# Patient Record
Sex: FEMALE | Race: WHITE | NOT HISPANIC OR LATINO | Employment: OTHER | URBAN - METROPOLITAN AREA
[De-identification: names, ages, dates, MRNs, and addresses within clinical notes are randomized per-mention and may not be internally consistent; named-entity substitution may affect disease eponyms.]

---

## 2021-01-08 ENCOUNTER — HOSPITAL ENCOUNTER (OUTPATIENT)
Dept: LAB | Facility: MEDICAL CENTER | Age: 71
End: 2021-01-08
Attending: PSYCHIATRY & NEUROLOGY
Payer: MEDICARE

## 2021-01-08 ENCOUNTER — OFFICE VISIT (OUTPATIENT)
Dept: NEUROLOGY | Facility: MEDICAL CENTER | Age: 71
End: 2021-01-08
Attending: PSYCHIATRY & NEUROLOGY
Payer: MEDICARE

## 2021-01-08 VITALS
HEART RATE: 94 BPM | TEMPERATURE: 99.4 F | RESPIRATION RATE: 16 BRPM | BODY MASS INDEX: 22.82 KG/M2 | WEIGHT: 141.98 LBS | HEIGHT: 66 IN | OXYGEN SATURATION: 95 % | SYSTOLIC BLOOD PRESSURE: 128 MMHG | DIASTOLIC BLOOD PRESSURE: 65 MMHG

## 2021-01-08 DIAGNOSIS — M31.6 GCA (GIANT CELL ARTERITIS) (HCC): ICD-10-CM

## 2021-01-08 DIAGNOSIS — M31.6 GCA (GIANT CELL ARTERITIS) (HCC): Primary | ICD-10-CM

## 2021-01-08 LAB — ERYTHROCYTE [SEDIMENTATION RATE] IN BLOOD BY WESTERGREN METHOD: <1 MM/HOUR (ref 0–30)

## 2021-01-08 PROCEDURE — 99212 OFFICE O/P EST SF 10 MIN: CPT | Performed by: PSYCHIATRY & NEUROLOGY

## 2021-01-08 PROCEDURE — 86039 ANTINUCLEAR ANTIBODIES (ANA): CPT

## 2021-01-08 PROCEDURE — 36415 COLL VENOUS BLD VENIPUNCTURE: CPT

## 2021-01-08 PROCEDURE — 86038 ANTINUCLEAR ANTIBODIES: CPT

## 2021-01-08 PROCEDURE — 99205 OFFICE O/P NEW HI 60 MIN: CPT | Performed by: PSYCHIATRY & NEUROLOGY

## 2021-01-08 PROCEDURE — 85652 RBC SED RATE AUTOMATED: CPT

## 2021-01-08 RX ORDER — ASPIRIN 81 MG/1
81 TABLET ORAL EVERY EVENING
COMMUNITY

## 2021-01-08 RX ORDER — OMEPRAZOLE 20 MG/1
CAPSULE, DELAYED RELEASE ORAL
COMMUNITY
Start: 2020-10-16 | End: 2021-04-15

## 2021-01-08 RX ORDER — CHOLECALCIFEROL (VITAMIN D3) 125 MCG
2000 CAPSULE ORAL DAILY
COMMUNITY

## 2021-01-08 RX ORDER — ATORVASTATIN CALCIUM 40 MG/1
40 TABLET, FILM COATED ORAL DAILY
Status: ON HOLD | COMMUNITY
Start: 2020-11-04 | End: 2021-04-17

## 2021-01-08 RX ORDER — OMEPRAZOLE 20 MG/1
20 CAPSULE, DELAYED RELEASE ORAL DAILY
COMMUNITY
Start: 2020-10-16 | End: 2021-04-15

## 2021-01-08 RX ORDER — AZELASTINE 1 MG/ML
SPRAY, METERED NASAL
COMMUNITY
Start: 2020-10-20 | End: 2021-04-15

## 2021-01-08 RX ORDER — PREDNISONE 20 MG/1
TABLET ORAL
COMMUNITY
Start: 2020-12-28 | End: 2021-01-11 | Stop reason: SDUPTHER

## 2021-01-08 ASSESSMENT — PATIENT HEALTH QUESTIONNAIRE - PHQ9: CLINICAL INTERPRETATION OF PHQ2 SCORE: 0

## 2021-01-10 ASSESSMENT — ENCOUNTER SYMPTOMS
BLURRED VISION: 1
HEADACHES: 1
DOUBLE VISION: 1
LOSS OF CONSCIOUSNESS: 0
SEIZURES: 0
FOCAL WEAKNESS: 0
MEMORY LOSS: 0
MYALGIAS: 0
NECK PAIN: 0
SENSORY CHANGE: 0

## 2021-01-10 NOTE — PROGRESS NOTES
Naeem Pelletier 59  OCCUPATIONAL THERAPY MISSED TREATMENT NOTE  [facility]  [id number]      Date: 2019  Patient Name: David العلي        CSN: [id number]   : 1936  (79 y.o.)  Gender: male                REASON FOR MISSED TREATMENT:  Missed Treat. Pt is eating breakfast, nurse reports possible back to [facility] today. Will check back at a later date if appropriate. Subjective:      Carine Christopher is a 70 y.o. female who presents for consultation, from the office of Dr. Khai Deras MD, with a history of acute onset headaches back in March, 2020, which have persisted and have seemingly been quite responsive to prednisone suggestive of possible GCA.     HPI    Patient is a pleasant 70-year-old right-handed woman whose headaches started quite suddenly in March of last year.  She has no history of migraine or other primary headache disorder.  The headache has always been right-sided, starting over the right occiput but in time and beginning to spread over into the right temple.  At their worst the headaches have been associated with some bilateral visual blurring but she has never had unilateral, ipsilateral visual loss.  She has complained of diplopia.  The pain itself has been constant, another occasion stabbing, energy levels have diminished, there is notable heightened sensitivity to light, but no GI disturbances or other migrainous stigmata.    She has noted a mass and tenderness that seems to have moved, associated with the pain over the right occiput and then in time over the right temple.  It is exquisitely sensitive to compression.  There is no neck pain or stiffness.  She denies any ipsilateral dysautonomia with lacrimation, conjunctival injection or rhinorrhea.    Since onset, she had one episode where her jaw actually locked, she could not open or close her mouth.  She saw a dentist with some resolution, no change in the headaches themselves.  She has been chewing on Motrin 800 mg tablets like candy.  This jaw event has never recurred.  She denies any pain with chewing or talking in the jaw itself.    She denies any systemic symptoms such as fevers, malaise, generalized myalgias, arthralgias, rash, etc.    She has seen her cardiologist, in June, 2020, evidently had suffered from a heart attack, and a stent was placed.  Placed on Plavix and aspirin, she has begun  to bruise quite profoundly.  He was the impression, given the symptoms, that she may be suffering from GCA.  With her most recent appointment, prednisone was started and this provided consistent benefit.  The problem with her prednisone is that she is putting on weight, and a lot of it!    Through Dr. Deras, additional blood work was drawn, prednisone was again started for 5 days and this gave her more than 2 weeks of benefit though the headaches then recurred.  She did this again with the same pattern of headache response and recurrence.  With steroids all of her symptoms associated with the headaches resolved as well as this tender mass.  Dizziness and some of the visual distortions as well got better though they never resolved completely.    She was then seen at [facility] for the symptoms, ESR was normal at 13, CRP slightly elevated at 1.13, but she was told that she had migraine headaches and nothing more.  Imaging of the brain with CT scan was unremarkable.  Back with Dr. Deras, Plavix has been held pending possible temporal artery biopsy.  She is back on prednisone 20 mg daily.    She has a history of CAD, reflux and dyslipidemia, no history of migraine or other primary headache disorder, diabetes, hypertension, PVD, CVA, seizure, MS, arrhythmia, malignancy, thyroid disease or other autoimmune disease.  There is no surgical history of note.    Her mother is 90 years of age, has suffered from migraine headache, her father and 3 siblings are all alive and well.  No one has a history of similar symptoms as well as autoimmune disease, CVA or malignancy.    She does not smoke, drinks alcohol socially.  Alcohol has no effect on her symptoms 1 way or the other.  She is on multivitamins, prednisone 20 mg daily, Prilosec, hormone supplementation, vitamin D, baby aspirin and Lipitor.    Review of Systems   Constitutional: Negative for malaise/fatigue.   Eyes: Positive for blurred vision and double vision.  "  Musculoskeletal: Negative for myalgias and neck pain.   Neurological: Positive for headaches. Negative for sensory change, focal weakness, seizures and loss of consciousness.   Psychiatric/Behavioral: Negative for memory loss.   All other systems reviewed and are negative.       Objective:     /65 (BP Location: Right arm, Patient Position: Sitting, BP Cuff Size: Adult)   Pulse 94   Temp 37.4 °C (99.4 °F) (Temporal)   Resp 16   Ht 1.664 m (5' 5.5\")   Wt 64.4 kg (141 lb 15.6 oz)   SpO2 95%   BMI 23.27 kg/m²      Physical Exam    She appears in no acute distress.  Still she is quite anxious regarding her symptoms.  She is cooperative.  Vital signs are stable.  There is no malar rash.  The neck is supple.  Cardiac evaluation is unremarkable.  She Chvostek sign is absent, there is no tenderness along the jaws margin bilaterally.  There is no tenderness of the right temple, I cannot palpate any type of soft tissue mass.  There is no conjunctival injection, or ipsilateral rhinorrhea and lacrimation on the right.    Cognition reveals no evidence of deficit, fully oriented, there is no aphasia, agnosia, apraxia, or inattention.    PERRLA/EOMI, visual fields are full to finger counting bilaterally, funduscopic exam reveals sharp disc margins bilaterally.  Facial movements are symmetric, sensory exam is intact to temperature and light touch bilaterally.  The tongue and uvula are midline without bulbar dysfunction.  Shoulder shrug and head rotation are intact and symmetric.    Musculoskeletal exam reveals normal tone throughout, there is no tremor, asterixis, or drift.  Strength is 5/5 throughout, reflexes are present throughout, there are no asymmetries and none are dropped, both toes are downgoing.    She stands easily, arm swing is symmetric, heel, toe, and tandem walking are all normal.  There is no appendicular dystaxia, repetitive movements and fine motor control are also normal with intact and symmetric " amplitude and frequencies throughout.    Sensory exam is intact to temperature and light touch, Romberg is absent.     Assessment/Plan:     1. GCA (giant cell arteritis) (HCC)  I agree that this is not migraine or any other likely primary headache disorder such as autonomic cephalalgias such as cluster headache, chronic paroxysmal hemicrania, etc.  This is not part of polymyalgia rheumatica, the sensitivity to steroids is certainly consistent with GCA, but giant cell artery disease may also have more systemic manifestations, though fortunately none of this has been discovered.  This is not a manifestation of primary CNS vasculitis, though other systemic autoimmune disease could be considered, though easily screened for through blood serologies.    For now, higher doses of steroids are indicated, typically at 60 mg daily for several weeks and then a slow titration by 10 mg increments.  Fortunately, up to now, she has had no ipsilateral visual loss, though she certainly is at risk.  In the interim she was told to talk with Dr. Deras about Plavix and aspirin, especially since the former is creating a problem with bruising.  As for the weight gain on prednisone, unfortunately she is going to have to bite the bullet on this, she was told to alter her diet accordingly, continue to exercise, etc.    Imaging of the brain as well as cerebrovascular bed is indicated.  Sedimentation rate should be checked to see if there has been any major change, it is often used as an indicator of disease control.  Biopsy probably should be done, and even though she has been on steroids, abnormalities can often be seen upwards of several weeks after steroid treatment is initiated.  Neuro-ophthalmology evaluation also is indicated as a baseline.    We talked about the nature of this condition, the rationale for steroid use, we also talked about her prognosis which can actually be quite good assuming the diagnosis is established and  appropriate treatments initiated and maintained.  I will try to contact an appropriate surgeon for biopsy if this has not already been arranged through Dr. Deras.  She now we will play phone contact in the interim.    - MR-BRAIN-WITH & W/O; Future  - MR-MRA HEAD-W/O; Future  - Sed Rate; Future  - VICKIE  - AMB REFERRAL TO NEURO OPHTHALMOLOGY    Time: 60-minute spent face-to-face for exam, review, discussion, and education, of this over 50% of the time spent counseling and coordinating care.

## 2021-01-11 DIAGNOSIS — M31.6 GCA (GIANT CELL ARTERITIS) (HCC): ICD-10-CM

## 2021-01-11 LAB — NUCLEAR IGG SER QL IA: DETECTED

## 2021-01-11 RX ORDER — PREDNISONE 20 MG/1
TABLET ORAL
Qty: 168 TAB | Refills: 0 | Status: ON HOLD
Start: 2021-01-11 | End: 2021-04-17

## 2021-01-11 NOTE — TELEPHONE ENCOUNTER
Received request via: Patient     Was the patient seen in the last year in this department? Yes    Does the patient have an active prescription (recently filled or refills available) for medication(s) requested? No     Patient did not receive medication on 01/08/2021

## 2021-01-12 LAB
ANA INTERPRETIVE COMMENT Q5143: ABNORMAL
ANA PATTERN Q5144: ABNORMAL
ANA TITER Q5145: ABNORMAL
ANTINUCLEAR ANTIBODY (ANA), HEP-2, IGG Q5142: DETECTED

## 2021-01-20 ENCOUNTER — HOSPITAL ENCOUNTER (OUTPATIENT)
Dept: RADIOLOGY | Facility: MEDICAL CENTER | Age: 71
End: 2021-01-20
Attending: PSYCHIATRY & NEUROLOGY
Payer: MEDICARE

## 2021-01-20 DIAGNOSIS — M31.6 GCA (GIANT CELL ARTERITIS) (HCC): ICD-10-CM

## 2021-01-20 PROCEDURE — 70544 MR ANGIOGRAPHY HEAD W/O DYE: CPT

## 2021-01-20 PROCEDURE — 700117 HCHG RX CONTRAST REV CODE 255: Performed by: PSYCHIATRY & NEUROLOGY

## 2021-01-20 PROCEDURE — 70553 MRI BRAIN STEM W/O & W/DYE: CPT

## 2021-01-20 PROCEDURE — A9576 INJ PROHANCE MULTIPACK: HCPCS | Performed by: PSYCHIATRY & NEUROLOGY

## 2021-01-20 RX ADMIN — GADOTERIDOL 15 ML: 279.3 INJECTION, SOLUTION INTRAVENOUS at 17:05

## 2021-02-03 ENCOUNTER — OFFICE VISIT (OUTPATIENT)
Dept: OPHTHALMOLOGY | Facility: MEDICAL CENTER | Age: 71
End: 2021-02-03
Payer: MEDICARE

## 2021-02-03 DIAGNOSIS — M31.6 GIANT CELL ARTERITIS (HCC): ICD-10-CM

## 2021-02-03 DIAGNOSIS — H46.9 OPTIC NEUROPATHY: ICD-10-CM

## 2021-02-03 DIAGNOSIS — Z96.1 PSEUDOPHAKIA OF BOTH EYES: ICD-10-CM

## 2021-02-03 DIAGNOSIS — H52.31 ANISOMETROPIA: ICD-10-CM

## 2021-02-03 DIAGNOSIS — H35.371 EPIRETINAL MEMBRANE (ERM) OF RIGHT EYE: ICD-10-CM

## 2021-02-03 PROCEDURE — 92250 FUNDUS PHOTOGRAPHY W/I&R: CPT | Performed by: OPHTHALMOLOGY

## 2021-02-03 PROCEDURE — 99204 OFFICE O/P NEW MOD 45 MIN: CPT | Mod: 25 | Performed by: OPHTHALMOLOGY

## 2021-02-03 ASSESSMENT — VISUAL ACUITY
OD_SC: 20/20
OS_PH_SC: 20/20
OS_SC: 20/40+2
OS_CC: J2
METHOD: SNELLEN - LINEAR
OD_CC: J2

## 2021-02-03 ASSESSMENT — ENCOUNTER SYMPTOMS
HEADACHES: 1
DOUBLE VISION: 1
BLURRED VISION: 1

## 2021-02-03 ASSESSMENT — REFRACTION
OD_SPHERE: +0.25
OS_SPHERE: -1.25
OD_AXIS: 079
OD_CYLINDER: +0.50
OS_CYLINDER: +1.25
OS_AXIS: 152

## 2021-02-03 ASSESSMENT — CONF VISUAL FIELD
OS_NORMAL: 1
OD_NORMAL: 1

## 2021-02-03 ASSESSMENT — REFRACTION_MANIFEST
OS_CYLINDER: +1.50
OS_AXIS: 156
METHOD_AUTOREFRACTION: 1
OD_SPHERE: +0.50
OS_SPHERE: -1.50
OD_CYLINDER: +0.25
OD_AXIS: 078

## 2021-02-03 ASSESSMENT — REFRACTION_WEARINGRX
SPECS_TYPE: SVL
OD_CYLINDER: SPHERE
OD_SPHERE: +1.50
OS_SPHERE: +1.50
OS_CYLINDER: SPHERE

## 2021-02-03 ASSESSMENT — SLIT LAMP EXAM - LIDS
COMMENTS: NORMAL
COMMENTS: NORMAL

## 2021-02-03 ASSESSMENT — EXTERNAL EXAM - RIGHT EYE: OD_EXAM: NORMAL

## 2021-02-03 ASSESSMENT — TONOMETRY
OD_IOP_MMHG: 16
OS_IOP_MMHG: 10
OD_IOP_MMHG: 14
OS_IOP_MMHG: 12

## 2021-02-03 ASSESSMENT — CUP TO DISC RATIO
OS_RATIO: 0.1
OD_RATIO: 0.1

## 2021-02-03 ASSESSMENT — EXTERNAL EXAM - LEFT EYE: OS_EXAM: NORMAL

## 2021-02-03 NOTE — ASSESSMENT & PLAN NOTE
2/3/2021 - Epiretinal membrane seen on 5-line OCT. Discussed that since vision still 20/20 would hold on referral unless becomes more symptomatic

## 2021-02-03 NOTE — ASSESSMENT & PLAN NOTE
2/3/2021 - Presumed giant cell arteritis by history, symptoms of temporal pain, and response to prednisone. Never had temporal artery biopsy. ESR was 1 back in January, but had been on prednosne at least 10 mg for a few months prior. Dr Banks increased prednisone to 60 and currently on 40 mg / day.  From the neuro-ophthalmic standpoint no evidence of optic nerve invovement with OCT NFL thickness normal at 107 OD and 110 OS. No optic disc pallor and no swelling. In Addition VICKIE was 1:320. Therefore discussed with patient that I recommend being managed by Rheumatology. Would consider Actemra and thus will refer to Dr Michaud in [place]. VICKIE will need to be characterized to determine is has concomitant other autoimmune disorder.
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
## 2021-02-03 NOTE — ASSESSMENT & PLAN NOTE
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 Causing some symptomatic aniseikonbia. Will therefore refer to Dr Navjot Feliz for consideration of IOL exchange

## 2021-02-12 ENCOUNTER — HOSPITAL ENCOUNTER (OUTPATIENT)
Dept: LAB | Facility: MEDICAL CENTER | Age: 71
End: 2021-02-12
Attending: FAMILY MEDICINE
Payer: MEDICARE

## 2021-02-12 LAB
ALBUMIN SERPL BCP-MCNC: 4.1 G/DL (ref 3.2–4.9)
ALBUMIN/GLOB SERPL: 1.5 G/DL
ALP SERPL-CCNC: 52 U/L (ref 30–99)
ALT SERPL-CCNC: 26 U/L (ref 2–50)
ANION GAP SERPL CALC-SCNC: 14 MMOL/L (ref 7–16)
AST SERPL-CCNC: 18 U/L (ref 12–45)
BASOPHILS # BLD AUTO: 0.2 % (ref 0–1.8)
BASOPHILS # BLD: 0.03 K/UL (ref 0–0.12)
BILIRUB SERPL-MCNC: 0.2 MG/DL (ref 0.1–1.5)
BUN SERPL-MCNC: 36 MG/DL (ref 8–22)
CALCIUM SERPL-MCNC: 9.4 MG/DL (ref 8.5–10.5)
CHLORIDE SERPL-SCNC: 102 MMOL/L (ref 96–112)
CHOLEST SERPL-MCNC: 196 MG/DL (ref 100–199)
CO2 SERPL-SCNC: 23 MMOL/L (ref 20–33)
CREAT SERPL-MCNC: 0.85 MG/DL (ref 0.5–1.4)
EOSINOPHIL # BLD AUTO: 0.01 K/UL (ref 0–0.51)
EOSINOPHIL NFR BLD: 0.1 % (ref 0–6.9)
ERYTHROCYTE [DISTWIDTH] IN BLOOD BY AUTOMATED COUNT: 55.4 FL (ref 35.9–50)
GLOBULIN SER CALC-MCNC: 2.7 G/DL (ref 1.9–3.5)
GLUCOSE SERPL-MCNC: 107 MG/DL (ref 65–99)
HCT VFR BLD AUTO: 46 % (ref 37–47)
HDLC SERPL-MCNC: 88 MG/DL
HGB BLD-MCNC: 14.8 G/DL (ref 12–16)
IMM GRANULOCYTES # BLD AUTO: 0.12 K/UL (ref 0–0.11)
IMM GRANULOCYTES NFR BLD AUTO: 0.8 % (ref 0–0.9)
LDLC SERPL CALC-MCNC: 86 MG/DL
LYMPHOCYTES # BLD AUTO: 1.07 K/UL (ref 1–4.8)
LYMPHOCYTES NFR BLD: 6.9 % (ref 22–41)
MCH RBC QN AUTO: 29.1 PG (ref 27–33)
MCHC RBC AUTO-ENTMCNC: 32.2 G/DL (ref 33.6–35)
MCV RBC AUTO: 90.6 FL (ref 81.4–97.8)
MONOCYTES # BLD AUTO: 0.7 K/UL (ref 0–0.85)
MONOCYTES NFR BLD AUTO: 4.5 % (ref 0–13.4)
NEUTROPHILS # BLD AUTO: 13.62 K/UL (ref 2–7.15)
NEUTROPHILS NFR BLD: 87.5 % (ref 44–72)
NRBC # BLD AUTO: 0 K/UL
NRBC BLD-RTO: 0 /100 WBC
PLATELET # BLD AUTO: 256 K/UL (ref 164–446)
PMV BLD AUTO: 11.4 FL (ref 9–12.9)
POTASSIUM SERPL-SCNC: 4.3 MMOL/L (ref 3.6–5.5)
PROT SERPL-MCNC: 6.8 G/DL (ref 6–8.2)
RBC # BLD AUTO: 5.08 M/UL (ref 4.2–5.4)
SODIUM SERPL-SCNC: 139 MMOL/L (ref 135–145)
TRIGL SERPL-MCNC: 110 MG/DL (ref 0–149)
WBC # BLD AUTO: 15.6 K/UL (ref 4.8–10.8)

## 2021-02-12 PROCEDURE — 85025 COMPLETE CBC W/AUTO DIFF WBC: CPT

## 2021-02-12 PROCEDURE — 80061 LIPID PANEL: CPT

## 2021-02-12 PROCEDURE — 36415 COLL VENOUS BLD VENIPUNCTURE: CPT

## 2021-02-12 PROCEDURE — 80053 COMPREHEN METABOLIC PANEL: CPT

## 2021-03-09 ENCOUNTER — HOSPITAL ENCOUNTER (OUTPATIENT)
Dept: RADIOLOGY | Facility: MEDICAL CENTER | Age: 71
End: 2021-03-09
Attending: FAMILY MEDICINE
Payer: MEDICARE

## 2021-03-09 DIAGNOSIS — R06.02 SHORTNESS OF BREATH: ICD-10-CM

## 2021-03-09 PROCEDURE — 71046 X-RAY EXAM CHEST 2 VIEWS: CPT

## 2021-04-15 ENCOUNTER — APPOINTMENT (OUTPATIENT)
Dept: RADIOLOGY | Facility: MEDICAL CENTER | Age: 71
DRG: 247 | End: 2021-04-15
Attending: EMERGENCY MEDICINE
Payer: MEDICARE

## 2021-04-15 ENCOUNTER — HOSPITAL ENCOUNTER (INPATIENT)
Facility: MEDICAL CENTER | Age: 71
LOS: 2 days | DRG: 247 | End: 2021-04-17
Attending: EMERGENCY MEDICINE | Admitting: INTERNAL MEDICINE
Payer: MEDICARE

## 2021-04-15 DIAGNOSIS — I20.0 UNSTABLE ANGINA (HCC): ICD-10-CM

## 2021-04-15 DIAGNOSIS — R07.9 ACUTE CHEST PAIN: ICD-10-CM

## 2021-04-15 PROBLEM — R73.9 HYPERGLYCEMIA: Status: ACTIVE | Noted: 2021-04-15

## 2021-04-15 PROBLEM — I25.10 CORONARY ARTERY DISEASE: Status: ACTIVE | Noted: 2021-04-15

## 2021-04-15 PROBLEM — D72.829 LEUKOCYTOSIS: Status: ACTIVE | Noted: 2021-04-15

## 2021-04-15 LAB
ALBUMIN SERPL BCP-MCNC: 4.3 G/DL (ref 3.2–4.9)
ALBUMIN/GLOB SERPL: 1.5 G/DL
ALP SERPL-CCNC: 63 U/L (ref 30–99)
ALT SERPL-CCNC: 26 U/L (ref 2–50)
ANION GAP SERPL CALC-SCNC: 10 MMOL/L (ref 7–16)
APTT PPP: 26.6 SEC (ref 24.7–36)
AST SERPL-CCNC: 36 U/L (ref 12–45)
BASOPHILS # BLD AUTO: 0.3 % (ref 0–1.8)
BASOPHILS # BLD: 0.05 K/UL (ref 0–0.12)
BILIRUB SERPL-MCNC: 0.4 MG/DL (ref 0.1–1.5)
BUN SERPL-MCNC: 23 MG/DL (ref 8–22)
CALCIUM SERPL-MCNC: 9.7 MG/DL (ref 8.5–10.5)
CHLORIDE SERPL-SCNC: 101 MMOL/L (ref 96–112)
CO2 SERPL-SCNC: 23 MMOL/L (ref 20–33)
CREAT SERPL-MCNC: 0.88 MG/DL (ref 0.5–1.4)
EKG IMPRESSION: NORMAL
EOSINOPHIL # BLD AUTO: 0.03 K/UL (ref 0–0.51)
EOSINOPHIL NFR BLD: 0.2 % (ref 0–6.9)
ERYTHROCYTE [DISTWIDTH] IN BLOOD BY AUTOMATED COUNT: 48.2 FL (ref 35.9–50)
EST. AVERAGE GLUCOSE BLD GHB EST-MCNC: 123 MG/DL
GLOBULIN SER CALC-MCNC: 2.9 G/DL (ref 1.9–3.5)
GLUCOSE SERPL-MCNC: 111 MG/DL (ref 65–99)
HBA1C MFR BLD: 5.9 % (ref 4–5.6)
HCT VFR BLD AUTO: 45.1 % (ref 37–47)
HGB BLD-MCNC: 14.4 G/DL (ref 12–16)
IMM GRANULOCYTES # BLD AUTO: 0.06 K/UL (ref 0–0.11)
IMM GRANULOCYTES NFR BLD AUTO: 0.4 % (ref 0–0.9)
INR PPP: 0.97 (ref 0.87–1.13)
LYMPHOCYTES # BLD AUTO: 0.93 K/UL (ref 1–4.8)
LYMPHOCYTES NFR BLD: 6.4 % (ref 22–41)
MCH RBC QN AUTO: 29.9 PG (ref 27–33)
MCHC RBC AUTO-ENTMCNC: 31.9 G/DL (ref 33.6–35)
MCV RBC AUTO: 93.6 FL (ref 81.4–97.8)
MONOCYTES # BLD AUTO: 0.36 K/UL (ref 0–0.85)
MONOCYTES NFR BLD AUTO: 2.5 % (ref 0–13.4)
NEUTROPHILS # BLD AUTO: 13.02 K/UL (ref 2–7.15)
NEUTROPHILS NFR BLD: 90.2 % (ref 44–72)
NRBC # BLD AUTO: 0 K/UL
NRBC BLD-RTO: 0 /100 WBC
PLATELET # BLD AUTO: 312 K/UL (ref 164–446)
PMV BLD AUTO: 10.3 FL (ref 9–12.9)
POTASSIUM SERPL-SCNC: 4.4 MMOL/L (ref 3.6–5.5)
PROT SERPL-MCNC: 7.2 G/DL (ref 6–8.2)
PROTHROMBIN TIME: 13.2 SEC (ref 12–14.6)
RBC # BLD AUTO: 4.82 M/UL (ref 4.2–5.4)
SARS-COV-2 RNA RESP QL NAA+PROBE: NOTDETECTED
SODIUM SERPL-SCNC: 134 MMOL/L (ref 135–145)
SPECIMEN SOURCE: NORMAL
TROPONIN T SERPL-MCNC: 43 NG/L (ref 6–19)
TROPONIN T SERPL-MCNC: 46 NG/L (ref 6–19)
TROPONIN T SERPL-MCNC: 47 NG/L (ref 6–19)
UFH PPP CHRO-ACNC: 0.99 IU/ML
UFH PPP CHRO-ACNC: <0.1 IU/ML
WBC # BLD AUTO: 14.5 K/UL (ref 4.8–10.8)

## 2021-04-15 PROCEDURE — A9270 NON-COVERED ITEM OR SERVICE: HCPCS | Performed by: INTERNAL MEDICINE

## 2021-04-15 PROCEDURE — 85730 THROMBOPLASTIN TIME PARTIAL: CPT

## 2021-04-15 PROCEDURE — 770020 HCHG ROOM/CARE - TELE (206)

## 2021-04-15 PROCEDURE — 80053 COMPREHEN METABOLIC PANEL: CPT

## 2021-04-15 PROCEDURE — U0005 INFEC AGEN DETEC AMPLI PROBE: HCPCS

## 2021-04-15 PROCEDURE — 700102 HCHG RX REV CODE 250 W/ 637 OVERRIDE(OP): Performed by: INTERNAL MEDICINE

## 2021-04-15 PROCEDURE — 85025 COMPLETE CBC W/AUTO DIFF WBC: CPT

## 2021-04-15 PROCEDURE — 700111 HCHG RX REV CODE 636 W/ 250 OVERRIDE (IP): Performed by: INTERNAL MEDICINE

## 2021-04-15 PROCEDURE — 700102 HCHG RX REV CODE 250 W/ 637 OVERRIDE(OP): Performed by: EMERGENCY MEDICINE

## 2021-04-15 PROCEDURE — 4A023N7 MEASUREMENT OF CARDIAC SAMPLING AND PRESSURE, LEFT HEART, PERCUTANEOUS APPROACH: ICD-10-PCS | Performed by: INTERNAL MEDICINE

## 2021-04-15 PROCEDURE — 36415 COLL VENOUS BLD VENIPUNCTURE: CPT

## 2021-04-15 PROCEDURE — 700117 HCHG RX CONTRAST REV CODE 255: Performed by: EMERGENCY MEDICINE

## 2021-04-15 PROCEDURE — 99223 1ST HOSP IP/OBS HIGH 75: CPT | Performed by: INTERNAL MEDICINE

## 2021-04-15 PROCEDURE — 99285 EMERGENCY DEPT VISIT HI MDM: CPT

## 2021-04-15 PROCEDURE — 93005 ELECTROCARDIOGRAM TRACING: CPT

## 2021-04-15 PROCEDURE — 85610 PROTHROMBIN TIME: CPT

## 2021-04-15 PROCEDURE — 83036 HEMOGLOBIN GLYCOSYLATED A1C: CPT

## 2021-04-15 PROCEDURE — 84484 ASSAY OF TROPONIN QUANT: CPT | Mod: 91

## 2021-04-15 PROCEDURE — A9270 NON-COVERED ITEM OR SERVICE: HCPCS | Performed by: EMERGENCY MEDICINE

## 2021-04-15 PROCEDURE — G1004 CDSM NDSC: HCPCS

## 2021-04-15 PROCEDURE — 71045 X-RAY EXAM CHEST 1 VIEW: CPT

## 2021-04-15 PROCEDURE — B2111ZZ FLUOROSCOPY OF MULTIPLE CORONARY ARTERIES USING LOW OSMOLAR CONTRAST: ICD-10-PCS | Performed by: INTERNAL MEDICINE

## 2021-04-15 PROCEDURE — U0003 INFECTIOUS AGENT DETECTION BY NUCLEIC ACID (DNA OR RNA); SEVERE ACUTE RESPIRATORY SYNDROME CORONAVIRUS 2 (SARS-COV-2) (CORONAVIRUS DISEASE [COVID-19]), AMPLIFIED PROBE TECHNIQUE, MAKING USE OF HIGH THROUGHPUT TECHNOLOGIES AS DESCRIBED BY CMS-2020-01-R: HCPCS

## 2021-04-15 PROCEDURE — 99222 1ST HOSP IP/OBS MODERATE 55: CPT | Mod: AI | Performed by: INTERNAL MEDICINE

## 2021-04-15 PROCEDURE — 027034Z DILATION OF CORONARY ARTERY, ONE ARTERY WITH DRUG-ELUTING INTRALUMINAL DEVICE, PERCUTANEOUS APPROACH: ICD-10-PCS | Performed by: INTERNAL MEDICINE

## 2021-04-15 PROCEDURE — 85520 HEPARIN ASSAY: CPT

## 2021-04-15 PROCEDURE — 93005 ELECTROCARDIOGRAM TRACING: CPT | Performed by: EMERGENCY MEDICINE

## 2021-04-15 RX ORDER — CLOPIDOGREL BISULFATE 75 MG/1
75 TABLET ORAL DAILY
Status: DISCONTINUED | OUTPATIENT
Start: 2021-04-16 | End: 2021-04-17 | Stop reason: HOSPADM

## 2021-04-15 RX ORDER — AMOXICILLIN 250 MG
2 CAPSULE ORAL 2 TIMES DAILY
Status: DISCONTINUED | OUTPATIENT
Start: 2021-04-15 | End: 2021-04-17 | Stop reason: HOSPADM

## 2021-04-15 RX ORDER — PREDNISONE 10 MG/1
10 TABLET ORAL DAILY
Status: DISCONTINUED | OUTPATIENT
Start: 2021-04-15 | End: 2021-04-15

## 2021-04-15 RX ORDER — PREDNISONE 20 MG/1
20 TABLET ORAL DAILY
Status: DISCONTINUED | OUTPATIENT
Start: 2021-04-16 | End: 2021-04-17 | Stop reason: HOSPADM

## 2021-04-15 RX ORDER — CLOPIDOGREL BISULFATE 75 MG/1
75 TABLET ORAL DAILY
COMMUNITY
End: 2021-04-29 | Stop reason: SDUPTHER

## 2021-04-15 RX ORDER — POLYETHYLENE GLYCOL 3350 17 G/17G
1 POWDER, FOR SOLUTION ORAL
Status: DISCONTINUED | OUTPATIENT
Start: 2021-04-15 | End: 2021-04-17 | Stop reason: HOSPADM

## 2021-04-15 RX ORDER — ACETAMINOPHEN 325 MG/1
650 TABLET ORAL EVERY 6 HOURS PRN
Status: DISCONTINUED | OUTPATIENT
Start: 2021-04-15 | End: 2021-04-17 | Stop reason: HOSPADM

## 2021-04-15 RX ORDER — CONJUGATED ESTROGENS AND MEDROXYPROGESTERONE ACETATE 0.625 (14)
1 KIT ORAL DAILY
COMMUNITY
End: 2022-01-11

## 2021-04-15 RX ORDER — ATORVASTATIN CALCIUM 40 MG/1
40 TABLET, FILM COATED ORAL DAILY
Status: DISCONTINUED | OUTPATIENT
Start: 2021-04-16 | End: 2021-04-15

## 2021-04-15 RX ORDER — HEPARIN SODIUM 5000 [USP'U]/100ML
0-30 INJECTION, SOLUTION INTRAVENOUS CONTINUOUS
Status: DISCONTINUED | OUTPATIENT
Start: 2021-04-15 | End: 2021-04-16

## 2021-04-15 RX ORDER — ATORVASTATIN CALCIUM 80 MG/1
80 TABLET, FILM COATED ORAL DAILY
Status: DISCONTINUED | OUTPATIENT
Start: 2021-04-16 | End: 2021-04-17 | Stop reason: HOSPADM

## 2021-04-15 RX ORDER — BISACODYL 10 MG
10 SUPPOSITORY, RECTAL RECTAL
Status: DISCONTINUED | OUTPATIENT
Start: 2021-04-15 | End: 2021-04-17 | Stop reason: HOSPADM

## 2021-04-15 RX ORDER — HEPARIN SODIUM 5000 [USP'U]/ML
5000 INJECTION, SOLUTION INTRAVENOUS; SUBCUTANEOUS EVERY 8 HOURS
Status: DISCONTINUED | OUTPATIENT
Start: 2021-04-15 | End: 2021-04-15

## 2021-04-15 RX ORDER — HEPARIN SODIUM 1000 [USP'U]/ML
60 INJECTION, SOLUTION INTRAVENOUS; SUBCUTANEOUS ONCE
Status: COMPLETED | OUTPATIENT
Start: 2021-04-15 | End: 2021-04-15

## 2021-04-15 RX ORDER — HEPARIN SODIUM 1000 [USP'U]/ML
30 INJECTION, SOLUTION INTRAVENOUS; SUBCUTANEOUS PRN
Status: DISCONTINUED | OUTPATIENT
Start: 2021-04-15 | End: 2021-04-16

## 2021-04-15 RX ORDER — LABETALOL HYDROCHLORIDE 5 MG/ML
10 INJECTION, SOLUTION INTRAVENOUS EVERY 4 HOURS PRN
Status: DISCONTINUED | OUTPATIENT
Start: 2021-04-15 | End: 2021-04-17 | Stop reason: HOSPADM

## 2021-04-15 RX ADMIN — HEPARIN SODIUM 5000 UNITS: 5000 INJECTION, SOLUTION INTRAVENOUS; SUBCUTANEOUS at 13:13

## 2021-04-15 RX ADMIN — ASPIRIN 81 MG: 81 TABLET, COATED ORAL at 17:51

## 2021-04-15 RX ADMIN — LIDOCAINE HYDROCHLORIDE 30 ML: 20 SOLUTION OROPHARYNGEAL at 08:08

## 2021-04-15 RX ADMIN — NITROGLYCERIN 0.5 INCH: 20 OINTMENT TOPICAL at 17:51

## 2021-04-15 RX ADMIN — METOPROLOL TARTRATE 25 MG: 25 TABLET, FILM COATED ORAL at 15:15

## 2021-04-15 RX ADMIN — ACETAMINOPHEN 650 MG: 325 TABLET, FILM COATED ORAL at 16:30

## 2021-04-15 RX ADMIN — IOHEXOL 90 ML: 350 INJECTION, SOLUTION INTRAVENOUS at 10:07

## 2021-04-15 RX ADMIN — HEPARIN SODIUM 3900 UNITS: 1000 INJECTION, SOLUTION INTRAVENOUS; SUBCUTANEOUS at 16:23

## 2021-04-15 RX ADMIN — HEPARIN SODIUM 12 UNITS/KG/HR: 5000 INJECTION, SOLUTION INTRAVENOUS at 16:00

## 2021-04-15 ASSESSMENT — PATIENT HEALTH QUESTIONNAIRE - PHQ9
SUM OF ALL RESPONSES TO PHQ9 QUESTIONS 1 AND 2: 0
2. FEELING DOWN, DEPRESSED, IRRITABLE, OR HOPELESS: NOT AT ALL
1. LITTLE INTEREST OR PLEASURE IN DOING THINGS: NOT AT ALL

## 2021-04-15 ASSESSMENT — COGNITIVE AND FUNCTIONAL STATUS - GENERAL
MOBILITY SCORE: 24
DAILY ACTIVITIY SCORE: 24
SUGGESTED CMS G CODE MODIFIER MOBILITY: CH
SUGGESTED CMS G CODE MODIFIER DAILY ACTIVITY: CH

## 2021-04-15 ASSESSMENT — LIFESTYLE VARIABLES
TOTAL SCORE: 0
CONSUMPTION TOTAL: NEGATIVE
EVER HAD A DRINK FIRST THING IN THE MORNING TO STEADY YOUR NERVES TO GET RID OF A HANGOVER: NO
TOTAL SCORE: 0
AVERAGE NUMBER OF DAYS PER WEEK YOU HAVE A DRINK CONTAINING ALCOHOL: 0
ON A TYPICAL DAY WHEN YOU DRINK ALCOHOL HOW MANY DRINKS DO YOU HAVE: 0
TOTAL SCORE: 0
HAVE YOU EVER FELT YOU SHOULD CUT DOWN ON YOUR DRINKING: NO
SUBSTANCE_ABUSE: 0
HAVE PEOPLE ANNOYED YOU BY CRITICIZING YOUR DRINKING: NO
HOW MANY TIMES IN THE PAST YEAR HAVE YOU HAD 5 OR MORE DRINKS IN A DAY: 0
EVER FELT BAD OR GUILTY ABOUT YOUR DRINKING: NO
ALCOHOL_USE: NO

## 2021-04-15 ASSESSMENT — ENCOUNTER SYMPTOMS
PALPITATIONS: 0
DIARRHEA: 0
SHORTNESS OF BREATH: 0
DOUBLE VISION: 0
EYE PAIN: 0
HEADACHES: 1
VOMITING: 0
NAUSEA: 0
HEARTBURN: 0
SPUTUM PRODUCTION: 0
BLURRED VISION: 0
GASTROINTESTINAL NEGATIVE: 1
FEVER: 0
DIAPHORESIS: 0
RESPIRATORY NEGATIVE: 1
DEPRESSION: 0
PHOTOPHOBIA: 0
HALLUCINATIONS: 0
DYSPHORIC MOOD: 1
COUGH: 0
CONSTITUTIONAL NEGATIVE: 1
NEUROLOGICAL NEGATIVE: 1
MUSCULOSKELETAL NEGATIVE: 1
WEIGHT LOSS: 0
ABDOMINAL PAIN: 0
CONSTIPATION: 0
HEMOPTYSIS: 0
CHILLS: 0

## 2021-04-15 ASSESSMENT — FIBROSIS 4 INDEX
FIB4 SCORE: 0.98
FIB4 SCORE: 1.61

## 2021-04-15 ASSESSMENT — PAIN DESCRIPTION - PAIN TYPE: TYPE: ACUTE PAIN

## 2021-04-15 NOTE — PROGRESS NOTES
2 RN skin check:  No devices in place  Skin intact behind bilat ears, knees, heels, and back/sacrum

## 2021-04-15 NOTE — H&P
Hospital Medicine History & Physical Note    Date of Service  4/15/2021    Primary Care Physician  Khai Deras M.D.    Consultants  Cardiology    Code Status  Full Code    Chief Complaint  Chief Complaint   Patient presents with   • Chest Pain     Started intermittently on friday. Last night worse and radiating between her shoulder blades and radiating down her left arm.        History of Presenting Illness  70 y.o. female with a past medical history of coronary artery disease status post stenting, and history of giant cell arteritis without proven biopsy who presented 4/15/2021 with chest pain.    The patient states that she has been having chest pain for the past 6 days.  She describes the pain as pressure-like, at its highest point it was 10 out of 10 of intensity, worsening on exertion, the patient mentions that yesterday the chest pain was more intense than previous episodes, and had radiated across her back and into her left shoulder.  The patient states that due to the similar nature of the pain when she had her MI last October, she decided to come to the ED for further assessment and evaluation.The patient denies abdominal pain, diarrhea, constipation, sick contacts or any other focal neurological deficit.    In the ED initial troponins were 47, EKG reported minimal ST depression on lateral leads.  Have consulted cardiology, who have now recommended the patient be placed on heparin drip, added metoprolol, increase the patient's atorvastatin and will continue with aspirin and Plavix.  As per cardiology the patient will undergo angiography tomorrow morning.    The patient will be admitted to telemetry.    Review of Systems  Review of Systems   Constitutional: Negative for chills, diaphoresis, fever, malaise/fatigue and weight loss.   HENT: Negative for ear discharge, ear pain, hearing loss and tinnitus.    Eyes: Negative for blurred vision, double vision, photophobia and pain.   Respiratory: Negative for  cough, hemoptysis, sputum production and shortness of breath.    Cardiovascular: Positive for chest pain. Negative for palpitations.   Gastrointestinal: Negative for abdominal pain, constipation, diarrhea, heartburn, nausea and vomiting.   Genitourinary: Negative for dysuria, frequency and urgency.   Neurological: Positive for headaches.   Psychiatric/Behavioral: Negative for depression, hallucinations, substance abuse and suicidal ideas.       Past Medical History   has a past medical history of Bronchitis, Giant cell arteritis (HCC), Heart attack (HCC) (10/2020), and Hyperlipidemia.    Surgical History   has a past surgical history that includes breast implant revision; abdominal exploration; and cataract extraction with iol.     Family History  family history includes Glasses in her father; Heart Disease in her father.     Social History   reports that she has never smoked. She has never used smokeless tobacco. She reports previous alcohol use of about 0.6 oz of alcohol per week. She reports that she does not use drugs.    Allergies  Allergies   Allergen Reactions   • Chloramphenicol Diarrhea     PCN is okay to take   • Codeine    • Sulfa Antibiotics  [Sulfa Drugs] Unspecified       Medications  Prior to Admission Medications   Prescriptions Last Dose Informant Patient Reported? Taking?   Ascorbic Acid (VITAMIN C PO) 2021 at AM Patient Yes Yes   Sig: Take 1 tablet by mouth every day.   Cholecalciferol (VITAMIN D3) 50 MCG (2000 UT) Tab 2021 at PM Patient Yes No   Sig: Take 2,000 Units by mouth every day at 6 PM.   Coenzyme Q10 (COQ10 PO) 2021 at AM Patient Yes Yes   Sig: Take 1 tablet by mouth every day.   Conj Estrog-Medroxyprogest Ace (PREMPHASE) Tab 2021 at PM Patient Yes Yes   Sig: Take 1 tablet by mouth every day.   LUTEIN PO 2021 at PM Patient Yes No   Sig: Take 2 Tablets by mouth every day.   Fhhcqq-ZkXdx-VdMym-FA-CA-Omega (COMPLETE [place] DHA) 29-1-200 & 250 MG Misc 2021 at PM  Patient Yes No   Sig: Take 2 Tablets by mouth every day.   aspirin (ASPIRIN 81) 81 MG EC tablet 4/14/2021 at PM Patient Yes No   Sig: Take 81 mg by mouth every evening.   atorvastatin (LIPITOR) 40 MG Tab 4/15/2021 at AM Patient Yes No   Sig: Take 40 mg by mouth every day.   clopidogrel (PLAVIX) 75 MG Tab 4/15/2021 at AM Patient Yes Yes   Sig: Take 75 mg by mouth every day.   predniSONE (DELTASONE) 20 MG Tab 4/15/2021 at AM Patient No No   Sig: Take 3 Tabs by mouth every day for 21 days, THEN 2 Tabs every day for 21 days, THEN 1.5 Tabs every day for 21 days, THEN 1 Tab every day for 21 days, THEN 0.5 Tabs every day for 21 days. Then stop      Facility-Administered Medications: None       Physical Exam  Temp:  [36.4 °C (97.6 °F)-36.7 °C (98 °F)] 36.7 °C (98 °F)  Pulse:  [81-98] 81  Resp:  [16-20] 20  BP: (119-151)/(63-88) 148/86  SpO2:  [93 %-95 %] 95 %    Physical Exam  Constitutional:       General: She is not in acute distress.     Appearance: Normal appearance. She is not ill-appearing or toxic-appearing.   HENT:      Mouth/Throat:      Pharynx: No oropharyngeal exudate or posterior oropharyngeal erythema.   Eyes:      General:         Right eye: No discharge.         Left eye: No discharge.   Cardiovascular:      Rate and Rhythm: Normal rate and regular rhythm.      Pulses: Normal pulses.      Heart sounds: No murmur. No gallop.    Pulmonary:      Effort: No respiratory distress.      Breath sounds: No wheezing or rales.   Abdominal:      General: Abdomen is flat. There is no distension.      Palpations: Abdomen is soft.      Tenderness: There is no abdominal tenderness.   Musculoskeletal:         General: Normal range of motion.      Cervical back: Normal range of motion.   Skin:     General: Skin is warm and dry.   Neurological:      General: No focal deficit present.      Mental Status: She is alert and oriented to person, place, and time. Mental status is at baseline.      Cranial Nerves: No cranial nerve  deficit.      Motor: No weakness.   Psychiatric:         Mood and Affect: Mood normal.         Behavior: Behavior normal.         Thought Content: Thought content normal.         Judgment: Judgment normal.         Laboratory:  Recent Labs     04/15/21  0800   WBC 14.5*   RBC 4.82   HEMOGLOBIN 14.4   HEMATOCRIT 45.1   MCV 93.6   MCH 29.9   MCHC 31.9*   RDW 48.2   PLATELETCT 312   MPV 10.3     Recent Labs     04/15/21  0800   SODIUM 134*   POTASSIUM 4.4   CHLORIDE 101   CO2 23   GLUCOSE 111*   BUN 23*   CREATININE 0.88   CALCIUM 9.7     Recent Labs     04/15/21  0800   ALTSGPT 26   ASTSGOT 36   ALKPHOSPHAT 63   TBILIRUBIN 0.4   GLUCOSE 111*         No results for input(s): NTPROBNP in the last 72 hours.      Recent Labs     04/15/21  0800 04/15/21  1238   TROPONINT 47* 43*       Imaging:  CT-CTA COMPLETE THORACOABDOMINAL AORTA   Final Result      1.  No thoracic or abdominal aortic dissection or aneurysm.      2.  No acute pulmonary process.      3.  No inflammatory or obstructive process identified.            DX-CHEST-PORTABLE (1 VIEW)   Final Result      No acute cardiopulmonary abnormality.      CL-LEFT HEART CATHETERIZATION WITH POSSIBLE INTERVENTION    (Results Pending)         Assessment/Plan:  I anticipate this patient will require at least two midnights for appropriate medical management, necessitating inpatient admission.    * Pain in the chest  Assessment & Plan  Patient comes in with a 6-day history of chest pain, that she describes as pressure-like, worse on exertion, radiating to the back and left shoulder.  Initial troponins were 47, EKG reported minimal ST depression on lateral leads  I have consulted cardiology, we appreciate further recommendations.  We will do serial troponins, place the patient on telemetry.    Cardiology has recommended heparin drip, increased the patient's atorvastatin dose and added metoprolol.  Cardiology has a plan to perform angiography tomorrow, unless patient develops  recurrent chest pain again.    Coronary artery disease  Assessment & Plan  Patient with a history of coronary artery disease status post stent last October in [place], as per the patient.  Patient taking aspirin, Plavix and atorvastatin and we have resumed.  It appears patient does not take beta-blocker, unsure as to what the reason is.  We appreciate further cardiology recommendation.  Cardiology have recommended metoprolol, that has been started now.    Giant cell arteritis (HCC)- (present on admission)  Assessment & Plan  As per patient she had been diagnosed with giant cell arteritis since last year.    Currently taking prednisone 10 mg daily, we will resume her dose for now.  Patient mentions that she has a follow-up appointment with neurology as outpatient.    Cardiology recommended to increase prednisone to 20 mg which they have now.      Hyperglycemia  Assessment & Plan  Patient with mild hyperglycemia on admission, with a glucose level of 111.  Patient has been taking prednisone for several months due to giant cell arteritis.  We will order hemoglobin A1c.  There is no need to start insulin sliding scale for now  We will monitor, repeat BMP and make changes accordingly.    Leukocytosis  Assessment & Plan  WBC of 14.5 on admission.  Most likely reactive, as the patient currently takes steroids for giant cell arteritis.  There are no other signs of infection at this time, patient afebrile normotensive, and heart rate within normal range.  There is no need for antibiotic treatment at this time.  We will monitor and repeat CBC.    DVT: Currently on heparin drip

## 2021-04-15 NOTE — ED TRIAGE NOTES
"Chief Complaint   Patient presents with   • Chest Pain     Started intermittently on friday. Last night worse and radiating between her shoulder blades and radiating down her left arm.      /88   Pulse 98   Temp 36.4 °C (97.6 °F) (Temporal)   Resp 18   Ht 1.664 m (5' 5.5\")   Wt 67 kg (147 lb 11.3 oz)   SpO2 95%   BMI 24.21 kg/m²   Pt placed back in lobby, educated on triage process, and told to inform staff of any change in condition.     "

## 2021-04-15 NOTE — ASSESSMENT & PLAN NOTE
Patient with mild hyperglycemia on admission, with a glucose level of 111.  Patient has been taking prednisone for several months due to giant cell arteritis.  A1c 5.9, slight elevation likely due to chronic prednisone  There is no need to start insulin sliding scale for now  We will monitor, repeat BMP and make changes accordingly

## 2021-04-15 NOTE — ASSESSMENT & PLAN NOTE
WBC of 14.5 on admission.  Most likely reactive, as the patient currently takes steroids for giant cell arteritis.  There are no other signs of infection at this time, patient afebrile, normotensive, and heart rate within normal range.  There is no need for antibiotic treatment at this time.  We will monitor and repeat CBC.

## 2021-04-15 NOTE — ASSESSMENT & PLAN NOTE
As per patient she had been diagnosed with giant cell arteritis since last year without official biopsy.    Patient mentions that she has a follow-up appointment with neurology as outpatient.  Cardiology recommended to increase prednisone to 20 mg which they have now.  Made recommendations for patient to see a rheumatologist and adjust her PCP, given information on discharge instructions for rheumatology around [facility].

## 2021-04-15 NOTE — CONSULTS
Cardiology Initial Consultation    Date of Service  4/15/2021    Referring Physician  Navjot Weinstein    History of Presenting Illness  Carine Christopher is a 70-year-old female seen for evaluation of chest discomfort.  The patient has history of known coronary disease with stenting of and completely occluded circumflex at [facility] on October 4, 2020.  At that time he had a 2.75 x 24 mm Synergy stent placed.  Prior to intervention, the patient had rather nondescript lower chest pain that she thought was indigestion.  On the night of admission to the hospital she had severe left shoulder pain accompanied by more central chest pain.  Angiography revealed a 40% RCA lesion and an occluded circumflex that was stented successfully.  She was discharged on aspirin and clopidogrel but stop the clopidogrel about 6 weeks ago and then restarted it about 2 weeks ago.  She stayed on aspirin during this time.    For the past 5 or 6 days she has had episodes of similar lower chest discomfort that is reminiscent of her prior anginal pain.  Yesterday afternoon, she had episode of chest discomfort that was more intense than her prior episodes.  Last night, she had even more severe pain that was located in the left side of her chest and radiated across her upper back bilaterally and into her left axilla.  She had no nitroglycerin to take.  The pain resolved on its own and she was seen in the ER.    Her history is complicated by a recent diagnosis of giant cell arteritis which is yet to be biopsy-proven.  This was initially diagnosed by a cardiologist in [place] who saw her for routine post intervention visit.  She was started on prednisone and subsequently has been seen by neurology who placed her on higher dose prednisone initially at 60 mg a day on a tapering schedule.  She has currently tapered down to 10 mg daily.  She still has right-sided temporal pain but no major visual  disturbance.  Because of her headaches, she decided on her own to stop her clopidogrel for a month as outlined above, to see if that helped the situation.    The patient is a history of hyperlipidemia but no history of diabetes, or smoking,    Review of Systems  Review of Systems   Constitutional: Negative.    HENT: Negative.    Respiratory: Negative.    Cardiovascular: Positive for chest pain.   Gastrointestinal: Negative.    Musculoskeletal: Negative.    Skin: Negative.    Neurological: Negative.    Psychiatric/Behavioral: Positive for dysphoric mood.       Past Medical History   has a past medical history of Bronchitis, Giant cell arteritis (HCC), Heart attack (HCC) (10/2020), and Hyperlipidemia.    Surgical History   has a past surgical history that includes breast implant revision; abdominal exploration; and cataract extraction with iol.    Family History  family history includes Glasses in her father; Heart Disease in her father.    Social History   reports that she has never smoked. She has never used smokeless tobacco. She reports previous alcohol use of about 0.6 oz of alcohol per week. She reports that she does not use drugs.    Medications  Prior to Admission Medications   Prescriptions Last Dose Informant Patient Reported? Taking?   Ascorbic Acid (VITAMIN C PO) 2021 at AM Patient Yes Yes   Sig: Take 1 tablet by mouth every day.   Cholecalciferol (VITAMIN D3) 50 MCG (2000 UT) Tab 2021 at PM Patient Yes No   Sig: Take 2,000 Units by mouth every day at 6 PM.   Coenzyme Q10 (COQ10 PO) 2021 at AM Patient Yes Yes   Sig: Take 1 tablet by mouth every day.   Conj Estrog-Medroxyprogest Ace (PREMPHASE) Tab 2021 at PM Patient Yes Yes   Sig: Take 1 tablet by mouth every day.   LUTEIN PO 2021 at PM Patient Yes No   Sig: Take 2 Tablets by mouth every day.   Pikwqi-OsDak-AgWqm-FA-CA-Omega (COMPLETE [place] DHA) 29-1-200 & 250 MG Misc 2021 at PM Patient Yes No   Sig: Take 2 Tablets by mouth  every day.   aspirin (ASPIRIN 81) 81 MG EC tablet 4/14/2021 at PM Patient Yes No   Sig: Take 81 mg by mouth every evening.   atorvastatin (LIPITOR) 40 MG Tab 4/15/2021 at AM Patient Yes No   Sig: Take 40 mg by mouth every day.   clopidogrel (PLAVIX) 75 MG Tab 4/15/2021 at AM Patient Yes Yes   Sig: Take 75 mg by mouth every day.   predniSONE (DELTASONE) 20 MG Tab 4/15/2021 at AM Patient No No   Sig: Take 3 Tabs by mouth every day for 21 days, THEN 2 Tabs every day for 21 days, THEN 1.5 Tabs every day for 21 days, THEN 1 Tab every day for 21 days, THEN 0.5 Tabs every day for 21 days. Then stop      Facility-Administered Medications: None       Allergies  Allergies   Allergen Reactions   • Chloramphenicol Diarrhea     PCN is okay to take   • Codeine    • Sulfa Antibiotics  [Sulfa Drugs] Unspecified       Vital signs in last 24 hours  Temp:  [36.4 °C (97.6 °F)-36.7 °C (98 °F)] 36.7 °C (98 °F)  Pulse:  [81-98] 81  Resp:  [16-20] 20  BP: (119-151)/(63-88) 148/86  SpO2:  [93 %-95 %] 95 %    Physical Exam  Physical Exam  Constitutional:       General: She is not in acute distress.     Appearance: She is well-developed.   HENT:      Head: Atraumatic.   Eyes:      General: No scleral icterus.     Conjunctiva/sclera: Conjunctivae normal.      Pupils: Pupils are equal, round, and reactive to light.   Neck:      Thyroid: No thyromegaly.      Vascular: No JVD.   Cardiovascular:      Rate and Rhythm: Normal rate and regular rhythm.      Heart sounds: Normal heart sounds. No murmur.   Pulmonary:      Effort: Pulmonary effort is normal.      Breath sounds: Normal breath sounds. No wheezing or rales.   Abdominal:      General: Bowel sounds are normal. There is no distension.      Tenderness: There is no abdominal tenderness.   Musculoskeletal:      Cervical back: Neck supple.   Skin:     General: Skin is warm and dry.      Comments: There is pain to palpation over the right upper temple and mild induration by palpation.    Neurological:      Mental Status: She is alert and oriented to person, place, and time.   Psychiatric:         Mood and Affect: Mood is depressed.         Lab Review  Lab Results   Component Value Date/Time    WBC 14.5 (H) 04/15/2021 08:00 AM    RBC 4.82 04/15/2021 08:00 AM    HEMOGLOBIN 14.4 04/15/2021 08:00 AM    HEMATOCRIT 45.1 04/15/2021 08:00 AM    MCV 93.6 04/15/2021 08:00 AM    MCH 29.9 04/15/2021 08:00 AM    MCHC 31.9 (L) 04/15/2021 08:00 AM    MPV 10.3 04/15/2021 08:00 AM      Lab Results   Component Value Date/Time    SODIUM 134 (L) 04/15/2021 08:00 AM    POTASSIUM 4.4 04/15/2021 08:00 AM    CHLORIDE 101 04/15/2021 08:00 AM    CO2 23 04/15/2021 08:00 AM    GLUCOSE 111 (H) 04/15/2021 08:00 AM    BUN 23 (H) 04/15/2021 08:00 AM    CREATININE 0.88 04/15/2021 08:00 AM      Lab Results   Component Value Date/Time    ASTSGOT 36 04/15/2021 08:00 AM    ALTSGPT 26 04/15/2021 08:00 AM     Lab Results   Component Value Date/Time    CHOLSTRLTOT 196 02/12/2021 03:39 PM    LDL 86 02/12/2021 03:39 PM    HDL 88 02/12/2021 03:39 PM    TRIGLYCERIDE 110 02/12/2021 03:39 PM    TROPONINT 43 (H) 04/15/2021 12:38 PM       No results for input(s): NTPROBNP in the last 72 hours.    EKG: The EKG from admission was personally reviewed.  My trepidation is that this demonstrates sinus rhythm with an isolated PAC.  There is borderline upsloping ST segment depression present in V3, no other abnormalities    Acute coronary syndrome: The patient has history of known coronary artery disease with prior stenting of her circumflex last October.  At that time the circumflex artery is completely occluded.  For the past 5 to 6 days she has had episodes of discomfort in her chest that is very reminiscent of her anginal pain that she had prior to her coronary intervention.    Also she has missed a month of clopidogrel, but has been back on for about 2 weeks.    Currently she is anginal free and hemodynamically stable.  The EKG is really  unremarkable.  The patient will be started on aggressive antianginal therapy with anticoagulation, nitrates and beta-blocker.  Her statin therapy will be intensified.  Angiography will be scheduled for the morning unless she develops recurrent recurrent and is stable pain during the night.    Presumptive giant cell arteritis: She has yet to have a biopsy because of her dual antiplatelet therapy    Chronic steroid therapy the patient has been on initially high-dose steroids for giant cell arteritis.  Currently she has been tapered down to 10 mg a day.  We will double her prednisone therapy because of the acute stress of her coronary artery disease.    Hyperlipidemia: Treated with statins  Lucius Chase M.D.   Cardiologist, [facility]  [phone]

## 2021-04-15 NOTE — ASSESSMENT & PLAN NOTE
Patient comes in with a 6-day history of chest pain, that she describes as pressure-like, worse on exertion, radiating to the back and left shoulder.  Initial troponins were 47, EKG reported minimal ST depression on lateral leads  Patient was started on nitro drip, heparin drip, aspirin & statin    Cardiology consulted, pending left heart catheterization  Cardiology has recommended heparin drip, high-dose atorvastatin dose and metoprolol.

## 2021-04-15 NOTE — ED PROVIDER NOTES
ED Provider Note    CHIEF COMPLAINT  Chief Complaint   Patient presents with   • Chest Pain     Started intermittently on friday. Last night worse and radiating between her shoulder blades and radiating down her left arm.        HPI  Carine Manisha Christopher is a 70 y.o. female who presents with chest pain.  She has had pain off and on for the last 6 days.  Seems to be worse with exertion.  Central sternal pain radiated to the left chest and neck.  Pain came on last night at about 8:30 PM and has been constant since then.  Tried taking ibuprofen without relief.  Associated shortness of breath and anxiety.  Pain radiates to the left arm.  No pleuritic pain.  She has a chronic cough with although this is improved with prednisone that she has recently been on for giant cell arteritis.  Currently this is being tapered down for another biopsy.  She has not had fevers, chills, congestion, runny nose, sore throat.  Denies abdominal pain, nausea vomiting diarrhea.  She states she has had weight gain from the prednisone.  No asymmetric swelling.  No hemoptysis.    REVIEW OF SYSTEMS  As per HPI, otherwise a 10 point review of systems is negative    PAST MEDICAL HISTORY  Coronary artery disease status post stenting.  Seen in [place].  Giant cell arteritis, hyperlipidemia    SOCIAL HISTORY  Social History     Tobacco Use   • Smoking status: Never Smoker   • Smokeless tobacco: Never Used   Substance Use Topics   • Alcohol use: Not Currently     Alcohol/week: 0.6 oz     Types: 1 Glasses of wine per week   • Drug use: Never       SURGICAL HISTORY  Past Surgical History:   Procedure Laterality Date   • ABDOMINAL EXPLORATION     • BREAST IMPLANT REVISION     • CATARACT EXTRACTION WITH IOL         CURRENT MEDICATIONS  Home Medications     Reviewed by Perla Olsen R.N. (Registered Nurse) on 04/15/21 at 0739  Med List Status: Complete   Medication Last Dose Status   aspirin (ASPIRIN 81) 81 MG Chew Tab chewable tablet   "Active   atorvastatin (LIPITOR) 40 MG Tab  Active   azelastine (ASTELIN) 137 MCG/SPRAY nasal spray  Active   Cholecalciferol (VITAMIN D3 PO)  Active   clopidogrel (PLAVIX) 75 MG Tab  Active   Conj Estrog-Medroxyprogest Ace Tab  Active   Lutein 10 MG Tab  Active   omeprazole (PRILOSEC) 20 MG delayed-release capsule  Active   omeprazole (PRILOSEC) 20 MG delayed-release capsule not taking Active   predniSONE (DELTASONE) 20 MG Tab  Active   Rpxdph-JcWjz-QwAyu-FA-CA-Omega (COMPLETE  DHA) 29-1-200 & 250 MG Misc  Active                ALLERGIES  Allergies   Allergen Reactions   • Chloramphenicol Diarrhea     PCN is okay to take   • Codeine    • Sulfa Antibiotics  [Sulfa Drugs] Unspecified       PHYSICAL EXAM  VITAL SIGNS: /88   Pulse 98   Temp 36.4 °C (97.6 °F) (Temporal)   Resp 18   Ht 1.664 m (5' 5.5\")   Wt 67 kg (147 lb 11.3 oz)   SpO2 95%   BMI 24.21 kg/m²    Constitutional: Awake and alert  HENT: Tenderness over the right scalp  Eyes: Grossly normal  Neck: Grossly normal range of motion.  Cardiovascular: Normal heart rate, Normal rhythm.  No murmur symmetric peripheral pulses.   Thorax & Lungs: No respiratory distress, No wheezing, No rales, No rhonchi, No chest tenderness.   Abdomen: Bowel sounds normal, soft, non-distended, nontender, no mass  Skin: No obvious rash.  Back: No tenderness, No CVA tenderness.   Extremities: No clubbing, cyanosis, edema, no Homans or cords.  Neurologic: Grossly normal   Psychiatric: Normal for situation    RADIOLOGY/PROCEDURES  CT-CTA COMPLETE THORACOABDOMINAL AORTA   Final Result      1.  No thoracic or abdominal aortic dissection or aneurysm.      2.  No acute pulmonary process.      3.  No inflammatory or obstructive process identified.            DX-CHEST-PORTABLE (1 VIEW)   Final Result      No acute cardiopulmonary abnormality.           Imaging is interpreted by radiologist    Labs:  Results for orders placed or performed during the hospital encounter of " 04/15/21   CBC with Differential   Result Value Ref Range    WBC 14.5 (H) 4.8 - 10.8 K/uL    RBC 4.82 4.20 - 5.40 M/uL    Hemoglobin 14.4 12.0 - 16.0 g/dL    Hematocrit 45.1 37.0 - 47.0 %    MCV 93.6 81.4 - 97.8 fL    MCH 29.9 27.0 - 33.0 pg    MCHC 31.9 (L) 33.6 - 35.0 g/dL    RDW 48.2 35.9 - 50.0 fL    Platelet Count 312 164 - 446 K/uL    MPV 10.3 9.0 - 12.9 fL    Neutrophils-Polys 90.20 (H) 44.00 - 72.00 %    Lymphocytes 6.40 (L) 22.00 - 41.00 %    Monocytes 2.50 0.00 - 13.40 %    Eosinophils 0.20 0.00 - 6.90 %    Basophils 0.30 0.00 - 1.80 %    Immature Granulocytes 0.40 0.00 - 0.90 %    Nucleated RBC 0.00 /100 WBC    Neutrophils (Absolute) 13.02 (H) 2.00 - 7.15 K/uL    Lymphs (Absolute) 0.93 (L) 1.00 - 4.80 K/uL    Monos (Absolute) 0.36 0.00 - 0.85 K/uL    Eos (Absolute) 0.03 0.00 - 0.51 K/uL    Baso (Absolute) 0.05 0.00 - 0.12 K/uL    Immature Granulocytes (abs) 0.06 0.00 - 0.11 K/uL    NRBC (Absolute) 0.00 K/uL   Complete Metabolic Panel (CMP)   Result Value Ref Range    Sodium 134 (L) 135 - 145 mmol/L    Potassium 4.4 3.6 - 5.5 mmol/L    Chloride 101 96 - 112 mmol/L    Co2 23 20 - 33 mmol/L    Anion Gap 10.0 7.0 - 16.0    Glucose 111 (H) 65 - 99 mg/dL    Bun 23 (H) 8 - 22 mg/dL    Creatinine 0.88 0.50 - 1.40 mg/dL    Calcium 9.7 8.5 - 10.5 mg/dL    AST(SGOT) 36 12 - 45 U/L    ALT(SGPT) 26 2 - 50 U/L    Alkaline Phosphatase 63 30 - 99 U/L    Total Bilirubin 0.4 0.1 - 1.5 mg/dL    Albumin 4.3 3.2 - 4.9 g/dL    Total Protein 7.2 6.0 - 8.2 g/dL    Globulin 2.9 1.9 - 3.5 g/dL    A-G Ratio 1.5 g/dL   Troponin   Result Value Ref Range    Troponin T 47 (H) 6 - 19 ng/L   ESTIMATED GFR   Result Value Ref Range    GFR If African American >60 >60 mL/min/1.73 m 2    GFR If Non African American >60 >60 mL/min/1.73 m 2   EKG   Result Value Ref Range    Report       [facility]    Test Date:  2021-04-15  Pt Name:    ANUM RILEY          Department: ER  MRN:        [id number]                       Room:  Gender:     Female                       Technician: [id number]  :        [id number]                   Requested By:ER TRIAGE PROTOCOL  Order #:    [id number]                    Reading MD: JOSTIN GRUBBS MD    Measurements  Intervals                                Axis  Rate:       82                           P:          67  MN:         99                           QRS:        28  QRSD:       82                           T:          76  QT:         366  QTc:        423    Interpretive Statements  Sinus rhythm  Atrial premature complex  Short MN interval  Minimal ST depression, lateral leads  No previous ECG available for comparison  Electronically Signed On 4- 7:47:07 PDT by JOSTIN GRUBBS MD         Medications   hyoscyamine-maalox plus-lidocaine viscous (GI COCKTAIL) oral susp 30 mL (has no administration in time range)         COURSE & MEDICAL DECISION MAKING  Patient presents with chest pain.  Has history of coronary artery disease status post stenting.  Patient has a history of giant cell arteritis.  She describes pain radiating straight to her back.  Differential does include aortic dissection or aortic vasculitis.  CT scan of the aorta was ordered.  She has been on prednisone for quite a while and she started taking ibuprofen.  Gastric source is a possibility.  Order GI cocktail.  Patient takes Plavix daily.    Data returned as above.  Has elevated troponin.  The patient's chest pain resolved after GI cocktail.  Other data returned with  elevated WBC count likely associated with prednisone use chest x-ray was negative.  At this point patient will be admitted to the hospital for further cardiac evaluation.  Hospitalist was paged for admission.      Discussed case with hospitalist.  He will involve cardiology as seen necessary.    FINAL IMPRESSION  1.  Chest pain  2.  Nondiagnostic elevation of the troponin  3.  History of giant cell arteritis and chronic prednisone therapy      This  dictation was created using voice recognition software. The accuracy of the dictation is limited to the abilities of the software.  The nursing notes were reviewed and certain aspects of this information were incorporated into this note.      Electronically signed by: New Burkett M.D., 4/15/2021 7:57 AM

## 2021-04-15 NOTE — ED NOTES
Med rec completed per pt and RX bottles (returned)  Allergies reviewed  No PO antibiotics in the last 14 days      Medication Sig   • clopidogrel (PLAVIX) 75 MG Tab Take 75 mg by mouth every day.   • Ascorbic Acid (VITAMIN C PO) Take 1 tablet by mouth every day.   • Coenzyme Q10 (COQ10 PO) Take 1 tablet by mouth every day.   • Conj Estrog-Medroxyprogest Ace (PREMPHASE) Tab Take 1 tablet by mouth every day.   • predniSONE (DELTASONE) 20 MG Tab Take 3 Tabs by mouth every day for 21 days, THEN 2 Tabs every day for 21 days, THEN 1.5 Tabs every day for 21 days, THEN 1 Tab every day for 21 days, THEN 0.5 Tabs every day for 21 days. Then stop   • atorvastatin (LIPITOR) 40 MG Tab Take 40 mg by mouth every day.   • LUTEIN PO Take 2 Tablets by mouth every day.   • Tyynqg-XmAnd-AuOgb-FA-CA-Omega (COMPLETE  DHA) 29-1-200 & 250 MG Misc Take 2 Tablets by mouth every day.   • Cholecalciferol (VITAMIN D3) 50 MCG (2000 UT) Tab Take 2,000 Units by mouth every day at 6 PM.   • aspirin (ASPIRIN 81) 81 MG EC tablet Take 81 mg by mouth every evening.     Patient is on a tapering course of Prednisone   She is currently taking Prednisone 10 mg daily (due to complete course 2021)     Patient has her medications at bedside

## 2021-04-15 NOTE — ASSESSMENT & PLAN NOTE
Patient with a history of coronary artery disease status post stent last October in [place], as per the patient.  Patient taking aspirin, Plavix and atorvastatin, continue medications  Cardiology have recommended metoprolol, to the medication

## 2021-04-16 ENCOUNTER — APPOINTMENT (OUTPATIENT)
Dept: CARDIOLOGY | Facility: MEDICAL CENTER | Age: 71
DRG: 247 | End: 2021-04-16
Attending: INTERNAL MEDICINE
Payer: MEDICARE

## 2021-04-16 PROBLEM — R51.9 HEADACHE: Chronic | Status: ACTIVE | Noted: 2021-04-16

## 2021-04-16 PROBLEM — R51.9 HEADACHE: Status: ACTIVE | Noted: 2021-04-16

## 2021-04-16 PROBLEM — R73.03 PREDIABETES: Status: ACTIVE | Noted: 2021-04-15

## 2021-04-16 LAB
ACT BLD: 219 SEC (ref 74–137)
ACT BLD: 235 SEC (ref 74–137)
ACT BLD: 494 SEC (ref 74–137)
ALBUMIN SERPL BCP-MCNC: 3.5 G/DL (ref 3.2–4.9)
ALBUMIN/GLOB SERPL: 1.4 G/DL
ALP SERPL-CCNC: 50 U/L (ref 30–99)
ALT SERPL-CCNC: 19 U/L (ref 2–50)
ANION GAP SERPL CALC-SCNC: 10 MMOL/L (ref 7–16)
APTT PPP: 108.3 SEC (ref 24.7–36)
APTT PPP: 94.4 SEC (ref 24.7–36)
AST SERPL-CCNC: 26 U/L (ref 12–45)
BASOPHILS # BLD AUTO: 0.7 % (ref 0–1.8)
BASOPHILS # BLD: 0.08 K/UL (ref 0–0.12)
BILIRUB SERPL-MCNC: 0.2 MG/DL (ref 0.1–1.5)
BUN SERPL-MCNC: 23 MG/DL (ref 8–22)
CALCIUM SERPL-MCNC: 8.6 MG/DL (ref 8.5–10.5)
CHLORIDE SERPL-SCNC: 103 MMOL/L (ref 96–112)
CHOLEST SERPL-MCNC: 127 MG/DL (ref 100–199)
CO2 SERPL-SCNC: 24 MMOL/L (ref 20–33)
CREAT SERPL-MCNC: 0.75 MG/DL (ref 0.5–1.4)
EKG IMPRESSION: NORMAL
EOSINOPHIL # BLD AUTO: 0.18 K/UL (ref 0–0.51)
EOSINOPHIL NFR BLD: 1.5 % (ref 0–6.9)
ERYTHROCYTE [DISTWIDTH] IN BLOOD BY AUTOMATED COUNT: 49.2 FL (ref 35.9–50)
GLOBULIN SER CALC-MCNC: 2.5 G/DL (ref 1.9–3.5)
GLUCOSE SERPL-MCNC: 101 MG/DL (ref 65–99)
HCT VFR BLD AUTO: 41.3 % (ref 37–47)
HDLC SERPL-MCNC: 58 MG/DL
HGB BLD-MCNC: 13.2 G/DL (ref 12–16)
IMM GRANULOCYTES # BLD AUTO: 0.07 K/UL (ref 0–0.11)
IMM GRANULOCYTES NFR BLD AUTO: 0.6 % (ref 0–0.9)
INR PPP: 1.04 (ref 0.87–1.13)
LDLC SERPL CALC-MCNC: 48 MG/DL
LYMPHOCYTES # BLD AUTO: 3.96 K/UL (ref 1–4.8)
LYMPHOCYTES NFR BLD: 32.6 % (ref 22–41)
MAGNESIUM SERPL-MCNC: 2.1 MG/DL (ref 1.5–2.5)
MCH RBC QN AUTO: 30.3 PG (ref 27–33)
MCHC RBC AUTO-ENTMCNC: 32 G/DL (ref 33.6–35)
MCV RBC AUTO: 94.7 FL (ref 81.4–97.8)
MONOCYTES # BLD AUTO: 0.88 K/UL (ref 0–0.85)
MONOCYTES NFR BLD AUTO: 7.2 % (ref 0–13.4)
NEUTROPHILS # BLD AUTO: 6.99 K/UL (ref 2–7.15)
NEUTROPHILS NFR BLD: 57.4 % (ref 44–72)
NRBC # BLD AUTO: 0 K/UL
NRBC BLD-RTO: 0 /100 WBC
PLATELET # BLD AUTO: 273 K/UL (ref 164–446)
PMV BLD AUTO: 10.5 FL (ref 9–12.9)
POTASSIUM SERPL-SCNC: 3.6 MMOL/L (ref 3.6–5.5)
PROT SERPL-MCNC: 6 G/DL (ref 6–8.2)
PROTHROMBIN TIME: 13.9 SEC (ref 12–14.6)
RBC # BLD AUTO: 4.36 M/UL (ref 4.2–5.4)
SODIUM SERPL-SCNC: 137 MMOL/L (ref 135–145)
TRIGL SERPL-MCNC: 104 MG/DL (ref 0–149)
UFH PPP CHRO-ACNC: 0.48 IU/ML
UFH PPP CHRO-ACNC: 0.59 IU/ML
UFH PPP CHRO-ACNC: 0.61 IU/ML
WBC # BLD AUTO: 12.2 K/UL (ref 4.8–10.8)

## 2021-04-16 PROCEDURE — 85025 COMPLETE CBC W/AUTO DIFF WBC: CPT

## 2021-04-16 PROCEDURE — 700111 HCHG RX REV CODE 636 W/ 250 OVERRIDE (IP)

## 2021-04-16 PROCEDURE — 85347 COAGULATION TIME ACTIVATED: CPT | Mod: 91

## 2021-04-16 PROCEDURE — 85730 THROMBOPLASTIN TIME PARTIAL: CPT

## 2021-04-16 PROCEDURE — 93010 ELECTROCARDIOGRAM REPORT: CPT | Mod: 59 | Performed by: INTERNAL MEDICINE

## 2021-04-16 PROCEDURE — 85610 PROTHROMBIN TIME: CPT

## 2021-04-16 PROCEDURE — 83735 ASSAY OF MAGNESIUM: CPT

## 2021-04-16 PROCEDURE — 80061 LIPID PANEL: CPT

## 2021-04-16 PROCEDURE — 700102 HCHG RX REV CODE 250 W/ 637 OVERRIDE(OP): Performed by: INTERNAL MEDICINE

## 2021-04-16 PROCEDURE — C1887 CATHETER, GUIDING: HCPCS

## 2021-04-16 PROCEDURE — 700111 HCHG RX REV CODE 636 W/ 250 OVERRIDE (IP): Performed by: INTERNAL MEDICINE

## 2021-04-16 PROCEDURE — 700101 HCHG RX REV CODE 250

## 2021-04-16 PROCEDURE — 700117 HCHG RX CONTRAST REV CODE 255: Performed by: INTERNAL MEDICINE

## 2021-04-16 PROCEDURE — 92928 PRQ TCAT PLMT NTRAC ST 1 LES: CPT | Mod: LC | Performed by: INTERNAL MEDICINE

## 2021-04-16 PROCEDURE — 700105 HCHG RX REV CODE 258: Performed by: INTERNAL MEDICINE

## 2021-04-16 PROCEDURE — 85520 HEPARIN ASSAY: CPT

## 2021-04-16 PROCEDURE — 99231 SBSQ HOSP IP/OBS SF/LOW 25: CPT | Mod: 25 | Performed by: INTERNAL MEDICINE

## 2021-04-16 PROCEDURE — 700102 HCHG RX REV CODE 250 W/ 637 OVERRIDE(OP)

## 2021-04-16 PROCEDURE — 80053 COMPREHEN METABOLIC PANEL: CPT

## 2021-04-16 PROCEDURE — A9270 NON-COVERED ITEM OR SERVICE: HCPCS | Performed by: INTERNAL MEDICINE

## 2021-04-16 PROCEDURE — 93005 ELECTROCARDIOGRAM TRACING: CPT | Performed by: INTERNAL MEDICINE

## 2021-04-16 PROCEDURE — 770020 HCHG ROOM/CARE - TELE (206)

## 2021-04-16 PROCEDURE — 99152 MOD SED SAME PHYS/QHP 5/>YRS: CPT | Performed by: INTERNAL MEDICINE

## 2021-04-16 PROCEDURE — 36415 COLL VENOUS BLD VENIPUNCTURE: CPT

## 2021-04-16 PROCEDURE — 99232 SBSQ HOSP IP/OBS MODERATE 35: CPT | Performed by: INTERNAL MEDICINE

## 2021-04-16 PROCEDURE — 93458 L HRT ARTERY/VENTRICLE ANGIO: CPT | Mod: 26,59 | Performed by: INTERNAL MEDICINE

## 2021-04-16 PROCEDURE — A9270 NON-COVERED ITEM OR SERVICE: HCPCS

## 2021-04-16 RX ORDER — CLOPIDOGREL BISULFATE 75 MG/1
75 TABLET ORAL DAILY
Status: DISCONTINUED | OUTPATIENT
Start: 2021-04-17 | End: 2021-04-17

## 2021-04-16 RX ORDER — MIDAZOLAM HYDROCHLORIDE 1 MG/ML
INJECTION INTRAMUSCULAR; INTRAVENOUS
Status: COMPLETED
Start: 2021-04-16 | End: 2021-04-16

## 2021-04-16 RX ORDER — CLOPIDOGREL 300 MG/1
TABLET, FILM COATED ORAL
Status: COMPLETED
Start: 2021-04-16 | End: 2021-04-16

## 2021-04-16 RX ORDER — SODIUM CHLORIDE 9 MG/ML
INJECTION, SOLUTION INTRAVENOUS CONTINUOUS
Status: DISCONTINUED | OUTPATIENT
Start: 2021-04-16 | End: 2021-04-17

## 2021-04-16 RX ORDER — HEPARIN SODIUM 1000 [USP'U]/ML
INJECTION, SOLUTION INTRAVENOUS; SUBCUTANEOUS
Status: COMPLETED
Start: 2021-04-16 | End: 2021-04-16

## 2021-04-16 RX ORDER — HEPARIN SODIUM 200 [USP'U]/100ML
INJECTION, SOLUTION INTRAVENOUS
Status: COMPLETED
Start: 2021-04-16 | End: 2021-04-16

## 2021-04-16 RX ORDER — VERAPAMIL HYDROCHLORIDE 2.5 MG/ML
INJECTION, SOLUTION INTRAVENOUS
Status: COMPLETED
Start: 2021-04-16 | End: 2021-04-16

## 2021-04-16 RX ORDER — SODIUM CHLORIDE 9 MG/ML
INJECTION, SOLUTION INTRAVENOUS CONTINUOUS
Status: DISCONTINUED | OUTPATIENT
Start: 2021-04-16 | End: 2021-04-16

## 2021-04-16 RX ORDER — CLOPIDOGREL BISULFATE 75 MG/1
300 TABLET ORAL ONCE
Status: DISCONTINUED | OUTPATIENT
Start: 2021-04-16 | End: 2021-04-16

## 2021-04-16 RX ORDER — LIDOCAINE HYDROCHLORIDE 20 MG/ML
INJECTION, SOLUTION INFILTRATION; PERINEURAL
Status: COMPLETED
Start: 2021-04-16 | End: 2021-04-16

## 2021-04-16 RX ADMIN — HEPARIN SODIUM: 1000 INJECTION, SOLUTION INTRAVENOUS; SUBCUTANEOUS at 15:31

## 2021-04-16 RX ADMIN — IOHEXOL 57 ML: 350 INJECTION, SOLUTION INTRAVENOUS at 15:15

## 2021-04-16 RX ADMIN — LIDOCAINE HYDROCHLORIDE: 20 INJECTION, SOLUTION INFILTRATION; PERINEURAL at 15:01

## 2021-04-16 RX ADMIN — DOCUSATE SODIUM 50 MG AND SENNOSIDES 8.6 MG 2 TABLET: 8.6; 5 TABLET, FILM COATED ORAL at 05:42

## 2021-04-16 RX ADMIN — VERAPAMIL HYDROCHLORIDE 2.5 MG: 2.5 INJECTION, SOLUTION INTRAVENOUS at 15:00

## 2021-04-16 RX ADMIN — HEPARIN SODIUM: 200 INJECTION, SOLUTION INTRAVENOUS at 13:45

## 2021-04-16 RX ADMIN — ACETAMINOPHEN 650 MG: 325 TABLET, FILM COATED ORAL at 08:03

## 2021-04-16 RX ADMIN — CLOPIDOGREL BISULFATE 75 MG: 75 TABLET ORAL at 05:42

## 2021-04-16 RX ADMIN — HEPARIN SODIUM: 1000 INJECTION, SOLUTION INTRAVENOUS; SUBCUTANEOUS at 15:33

## 2021-04-16 RX ADMIN — MIDAZOLAM HYDROCHLORIDE 2 MG: 1 INJECTION, SOLUTION INTRAMUSCULAR; INTRAVENOUS at 15:00

## 2021-04-16 RX ADMIN — METOPROLOL TARTRATE 25 MG: 25 TABLET, FILM COATED ORAL at 05:42

## 2021-04-16 RX ADMIN — ASPIRIN 81 MG: 81 TABLET, COATED ORAL at 16:44

## 2021-04-16 RX ADMIN — SODIUM CHLORIDE: 9 INJECTION, SOLUTION INTRAVENOUS at 04:06

## 2021-04-16 RX ADMIN — CLOPIDOGREL BISULFATE 300 MG: 300 TABLET, FILM COATED ORAL at 15:30

## 2021-04-16 RX ADMIN — NITROGLYCERIN 0.5 INCH: 20 OINTMENT TOPICAL at 00:47

## 2021-04-16 RX ADMIN — PREDNISONE 20 MG: 20 TABLET ORAL at 05:42

## 2021-04-16 RX ADMIN — ATORVASTATIN CALCIUM 80 MG: 80 TABLET, FILM COATED ORAL at 05:42

## 2021-04-16 RX ADMIN — FENTANYL CITRATE 100 MCG: 50 INJECTION, SOLUTION INTRAMUSCULAR; INTRAVENOUS at 15:00

## 2021-04-16 RX ADMIN — SODIUM CHLORIDE: 9 INJECTION, SOLUTION INTRAVENOUS at 16:43

## 2021-04-16 RX ADMIN — NITROGLYCERIN 0.5 INCH: 20 OINTMENT TOPICAL at 05:44

## 2021-04-16 RX ADMIN — NITROGLYCERIN 10 ML: 20 INJECTION INTRAVENOUS at 15:01

## 2021-04-16 RX ADMIN — METOPROLOL TARTRATE 25 MG: 25 TABLET, FILM COATED ORAL at 17:07

## 2021-04-16 ASSESSMENT — ENCOUNTER SYMPTOMS
SHORTNESS OF BREATH: 0
NEUROLOGICAL NEGATIVE: 1
MUSCULOSKELETAL NEGATIVE: 1
DIARRHEA: 0
INSOMNIA: 0
ABDOMINAL PAIN: 0
FEVER: 0
GASTROINTESTINAL NEGATIVE: 1
CONSTIPATION: 0
RESPIRATORY NEGATIVE: 1
NERVOUS/ANXIOUS: 0
DIZZINESS: 0
HEADACHES: 1
COUGH: 0
VOMITING: 0
CARDIOVASCULAR NEGATIVE: 1
PALPITATIONS: 0
NAUSEA: 0
CONSTITUTIONAL NEGATIVE: 1
BACK PAIN: 0

## 2021-04-16 ASSESSMENT — PAIN DESCRIPTION - PAIN TYPE
TYPE: ACUTE PAIN

## 2021-04-16 NOTE — PROGRESS NOTES
Handoff report received. Assumed patient care. PT is reclined in bed, AAOx4, complaints of continuous, mild chest discomfort. Tele box is on, POC discussed with PT and all questions addressed. Safety precautions in place. Call light and personal belongings within reach. Educated to call for assistance if needed.

## 2021-04-16 NOTE — PROGRESS NOTES
Everton from Lab called with critical result of aptt 108.0 at 0610. Critical lab result read back to Everton.   Paged hospitalist lacie with no response  Pharmacist Laurie notified of critical lab result at 0645.  Critical lab result read back by Pharmacist Laurie.    Per Pharmacy: continue following the heparin anti-Xa protocol as XA is 0.61 and therapeautic. Re-draw coag panel.

## 2021-04-16 NOTE — PROCEDURES
"CARDIAC CATHETERIZATION REPORT    Referring Provider: Lucius Chase M.D.    PROCEDURE PHYSICIAN: Tone Davis MD, [facility], [facility]  ASSISTANT: None    IMPRESSIONS:  1.  Unstable angina due to severe in-stent restenosis of the circumflex  2.  Successful PCI of the circumflex using 1 drug-eluting stent  3.  Mild elevation LVEDP (19 mmHg)    Recommendations:  Usual post PCI care  Dual antiplatelet therapy for 12 months    Pre-procedure diagnosis: Unstable angina  Post-procedure diagnosis: Same    Procedure performed  Selective coronary angiography  Left heart catheterization  Percutaneous coronary intervention - Stent Placement (JAMES to circumflex)   Aborted IVUS after visualization of the proximal circumflex due to inability to navigate tortuosity    Conscious sedation was supervised by myself and administered by trained personnel using fentanyl and versed between 1447 and 1521. The patient tolerated sedation without complication.     Procedure Description  1. Access: 5/6 Chinese right radial artery Micropuncture technique was utilized following local anesthesia with lidocaine.  A radial cocktail containing verapamil, heparin and saline was administered in the radial artery sheath    2. Diagnostic description: The catheter was passed to the central circulation with the aide of J tipped 0.35\" wire. A 5F TIG 4.0 was used to inject the coronary circulation enter the left ventricle during invasive hemodynamic monitoring.     3. Description of Intervention: After confirming therapeutic anticoagulation intervention proceeded. A 6F EBU 3.5 guiding catheter was used. The lesion in the circumflex was crossed with a 0.014\" Run Through wire.  The lesion was predilated with a 2.5 x 20 NC balloon.  I then deployed a 2.5 x 26 mm Sheng drug-eluting stent at 18 mirlande.  I attempted to perform IVUS but could not pass the catheter through the stent due to abrupt angulation into the stent.  The proximal vessel reference size was 2.75 mm in " diameter.  I then postdilated with a 2.75 x 15 mm NC balloon at 16 mirlande.  Post procedure angiograms demonstrated excellent stent sizing with absence of site complication.    4. Closing: At completion of the procedure the relevant equipment was removed from the body and hemostasis achieved by Radial band    Findings   Hemodynamics:   Aorta: 112/70 mmHg  LVEDP: 112/19 mmHg  No significant pullback gradient across the aortic valve    Coronary Anatomy   Left Main: Normal   LAD: 25% stenosis in the midsegment   LCx: 99% in-stent restenosis in the mid circumflex   RCA: Dominant, Mid 40% stenosis.  The PDA and posterior lateral branch are normal     Results of intervention to the circumflex:  Pre: 99% stenosis and INO I flow  Post: 0% residual stenosis and INO III flow. No dissection or distal embolization.    Technical Factors  1. Complications: None  2. Estimated Blood Loss: <50 cc  3. Specimens: None  4. Contrast Volume: 57 ml  5. Medications: Radial cocktail (Verapamil 2.5 mg, Nitroglycerin 100 mcg) Heparin to maintain ACT >250 Intracoronary nitroglycerin Clopidogrel 300 mg  6. Radiation (Air Kerma): 444 mGy

## 2021-04-16 NOTE — ASSESSMENT & PLAN NOTE
Patient complaining of headache, likely due to nitro drip and less likely giant cell arteritis as patient has been on chronic steroids  -Tylenol for headache at this time until nitroglycerin discontinued  -If patient's headache continues will increase steroids to IV

## 2021-04-16 NOTE — PROGRESS NOTES
Hospital Medicine Daily Progress Note    Date of Service  4/16/2021    Chief Complaint  70 y.o. female admitted 4/15/2021 with chest pain    Hospital Course  70F PMH CAD, MI status post stenting, history of biopsy unconfirmed giant cell arteritis, chronic steroids, hypercholesterolemia admitted 4/15/2021 for chest pain.  Patient has had chest pain for about 6 days now, pressure-like, 10 out of 10 intensity, worse with exertion, similar chest pain to her previous heart attack.  Radiation to her back and left shoulder.  Patient otherwise denied other pains.  Patient found to have minimal ST depression on EKG, cardiology was consulted on admission and recommended for left heart catheterization.    Interval Problem Update  4/16/2021-patient seen and evaluated this morning.  Patient stated she her chest pain has resolved, but her headache was getting worse.  Patient has history of giant cell arteritis but at the same time patient was on a nitro drip for chest pain.  Patient to go to for left heart catheterization today.  Also made recommendations for patient to follow-up with rheumatologist which she has not for official biopsy and diagnosis of her giant cell arteritis.    Consultants/Specialty  Cardiology    Code Status  Full Code    Disposition  To be determined, pending left heart catheterization results    Review of Systems  Review of Systems   Constitutional: Negative for fever and malaise/fatigue.   Respiratory: Negative for cough and shortness of breath.    Cardiovascular: Negative for chest pain and palpitations.   Gastrointestinal: Negative for abdominal pain, constipation, diarrhea, nausea and vomiting.   Musculoskeletal: Negative for back pain and joint pain.   Neurological: Positive for headaches. Negative for dizziness.   Psychiatric/Behavioral: The patient is not nervous/anxious and does not have insomnia.         Physical Exam  Temp:  [36.4 °C (97.5 °F)-36.8 °C (98.3 °F)] 36.5 °C (97.7 °F)  Pulse:  [80-87]  84  Resp:  [14-18] 14  BP: (108-127)/(60-77) 122/71  SpO2:  [92 %-94 %] 93 %    Physical Exam  Vitals and nursing note reviewed.   Constitutional:       General: She is not in acute distress.     Appearance: Normal appearance. She is not ill-appearing.   Cardiovascular:      Rate and Rhythm: Normal rate.      Pulses: Normal pulses.      Heart sounds: Murmur (Diastolic murmur, apex loudest, 2/4) present.   Pulmonary:      Effort: Pulmonary effort is normal. No respiratory distress.      Breath sounds: Normal breath sounds. No wheezing.   Abdominal:      General: Abdomen is flat. Bowel sounds are normal. There is no distension.      Palpations: Abdomen is soft.      Tenderness: There is no abdominal tenderness.   Musculoskeletal:         General: No swelling or tenderness. Normal range of motion.   Skin:     General: Skin is warm.      Capillary Refill: Capillary refill takes less than 2 seconds.      Coloration: Skin is not jaundiced.      Findings: No erythema.   Neurological:      General: No focal deficit present.      Mental Status: She is alert and oriented to person, place, and time. Mental status is at baseline.      Motor: No weakness.   Psychiatric:         Mood and Affect: Mood normal.         Behavior: Behavior normal.         Thought Content: Thought content normal.         Judgment: Judgment normal.         Fluids    Intake/Output Summary (Last 24 hours) at 4/16/2021 1434  Last data filed at 4/15/2021 2317  Gross per 24 hour   Intake 600 ml   Output 300 ml   Net 300 ml       Laboratory  Recent Labs     04/15/21  0800 04/16/21  0446   WBC 14.5* 12.2*   RBC 4.82 4.36   HEMOGLOBIN 14.4 13.2   HEMATOCRIT 45.1 41.3   MCV 93.6 94.7   MCH 29.9 30.3   MCHC 31.9* 32.0*   RDW 48.2 49.2   PLATELETCT 312 273   MPV 10.3 10.5     Recent Labs     04/15/21  0800 04/16/21  0446   SODIUM 134* 137   POTASSIUM 4.4 3.6   CHLORIDE 101 103   CO2 23 24   GLUCOSE 111* 101*   BUN 23* 23*   CREATININE 0.88 0.75   CALCIUM 9.7 8.6      Recent Labs     04/15/21  1459 04/16/21  0446 04/16/21  0718   APTT 26.6 108.3* 94.4*   INR 0.97 1.04  --          Recent Labs     04/16/21  0446   TRIGLYCERIDE 104   HDL 58   LDL 48       Imaging  CT-CTA COMPLETE THORACOABDOMINAL AORTA   Final Result      1.  No thoracic or abdominal aortic dissection or aneurysm.      2.  No acute pulmonary process.      3.  No inflammatory or obstructive process identified.            DX-CHEST-PORTABLE (1 VIEW)   Final Result      No acute cardiopulmonary abnormality.      CL-LEFT HEART CATHETERIZATION WITH POSSIBLE INTERVENTION    (Results Pending)        Assessment/Plan  * Pain in the chest- (present on admission)  Assessment & Plan  Patient comes in with a 6-day history of chest pain, that she describes as pressure-like, worse on exertion, radiating to the back and left shoulder.  Initial troponins were 47, EKG reported minimal ST depression on lateral leads  Patient was started on nitro drip, heparin drip, aspirin & statin    Cardiology consulted, pending left heart catheterization  Cardiology has recommended heparin drip, high-dose atorvastatin dose and metoprolol.    Coronary artery disease- (present on admission)  Assessment & Plan  Patient with a history of coronary artery disease status post stent last October in [place], as per the patient.  Patient taking aspirin, Plavix and atorvastatin, continue medications  Cardiology have recommended metoprolol, to the medication    Headache- (present on admission)  Assessment & Plan  Patient complaining of headache, likely due to nitro drip and less likely giant cell arteritis as patient has been on chronic steroids  -Tylenol for headache at this time until nitroglycerin discontinued  -If patient's headache continues will increase steroids to IV    Giant cell arteritis (HCC)- (present on admission)  Assessment & Plan  As per patient she had been diagnosed with giant cell arteritis since last year without official  biopsy.    Patient mentions that she has a follow-up appointment with neurology as outpatient.  Cardiology recommended to increase prednisone to 20 mg which they have now.  Made recommendations for patient to see a rheumatologist and adjust her PCP, given information on discharge instructions for rheumatology around [facility].    Prediabetes- (present on admission)  Assessment & Plan  Patient with mild hyperglycemia on admission, with a glucose level of 111.  Patient has been taking prednisone for several months due to giant cell arteritis.  A1c 5.9, slight elevation likely due to chronic prednisone  There is no need to start insulin sliding scale for now  We will monitor, repeat BMP and make changes accordingly    Leukocytosis- (present on admission)  Assessment & Plan  WBC of 14.5 on admission.  Most likely reactive, as the patient currently takes steroids for giant cell arteritis.  There are no other signs of infection at this time, patient afebrile, normotensive, and heart rate within normal range.  There is no need for antibiotic treatment at this time.  We will monitor and repeat CBC.     VTE prophylaxis: Heparin drip

## 2021-04-16 NOTE — PROGRESS NOTES
Cardiology Follow Up Progress Note    Date of Service  4/16/2021    Attending Physician  Sergio Emanuel M.D.    Chief Complaint   Chest discomfort    HPI  Carine Christopher is a 70 y.o. female admitted 4/15/2021 with the above complaints.  She has a history of known coronary disease with stenting of an occluded obtuse marginal artery in October 2020 with a 2.75 x 24 mm Synergy stent.  For approximately 6 days prior to admission she had episodic chest discomfort is quite suggestive of angina and reminiscent of her pain that she had prior to her coronary intervention last October.  She was started on anticoagulation, nitrates and beta-blockers.  Overnight she had no significant chest discomfort.  She has had no bleeding problems.  Troponins have been in the indeterminate range without any rise or fall.  She is scheduled for angiography later today.    Review of Systems  Review of Systems   Constitutional: Negative.    HENT: Negative.    Respiratory: Negative.    Cardiovascular: Negative.    Gastrointestinal: Negative.    Musculoskeletal: Negative.    Skin: Negative.    Neurological: Negative.        Vital signs in last 24 hours  Temp:  [36.4 °C (97.5 °F)-36.8 °C (98.3 °F)] 36.4 °C (97.5 °F)  Pulse:  [80-87] 80  Resp:  [16-20] 16  BP: (108-148)/(60-86) 119/77  SpO2:  [92 %-95 %] 93 %    Physical Exam  Physical Exam  Constitutional:       General: She is not in acute distress.     Appearance: She is well-developed. She is not diaphoretic.   HENT:      Head: Atraumatic.   Eyes:      Conjunctiva/sclera: Conjunctivae normal.      Pupils: Pupils are equal, round, and reactive to light.   Neck:      Vascular: No JVD.   Cardiovascular:      Rate and Rhythm: Normal rate and regular rhythm.      Heart sounds: No murmur.   Pulmonary:      Effort: Pulmonary effort is normal. No respiratory distress.      Breath sounds: Normal breath sounds. No wheezing or rales.   Musculoskeletal:      Cervical back: Neck  supple.   Skin:     General: Skin is warm and dry.   Neurological:      Mental Status: She is alert and oriented to person, place, and time.         Lab Review  Lab Results   Component Value Date/Time    WBC 12.2 (H) 04/16/2021 04:46 AM    RBC 4.36 04/16/2021 04:46 AM    HEMOGLOBIN 13.2 04/16/2021 04:46 AM    HEMATOCRIT 41.3 04/16/2021 04:46 AM    MCV 94.7 04/16/2021 04:46 AM    MCH 30.3 04/16/2021 04:46 AM    MCHC 32.0 (L) 04/16/2021 04:46 AM    MPV 10.5 04/16/2021 04:46 AM      Lab Results   Component Value Date/Time    SODIUM 137 04/16/2021 04:46 AM    POTASSIUM 3.6 04/16/2021 04:46 AM    CHLORIDE 103 04/16/2021 04:46 AM    CO2 24 04/16/2021 04:46 AM    GLUCOSE 101 (H) 04/16/2021 04:46 AM    BUN 23 (H) 04/16/2021 04:46 AM    CREATININE 0.75 04/16/2021 04:46 AM      Lab Results   Component Value Date/Time    ASTSGOT 26 04/16/2021 04:46 AM    ALTSGPT 19 04/16/2021 04:46 AM     Lab Results   Component Value Date/Time    CHOLSTRLTOT 127 04/16/2021 04:46 AM    LDL 48 04/16/2021 04:46 AM    HDL 58 04/16/2021 04:46 AM    TRIGLYCERIDE 104 04/16/2021 04:46 AM    TROPONINT 46 (H) 04/15/2021 04:16 PM       No results for input(s): NTPROBNP in the last 72 hours.    Chest pain: Quite compatible with angina.  History of known coronary disease with stenting of circumflex last October.  She had taken himself off dual and platelet therapy on her own about 2 weeks ago.    History of presumed temporal arteritis.  The patient has been on prednisone for several months.  Her prednisone dose was doubled yesterday because the anticipated stress of the hospitalization and possible angina.    Hyperlipidemia: Treated with statin.    Anticoagulation: No bleeding problem on her heparin.  She is scheduled for coronary angiography later today.    Please contact me with any questions.    Lucius Chase M.D.   Cardiologist, [facility]  [phone]

## 2021-04-16 NOTE — CARE PLAN
Problem: Communication  Goal: The ability to communicate needs accurately and effectively will improve  Outcome: PROGRESSING AS EXPECTED  Note: Patient educated to utilize call light. Patient and family oriented to hospital room. Patient encouraged to ask questions about plan of care. Patient effectively uses call light and is involved in POC.      Problem: Safety  Goal: Will remain free from injury  Outcome: PROGRESSING AS EXPECTED  Note: Patient's risk for injury and falls assessed. Appropriate safety precautions in place. Patient educated to utilize call light for needs. Patient verbalizes understanding.

## 2021-04-16 NOTE — PROGRESS NOTES
Cardiology Follow Up Progress Note    Date of Service  4/16/2021    Attending Physician  Sergio Emanuel M.D.    PMH: CAD with PCI to LCx 4/2020, hyperlipidemia, suspected giant cell arteritis on chronic steroid therapy, biopsy pending    Presented with chest pain    Cardiac consultation for unstable angina    Interim Events  NO overnight cardiac events  Telemetry: SR  /77  Labs reviewed  CMP in good order, aside form glucose 101, BUN 23    Review of Systems  Review of Systems   Constitutional: Negative for diaphoresis.   Respiratory: Negative for apnea, cough, choking, chest tightness, shortness of breath, wheezing and stridor.        Vital signs in last 24 hours  Temp:  [36.4 °C (97.5 °F)-36.6 °C (97.9 °F)] 36.5 °C (97.7 °F)  Pulse:  [80-84] 84  Resp:  [14-18] 14  BP: (108-127)/(60-77) 122/71  SpO2:  [92 %-94 %] 93 %    Physical Exam  Physical Exam  Cardiovascular:      Rate and Rhythm: Normal rate and regular rhythm.   Pulmonary:      Effort: Pulmonary effort is normal.   Skin:     General: Skin is warm.      Comments: R radial cath site with mild bruising , excellent circulation   Neurological:      General: No focal deficit present.      Mental Status: She is alert.   Psychiatric:         Mood and Affect: Mood normal.         Lab Review  Lab Results   Component Value Date/Time    WBC 12.2 (H) 04/16/2021 04:46 AM    RBC 4.36 04/16/2021 04:46 AM    HEMOGLOBIN 13.2 04/16/2021 04:46 AM    HEMATOCRIT 41.3 04/16/2021 04:46 AM    MCV 94.7 04/16/2021 04:46 AM    MCH 30.3 04/16/2021 04:46 AM    MCHC 32.0 (L) 04/16/2021 04:46 AM    MPV 10.5 04/16/2021 04:46 AM      Lab Results   Component Value Date/Time    SODIUM 137 04/16/2021 04:46 AM    POTASSIUM 3.6 04/16/2021 04:46 AM    CHLORIDE 103 04/16/2021 04:46 AM    CO2 24 04/16/2021 04:46 AM    GLUCOSE 101 (H) 04/16/2021 04:46 AM    BUN 23 (H) 04/16/2021 04:46 AM    CREATININE 0.75 04/16/2021 04:46 AM      Lab Results   Component Value Date/Time    ASTSGOT 26  04/16/2021 04:46 AM    ALTSGPT 19 04/16/2021 04:46 AM     Lab Results   Component Value Date/Time    CHOLSTRLTOT 127 04/16/2021 04:46 AM    LDL 48 04/16/2021 04:46 AM    HDL 58 04/16/2021 04:46 AM    TRIGLYCERIDE 104 04/16/2021 04:46 AM    TROPONINT 46 (H) 04/15/2021 04:16 PM       No results for input(s): NTPROBNP in the last 72 hours.    Cardiac Imaging and Procedures Review  EKG: Sinus rhythm, ST segment depression in V3    Echocardiogram:  pending    Cardiac Catheterization:   Coronary Anatomy              Left Main: Normal              LAD: 25% stenosis in the midsegment              LCx: 99% in-stent restenosis in the mid circumflex              RCA: Dominant, Mid 40% stenosis.  The PDA and posterior lateral branch are     IMPRESSIONS:  1.  Unstable angina due to severe in-stent restenosis of the circumflex  2.  Successful PCI of the circumflex using 1 drug-eluting stent  3.  Mild elevation LVEDP (19 mmHg)     Recommendations:  Usual post PCI care  Dual antiplatelet therapy for 12 months       Imaging  Chest X-Ray: No acute cardiopulmonary abnormalities    Stress Test: Not applicable    Assessment/Plan    Unstable angina  Trop peaked at 47  -Status post successful PCI/JAMES x1 of the 99% in-stent restenosis of the mid circumflex, 4/16/2021  -Dual antiplatelet therapy in the form of aspirin, Plavix x12 months, along with atorvastatin 80, metoprolol 25 mg BID    Giant cell arteritis  -On chronic steroid therapy with prednisone  -Follows with neurology as an outpatient    Hyperlipidemia  -Lipid panel in good order  -LDL 48  -HDL 58  -Triglycerides 104  -Total cholesterol 127    A1c 5.9    Stable for DC pending echo  Follow up with cardiology 4/26/21 at 9:15 am  Cardiac rehab discussed, will arrange follow up  Thank you for allowing me to participate in the care of this patient.      Please contact me with any questions.    BERTRAND Sher.   Cardiologist, [facility]  [phone]  [phone]

## 2021-04-16 NOTE — PROGRESS NOTES
Cath lab BESSIE Burch notified MD Davis of bruising under TR band. Per Cath Lab BESSIE Burch, MD Davis aware of site, continue to monitor site. This RN notified CARI Edwards of site. Patient has good waveform noted, +2 radial pulse. Will continue to monitor.     MD Chase to bedside, assessed R radial site at bedside, MD Chase aware, +2 pulse and waveform noted, continue to monitor site. Verbal order per MD Chase OK to d/c pre-op fluids and to stop post op IV fluids after 4 hours.

## 2021-04-16 NOTE — PROGRESS NOTES
Patient back to floor from heart cath. Patient A&Ox4, on room air. Patient has TR band in place at R radial site. Patient has 2+ pulse, site CDI, soft, site under TR band appearing purple. Patient has +2 pulses in all extremities. Patient has no pain in arm, no chest pain. Tele boxed placed, monitors notified.

## 2021-04-16 NOTE — PROGRESS NOTES
Assumed patient care at 0715. Patient A&O x 4 on RA. Patient has tele box on and in place.Patient updated on plan of care, verbalizes understanding. Patient has fall precautions in place, call light within reach. Patient has bed in low and locked position. Will continue to monitor.

## 2021-04-17 ENCOUNTER — APPOINTMENT (OUTPATIENT)
Dept: CARDIOLOGY | Facility: MEDICAL CENTER | Age: 71
DRG: 247 | End: 2021-04-17
Attending: NURSE PRACTITIONER
Payer: MEDICARE

## 2021-04-17 VITALS
WEIGHT: 145.06 LBS | BODY MASS INDEX: 24.17 KG/M2 | HEIGHT: 65 IN | RESPIRATION RATE: 16 BRPM | DIASTOLIC BLOOD PRESSURE: 77 MMHG | SYSTOLIC BLOOD PRESSURE: 122 MMHG | OXYGEN SATURATION: 93 % | TEMPERATURE: 97.1 F | HEART RATE: 85 BPM

## 2021-04-17 PROBLEM — R07.9 PAIN IN THE CHEST: Status: RESOLVED | Noted: 2021-04-15 | Resolved: 2021-04-17

## 2021-04-17 PROBLEM — Z79.52 CURRENT CHRONIC USE OF SYSTEMIC STEROIDS: Chronic | Status: ACTIVE | Noted: 2021-04-17

## 2021-04-17 PROBLEM — R51.9 HEADACHE: Chronic | Status: RESOLVED | Noted: 2021-04-16 | Resolved: 2021-04-17

## 2021-04-17 LAB
ALBUMIN SERPL BCP-MCNC: 3.8 G/DL (ref 3.2–4.9)
ANION GAP SERPL CALC-SCNC: 14 MMOL/L (ref 7–16)
BUN SERPL-MCNC: 19 MG/DL (ref 8–22)
CALCIUM SERPL-MCNC: 9 MG/DL (ref 8.5–10.5)
CHLORIDE SERPL-SCNC: 102 MMOL/L (ref 96–112)
CO2 SERPL-SCNC: 20 MMOL/L (ref 20–33)
CREAT SERPL-MCNC: 0.92 MG/DL (ref 0.5–1.4)
ERYTHROCYTE [DISTWIDTH] IN BLOOD BY AUTOMATED COUNT: 48.7 FL (ref 35.9–50)
GLUCOSE SERPL-MCNC: 287 MG/DL (ref 65–99)
HCT VFR BLD AUTO: 43.2 % (ref 37–47)
HGB BLD-MCNC: 13.9 G/DL (ref 12–16)
LV EJECT FRACT  99904: 60
LV EJECT FRACT MOD 2C 99903: 63.25
LV EJECT FRACT MOD 4C 99902: 60.94
LV EJECT FRACT MOD BP 99901: 61.94
MAGNESIUM SERPL-MCNC: 1.9 MG/DL (ref 1.5–2.5)
MCH RBC QN AUTO: 30.3 PG (ref 27–33)
MCHC RBC AUTO-ENTMCNC: 32.2 G/DL (ref 33.6–35)
MCV RBC AUTO: 94.1 FL (ref 81.4–97.8)
PHOSPHATE SERPL-MCNC: 2.4 MG/DL (ref 2.5–4.5)
PLATELET # BLD AUTO: 292 K/UL (ref 164–446)
PMV BLD AUTO: 10.7 FL (ref 9–12.9)
POTASSIUM SERPL-SCNC: 4 MMOL/L (ref 3.6–5.5)
RBC # BLD AUTO: 4.59 M/UL (ref 4.2–5.4)
SODIUM SERPL-SCNC: 136 MMOL/L (ref 135–145)
WBC # BLD AUTO: 17.3 K/UL (ref 4.8–10.8)

## 2021-04-17 PROCEDURE — A9270 NON-COVERED ITEM OR SERVICE: HCPCS | Performed by: INTERNAL MEDICINE

## 2021-04-17 PROCEDURE — 97535 SELF CARE MNGMENT TRAINING: CPT | Performed by: PHYSICAL THERAPIST

## 2021-04-17 PROCEDURE — 83735 ASSAY OF MAGNESIUM: CPT

## 2021-04-17 PROCEDURE — 700102 HCHG RX REV CODE 250 W/ 637 OVERRIDE(OP): Performed by: INTERNAL MEDICINE

## 2021-04-17 PROCEDURE — 99239 HOSP IP/OBS DSCHRG MGMT >30: CPT | Performed by: INTERNAL MEDICINE

## 2021-04-17 PROCEDURE — 99232 SBSQ HOSP IP/OBS MODERATE 35: CPT | Performed by: INTERNAL MEDICINE

## 2021-04-17 PROCEDURE — 93306 TTE W/DOPPLER COMPLETE: CPT | Mod: 26 | Performed by: INTERNAL MEDICINE

## 2021-04-17 PROCEDURE — 93306 TTE W/DOPPLER COMPLETE: CPT

## 2021-04-17 PROCEDURE — 80069 RENAL FUNCTION PANEL: CPT

## 2021-04-17 PROCEDURE — 700111 HCHG RX REV CODE 636 W/ 250 OVERRIDE (IP): Performed by: INTERNAL MEDICINE

## 2021-04-17 PROCEDURE — 85027 COMPLETE CBC AUTOMATED: CPT

## 2021-04-17 PROCEDURE — 36415 COLL VENOUS BLD VENIPUNCTURE: CPT

## 2021-04-17 RX ORDER — ATORVASTATIN CALCIUM 80 MG/1
80 TABLET, FILM COATED ORAL DAILY
Qty: 30 TABLET | Refills: 11 | Status: SHIPPED | OUTPATIENT
Start: 2021-04-18 | End: 2021-04-29 | Stop reason: SDUPTHER

## 2021-04-17 RX ORDER — NITROGLYCERIN 0.4 MG/1
0.4 TABLET SUBLINGUAL PRN
Qty: 30 TABLET | Refills: 3 | Status: SHIPPED | OUTPATIENT
Start: 2021-04-17 | End: 2023-04-20

## 2021-04-17 RX ORDER — PREDNISONE 20 MG/1
TABLET ORAL
Qty: 30 TABLET | Refills: 0 | Status: SHIPPED
Start: 2021-04-18 | End: 2022-01-11

## 2021-04-17 RX ADMIN — ATORVASTATIN CALCIUM 80 MG: 80 TABLET, FILM COATED ORAL at 05:11

## 2021-04-17 RX ADMIN — CLOPIDOGREL BISULFATE 75 MG: 75 TABLET ORAL at 05:10

## 2021-04-17 RX ADMIN — PREDNISONE 20 MG: 20 TABLET ORAL at 05:10

## 2021-04-17 RX ADMIN — ASPIRIN 81 MG: 81 TABLET, COATED ORAL at 05:10

## 2021-04-17 RX ADMIN — METOPROLOL TARTRATE 25 MG: 25 TABLET, FILM COATED ORAL at 05:10

## 2021-04-17 ASSESSMENT — ENCOUNTER SYMPTOMS
DIAPHORESIS: 0
STRIDOR: 0
CHEST TIGHTNESS: 0
COUGH: 0
CHOKING: 0
SHORTNESS OF BREATH: 0
APNEA: 0
WHEEZING: 0

## 2021-04-17 NOTE — PROGRESS NOTES
Patient hematoma appearing darker. Patient has 2+ pulse in R radial, waveform intact. MD Salgado aware. Will notify night shift RN to notify MD with any changes.

## 2021-04-17 NOTE — PROGRESS NOTES
Received bedside report from BESSIE Watters, pt care assumed, VSS, pt assessment complete. Pt AAOx4, c/o 3/10 headache at this time. No signs of acute distress noted at this time. POC discussed with pt and verbalizes no questions. Pt denies any additional needs at this time. Bed in lowest position, bed alarm off as client is no fall risk, pt educated on fall risk and verbalized understanding, call light within reach, hourly rounding initiated.

## 2021-04-17 NOTE — PROGRESS NOTES
Hema-band removed from right radial site. No signs of bleeding at this time. Placed 2x2 gauze with Tegaderm over site.

## 2021-04-17 NOTE — PROGRESS NOTES
Pt w/o c/o. Discussed pt's care with Hospitalist. Discussed pt's care with cards. Pt OK to DC. Discharge instructions given to patient at bedside, verbalizes understanding and states plans for follow-up with PCP and Cardiologist as recommended. Individualized instruction and Krames discharge information provided. Telemetry monitor/IV cathlon removed. All belongings accounted for, all questions answered at this time. Pt denies further needs. Declines WC / hosp escort. States she knows the way out. Leaves floor with SO @ side.

## 2021-04-17 NOTE — DISCHARGE INSTRUCTIONS
Discharge Instructions    Discharged to home by car with friend. Discharged via wheelchair, hospital escort: Yes.  Special equipment needed: Not Applicable    Be sure to schedule a follow-up appointment with your primary care doctor or any specialists as instructed.     Discharge Plan:   Diet Plan: Discussed - cardiac diet  Activity Level: Discussed - activity as tolerated; follow your activity precautions   Confirmed Follow up Appointment: Patient to Call and Schedule Appointment - see instructions re: follow up   Confirmed Symptoms Management: Discussed  Medication Reconciliation Updated: Yes    I understand that a diet low in cholesterol, fat, and sodium is recommended for good health. Unless I have been given specific instructions below for another diet, I accept this instruction as my diet prescription.   Other diet: cardiac diet     · Special Instructions:   Special Instructions: Diagnosis:  Acute Coronary Syndrome (ACS) is a diagnosis that encompasses cardiac-related chest pain and heart attack. ACS occurs when the blood flow to the heart muscle is severely reduced or cut off completely due to a slow process called atherosclerosis.  Atherosclerosis is a disease in which the coronary arteries become narrow from a buildup of fat, cholesterol, and other substances that combine to form plaque. If the plaque breaks, a blood clot will form and block the blood flow to the heart muscle. This lack of blood flow can cause damage or death to the heart muscle which is called a heart attack or Myocardial Infarction (MI). There are two different types of MIs:  ST Elevation Myocardial Infarction or STEMI (the most severe type of heart attack) and Non-ST Elevation Myocardial Infarction or NSTEMI.    Treatment Plan:  · Cardiac Diet  - Low fat, low salt, low cholesterol   · Cardiac Rehab  - Your doctor has ordered you a referral to [facility].  Call [phone] to schedule an appointment.  · Attend my follow-up appointment with  my Cardiologist.  · Take my medications as prescribed by my doctor  · Exercise daily  · Lower my bad cholesterol and raise my good cholesterol, lower my blood pressure and Reduce stress    Medications:  Certain medications are used to treat ACS.  Remember to always take medications as prescribed and never stop talking medications unless told by your doctor.    You have been prescribed the following medicatons:    Aspirin - Aspirin is used as a blood thinning medication and you will require this medication indefinitely.  Anti-platelet/blood thinner - Your Anti-platelet/Blood thinning medication is called plavix, and is used in combination with aspirin to prevent clots from forming in your heart and/or around your stent.  Your doctor will determine how long you need to be on this medicine.  Beta-Blocker - Beta-Blocker metoprolol is used to lower blood pressure and heart rate, and/or helps your heart heal after a heart attack.  Statin - Statin atorvastatin is used to lower cholesterol.    · Is patient discharged on Warfarin / Coumadin?   No     Radial Catherization Discharge Instructions      · Do not subject hand/arm to any forceful movements for 24 hours    i.e. supporting weight when rising from the chair or bed.   · Do not drive a car for 24 hours  · You may remove the dressing tomorrow  · You may shower on the day following your procedure.  Do not take a tub bath or submerge the puncture site in water for 3 days following the procedure.  · No Lifting more than 3-5 pounds with affected wrist for 5 days  · Follow up with cardiology as directed  · Continue all previous medications unless otherwise instructed.    If bleeding should occur following discharge:  · Sit down and apply firm pressure to site with your fingers for 10 minutes  · If the bleeding stops, continue to sit quietly, keeping your wrist straight for 2 hours.  Notify physician as soon as possible [facility] [phone]  · If bleeding does not stop  after 10 minutes, or if there is a large amount of bleeding or spurting, call 911 immediately.  Do not drive yourself to the hospital.        Depression / Suicide Risk    As you are discharged from this [facility] facility, it is important to learn how to keep safe from harming yourself.    Recognize the warning signs:  · Abrupt changes in personality, positive or negative- including increase in energy   · Giving away possessions  · Change in eating patterns- significant weight changes-  positive or negative  · Change in sleeping patterns- unable to sleep or sleeping all the time   · Unwillingness or inability to communicate  · Depression  · Unusual sadness, discouragement and loneliness  · Talk of wanting to die  · Neglect of personal appearance   · Rebelliousness- reckless behavior  · Withdrawal from people/activities they love  · Confusion- inability to concentrate     If you or a loved one observes any of these behaviors or has concerns about self-harm, here's what you can do:  · Talk about it- your feelings and reasons for harming yourself  · Remove any means that you might use to hurt yourself (examples: pills, rope, extension cords, firearm)  · Get professional help from the community (Mental Health, Substance Abuse, psychological counseling)  · Do not be alone:Call your Safe Contact- someone whom you trust who will be there for you.  · Call your local CRISIS HOTLINE 805-1855 or 386-699-3374  · Call your local Children's Mobile Crisis Response Team [place] (748) 090-5787 or www.[employer]  · Call the toll free National Suicide Prevention Hotlines   · National Suicide Prevention Lifeline 548-880-KKOV (6337)  · National Hope Line Network 800-SUICIDE (965-9080)

## 2021-04-17 NOTE — PROGRESS NOTES
Hema-band remains in place to R wrist with 12 mL of air. Attempted to remove 3 mL and noted oozing from sheath site. Notified Dr. Salgado and received order to leave hema-band on for an additional 2 hours and then to resume removal per policy.    Yes

## 2021-04-18 NOTE — DISCHARGE SUMMARY
Discharge Summary    CHIEF COMPLAINT ON ADMISSION  Chief Complaint   Patient presents with   • Chest Pain     Started intermittently on friday. Last night worse and radiating between her shoulder blades and radiating down her left arm.        Reason for Admission  CP     Admission Date  4/15/2021    CODE STATUS  Prior    HPI & HOSPITAL COURSE  This is a 70 y.o. female here with chest pain  70  female with past medical history of CAD, status post stenting, unconfirmed diagnosis of giant cell arteritis on chronic steroids presenting 4/15/2021 with chest pain.  Patient had chest pain for about 6 days, 10 out of 10 intensity, pressure-like, worse with exertion, more intense than her previous heart attack in October 2020.  Patient denied abdominal pain, diarrhea, constipation, loss of consciousness or dizziness.    Patient's initial troponins were 47, mild ST depressions on ECG.  Cardiology was consulted given patient's history of prior MI, was placed on a heparin drip, metoprolol, atorvastatin, aspirin and Plavix.    Cardiology to patient for left heart catheterization.  Findings include left circumflex with 99% in-stent stent restenosis in the mid circumflex.  Patient had 1 drug-eluting stent placed within her previous occluded stent.    Patient was cleared by cardiology the day after her procedure.  Recommendations were given to patient given her undiagnosed giant cell arteritis, rheumatology clinics information given on her discharge paperwork.  Patient recommended to continue her prednisone at 20 mg, prescription was given.  Patient was recommended to continue her aspirin and Plavix for at least 1 year, try to defer temporal artery biopsy until then.    Appointments listed below for cardiology.  New medication includes metoprolol tartrate 25 mg p.o. twice daily and nitroglycerin as needed.    Therefore, she is discharged in fair and stable condition to home with close outpatient follow-up.    The patient  met 2-midnight criteria for an inpatient stay at the time of discharge.    Discharge Date  4/17/2021    FOLLOW UP ITEMS POST DISCHARGE  Listed below    DISCHARGE DIAGNOSES  Principal Problem (Resolved):    Pain in the chest POA: Yes  Active Problems:    Coronary artery disease POA: Yes    Giant cell arteritis (HCC) POA: Yes      Overview: Unconfirmed, no prior biopsy. Dx started 02/2020.    Current chronic use of systemic steroids (Chronic) POA: Yes    Leukocytosis POA: Yes    Prediabetes POA: Yes  Resolved Problems:    Headache (Chronic) POA: Yes      FOLLOW UP  Future Appointments   Date Time Provider Department Center   4/26/2021  9:15 AM TESHA Culver [facility] None   7/16/2021 11:20 AM Filiberto Banks M.D. [facility] None     [facility]  [address] , [address]  [facility] [address]  [phone]    options for local rheumatology, please have PCP provide referral    [facility]  [address] [id number]  [facility] [id number] [phone]    options for local rheumatology, please have PCP provide referral    [facility]  [address], [place] [address]  [phone]      options for local rheumatology, please have PCP provide referral    Khai Deras M.D.  [address] [place] [id number]  [phone]    Schedule an appointment as soon as possible for a visit in 1 week  F/U for NSTEMI, s/p LHC with re-stenting of prior stent due to occlusion. Refer to Rheumatologist.    TESHA Culver  [address]  [address]  [facility] [address]  [phone]    On 4/26/2021  F/U for NSTEMI, LHC, re-stented      MEDICATIONS ON DISCHARGE     Medication List      START taking these medications      Instructions   metoprolol tartrate 25 MG Tabs  Commonly known as: LOPRESSOR   Take 1 tablet by mouth 2 times a day.  Dose: 25 mg     nitroglycerin 0.4 MG Subl  Commonly known as: NITROSTAT   Doctor's comments: PRN ONLY FOR CHEST PAIN  Place 1  tablet under the tongue as needed for Chest Pain.  Dose: 0.4 mg        CHANGE how you take these medications      Instructions   atorvastatin 80 MG tablet  Start taking on: 2021  What changed:   · medication strength  · how much to take  Commonly known as: LIPITOR   Take 1 tablet by mouth every day.  Dose: 80 mg     predniSONE 20 MG Tabs  Start taking on: 2021  What changed: See the new instructions.  Commonly known as: DELTASONE   Take 20mg daily        CONTINUE taking these medications      Instructions   Aspirin 81 81 MG EC tablet  Generic drug: aspirin   Take 81 mg by mouth every evening.  Dose: 81 mg     clopidogrel 75 MG Tabs  Commonly known as: PLAVIX   Take 75 mg by mouth every day.  Dose: 75 mg     Complete Juan R DHA 29-1-200 & 250 MG Misc   Take 2 Tablets by mouth every day.  Dose: 2 tablet     COQ10 PO   Take 1 tablet by mouth every day.  Dose: 1 tablet     LUTEIN PO   Take 2 Tablets by mouth every day.  Dose: 2 tablet     Premphase Tabs  Generic drug: Conj Estrog-Medroxyprogest Ace   Take 1 tablet by mouth every day.  Dose: 1 tablet     VITAMIN C PO   Take 1 tablet by mouth every day.  Dose: 1 tablet     Vitamin D3 50 MCG (2000 UT) Tabs   Take 2,000 Units by mouth every day at 6 PM.  Dose: 2,000 Units            Allergies  Allergies   Allergen Reactions   • Chloramphenicol Diarrhea     PCN is okay to take   • Codeine    • Sulfa Antibiotics  [Sulfa Drugs] Unspecified       DIET  No orders of the defined types were placed in this encounter.      ACTIVITY  As tolerated.  Weight bearing as tolerated    CONSULTATIONS  Cardiology    PROCEDURES  2021-left heart catheterization    LABORATORY  Lab Results   Component Value Date    SODIUM 136 2021    POTASSIUM 4.0 2021    CHLORIDE 102 2021    CO2 20 2021    GLUCOSE 287 (H) 2021    BUN 19 2021    CREATININE 0.92 2021        Lab Results   Component Value Date    WBC 17.3 (H) 2021    HEMOGLOBIN  13.9 04/17/2021    HEMATOCRIT 43.2 04/17/2021    PLATELETCT 292 04/17/2021        Total time of the discharge process exceeds 36 minutes.  More than 50% of time was spent face to face with patient.  This included but not limited to review of hospital course with patient, treatment goals upon discharge, recommendations to PCP, continued and new medications and their adverse reactions and nursing instructions for patient.

## 2021-04-28 PROBLEM — I35.1 MODERATE AORTIC INSUFFICIENCY: Status: ACTIVE | Noted: 2021-04-28

## 2021-04-28 NOTE — PROGRESS NOTES
Chief Complaint   Patient presents with   • Coronary Artery Disease     F/V Dx       Subjective:   Carine Christopher is a 70 y.o. female who presents today for hospital follow up CAD.    She was seen by Dr. Chase in the hospital, history of CAD circumflex occlusion, status post PCI 10/2020, suspected giant cell arteritis on chronic steroid therapy.    Hospitalized for unstable angina 4/16/ 2021.  Troponin T peaked at 47.  Underwent cardiac catheterization PCI/JAMES x1 of the 99% in-stent restenosis of the mid circumflex.  Patient was placed on aspirin Plavix, atorvastatin and metoprolol.  Echocardiogram showed EF is 60% with hypokinesis of the basal inferior wall.  Moderate aortic insufficiency.    Past Medical History:   Diagnosis Date   • Bronchitis    • Giant cell arteritis (HCC)    • Heart attack (HCC) 10/2020   • Hyperlipidemia      Past Surgical History:   Procedure Laterality Date   • ABDOMINAL EXPLORATION     • BREAST IMPLANT REVISION     • CATARACT EXTRACTION WITH IOL       Family History   Problem Relation Age of Onset   • Glasses Father    • Heart Disease Father      Social History     Socioeconomic History   • Marital status:      Spouse name: Not on file   • Number of children: Not on file   • Years of education: Not on file   • Highest education level: Not on file   Occupational History   • Not on file   Tobacco Use   • Smoking status: Never Smoker   • Smokeless tobacco: Never Used   Substance and Sexual Activity   • Alcohol use: Not Currently     Alcohol/week: 0.6 oz     Types: 1 Glasses of wine per week   • Drug use: Never   • Sexual activity: Not on file   Other Topics Concern   • Not on file   Social History Narrative    retired     Social Determinants of Health     Financial Resource Strain:    • Difficulty of Paying Living Expenses:    Food Insecurity:    • Worried About Running Out of Food in the Last Year:    • Ran Out of Food in the Last Year:    Transportation Needs:     • Lack of Transportation (Medical):    • Lack of Transportation (Non-Medical):    Physical Activity:    • Days of Exercise per Week:    • Minutes of Exercise per Session:    Stress:    • Feeling of Stress :    Social Connections:    • Frequency of Communication with Friends and Family:    • Frequency of Social Gatherings with Friends and Family:    • Attends Mosque Services:    • Active Member of Clubs or Organizations:    • Attends Club or Organization Meetings:    • Marital Status:    Intimate Partner Violence:    • Fear of Current or Ex-Partner:    • Emotionally Abused:    • Physically Abused:    • Sexually Abused:      Allergies   Allergen Reactions   • Chloramphenicol Diarrhea     PCN is okay to take   • Codeine    • Sulfa Antibiotics  [Sulfa Drugs] Unspecified     Outpatient Encounter Medications as of 2021   Medication Sig Dispense Refill   • fluconazole (DIFLUCAN) 150 MG tablet      • clopidogrel (PLAVIX) 75 MG Tab Take 1 tablet by mouth every day. 90 tablet 3   • atorvastatin (LIPITOR) 80 MG tablet Take 1 tablet by mouth every day. 90 tablet 3   • metoprolol SR (TOPROL XL) 25 MG TABLET SR 24 HR Take 1 tablet by mouth every evening. 90 tablet 3   • predniSONE (DELTASONE) 20 MG Tab Take 20mg daily 30 tablet 0   • nitroglycerin (NITROSTAT) 0.4 MG SL Tab Place 1 tablet under the tongue as needed for Chest Pain. 30 tablet 3   • Ascorbic Acid (VITAMIN C PO) Take 1 tablet by mouth every day.     • Coenzyme Q10 (COQ10 PO) Take 1 tablet by mouth every day.     • Conj Estrog-Medroxyprogest Ace (PREMPHASE) Tab Take 1 tablet by mouth every day.     • LUTEIN PO Take 2 Tablets by mouth every day.     • Htkcwz-TgGkz-UoGak-FA-CA-Omega (COMPLETE  DHA) -200 & 250 MG Misc Take 2 Tablets by mouth every day.     • Cholecalciferol (VITAMIN D3) 50 MCG (2000 UT) Tab Take 2,000 Units by mouth every day at 6 PM.     • aspirin (ASPIRIN 81) 81 MG EC tablet Take 81 mg by mouth every evening.     • [DISCONTINUED]  "furosemide (LASIX) 20 MG Tab      • [DISCONTINUED] nystatin (MYCOSTATIN) 130108 UNIT/ML Suspension      • [DISCONTINUED] potassium chloride ER (KLOR-CON) 10 MEQ tablet      • [DISCONTINUED] atorvastatin (LIPITOR) 80 MG tablet Take 1 tablet by mouth every day. 30 tablet 11   • [DISCONTINUED] metoprolol tartrate (LOPRESSOR) 25 MG Tab Take 1 tablet by mouth 2 times a day. 60 tablet 11   • [DISCONTINUED] clopidogrel (PLAVIX) 75 MG Tab Take 75 mg by mouth every day.       No facility-administered encounter medications on file as of 4/29/2021.     Review of Systems   Constitutional: Negative for malaise/fatigue.   Respiratory: Negative for cough, hemoptysis, sputum production, shortness of breath and wheezing.    Cardiovascular: Negative for chest pain, palpitations, orthopnea, claudication, leg swelling and PND.   Gastrointestinal: Negative for nausea and vomiting.   Musculoskeletal: Positive for back pain.   Neurological: Positive for dizziness. Negative for speech change and weakness.   Endo/Heme/Allergies: Bruises/bleeds easily.   Psychiatric/Behavioral: The patient is not nervous/anxious.         Objective:   /62 (BP Location: Left arm, Patient Position: Sitting, BP Cuff Size: Adult)   Pulse 77   Resp 16   Ht 1.651 m (5' 5\")   Wt 67.1 kg (148 lb)   SpO2 96%   BMI 24.63 kg/m²     Physical Exam   Constitutional: She is oriented to person, place, and time. She appears well-developed and well-nourished. No distress.   HENT:   Head: Normocephalic and atraumatic.   Eyes: Pupils are equal, round, and reactive to light.   Neck: No JVD present.   Cardiovascular: Normal rate, regular rhythm and normal heart sounds.   No murmur heard.  Pulmonary/Chest: Effort normal and breath sounds normal. No respiratory distress.   Abdominal: Soft. Bowel sounds are normal. She exhibits no distension.   Musculoskeletal:         General: No edema.   Neurological: She is alert and oriented to person, place, and time.   Skin: Skin is " warm and dry. She is not diaphoretic.   Psychiatric: She has a normal mood and affect. Her behavior is normal. Judgment and thought content normal.       Cardiac Catheterization:   4/15/2021  Coronary Anatomy              Left Main: Normal              LAD: 25% stenosis in the midsegment              LCx: 99% in-stent restenosis in the mid circumflex              RCA: Dominant, Mid 40% stenosis.  The PDA and posterior lateral branch are      IMPRESSIONS:  1.  Unstable angina due to severe in-stent restenosis of the circumflex  2.  Successful PCI of the circumflex using 1 drug-eluting stent  3.  Mild elevation LVEDP (19 mmHg)     Recommendations:  Usual post PCI care  Dual antiplatelet therapy for 12 months    ECHO  4/17/2021  Left ventricular ejection fraction is visually estimated to be 60%.  Hypokinesis of basal inferior wall.  Normal left atrial size.  Trace mitral regurgitation.  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  Trace tricuspid regurgitation.  Estimated right ventricular systolic pressure  is 30 mmHg.  No pericardial effusion seen.  Normal aortic root for body surface area.       Assessment:     1. Coronary artery disease involving native heart with unstable angina pectoris, unspecified vessel or lesion type ([facility])     2. Moderate aortic insufficiency         Medical Decision Making:  Today's Assessment / Status / Plan:       1. CAD s/p PCI circumflex:  - Status post successful PCI/JAMES x1 of the 99% in-stent restenosis of the mid circumflex, 4/16/2021  - Dual antiplatelet therapy: aspirin, Plavix x12 months, atorvastatin 80mg qd  - changed metoprolol 25 mg BID to metoprolol XL 25mg qd   - euvolemic upon examination, stopped furosemide and potassium  - referral to cardial rehab       2. Giant cell arteritis  -On chronic steroid therapy with prednisone  - PCP send referral to [facility]      3. Hyperlipidemia  -LDL 48  -HDL 58  -Triglycerides 104  -Total cholesterol 127     4.  Moderate aortic insufficiency:  - monitor      Follow up in 3 months, sooner as needed.

## 2021-04-29 ENCOUNTER — OFFICE VISIT (OUTPATIENT)
Dept: CARDIOLOGY | Facility: MEDICAL CENTER | Age: 71
End: 2021-04-29
Payer: MEDICARE

## 2021-04-29 VITALS
HEIGHT: 65 IN | SYSTOLIC BLOOD PRESSURE: 120 MMHG | WEIGHT: 148 LBS | HEART RATE: 77 BPM | BODY MASS INDEX: 24.66 KG/M2 | DIASTOLIC BLOOD PRESSURE: 62 MMHG | RESPIRATION RATE: 16 BRPM | OXYGEN SATURATION: 96 %

## 2021-04-29 DIAGNOSIS — I25.110 CORONARY ARTERY DISEASE INVOLVING NATIVE HEART WITH UNSTABLE ANGINA PECTORIS, UNSPECIFIED VESSEL OR LESION TYPE (HCC): ICD-10-CM

## 2021-04-29 DIAGNOSIS — M31.6 GIANT CELL ARTERITIS (HCC): ICD-10-CM

## 2021-04-29 DIAGNOSIS — I35.1 MODERATE AORTIC INSUFFICIENCY: ICD-10-CM

## 2021-04-29 PROCEDURE — 99214 OFFICE O/P EST MOD 30 MIN: CPT | Performed by: NURSE PRACTITIONER

## 2021-04-29 RX ORDER — FLUCONAZOLE 150 MG/1
TABLET ORAL
COMMUNITY
Start: 2021-03-18 | End: 2022-01-11

## 2021-04-29 RX ORDER — ATORVASTATIN CALCIUM 80 MG/1
80 TABLET, FILM COATED ORAL DAILY
Qty: 90 TABLET | Refills: 3 | Status: SHIPPED
Start: 2021-04-29 | End: 2022-01-11

## 2021-04-29 RX ORDER — METOPROLOL SUCCINATE 25 MG/1
25 TABLET, EXTENDED RELEASE ORAL EVERY EVENING
Qty: 90 TABLET | Refills: 3 | Status: SHIPPED | OUTPATIENT
Start: 2021-04-29 | End: 2023-01-13

## 2021-04-29 RX ORDER — FUROSEMIDE 20 MG/1
TABLET ORAL
COMMUNITY
Start: 2021-03-18 | End: 2021-04-29

## 2021-04-29 RX ORDER — POTASSIUM CHLORIDE 750 MG/1
TABLET, FILM COATED, EXTENDED RELEASE ORAL
COMMUNITY
Start: 2021-03-18 | End: 2021-04-29

## 2021-04-29 RX ORDER — CLOPIDOGREL BISULFATE 75 MG/1
75 TABLET ORAL DAILY
Qty: 90 TABLET | Refills: 3 | Status: SHIPPED
Start: 2021-04-29 | End: 2022-01-11

## 2021-04-29 ASSESSMENT — ENCOUNTER SYMPTOMS
BACK PAIN: 1
SHORTNESS OF BREATH: 0
DIZZINESS: 1
PND: 0
PALPITATIONS: 0
SPUTUM PRODUCTION: 0
HEMOPTYSIS: 0
WEAKNESS: 0
SPEECH CHANGE: 0
NAUSEA: 0
CLAUDICATION: 0
ORTHOPNEA: 0
COUGH: 0
NERVOUS/ANXIOUS: 0
BRUISES/BLEEDS EASILY: 1
WHEEZING: 0
VOMITING: 0

## 2021-04-29 ASSESSMENT — FIBROSIS 4 INDEX: FIB4 SCORE: 1.45

## 2021-04-29 NOTE — LETTER
[facility]   [address], [address]  [place] [address]  Phone: [phone]  Fax: [phone]              Carine Christopher  1950    Encounter Date: 4/29/2021    TESHA Culver          PROGRESS NOTE:  Chief Complaint   Patient presents with   • Coronary Artery Disease     F/V Dx       Subjective:   Carine Christopher is a 70 y.o. female who presents today for hospital follow up CAD.    She was seen by Dr. Chase in the hospital, history of CAD circumflex occlusion, status post PCI 10/2020, suspected giant cell arteritis on chronic steroid therapy.    Hospitalized for unstable angina 4/16/ 2021.  Troponin T peaked at 47.  Underwent cardiac catheterization PCI/JAMES x1 of the 99% in-stent restenosis of the mid circumflex.  Patient was placed on aspirin Plavix, atorvastatin and metoprolol.  Echocardiogram showed EF is 60% with hypokinesis of the basal inferior wall.  Moderate aortic insufficiency.    Past Medical History:   Diagnosis Date   • Bronchitis    • Giant cell arteritis (HCC)    • Heart attack (HCC) 10/2020   • Hyperlipidemia      Past Surgical History:   Procedure Laterality Date   • ABDOMINAL EXPLORATION     • BREAST IMPLANT REVISION     • CATARACT EXTRACTION WITH IOL       Family History   Problem Relation Age of Onset   • Glasses Father    • Heart Disease Father      Social History     Socioeconomic History   • Marital status:      Spouse name: Not on file   • Number of children: Not on file   • Years of education: Not on file   • Highest education level: Not on file   Occupational History   • Not on file   Tobacco Use   • Smoking status: Never Smoker   • Smokeless tobacco: Never Used   Substance and Sexual Activity   • Alcohol use: Not Currently     Alcohol/week: 0.6 oz     Types: 1 Glasses of wine per week   • Drug use: Never   • Sexual activity: Not on file   Other Topics Concern   • Not on file      Social History Narrative    retired     Social Determinants of Health     Financial Resource Strain:    • Difficulty of Paying Living Expenses:    Food Insecurity:    • Worried About Running Out of Food in the Last Year:    • Ran Out of Food in the Last Year:    Transportation Needs:    • Lack of Transportation (Medical):    • Lack of Transportation (Non-Medical):    Physical Activity:    • Days of Exercise per Week:    • Minutes of Exercise per Session:    Stress:    • Feeling of Stress :    Social Connections:    • Frequency of Communication with Friends and Family:    • Frequency of Social Gatherings with Friends and Family:    • Attends Hindu Services:    • Active Member of Clubs or Organizations:    • Attends Club or Organization Meetings:    • Marital Status:    Intimate Partner Violence:    • Fear of Current or Ex-Partner:    • Emotionally Abused:    • Physically Abused:    • Sexually Abused:      Allergies   Allergen Reactions   • Chloramphenicol Diarrhea     PCN is okay to take   • Codeine    • Sulfa Antibiotics  [Sulfa Drugs] Unspecified     Outpatient Encounter Medications as of 4/29/2021   Medication Sig Dispense Refill   • fluconazole (DIFLUCAN) 150 MG tablet      • clopidogrel (PLAVIX) 75 MG Tab Take 1 tablet by mouth every day. 90 tablet 3   • atorvastatin (LIPITOR) 80 MG tablet Take 1 tablet by mouth every day. 90 tablet 3   • metoprolol SR (TOPROL XL) 25 MG TABLET SR 24 HR Take 1 tablet by mouth every evening. 90 tablet 3   • predniSONE (DELTASONE) 20 MG Tab Take 20mg daily 30 tablet 0   • nitroglycerin (NITROSTAT) 0.4 MG SL Tab Place 1 tablet under the tongue as needed for Chest Pain. 30 tablet 3   • Ascorbic Acid (VITAMIN C PO) Take 1 tablet by mouth every day.     • Coenzyme Q10 (COQ10 PO) Take 1 tablet by mouth every day.     • Conj Estrog-Medroxyprogest Ace (PREMPHASE) Tab Take 1 tablet by mouth every day.     • LUTEIN PO Take 2 Tablets by mouth every day.     •  "Ospfug-UsLcp-RjOyv-FA-CA-Omega (COMPLETE [place] DHA) 29-1-200 & 250 MG Misc Take 2 Tablets by mouth every day.     • Cholecalciferol (VITAMIN D3) 50 MCG (2000 UT) Tab Take 2,000 Units by mouth every day at 6 PM.     • aspirin (ASPIRIN 81) 81 MG EC tablet Take 81 mg by mouth every evening.     • [DISCONTINUED] furosemide (LASIX) 20 MG Tab      • [DISCONTINUED] nystatin (MYCOSTATIN) 533898 UNIT/ML Suspension      • [DISCONTINUED] potassium chloride ER (KLOR-CON) 10 MEQ tablet      • [DISCONTINUED] atorvastatin (LIPITOR) 80 MG tablet Take 1 tablet by mouth every day. 30 tablet 11   • [DISCONTINUED] metoprolol tartrate (LOPRESSOR) 25 MG Tab Take 1 tablet by mouth 2 times a day. 60 tablet 11   • [DISCONTINUED] clopidogrel (PLAVIX) 75 MG Tab Take 75 mg by mouth every day.       No facility-administered encounter medications on file as of 2021.     Review of Systems   Constitutional: Negative for malaise/fatigue.   Respiratory: Negative for cough, hemoptysis, sputum production, shortness of breath and wheezing.    Cardiovascular: Negative for chest pain, palpitations, orthopnea, claudication, leg swelling and PND.   Gastrointestinal: Negative for nausea and vomiting.   Musculoskeletal: Positive for back pain.   Neurological: Positive for dizziness. Negative for speech change and weakness.   Endo/Heme/Allergies: Bruises/bleeds easily.   Psychiatric/Behavioral: The patient is not nervous/anxious.         Objective:   /62 (BP Location: Left arm, Patient Position: Sitting, BP Cuff Size: Adult)   Pulse 77   Resp 16   Ht 1.651 m (5' 5\")   Wt 67.1 kg (148 lb)   SpO2 96%   BMI 24.63 kg/m²     Physical Exam   Constitutional: She is oriented to person, place, and time. She appears well-developed and well-nourished. No distress.   HENT:   Head: Normocephalic and atraumatic.   Eyes: Pupils are equal, round, and reactive to light.   Neck: No JVD present.   Cardiovascular: Normal rate, regular rhythm and normal heart " sounds.   No murmur heard.  Pulmonary/Chest: Effort normal and breath sounds normal. No respiratory distress.   Abdominal: Soft. Bowel sounds are normal. She exhibits no distension.   Musculoskeletal:         General: No edema.   Neurological: She is alert and oriented to person, place, and time.   Skin: Skin is warm and dry. She is not diaphoretic.   Psychiatric: She has a normal mood and affect. Her behavior is normal. Judgment and thought content normal.       Cardiac Catheterization:   4/15/2021  Coronary Anatomy              Left Main: Normal              LAD: 25% stenosis in the midsegment              LCx: 99% in-stent restenosis in the mid circumflex              RCA: Dominant, Mid 40% stenosis.  The PDA and posterior lateral branch are      IMPRESSIONS:  1.  Unstable angina due to severe in-stent restenosis of the circumflex  2.  Successful PCI of the circumflex using 1 drug-eluting stent  3.  Mild elevation LVEDP (19 mmHg)     Recommendations:  Usual post PCI care  Dual antiplatelet therapy for 12 months    ECHO  4/17/2021  Left ventricular ejection fraction is visually estimated to be 60%.  Hypokinesis of basal inferior wall.  Normal left atrial size.  Trace mitral regurgitation.  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  Trace tricuspid regurgitation.  Estimated right ventricular systolic pressure  is 30 mmHg.  No pericardial effusion seen.  Normal aortic root for body surface area.       Assessment:     1. Coronary artery disease involving native heart with unstable angina pectoris, unspecified vessel or lesion type ([facility])     2. Moderate aortic insufficiency         Medical Decision Making:  Today's Assessment / Status / Plan:       1. CAD s/p PCI circumflex:  - Status post successful PCI/JAMES x1 of the 99% in-stent restenosis of the mid circumflex, 4/16/2021  - Dual antiplatelet therapy: aspirin, Plavix x12 months, atorvastatin 80mg qd  - changed metoprolol 25 mg BID to  metoprolol XL 25mg qd   - euvolemic upon examination, stopped furosemide and potassium  - referral to cardial rehab       2. Giant cell arteritis  -On chronic steroid therapy with prednisone  - PCP send referral to [facility]      3. Hyperlipidemia  -LDL 48  -HDL 58  -Triglycerides 104  -Total cholesterol 127     4. Moderate aortic insufficiency:  - monitor      Follow up in 3 months, sooner as needed.            Khai Deras M.D.  [address] [place] [id number]  Via Fax: [phone]

## 2021-06-21 ENCOUNTER — APPOINTMENT (OUTPATIENT)
Dept: RADIOLOGY | Facility: MEDICAL CENTER | Age: 71
End: 2021-06-21
Attending: EMERGENCY MEDICINE
Payer: MEDICARE

## 2021-06-21 ENCOUNTER — HOSPITAL ENCOUNTER (EMERGENCY)
Facility: MEDICAL CENTER | Age: 71
End: 2021-06-22
Attending: EMERGENCY MEDICINE
Payer: MEDICARE

## 2021-06-21 DIAGNOSIS — Q79.8: ICD-10-CM

## 2021-06-21 DIAGNOSIS — S70.12XA THIGH HEMATOMA, LEFT, INITIAL ENCOUNTER: ICD-10-CM

## 2021-06-21 DIAGNOSIS — S37.012A CONTUSION OF LEFT KIDNEY, INITIAL ENCOUNTER: ICD-10-CM

## 2021-06-21 DIAGNOSIS — R31.0 GROSS HEMATURIA: ICD-10-CM
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
## 2021-06-21 DIAGNOSIS — T07.XXXA MULTIPLE CONTUSIONS: ICD-10-CM

## 2021-06-21 LAB
ALBUMIN SERPL BCP-MCNC: 4.1 G/DL (ref 3.2–4.9)
ALBUMIN/GLOB SERPL: 1.6 G/DL
ALP SERPL-CCNC: 49 U/L (ref 30–99)
ALT SERPL-CCNC: 28 U/L (ref 2–50)
ANION GAP SERPL CALC-SCNC: 11 MMOL/L (ref 7–16)
APPEARANCE UR: ABNORMAL
AST SERPL-CCNC: 29 U/L (ref 12–45)
BACTERIA #/AREA URNS HPF: ABNORMAL /HPF
BASOPHILS # BLD AUTO: 0.2 % (ref 0–1.8)
BASOPHILS # BLD: 0.03 K/UL (ref 0–0.12)
BILIRUB SERPL-MCNC: 0.3 MG/DL (ref 0.1–1.5)
BILIRUB UR QL STRIP.AUTO: NEGATIVE
BUN SERPL-MCNC: 24 MG/DL (ref 8–22)
CALCIUM SERPL-MCNC: 9 MG/DL (ref 8.5–10.5)
CAOX CRY #/AREA URNS HPF: ABNORMAL /HPF
CHLORIDE SERPL-SCNC: 106 MMOL/L (ref 96–112)
CO2 SERPL-SCNC: 24 MMOL/L (ref 20–33)
COLOR UR: YELLOW
CREAT SERPL-MCNC: 0.8 MG/DL (ref 0.5–1.4)
EOSINOPHIL # BLD AUTO: 0.01 K/UL (ref 0–0.51)
EOSINOPHIL NFR BLD: 0.1 % (ref 0–6.9)
EPI CELLS #/AREA URNS HPF: NEGATIVE /HPF
ERYTHROCYTE [DISTWIDTH] IN BLOOD BY AUTOMATED COUNT: 47 FL (ref 35.9–50)
GLOBULIN SER CALC-MCNC: 2.5 G/DL (ref 1.9–3.5)
GLUCOSE SERPL-MCNC: 131 MG/DL (ref 65–99)
GLUCOSE UR STRIP.AUTO-MCNC: 100 MG/DL
HCT VFR BLD AUTO: 42.7 % (ref 37–47)
HGB BLD-MCNC: 13.7 G/DL (ref 12–16)
HYALINE CASTS #/AREA URNS LPF: ABNORMAL /LPF
IMM GRANULOCYTES # BLD AUTO: 0.13 K/UL (ref 0–0.11)
IMM GRANULOCYTES NFR BLD AUTO: 0.8 % (ref 0–0.9)
KETONES UR STRIP.AUTO-MCNC: ABNORMAL MG/DL
LEUKOCYTE ESTERASE UR QL STRIP.AUTO: ABNORMAL
LIPASE SERPL-CCNC: 40 U/L (ref 11–82)
LYMPHOCYTES # BLD AUTO: 1.46 K/UL (ref 1–4.8)
LYMPHOCYTES NFR BLD: 9.3 % (ref 22–41)
MCH RBC QN AUTO: 29.5 PG (ref 27–33)
MCHC RBC AUTO-ENTMCNC: 32.1 G/DL (ref 33.6–35)
MCV RBC AUTO: 92 FL (ref 81.4–97.8)
MICRO URNS: ABNORMAL
MONOCYTES # BLD AUTO: 0.98 K/UL (ref 0–0.85)
MONOCYTES NFR BLD AUTO: 6.2 % (ref 0–13.4)
NEUTROPHILS # BLD AUTO: 13.11 K/UL (ref 2–7.15)
NEUTROPHILS NFR BLD: 83.4 % (ref 44–72)
NITRITE UR QL STRIP.AUTO: NEGATIVE
NRBC # BLD AUTO: 0 K/UL
NRBC BLD-RTO: 0 /100 WBC
PH UR STRIP.AUTO: 5.5 [PH] (ref 5–8)
PLATELET # BLD AUTO: 263 K/UL (ref 164–446)
PMV BLD AUTO: 10.2 FL (ref 9–12.9)
POTASSIUM SERPL-SCNC: 4.2 MMOL/L (ref 3.6–5.5)
PROT SERPL-MCNC: 6.6 G/DL (ref 6–8.2)
PROT UR QL STRIP: 30 MG/DL
RBC # BLD AUTO: 4.64 M/UL (ref 4.2–5.4)
RBC # URNS HPF: >150 /HPF
RBC UR QL AUTO: ABNORMAL
SODIUM SERPL-SCNC: 141 MMOL/L (ref 135–145)
SP GR UR STRIP.AUTO: 1.02
UROBILINOGEN UR STRIP.AUTO-MCNC: 0.2 MG/DL
WBC # BLD AUTO: 15.7 K/UL (ref 4.8–10.8)
WBC #/AREA URNS HPF: ABNORMAL /HPF

## 2021-06-21 PROCEDURE — 83690 ASSAY OF LIPASE: CPT

## 2021-06-21 PROCEDURE — 74177 CT ABD & PELVIS W/CONTRAST: CPT | Mod: MG

## 2021-06-21 PROCEDURE — 80053 COMPREHEN METABOLIC PANEL: CPT

## 2021-06-21 PROCEDURE — 99284 EMERGENCY DEPT VISIT MOD MDM: CPT

## 2021-06-21 PROCEDURE — 85025 COMPLETE CBC W/AUTO DIFF WBC: CPT

## 2021-06-21 PROCEDURE — 700105 HCHG RX REV CODE 258: Performed by: EMERGENCY MEDICINE

## 2021-06-21 PROCEDURE — 36415 COLL VENOUS BLD VENIPUNCTURE: CPT

## 2021-06-21 PROCEDURE — 700117 HCHG RX CONTRAST REV CODE 255: Performed by: EMERGENCY MEDICINE

## 2021-06-21 PROCEDURE — 81001 URINALYSIS AUTO W/SCOPE: CPT

## 2021-06-21 RX ORDER — SODIUM CHLORIDE 9 MG/ML
1000 INJECTION, SOLUTION INTRAVENOUS ONCE
Status: COMPLETED | OUTPATIENT
Start: 2021-06-21 | End: 2021-06-22

## 2021-06-21 RX ADMIN — SODIUM CHLORIDE 1000 ML: 9 INJECTION, SOLUTION INTRAVENOUS at 22:07

## 2021-06-21 RX ADMIN — IOHEXOL 100 ML: 350 INJECTION, SOLUTION INTRAVENOUS at 23:00

## 2021-06-21 ASSESSMENT — FIBROSIS 4 INDEX: FIB4 SCORE: 1.45

## 2021-06-22 VITALS
BODY MASS INDEX: 24.06 KG/M2 | DIASTOLIC BLOOD PRESSURE: 92 MMHG | HEIGHT: 65 IN | HEART RATE: 79 BPM | TEMPERATURE: 98.1 F | SYSTOLIC BLOOD PRESSURE: 150 MMHG | OXYGEN SATURATION: 94 % | WEIGHT: 144.4 LBS | RESPIRATION RATE: 14 BRPM

## 2021-06-22 NOTE — ED NOTES
Pt ambulate to room with steady gait, Pt presents with large bruise noted to left thigh and left wrist from GLF. Pt notes the bruising has increased since last night, Pt takes Plavix and ASA.

## 2021-06-22 NOTE — ED NOTES
Discharge instructions given to patient, a verbal understanding of all instructions was stated. IV removed, cathlon intact, site without s/s of infection. Pt preferred to walk out accompanied by  VSS, all belongings accounted for.

## 2021-06-22 NOTE — DISCHARGE INSTRUCTIONS
Hold your Plavix for the next 2 to 3 days  Alternate between ice and moist heat  Increase fluids especially water and water based products to flush your kidneys  Return if increased or worsening pain, fever or worsening blood in your urine.

## 2021-06-22 NOTE — ED TRIAGE NOTES
"Chief Complaint   Patient presents with   • Blood in Urine     Started this morning and currently on plavix, \"it's a lot.\"   • T-5000 GLF     Fell Saturday night and fell on her left wrist and left hip. No problems ambulating.     Pt ambulatory to triage for above complaint.  Pt is AO x 4, follows commands, and responds appropriately to questions. Patient's breathing is unlabored and pain is currently 8/10 on the 0-10 pain scale.  Pt placed in lobby. Patient educated on triage process and encouraged to alert staff for any changes.    "

## 2021-06-22 NOTE — ED PROVIDER NOTES
"Scribed for Jeny Crews D.O. by Jason Kraus. 6/21/2021, 9:22 PM.     Primary care provider: Khai Deras M.D.  Means of arrival: Walk-in        History obtained from: Patient  History limited by: None    CHIEF COMPLAINT  Chief Complaint   Patient presents with   • Blood in Urine     Started this morning and currently on plavix, \"it's a lot.\"   • T-5000 GLF     Fell Saturday night and fell on her left wrist and left hip. No problems ambulating.       HPI  Carine Christopher is a 70 y.o. female who presents to the Emergency Department for acute, moderate hematuria onset this morning. The patient says that she fell Saturday evening and landed on her left wrist and hip. had an associated hematoma on her left lateral thigh. She has associated symptoms of flank soreness. 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  Patient has a history of giant cell arteritis requiring anticoagulation therapy.  She states she was very concerned when she noted blood in her urine and thought that she may have a kidney injury.    REVIEW OF SYSTEMS  Pertinent positives include hematuria, hematoma on her left lateral thigh, and kidney stones.  See HPI for further details. All other systems are negative.    PAST MEDICAL HISTORY  Past Medical History:   Diagnosis Date   • Bronchitis    • Giant cell arteritis (HCC)    • Heart attack (HCC) 10/2020   • Hyperlipidemia        FAMILY HISTORY  Family History   Problem Relation Age of Onset   • Glasses Father    • Heart Disease Father        SOCIAL HISTORY  Social History     Tobacco Use   • Smoking status: Never Smoker   • Smokeless tobacco: Never Used   Vaping Use   • Vaping Use: Never used   Substance Use Topics   • Alcohol use: Not Currently     Alcohol/week: 0.6 oz     Types: 1 Glasses of wine per week   • Drug use: Never      Social History     Substance and Sexual Activity   Drug Use Never       SURGICAL HISTORY  Past Surgical History: " "  Procedure Laterality Date   • ABDOMINAL EXPLORATION     • BREAST IMPLANT REVISION     • CATARACT EXTRACTION WITH IOL         CURRENT MEDICATIONS  Current Outpatient Medications   Medication Instructions   • Ascorbic Acid (VITAMIN C PO) 1 tablet, Oral, DAILY   • aspirin (ASPIRIN 81) 81 mg, Oral, EVERY EVENING   • atorvastatin (LIPITOR) 80 mg, Oral, DAILY   • clopidogrel (PLAVIX) 75 mg, Oral, DAILY   • Coenzyme Q10 (COQ10 PO) 1 tablet, Oral, DAILY   • Conj Estrog-Medroxyprogest Ace (PREMPHASE) Tab 1 tablet, Oral, DAILY   • fluconazole (DIFLUCAN) 150 MG tablet No dose, route, or frequency recorded.   • LUTEIN PO 2 Tablets, Oral, DAILY   • metoprolol SR (TOPROL XL) 25 mg, Oral, EVERY EVENING   • nitroglycerin (NITROSTAT) 0.4 mg, Sublingual, PRN   • predniSONE (DELTASONE) 20 MG Tab Take 20mg daily   • Ngxwfr-TzUsd-AmUod-FA-CA-Omega (COMPLETE [place] DHA) 29-1-200 & 250 MG Misc 2 Tablets, Oral, DAILY   • Vitamin D3 2,000 Units, Oral, DAILY       ALLERGIES  Allergies   Allergen Reactions   • Chloramphenicol Diarrhea     PCN is okay to take   • Codeine    • Sulfa Antibiotics  [Sulfa Drugs] Unspecified       PHYSICAL EXAM  VITAL SIGNS: /77   Pulse 88   Temp 37.2 °C (99 °F) (Temporal)   Resp 14   Ht 1.651 m (5' 5\")   Wt 65.5 kg (144 lb 6.4 oz)   LMP 2021 (Exact Date)   SpO2 95%   BMI 24.03 kg/m²     Constitutional: Patient is well developed, well nourished.  Mild distress.  HENT: Normocephalic, atraumatic.  Oral mucosa moist..  Eyes: PERRL, EOMI, Conjunctiva with no subconjunctival hemorrhage.  Neck: Supple with  Normal range of motion in flexion, extension and lateral rotation. No tenderness along the bony prominences or paraspinal muscles..  Cardiovascular: Normal heart rate and Regular rhythm. No murmur  Thorax & Lungs: Clear and equal breath sounds with good excursion. No respiratory distress, no rhonchi, wheezing or rales. No chest tenderness or signs of trauma .  Abdomen: Soft,nontender, no rebound , " guarding, palpable masses.  No signs of trauma.  Mild left CVA tenderness.  Skin: Warm, Dry, multiple areas of bruising with ecchymosis.  Back: No midline cervical, thoracic, or lumbosacral tenderness.  Musculoskeletal: Normal range of motion in all major joints. 20 by 22 cm ecchymotic hematoma on left lateral thigh. 8 by 8 cm ecchymosis on volar of left wrist. Tenderness in left flank but no contusions or abrasions   Neurologic: Alert & oriented x 3, Normal motor function, Normal sensory function, No lateralizing or focal deficits noted. DTR's 4/4 bilaterally.  Psychiatric: Affect normal, Judgment normal, Mood normal.     DIAGNOSTICS/PROCEDURES    LABS  Results for orders placed or performed during the hospital encounter of 06/21/21   CBC WITH DIFFERENTIAL   Result Value Ref Range    WBC 15.7 (H) 4.8 - 10.8 K/uL    RBC 4.64 4.20 - 5.40 M/uL    Hemoglobin 13.7 12.0 - 16.0 g/dL    Hematocrit 42.7 37.0 - 47.0 %    MCV 92.0 81.4 - 97.8 fL    MCH 29.5 27.0 - 33.0 pg    MCHC 32.1 (L) 33.6 - 35.0 g/dL    RDW 47.0 35.9 - 50.0 fL    Platelet Count 263 164 - 446 K/uL    MPV 10.2 9.0 - 12.9 fL    Neutrophils-Polys 83.40 (H) 44.00 - 72.00 %    Lymphocytes 9.30 (L) 22.00 - 41.00 %    Monocytes 6.20 0.00 - 13.40 %    Eosinophils 0.10 0.00 - 6.90 %    Basophils 0.20 0.00 - 1.80 %    Immature Granulocytes 0.80 0.00 - 0.90 %    Nucleated RBC 0.00 /100 WBC    Neutrophils (Absolute) 13.11 (H) 2.00 - 7.15 K/uL    Lymphs (Absolute) 1.46 1.00 - 4.80 K/uL    Monos (Absolute) 0.98 (H) 0.00 - 0.85 K/uL    Eos (Absolute) 0.01 0.00 - 0.51 K/uL    Baso (Absolute) 0.03 0.00 - 0.12 K/uL    Immature Granulocytes (abs) 0.13 (H) 0.00 - 0.11 K/uL    NRBC (Absolute) 0.00 K/uL   COMP METABOLIC PANEL   Result Value Ref Range    Sodium 141 135 - 145 mmol/L    Potassium 4.2 3.6 - 5.5 mmol/L    Chloride 106 96 - 112 mmol/L    Co2 24 20 - 33 mmol/L    Anion Gap 11.0 7.0 - 16.0    Glucose 131 (H) 65 - 99 mg/dL    Bun 24 (H) 8 - 22 mg/dL    Creatinine 0.80  0.50 - 1.40 mg/dL    Calcium 9.0 8.5 - 10.5 mg/dL    AST(SGOT) 29 12 - 45 U/L    ALT(SGPT) 28 2 - 50 U/L    Alkaline Phosphatase 49 30 - 99 U/L    Total Bilirubin 0.3 0.1 - 1.5 mg/dL    Albumin 4.1 3.2 - 4.9 g/dL    Total Protein 6.6 6.0 - 8.2 g/dL    Globulin 2.5 1.9 - 3.5 g/dL    A-G Ratio 1.6 g/dL   LIPASE   Result Value Ref Range    Lipase 40 11 - 82 U/L   URINALYSIS    Specimen: Urine   Result Value Ref Range    Color Yellow     Character Cloudy (A)     Specific Gravity 1.022 <1.035    Ph 5.5 5.0 - 8.0    Glucose 100 (A) Negative mg/dL    Ketones Trace (A) Negative mg/dL    Protein 30 (A) Negative mg/dL    Bilirubin Negative Negative    Urobilinogen, Urine 0.2 Negative    Nitrite Negative Negative    Leukocyte Esterase Trace (A) Negative    Occult Blood Large (A) Negative    Micro Urine Req Microscopic    URINE MICROSCOPIC (W/UA)   Result Value Ref Range    WBC 2-5 /hpf    RBC >150 (A) /hpf    Bacteria Rare (A) None /hpf    Epithelial Cells Negative /hpf    Ca Oxalate Crystal Few /hpf    Hyaline Cast 0-2 /lpf   ESTIMATED GFR   Result Value Ref Range    GFR If African American >60 >60 mL/min/1.73 m 2    GFR If Non African American >60 >60 mL/min/1.73 m 2     Labs reviewed by me      RADIOLOGY/PROCEDURES  CT-ABDOMEN-PELVIS WITH   Final Result      Inferior left gluteal subcutaneous fat 4 cm hematoma        Results and radiologist interpretation reviewed by me.     COURSE & MEDICAL DECISION MAKING  Pertinent Labs & Imaging studies reviewed. (See chart for details)    9:22 PM - Patient seen and evaluated at bedside. Ordered for Estimated GFR, Urine Microscopic (w/ UA), CBC w/ diff, CMP, Lipase, Urinalysis, and CT-Abdomen-Pelvis to evaluate. Patient will be treated with IV fluids for her symptoms. Differential diagnoses include, but are not limited to, renal contusion or renal hematoma.  Patient's laboratory results were stable.  She has no signs of anemia.  Her white count is overall somewhat elevated but there is  no signs of infection.  Urinalysis shows greater than 100 red cells, rare bacteria.  Upon reurinating in the ER she states that the blood had dissipated and was nearly gone.  I did treat her with IV fluids secondary to the hematuria and the acute injury which did seem to improve her bleeding.    11:47 PM - Patient was reevaluated at bedside. I informed her that there is no injury to her kidneys, however, it is bruised. I recommended her to stay hydrated to flush out her kidneys. Discussed plans for discharge and to return to the ED for worsening symptoms. Patient understands and verbalizes agreement to the plan of discharge.    HYDRATION: Based on the patient's presentation of Acute Kindney Injury the patient was given IV fluids. IV Hydration was used because oral hydration was not adequate alone. Upon recheck following hydration, the patient was improved.     She is instructed to stop her Plavix for at least 3 days, rest, increase fluids, continue icing her affected areas of hematoma on her gluteal and thigh region.  She is to return if elevated fever, increased swelling, increased bleeding or any concerning change in her condition.  She is stable at discharge.        DISPOSITION:  Patient will be discharged home in stable condition.    FOLLOW UP:  Khai Deras M.D.  [address] [place] [id number]  [phone]    Schedule an appointment as soon as possible for a visit in 3 days  For recheck      OUTPATIENT MEDICATIONS:  New Prescriptions    No medications on file         FINAL IMPRESSION  1. Gross hematuria    2. Fall from ground level    3. Contusion of left kidney, initial encounter    4. Thigh hematoma, left, initial encounter    5. Absence of gluteal muscle    6. Multiple contusions         Jason EID (Alfonzo), am scribing for, and in the presence of, Jeny Crews D.O..    Electronically signed by: Jason Kraus (Alfonzo), 6/21/2021    Jeny EID D.O. personally performed the services  described in this documentation, as scribed by Jason Kraus in my presence, and it is both accurate and complete. C.    The note accurately reflects work and decisions made by me.  Jeny Crews D.O.  6/22/2021  2:04 AM

## 2021-06-22 NOTE — ED TRIAGE NOTES
"Patient vital signs rechecked and documented per [facility]. Patient denies any new needs at this time.  Patient updated on wait times, thanked for patience. Pt informed to alert triage tech or triage RN with any needs and/or changes in condition; patient verbalized understanding. Patient stated \"pain is the same. Its hard sit.\"   "

## 2021-07-16 ENCOUNTER — APPOINTMENT (OUTPATIENT)
Dept: NEUROLOGY | Facility: MEDICAL CENTER | Age: 71
End: 2021-07-16
Attending: PSYCHIATRY & NEUROLOGY
Payer: MEDICARE

## 2021-08-25 ENCOUNTER — OFFICE VISIT (OUTPATIENT)
Dept: OPHTHALMOLOGY | Facility: MEDICAL CENTER | Age: 71
End: 2021-08-25
Payer: MEDICARE

## 2021-08-25 ENCOUNTER — HOSPITAL ENCOUNTER (OUTPATIENT)
Dept: LAB | Facility: MEDICAL CENTER | Age: 71
End: 2021-08-25
Attending: OPHTHALMOLOGY
Payer: MEDICARE

## 2021-08-25 DIAGNOSIS — H52.31 ANISOMETROPIA: ICD-10-CM

## 2021-08-25 DIAGNOSIS — M31.6 GIANT CELL ARTERITIS (HCC): ICD-10-CM

## 2021-08-25 DIAGNOSIS — H35.371 EPIRETINAL MEMBRANE (ERM) OF RIGHT EYE: ICD-10-CM

## 2021-08-25 DIAGNOSIS — Z96.1 PSEUDOPHAKIA OF BOTH EYES: ICD-10-CM

## 2021-08-25 DIAGNOSIS — H49.9 OPHTHALMOPLEGIA: ICD-10-CM

## 2021-08-25 LAB
ALBUMIN SERPL BCP-MCNC: 4.2 G/DL (ref 3.2–4.9)
ALBUMIN/GLOB SERPL: 1.6 G/DL
ALP SERPL-CCNC: 52 U/L (ref 30–99)
ALT SERPL-CCNC: 20 U/L (ref 2–50)
ANION GAP SERPL CALC-SCNC: 12 MMOL/L (ref 7–16)
AST SERPL-CCNC: 20 U/L (ref 12–45)
BASOPHILS # BLD AUTO: 0.4 % (ref 0–1.8)
BASOPHILS # BLD: 0.06 K/UL (ref 0–0.12)
BILIRUB SERPL-MCNC: 0.2 MG/DL (ref 0.1–1.5)
BUN SERPL-MCNC: 32 MG/DL (ref 8–22)
CALCIUM SERPL-MCNC: 9.2 MG/DL (ref 8.5–10.5)
CHLORIDE SERPL-SCNC: 105 MMOL/L (ref 96–112)
CO2 SERPL-SCNC: 22 MMOL/L (ref 20–33)
CREAT SERPL-MCNC: 1.09 MG/DL (ref 0.5–1.4)
CRP SERPL HS-MCNC: 0.34 MG/DL (ref 0–0.75)
EOSINOPHIL # BLD AUTO: 0.03 K/UL (ref 0–0.51)
EOSINOPHIL NFR BLD: 0.2 % (ref 0–6.9)
ERYTHROCYTE [DISTWIDTH] IN BLOOD BY AUTOMATED COUNT: 50.3 FL (ref 35.9–50)
GLOBULIN SER CALC-MCNC: 2.7 G/DL (ref 1.9–3.5)
GLUCOSE SERPL-MCNC: 105 MG/DL (ref 65–99)
HCT VFR BLD AUTO: 47.2 % (ref 37–47)
HGB BLD-MCNC: 15.1 G/DL (ref 12–16)
IMM GRANULOCYTES # BLD AUTO: 0.29 K/UL (ref 0–0.11)
IMM GRANULOCYTES NFR BLD AUTO: 2.2 % (ref 0–0.9)
LYMPHOCYTES # BLD AUTO: 1.36 K/UL (ref 1–4.8)
LYMPHOCYTES NFR BLD: 10.2 % (ref 22–41)
MCH RBC QN AUTO: 29.5 PG (ref 27–33)
MCHC RBC AUTO-ENTMCNC: 32 G/DL (ref 33.6–35)
MCV RBC AUTO: 92.4 FL (ref 81.4–97.8)
MONOCYTES # BLD AUTO: 0.75 K/UL (ref 0–0.85)
MONOCYTES NFR BLD AUTO: 5.6 % (ref 0–13.4)
NEUTROPHILS # BLD AUTO: 10.89 K/UL (ref 2–7.15)
NEUTROPHILS NFR BLD: 81.4 % (ref 44–72)
NRBC # BLD AUTO: 0 K/UL
NRBC BLD-RTO: 0 /100 WBC
PLATELET # BLD AUTO: 256 K/UL (ref 164–446)
PMV BLD AUTO: 10.9 FL (ref 9–12.9)
POTASSIUM SERPL-SCNC: 4.5 MMOL/L (ref 3.6–5.5)
PROT SERPL-MCNC: 6.9 G/DL (ref 6–8.2)
RBC # BLD AUTO: 5.11 M/UL (ref 4.2–5.4)
SODIUM SERPL-SCNC: 139 MMOL/L (ref 135–145)
TSH SERPL DL<=0.005 MIU/L-ACNC: 1.16 UIU/ML (ref 0.38–5.33)
WBC # BLD AUTO: 13.4 K/UL (ref 4.8–10.8)

## 2021-08-25 PROCEDURE — 92060 SENSORIMOTOR EXAMINATION: CPT | Performed by: OPHTHALMOLOGY

## 2021-08-25 PROCEDURE — 84443 ASSAY THYROID STIM HORMONE: CPT | Mod: GA

## 2021-08-25 PROCEDURE — 80053 COMPREHEN METABOLIC PANEL: CPT

## 2021-08-25 PROCEDURE — 86140 C-REACTIVE PROTEIN: CPT

## 2021-08-25 PROCEDURE — 92250 FUNDUS PHOTOGRAPHY W/I&R: CPT | Performed by: OPHTHALMOLOGY

## 2021-08-25 PROCEDURE — 85652 RBC SED RATE AUTOMATED: CPT

## 2021-08-25 PROCEDURE — 84479 ASSAY OF THYROID (T3 OR T4): CPT | Mod: GA

## 2021-08-25 PROCEDURE — 92014 COMPRE OPH EXAM EST PT 1/>: CPT | Mod: 25 | Performed by: OPHTHALMOLOGY

## 2021-08-25 PROCEDURE — 36415 COLL VENOUS BLD VENIPUNCTURE: CPT

## 2021-08-25 PROCEDURE — 92015 DETERMINE REFRACTIVE STATE: CPT | Performed by: OPHTHALMOLOGY

## 2021-08-25 PROCEDURE — 84436 ASSAY OF TOTAL THYROXINE: CPT | Mod: GA

## 2021-08-25 PROCEDURE — 85025 COMPLETE CBC W/AUTO DIFF WBC: CPT

## 2021-08-25 ASSESSMENT — REFRACTION
OD_SPHERE: +0.25
OS_CYLINDER: +0.75
OD_CYLINDER: +0.25
OS_SPHERE: -1.50
OD_AXIS: 125
OS_AXIS: 159

## 2021-08-25 ASSESSMENT — EXTERNAL EXAM - RIGHT EYE: OD_EXAM: NORMAL

## 2021-08-25 ASSESSMENT — REFRACTION_MANIFEST
OS_SPHERE: -1.50
OD_AXIS: 077
METHOD_AUTOREFRACTION: 1
OS_CYLINDER: +0.75
OD_CYLINDER: +0.25
OD_SPHERE: +0.25
OS_AXIS: 156

## 2021-08-25 ASSESSMENT — TONOMETRY
OD_IOP_MMHG: 17
OS_IOP_MMHG: 15
IOP_METHOD: I-CARE

## 2021-08-25 ASSESSMENT — ENCOUNTER SYMPTOMS
DOUBLE VISION: 1
BLURRED VISION: 1
DIZZINESS: 1

## 2021-08-25 ASSESSMENT — EXTERNAL EXAM - LEFT EYE: OS_EXAM: NORMAL

## 2021-08-25 ASSESSMENT — CUP TO DISC RATIO
OS_RATIO: 0.1
OD_RATIO: 0.1

## 2021-08-25 ASSESSMENT — VISUAL ACUITY
OS_SC+: -2
METHOD: SNELLEN - LINEAR
OS_SC: 20/20
OD_SC: 20/20

## 2021-08-25 ASSESSMENT — REFRACTION_FINALRX
OD_VPRISM: 2.0
OS_HPRISM: 2.0
OS_HBASE: IN

## 2021-08-25 ASSESSMENT — CONF VISUAL FIELD
OS_NORMAL: 1
OD_NORMAL: 1

## 2021-08-25 ASSESSMENT — SLIT LAMP EXAM - LIDS
COMMENTS: NORMAL
COMMENTS: NORMAL

## 2021-08-25 NOTE — PROGRESS NOTES
Peds/Neuro Ophthalmology:   Jon Wilde M.D.    Date & Time note created:    8/25/2021   4:16 PM     Referring MD / APRN:  Khai Deras M.D., No att. providers found    Patient ID:  Name:             Carine Christopher   YOB: 1950  Age:                 71 y.o.  female   MRN:               [id number]    Chief Complaint/Reason for Visit:     Other (6 month F/U for Giant cell arteritis OU and Anisometropia OS)      History of Present Illness:    Carine Christopher is a 71 y.o. female   Pt is here for 6 month F/U for Giant cell arteritis OU and Anisometropia OS. Pt states vision is better after she has PCO removed by Dr. Feliz in July 2021. Pt states she still sees double images. She states her dizziness is really bad and has had really bad falls due to it. Pt states she sees a teal color on images mainly with OS.       Review of Systems:  Review of Systems   Eyes: Positive for blurred vision and double vision.        Giant cell arteritis OU and Anisometropia OS   Neurological: Positive for dizziness.   All other systems reviewed and are negative.      Past Medical History:   Past Medical History:   Diagnosis Date   • Bronchitis    • Giant cell arteritis (HCC)    • Heart attack (HCC) 10/2020   • Hyperlipidemia        Past Surgical History:  Past Surgical History:   Procedure Laterality Date   • ABDOMINAL EXPLORATION     • BREAST IMPLANT REVISION     • CATARACT EXTRACTION WITH IOL         Current Outpatient Medications:  Current Outpatient Medications   Medication Sig Dispense Refill   • fluconazole (DIFLUCAN) 150 MG tablet      • clopidogrel (PLAVIX) 75 MG Tab Take 1 tablet by mouth every day. 90 tablet 3   • atorvastatin (LIPITOR) 80 MG tablet Take 1 tablet by mouth every day. (Patient taking differently: Take 40 mg by mouth every day.) 90 tablet 3   • metoprolol SR (TOPROL XL) 25 MG TABLET SR 24 HR Take 1 tablet by mouth every evening. 90 tablet 3   •  predniSONE (DELTASONE) 20 MG Tab Take 20mg daily (Patient taking differently: Take 10 mg by mouth every day.) 30 tablet 0   • nitroglycerin (NITROSTAT) 0.4 MG SL Tab Place 1 tablet under the tongue as needed for Chest Pain. 30 tablet 3   • Ascorbic Acid (VITAMIN C PO) Take 1 tablet by mouth every day.     • Coenzyme Q10 (COQ10 PO) Take 1 tablet by mouth every day.     • Conj Estrog-Medroxyprogest Ace (PREMPHASE) Tab Take 1 tablet by mouth every day.     • LUTEIN PO Take 1 Tablet by mouth every day.     • Fjiqhs-OoNij-ChVos-FA-CA-Omega (COMPLETE  DHA) 29-1-200 & 250 MG Misc Take 2 Tablets by mouth every day.     • Cholecalciferol (VITAMIN D3) 50 MCG (2000 UT) Tab Take 2,000 Units by mouth every day at 6 PM.     • aspirin (ASPIRIN 81) 81 MG EC tablet Take 81 mg by mouth every evening.       No current facility-administered medications for this visit.       Allergies:  Allergies   Allergen Reactions   • Chloramphenicol Diarrhea     PCN is okay to take   • Codeine    • Sulfa Antibiotics  [Sulfa Drugs] Unspecified       Family History:  Family History   Problem Relation Age of Onset   • Glasses Father    • Heart Disease Father        Social History:  Social History     Socioeconomic History   • Marital status:      Spouse name: Not on file   • Number of children: Not on file   • Years of education: Not on file   • Highest education level: Not on file   Occupational History   • Not on file   Tobacco Use   • Smoking status: Never Smoker   • Smokeless tobacco: Never Used   Vaping Use   • Vaping Use: Never used   Substance and Sexual Activity   • Alcohol use: Not Currently     Alcohol/week: 0.6 oz     Types: 1 Glasses of wine per week   • Drug use: Never   • Sexual activity: Not on file   Other Topics Concern   • Not on file   Social History Narrative    Retired- Office manger for [employer]      Social Determinants of Health     Financial Resource Strain:    • Difficulty of Paying Living Expenses:     Food Insecurity:    • Worried About Running Out of Food in the Last Year:    • Ran Out of Food in the Last Year:    Transportation Needs:    • Lack of Transportation (Medical):    • Lack of Transportation (Non-Medical):    Physical Activity:    • Days of Exercise per Week:    • Minutes of Exercise per Session:    Stress:    • Feeling of Stress :    Social Connections:    • Frequency of Communication with Friends and Family:    • Frequency of Social Gatherings with Friends and Family:    • Attends Restorationist Services:    • Active Member of Clubs or Organizations:    • Attends Club or Organization Meetings:    • Marital Status:    Intimate Partner Violence:    • Fear of Current or Ex-Partner:    • Emotionally Abused:    • Physically Abused:    • Sexually Abused:           Physical Exam:  Physical Exam    Oriented x 3  Weight/BMI: There is no height or weight on file to calculate BMI.  There were no vitals taken for this visit.    Base Eye Exam     Visual Acuity (Snellen - Linear)       Right Left    Dist sc 20/20 20/20 -2          Tonometry (I-care, 2:21 PM)       Right Left    Pressure 17 15          Pupils       Pupils    Right PERRL    Left PERRL          Visual Fields       Right Left     Full Full          Extraocular Movement       Right Left     Full, Ortho Full, Ortho          Neuro/Psych     Oriented x3: Yes    Mood/Affect: Normal          Dilation     Both eyes: Tropicamide (MYDRIACYL) 1% ophthalmic solution, Phenylephrine (NEOSYNEPHRINE) ophthalmic solution 2.5% @ 4:08 PM            Slit Lamp and Fundus Exam     External Exam       Right Left    External Normal Normal          Slit Lamp Exam       Right Left    Lids/Lashes Normal Normal    Conjunctiva/Sclera White and quiet White and quiet    Cornea Clear Clear    Anterior Chamber Deep and quiet Deep and quiet    Iris Round and reactive Round and reactive    Lens Posterior chamber intraocular lens Posterior chamber intraocular lens    Vitreous Normal  Normal          Fundus Exam       Right Left    Disc Normal Normal    C/D Ratio 0.1 0.1    Macula Epiretinal membrane Normal    Vessels Normal Normal    Periphery Normal Normal            Refraction     Manifest Refraction (Auto)       Sphere Cylinder Axis    Right +0.25 +0.25 077    Left -1.50 +0.75 156          Cycloplegic Refraction (Auto)       Sphere Cylinder Axis    Right +0.25 +0.25 125    Left -1.50 +0.75 159          Final Rx       Sphere Cylinder Axis Add Horz Prism Vert Prism    Right +0.25   +2.75  2.0 up    Left -1.50 +0.75 159 +2.75 2.0 in                 Pertinent Lab/Test/Imaging Review:      Assessment and Plan:     Giant cell arteritis (HCC)  2/3/2021 - Presumed giant cell arteritis by history, symptoms of temporal pain, and response to prednisone. Never had temporal artery biopsy. ESR was 1 back in January, but had been on prednosne at least 10 mg for a few months prior. Dr Banks increased prednisone to 60 and currently on 40 mg / day.  From the neuro-ophthalmic standpoint no evidence of optic nerve invovement with OCT NFL thickness normal at 107 OD and 110 OS. No optic disc pallor and no swelling. In Addition VICKIE was 1:320. Therefore discussed with patient that I recommend being managed by Rheumatology. Would consider Actemra and thus will refer to Dr Michaud in [place]. VICKIE will need to be characterized to determine is has concomitant other autoimmune disorder.   8/25/2021 - Didn't see Dr Michaud. Sept 13th has a telemedicine apt with [place]. On prednisone 10 mg. Initial symptoms headache and lump on the side of the head. Dx made 10/2020. Long discussion regarding GCA, and monitoring symptoms, ESR and CRP on pred taper. Recommended she have discussion via telemed with Rheum about Actemra. Ordered ESR, CRP and TFT's. No evidence of GCA induced optic neuropathy    Epiretinal membrane (ERM) of right eye  2/3/2021 - Epiretinal membrane seen on 5-line OCT. 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 Causing some symptomatic aniseikonbia. Will therefore refer to Dr Navjot Feliz for consideration of IOL exchange  8/25/2021 - Will give trial of glasses rx to help correct some of the aniseikonia to see if tolerates. Discussed that even with IOL exchange has the ERM OD that would affect fusion    Pseudophakia of both eyes  2/3/2021 - IOL in position  8/25/2021 - sp Yag    Ophthalmoplegia  8/25/2021 - small RHypo and XT. 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 However difficulty fusion secondary to the aniseikonia form the monovision as well as the ERM OD. In Pat did better with a 2 base up OD and 2 base in OS. However still having some asthenopic findings secondary to the ERM. Discussed trial of RX with prisms and bifocal and correcting for the monovision.         Jon Wilde M.D.

## 2021-08-25 NOTE — ASSESSMENT & PLAN NOTE
2/3/2021 - Epiretinal membrane seen on 5-line OCT. Discussed that since vision still 20/20 would hold on referral unless becomes more symptomatic  8/25/2021 -

## 2021-08-25 NOTE — ASSESSMENT & PLAN NOTE
8/25/2021 - small RHypo and XT. 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 However difficulty fusion secondary to the aniseikonia form the monovision as well as the ERM OD. In Pat did better with a 2 base up OD and 2 base in OS. However still having some asthenopic findings secondary to the ERM. Discussed trial of RX with prisms and bifocal and correcting for the monovision.

## 2021-08-25 NOTE — ASSESSMENT & PLAN NOTE
2/3/2021 - Presumed giant cell arteritis by history, symptoms of temporal pain, and response to prednisone. Never had temporal artery biopsy. ESR was 1 back in January, but had been on prednosne at least 10 mg for a few months prior. Dr Banks increased prednisone to 60 and currently on 40 mg / day.  From the neuro-ophthalmic standpoint no evidence of optic nerve invovement with OCT NFL thickness normal at 107 OD and 110 OS. No optic disc pallor and no swelling. In Addition VICKIE was 1:320. Therefore discussed with patient that I recommend being managed by Rheumatology. Would consider Actemra and thus will refer to Dr Michaud in [place]. VICKIE will need to be characterized to determine is has concomitant other autoimmune disorder.   8/25/2021 - Didn't see Dr Michaud. Sept 13th has a telemedicine apt with [place]. On prednisone 10 mg. Initial symptoms headache and lump on the side of the head. Dx made 10/2020. Long discussion regarding GCA, and monitoring symptoms, ESR and CRP on pred taper. Recommended she have discussion via telemed with Rheum about Actemra. Ordered ESR, CRP and TFT's. No evidence of GCA induced optic neuropathy

## 2021-08-25 NOTE — ASSESSMENT & PLAN NOTE
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 Causing some symptomatic aniseikonbia. Will therefore refer to Dr Navjot Feliz for consideration of IOL exchange  8/25/2021 - Will give trial of glasses rx to help correct some of the aniseikonia to see if tolerates. Discussed that even with IOL exchange has the ERM OD that would affect fusion

## 2021-08-26 ENCOUNTER — DOCUMENTATION (OUTPATIENT)
Dept: OPHTHALMOLOGY | Facility: MEDICAL CENTER | Age: 71
End: 2021-08-26

## 2021-08-26 DIAGNOSIS — M31.6 GIANT CELL ARTERITIS (HCC): ICD-10-CM

## 2021-08-26 LAB — ERYTHROCYTE [SEDIMENTATION RATE] IN BLOOD BY WESTERGREN METHOD: 4 MM/HOUR (ref 0–25)

## 2021-08-26 NOTE — ASSESSMENT & PLAN NOTE
2/3/2021 - Presumed giant cell arteritis by history, symptoms of temporal pain, and response to prednisone. Never had temporal artery biopsy. ESR was 1 back in January, but had been on prednosne at least 10 mg for a few months prior. Dr Banks increased prednisone to 60 and currently on 40 mg / day.  From the neuro-ophthalmic standpoint no evidence of optic nerve invovement with OCT NFL thickness normal at 107 OD and 110 OS. No optic disc pallor and no swelling. In Addition VICKIE was 1:320. Therefore discussed with patient that I recommend being managed by Rheumatology. Would consider Actemra and thus will refer to Dr Michaud in [place]. VICKIE will need to be characterized to determine is has concomitant other autoimmune disorder.   8/25/2021 - Didn't see Dr Michaud. Sept 13th has a telemedicine apt with [place]. On prednisone 10 mg. Initial symptoms headache and lump on the side of the head. Dx made 10/2020. Long discussion regarding GCA, and monitoring symptoms, ESR and CRP on pred taper. Recommended she have discussion via telemed with Rheum about Actemra. Ordered ESR, CRP and TFT's. No evidence of GCA induced optic neuropathy  8/26/2021 - ESR 4.0, TSH 1.16, CRP 0.34

## 2021-08-27 LAB
FT4I SERPL CALC-MCNC: 2.6 UNITS (ref 1.7–4.2)
T3RU NFR SERPL: 30 % (ref 28–41)
T4 SERPL-MCNC: 8.83 UG/DL (ref 5.1–14.1)

## 2021-08-30 NOTE — ASSESSMENT & PLAN NOTE
2/3/2021 - Presumed giant cell arteritis by history, symptoms of temporal pain, and response to prednisone. Never had temporal artery biopsy. ESR was 1 back in January, but had been on prednosne at least 10 mg for a few months prior. Dr Banks increased prednisone to 60 and currently on 40 mg / day.  From the neuro-ophthalmic standpoint no evidence of optic nerve invovement with OCT NFL thickness normal at 107 OD and 110 OS. No optic disc pallor and no swelling. In Addition VICKIE was 1:320. Therefore discussed with patient that I recommend being managed by Rheumatology. Would consider Actemra and thus will refer to Dr Michaud in [place]. VICKIE will need to be characterized to determine is has concomitant other autoimmune disorder.   8/25/2021 - Didn't see Dr Michaud. Sept 13th has a telemedicine apt with [place]. On prednisone 10 mg. Initial symptoms headache and lump on the side of the head. Dx made 10/2020. Long discussion regarding GCA, and monitoring symptoms, ESR and CRP on pred taper. Recommended she have discussion via telemed with Rheum about Actemra. Ordered ESR, CRP and TFT's. No evidence of GCA induced optic neuropathy  8/26/2021 - ESR 4.0, TSH 1.16, CRP 0.34  8/30/2021 - Thyroid panel normal

## 2021-09-20 ENCOUNTER — APPOINTMENT (OUTPATIENT)
Dept: LAB | Facility: MEDICAL CENTER | Age: 71
End: 2021-09-20
Payer: MEDICARE

## 2021-10-01 ENCOUNTER — HOSPITAL ENCOUNTER (OUTPATIENT)
Dept: LAB | Facility: MEDICAL CENTER | Age: 71
End: 2021-10-01
Attending: FAMILY MEDICINE
Payer: MEDICARE

## 2021-10-01 ENCOUNTER — HOSPITAL ENCOUNTER (OUTPATIENT)
Dept: LAB | Facility: MEDICAL CENTER | Age: 71
End: 2021-10-01
Attending: STUDENT IN AN ORGANIZED HEALTH CARE EDUCATION/TRAINING PROGRAM
Payer: MEDICARE

## 2021-10-01 LAB
ALBUMIN SERPL BCP-MCNC: 4.3 G/DL (ref 3.2–4.9)
ALBUMIN/GLOB SERPL: 1.8 G/DL
ALP SERPL-CCNC: 50 U/L (ref 30–99)
ALT SERPL-CCNC: 22 U/L (ref 2–50)
ANION GAP SERPL CALC-SCNC: 13 MMOL/L (ref 7–16)
AST SERPL-CCNC: 21 U/L (ref 12–45)
BASOPHILS # BLD AUTO: 0.3 % (ref 0–1.8)
BASOPHILS # BLD: 0.06 K/UL (ref 0–0.12)
BILIRUB SERPL-MCNC: 0.3 MG/DL (ref 0.1–1.5)
BUN SERPL-MCNC: 23 MG/DL (ref 8–22)
C3 SERPL-MCNC: 137.1 MG/DL (ref 87–200)
C4 SERPL-MCNC: 24.5 MG/DL (ref 19–52)
CALCIUM SERPL-MCNC: 9.4 MG/DL (ref 8.5–10.5)
CHLORIDE SERPL-SCNC: 105 MMOL/L (ref 96–112)
CO2 SERPL-SCNC: 23 MMOL/L (ref 20–33)
CREAT SERPL-MCNC: 0.84 MG/DL (ref 0.5–1.4)
CRP SERPL HS-MCNC: <0.3 MG/DL (ref 0–0.75)
CRP SERPL HS-MCNC: <0.3 MG/DL (ref 0–0.75)
EOSINOPHIL # BLD AUTO: 0.03 K/UL (ref 0–0.51)
EOSINOPHIL NFR BLD: 0.2 % (ref 0–6.9)
ERYTHROCYTE [DISTWIDTH] IN BLOOD BY AUTOMATED COUNT: 50 FL (ref 35.9–50)
ERYTHROCYTE [SEDIMENTATION RATE] IN BLOOD BY WESTERGREN METHOD: 2 MM/HOUR (ref 0–25)
ERYTHROCYTE [SEDIMENTATION RATE] IN BLOOD BY WESTERGREN METHOD: 5 MM/HOUR (ref 0–25)
GLOBULIN SER CALC-MCNC: 2.4 G/DL (ref 1.9–3.5)
GLUCOSE SERPL-MCNC: 100 MG/DL (ref 65–99)
HCT VFR BLD AUTO: 45.6 % (ref 37–47)
HGB BLD-MCNC: 14.5 G/DL (ref 12–16)
IMM GRANULOCYTES # BLD AUTO: 0.11 K/UL (ref 0–0.11)
IMM GRANULOCYTES NFR BLD AUTO: 0.6 % (ref 0–0.9)
LYMPHOCYTES # BLD AUTO: 1.22 K/UL (ref 1–4.8)
LYMPHOCYTES NFR BLD: 6.7 % (ref 22–41)
MCH RBC QN AUTO: 29.5 PG (ref 27–33)
MCHC RBC AUTO-ENTMCNC: 31.8 G/DL (ref 33.6–35)
MCV RBC AUTO: 92.7 FL (ref 81.4–97.8)
MONOCYTES # BLD AUTO: 0.59 K/UL (ref 0–0.85)
MONOCYTES NFR BLD AUTO: 3.2 % (ref 0–13.4)
NEUTROPHILS # BLD AUTO: 16.21 K/UL (ref 2–7.15)
NEUTROPHILS NFR BLD: 89 % (ref 44–72)
NRBC # BLD AUTO: 0 K/UL
NRBC BLD-RTO: 0 /100 WBC
PLATELET # BLD AUTO: 249 K/UL (ref 164–446)
PMV BLD AUTO: 10.5 FL (ref 9–12.9)
POTASSIUM SERPL-SCNC: 3.7 MMOL/L (ref 3.6–5.5)
PROT SERPL-MCNC: 6.7 G/DL (ref 6–8.2)
RBC # BLD AUTO: 4.92 M/UL (ref 4.2–5.4)
SODIUM SERPL-SCNC: 141 MMOL/L (ref 135–145)
THYROPEROXIDASE AB SERPL-ACNC: 14 IU/ML (ref 0–9)
WBC # BLD AUTO: 18.2 K/UL (ref 4.8–10.8)

## 2021-10-01 PROCEDURE — 86376 MICROSOMAL ANTIBODY EACH: CPT

## 2021-10-01 PROCEDURE — 86235 NUCLEAR ANTIGEN ANTIBODY: CPT

## 2021-10-01 PROCEDURE — 80053 COMPREHEN METABOLIC PANEL: CPT

## 2021-10-01 PROCEDURE — 86480 TB TEST CELL IMMUN MEASURE: CPT

## 2021-10-01 PROCEDURE — 86225 DNA ANTIBODY NATIVE: CPT

## 2021-10-01 PROCEDURE — 86140 C-REACTIVE PROTEIN: CPT

## 2021-10-01 PROCEDURE — 86160 COMPLEMENT ANTIGEN: CPT

## 2021-10-01 PROCEDURE — 85652 RBC SED RATE AUTOMATED: CPT | Mod: 91

## 2021-10-01 PROCEDURE — 36415 COLL VENOUS BLD VENIPUNCTURE: CPT

## 2021-10-01 PROCEDURE — 86140 C-REACTIVE PROTEIN: CPT | Mod: 91

## 2021-10-01 PROCEDURE — 85025 COMPLETE CBC W/AUTO DIFF WBC: CPT

## 2021-10-01 PROCEDURE — 85652 RBC SED RATE AUTOMATED: CPT

## 2021-10-04 LAB
DSDNA AB TITR SER CLIF: 3 {TITER}
GAMMA INTERFERON BACKGROUND BLD IA-ACNC: 0.02 IU/ML
M TB IFN-G BLD-IMP: ABNORMAL
M TB IFN-G CD4+ BCKGRND COR BLD-ACNC: 0 IU/ML
MITOGEN IGNF BCKGRD COR BLD-ACNC: 0.39 IU/ML
QFT TB2 - NIL TBQ2: 0 IU/ML

## 2021-10-07 LAB
ENA SM IGG SER-ACNC: 0 AU/ML (ref 0–40)
ENA SS-B IGG SER IA-ACNC: 0 AU/ML (ref 0–40)
SSA52 R0ENA AB IGG Q0420: 0 AU/ML (ref 0–40)
SSA60 R0ENA AB IGG Q0419: 0 AU/ML (ref 0–40)
U1 SNRNP IGG SER QL: 2

## 2021-10-20 ENCOUNTER — HOSPITAL ENCOUNTER (OUTPATIENT)
Dept: RADIOLOGY | Facility: MEDICAL CENTER | Age: 71
End: 2021-10-20
Attending: STUDENT IN AN ORGANIZED HEALTH CARE EDUCATION/TRAINING PROGRAM
Payer: MEDICARE

## 2021-10-20 DIAGNOSIS — Z79.3 LONG TERM CURRENT USE OF HORMONAL CONTRACEPTIVE: ICD-10-CM

## 2021-10-20 DIAGNOSIS — M31.6 GIANT CELL ARTERITIS (HCC): ICD-10-CM

## 2021-10-20 PROCEDURE — 77080 DXA BONE DENSITY AXIAL: CPT

## 2021-12-01 ENCOUNTER — HOSPITAL ENCOUNTER (OUTPATIENT)
Dept: LAB | Facility: MEDICAL CENTER | Age: 71
End: 2021-12-01
Attending: OPHTHALMOLOGY
Payer: MEDICARE

## 2021-12-01 ENCOUNTER — OFFICE VISIT (OUTPATIENT)
Dept: OPHTHALMOLOGY | Facility: MEDICAL CENTER | Age: 71
End: 2021-12-01
Payer: MEDICARE

## 2021-12-01 DIAGNOSIS — R04.0 BLEEDING FROM THE NOSE: ICD-10-CM

## 2021-12-01 DIAGNOSIS — Z96.1 PSEUDOPHAKIA OF BOTH EYES: ICD-10-CM

## 2021-12-01 DIAGNOSIS — H52.31 ANISOMETROPIA: ICD-10-CM

## 2021-12-01 DIAGNOSIS — H35.371 EPIRETINAL MEMBRANE (ERM) OF RIGHT EYE: ICD-10-CM

## 2021-12-01 DIAGNOSIS — M31.6 GIANT CELL ARTERITIS (HCC): ICD-10-CM

## 2021-12-01 DIAGNOSIS — H49.9 OPHTHALMOPLEGIA: ICD-10-CM

## 2021-12-01 LAB
APTT PPP: 26.8 SEC (ref 24.7–36)
CRP SERPL HS-MCNC: 0.6 MG/DL (ref 0–0.75)
ERYTHROCYTE [SEDIMENTATION RATE] IN BLOOD BY WESTERGREN METHOD: 4 MM/HOUR (ref 0–25)
INR PPP: 1.03 (ref 0.87–1.13)
PROTHROMBIN TIME: 13.2 SEC (ref 12–14.6)

## 2021-12-01 PROCEDURE — 99214 OFFICE O/P EST MOD 30 MIN: CPT | Mod: 25 | Performed by: OPHTHALMOLOGY

## 2021-12-01 PROCEDURE — 36415 COLL VENOUS BLD VENIPUNCTURE: CPT | Mod: GA

## 2021-12-01 PROCEDURE — 92250 FUNDUS PHOTOGRAPHY W/I&R: CPT | Performed by: OPHTHALMOLOGY

## 2021-12-01 PROCEDURE — 85652 RBC SED RATE AUTOMATED: CPT

## 2021-12-01 PROCEDURE — 86140 C-REACTIVE PROTEIN: CPT

## 2021-12-01 PROCEDURE — 85610 PROTHROMBIN TIME: CPT | Mod: GA

## 2021-12-01 PROCEDURE — 92060 SENSORIMOTOR EXAMINATION: CPT | Performed by: OPHTHALMOLOGY

## 2021-12-01 PROCEDURE — 85730 THROMBOPLASTIN TIME PARTIAL: CPT | Mod: GA

## 2021-12-01 ASSESSMENT — CUP TO DISC RATIO
OD_RATIO: 0.1
OS_RATIO: 0.1

## 2021-12-01 ASSESSMENT — REFRACTION_MANIFEST
OS_SPHERE: -1.25
OD_SPHERE: -0.50
OS_AXIS: 140
METHOD_AUTOREFRACTION: 1
OS_CYLINDER: +0.50
OD_CYLINDER: SPHERE

## 2021-12-01 ASSESSMENT — TONOMETRY
OS_IOP_MMHG: 20
OD_IOP_MMHG: 19

## 2021-12-01 ASSESSMENT — VISUAL ACUITY
OS_SC: 20/20
OD_SC: 20/20-1
METHOD: SNELLEN - LINEAR

## 2021-12-01 ASSESSMENT — REFRACTION_WEARINGRX
OD_SPHERE: +0.25
OS_AXIS: 159
OD_ADD: +2.75
OS_HPRISM: 2.0
OD_VBASE: UP
OS_HBASE: IN
OS_CYLINDER: +0.75
OD_VPRISM: 2.0
OS_ADD: +2.75
OS_SPHERE: -1.50

## 2021-12-01 ASSESSMENT — SLIT LAMP EXAM - LIDS
COMMENTS: NORMAL
COMMENTS: NORMAL

## 2021-12-01 ASSESSMENT — ENCOUNTER SYMPTOMS: DOUBLE VISION: 1

## 2021-12-01 ASSESSMENT — EXTERNAL EXAM - RIGHT EYE: OD_EXAM: NORMAL

## 2021-12-01 ASSESSMENT — CONF VISUAL FIELD
OS_NORMAL: 1
OD_NORMAL: 1

## 2021-12-01 ASSESSMENT — EXTERNAL EXAM - LEFT EYE: OS_EXAM: NORMAL

## 2021-12-01 NOTE — ASSESSMENT & PLAN NOTE
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 Causing some symptomatic aniseikonbia. Will therefore refer to Dr Navjot Feliz for consideration of IOL exchange  8/25/2021 - Will give trial of glasses rx to help correct some of the aniseikonia to see if tolerates. Discussed that even with IOL exchange has the ERM OD that would affect fusion  12/1/2021 -

## 2021-12-01 NOTE — ASSESSMENT & PLAN NOTE
2/3/2021 - Epiretinal membrane seen on 5-line OCT. Discussed that since vision still 20/20 would hold on referral unless becomes more symptomatic  12/1/2021 - OCT 5-line no significant change and vision still good

## 2021-12-01 NOTE — ASSESSMENT & PLAN NOTE
8/25/2021 - small RHypo and XT. 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 However difficulty fusion secondary to the aniseikonia form the monovision as well as the ERM OD. In Pat did better with a 2 base up OD and 2 base in OS. However still having some asthenopic findings secondary to the ERM. Discussed trial of RX with prisms and bifocal and correcting for the monovision.   12/1/2021 - ortho with new glasses, but not wearing because of nose bleed. No progression of the misalignment

## 2021-12-01 NOTE — PROGRESS NOTES
Peds/Neuro Ophthalmology:   Jon Wilde M.D.    Date & Time note created:    2021   3:12 PM     Referring MD / APRN:  Khai Deras M.D., No att. providers found    Patient ID:  Name:             Carine Christopher   YOB: 1950  Age:                 71 y.o.  female   MRN:               [id number]    Chief Complaint/Reason for Visit:     Other (Giant cell arteritis)      History of Present Illness:    Carine Christopher is a 71 y.o. female   Follow up for Giant cell arteritis with worsening double vision.Patient stopped blood thinner and is now on 12.5 mg of Prednisone.Vision is pretty good at distance.Pt has questions re biopsy for GCA.      Review of Systems:  Review of Systems   Eyes: Positive for double vision.   All other systems reviewed and are negative.      Past Medical History:   Past Medical History:   Diagnosis Date   • Bronchitis    • Giant cell arteritis (HCC)    • Heart attack (HCC) 10/2020   • Hyperlipidemia        Past Surgical History:  Past Surgical History:   Procedure Laterality Date   • ABDOMINAL EXPLORATION     • BREAST IMPLANT REVISION     • CATARACT EXTRACTION WITH IOL         Current Outpatient Medications:  Current Outpatient Medications   Medication Sig Dispense Refill   • fluconazole (DIFLUCAN) 150 MG tablet      • metoprolol SR (TOPROL XL) 25 MG TABLET SR 24 HR Take 1 tablet by mouth every evening. 90 tablet 3   • predniSONE (DELTASONE) 20 MG Tab Take 20mg daily (Patient taking differently: Take 10 mg by mouth every day.) 30 tablet 0   • nitroglycerin (NITROSTAT) 0.4 MG SL Tab Place 1 tablet under the tongue as needed for Chest Pain. 30 tablet 3   • Ascorbic Acid (VITAMIN C PO) Take 1 tablet by mouth every day.     • Coenzyme Q10 (COQ10 PO) Take 1 tablet by mouth every day.     • LUTEIN PO Take 1 Tablet by mouth every day.     • Rvpsta-WnCsr-RyMjt-FA-CA-Omega (COMPLETE [place] DHA) 29-1-200 & 250 MG Misc Take 2 Tablets by  mouth every day.     • Cholecalciferol (VITAMIN D3) 50 MCG (2000 UT) Tab Take 2,000 Units by mouth every day at 6 PM.     • aspirin (ASPIRIN 81) 81 MG EC tablet Take 81 mg by mouth every evening.     • clopidogrel (PLAVIX) 75 MG Tab Take 1 tablet by mouth every day. (Patient not taking: Reported on 12/1/2021) 90 tablet 3   • atorvastatin (LIPITOR) 80 MG tablet Take 1 tablet by mouth every day. (Patient not taking: Reported on 12/1/2021) 90 tablet 3   • Conj Estrog-Medroxyprogest Ace (PREMPHASE) Tab Take 1 tablet by mouth every day. (Patient not taking: Reported on 12/1/2021)       No current facility-administered medications for this visit.       Allergies:  Allergies   Allergen Reactions   • Chloramphenicol Diarrhea     PCN is okay to take   • Codeine    • Sulfa Antibiotics  [Sulfa Drugs] Unspecified       Family History:  Family History   Problem Relation Age of Onset   • Glasses Father    • Heart Disease Father        Social History:  Social History     Socioeconomic History   • Marital status:      Spouse name: Not on file   • Number of children: Not on file   • Years of education: Not on file   • Highest education level: Not on file   Occupational History   • Not on file   Tobacco Use   • Smoking status: Never Smoker   • Smokeless tobacco: Never Used   Vaping Use   • Vaping Use: Never used   Substance and Sexual Activity   • Alcohol use: Not Currently     Alcohol/week: 0.6 oz     Types: 1 Glasses of wine per week   • Drug use: Never   • Sexual activity: Not on file   Other Topics Concern   • Not on file   Social History Narrative    Retired- Office manger for Ex [employer]      Social Determinants of Health     Financial Resource Strain:    • Difficulty of Paying Living Expenses: Not on file   Food Insecurity:    • Worried About Running Out of Food in the Last Year: Not on file   • Ran Out of Food in the Last Year: Not on file   Transportation Needs:    • Lack of Transportation (Medical): Not on  file   • Lack of Transportation (Non-Medical): Not on file   Physical Activity:    • Days of Exercise per Week: Not on file   • Minutes of Exercise per Session: Not on file   Stress:    • Feeling of Stress : Not on file   Social Connections:    • Frequency of Communication with Friends and Family: Not on file   • Frequency of Social Gatherings with Friends and Family: Not on file   • Attends Baptism Services: Not on file   • Active Member of Clubs or Organizations: Not on file   • Attends Club or Organization Meetings: Not on file   • Marital Status: Not on file   Intimate Partner Violence:    • Fear of Current or Ex-Partner: Not on file   • Emotionally Abused: Not on file   • Physically Abused: Not on file   • Sexually Abused: Not on file   Housing Stability:    • Unable to Pay for Housing in the Last Year: Not on file   • Number of Places Lived in the Last Year: Not on file   • Unstable Housing in the Last Year: Not on file          Physical Exam:  Physical Exam    Oriented x 3  Weight/BMI: There is no height or weight on file to calculate BMI.  There were no vitals taken for this visit.    Base Eye Exam     Visual Acuity (Snellen - Linear)       Right Left    Dist sc 20/20-1 20/20          Tonometry ([facility], 1:41 PM)       Right Left    Pressure 19 20          Pupils       Pupils    Right PERRL    Left PERRL          Visual Fields       Right Left     Full Full          Extraocular Movement       Right Left     Full, Ortho Full, Ortho          Neuro/Psych     Oriented x3: Yes    Mood/Affect: Normal          Dilation     Both eyes: Tropicamide (MYDRIACYL) 1% ophthalmic solution, Phenylephrine (NEOSYNEPHRINE) ophthalmic solution 2.5% @ 3:06 PM            Additional Tests     Stereo     Fly: -    Animals: 1/3    Circles: 1/9            Strabismus Exam     Observations: Ortho    Distance Near Near +3DS N Bifocals                    Slit Lamp and Fundus Exam     External Exam       Right Left    External Normal  Normal          Slit Lamp Exam       Right Left    Lids/Lashes Normal Normal    Conjunctiva/Sclera White and quiet White and quiet    Cornea Clear Clear    Anterior Chamber Deep and quiet Deep and quiet    Iris Round and reactive Round and reactive    Lens Posterior chamber intraocular lens Posterior chamber intraocular lens    Vitreous Normal Normal          Fundus Exam       Right Left    Disc Normal Normal    C/D Ratio 0.1 0.1    Macula Epiretinal membrane Normal    Vessels Normal Normal    Periphery Normal Normal            Refraction     Wearing Rx       Sphere Cylinder Axis Add Horz Prism Vert Prism    Right +0.25   +2.75  2.0 up    Left -1.50 +0.75 159 +2.75 2.0 in           Manifest Refraction (Auto)       Sphere Cylinder Axis    Right -0.50 Sphere     Left -1.25 +0.50 140                Pertinent Lab/Test/Imaging Review:      Assessment and Plan:     Giant cell arteritis (HCC)  2/3/2021 - Presumed giant cell arteritis by history, symptoms of temporal pain, and response to prednisone. Never had temporal artery biopsy. ESR was 1 back in January, but had been on prednosne at least 10 mg for a few months prior. Dr Banks increased prednisone to 60 and currently on 40 mg / day.  From the neuro-ophthalmic standpoint no evidence of optic nerve invovement with OCT NFL thickness normal at 107 OD and 110 OS. No optic disc pallor and no swelling. In Addition VICKIE was 1:320. Therefore discussed with patient that I recommend being managed by Rheumatology. Would consider Actemra and thus will refer to Dr Michaud in [place]. VICKIE will need to be characterized to determine is has concomitant other autoimmune disorder.   8/25/2021 - Didn't see Dr Michaud. Sept 13th has a telemedicine apt with [place]. On prednisone 10 mg. Initial symptoms headache and lump on the side of the head. Dx made 10/2020. Long discussion regarding GCA, and monitoring symptoms, ESR and CRP on pred taper. Recommended she have discussion via  telemed with Rheum about Actemra. Ordered ESR, CRP and TFT's. No evidence of GCA induced optic neuropathy  8/26/2021 - ESR 4.0, TSH 1.16, CRP 0.34  12/1/2021 - Apparently had relapse of headaches in September and went back to 20 mg prednisone. Then at taper again down to 10 mg last week headache returned and increased to 12.5 mg. She discussed with Rheumatologist in [place] where advised TA biopsy to secure the diagnosis. Would need to find provider here in [place] to rx Actemra. OCT NFL thickness stable at 107 OD and 114 OS and nerves healthy, so no evidence of optic neuropathy. However will recheck ESR and CRP. Also refer to Dr Pelaez for temporal artery biopsy    Ophthalmoplegia  8/25/2021 - small RHypo and XT. 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 However difficulty fusion secondary to the aniseikonia form the monovision as well as the ERM OD. In Pat did better with a 2 base up OD and 2 base in OS. However still having some asthenopic findings secondary to the ERM. Discussed trial of RX with prisms and bifocal and correcting for the monovision.   12/1/2021 - ortho with new glasses, but not wearing because of nose bleed. No progression of the misalignment    Epiretinal membrane (ERM) of right eye  2/3/2021 - Epiretinal membrane seen on 5-line OCT. 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 Causing some symptomatic aniseikonbia. Will therefore refer to Dr Navjot Feliz for consideration of IOL exchange  8/25/2021 - Will give trial of glasses rx to help correct some of the aniseikonia to see if tolerates. Discussed that even with IOL exchange has the ERM OD that would affect fusion  12/1/2021 -     Pseudophakia of  both eyes  2/3/2021 - IOL in position  8/25/2021 - sp Yag  12/1/2021 - IOL in position    Bleeding from the nose  12/1/2021 -Nose bleed, not stopping. Will refer to Dr Brock Wilde M.D.

## 2021-12-01 NOTE — ASSESSMENT & PLAN NOTE
2/3/2021 - Presumed giant cell arteritis by history, symptoms of temporal pain, and response to prednisone. Never had temporal artery biopsy. ESR was 1 back in January, but had been on prednosne at least 10 mg for a few months prior. Dr Banks increased prednisone to 60 and currently on 40 mg / day.  From the neuro-ophthalmic standpoint no evidence of optic nerve invovement with OCT NFL thickness normal at 107 OD and 110 OS. No optic disc pallor and no swelling. In Addition VICKIE was 1:320. Therefore discussed with patient that I recommend being managed by Rheumatology. Would consider Actemra and thus will refer to Dr Michaud in [place]. VICKIE will need to be characterized to determine is has concomitant other autoimmune disorder.   8/25/2021 - Didn't see Dr Michaud. Sept 13th has a telemedicine apt with [place]. On prednisone 10 mg. Initial symptoms headache and lump on the side of the head. Dx made 10/2020. Long discussion regarding GCA, and monitoring symptoms, ESR and CRP on pred taper. Recommended she have discussion via telemed with Rheum about Actemra. Ordered ESR, CRP and TFT's. No evidence of GCA induced optic neuropathy  8/26/2021 - ESR 4.0, TSH 1.16, CRP 0.34  12/1/2021 - Apparently had relapse of headaches in September and went back to 20 mg prednisone. Then at taper again down to 10 mg last week headache returned and increased to 12.5 mg. She discussed with Rheumatologist in [place] where advised TA biopsy to secure the diagnosis. Would need to find provider here in [place] to rx Actemra. OCT NFL thickness stable at 107 OD and 114 OS and nerves healthy, so no evidence of optic neuropathy. However will recheck ESR and CRP. Also refer to Dr Pelaez for temporal artery biopsy

## 2021-12-03 ENCOUNTER — DOCUMENTATION (OUTPATIENT)
Dept: OPHTHALMOLOGY | Facility: MEDICAL CENTER | Age: 71
End: 2021-12-03

## 2021-12-03 DIAGNOSIS — M31.6 GIANT CELL ARTERITIS (HCC): ICD-10-CM

## 2021-12-03 NOTE — PROGRESS NOTES
2/3/2021 - Presumed giant cell arteritis by history, symptoms of temporal pain, and response to prednisone. Never had temporal artery biopsy. ESR was 1 back in January, but had been on prednosne at least 10 mg for a few months prior. Dr Banks increased prednisone to 60 and currently on 40 mg / day.  From the neuro-ophthalmic standpoint no evidence of optic nerve invovement with OCT NFL thickness normal at 107 OD and 110 OS. No optic disc pallor and no swelling. In Addition VICKIE was 1:320. Therefore discussed with patient that I recommend being managed by Rheumatology. Would consider Actemra and thus will refer to Dr Michaud in [place]. VICKIE will need to be characterized to determine is has concomitant other autoimmune disorder.   8/25/2021 - Didn't see Dr Michaud. Sept 13th has a telemedicine apt with [place]. On prednisone 10 mg. Initial symptoms headache and lump on the side of the head. Dx made 10/2020. Long discussion regarding GCA, and monitoring symptoms, ESR and CRP on pred taper. Recommended she have discussion via telemed with Rheum about Actemra. Ordered ESR, CRP and TFT's. No evidence of GCA induced optic neuropathy  8/26/2021 - ESR 4.0, TSH 1.16, CRP 0.34  12/1/2021 - Apparently had relapse of headaches in September and went back to 20 mg prednisone. Then at taper again down to 10 mg last week headache returned and increased to 12.5 mg. She discussed with Rheumatologist in [place] where advised TA biopsy to secure the diagnosis. Would need to find provider here in [place] to rx Actemra. OCT NFL thickness stable at 107 OD and 114 OS and nerves healthy, so no evidence of optic neuropathy. However will recheck ESR and CRP. Also refer to Dr Pelaez for temporal artery biopsy  12/3/2021 - Repeat ESR 4.0, CRP 0.6 slightly increased, but still within normal.

## 2021-12-03 NOTE — ASSESSMENT & PLAN NOTE
2/3/2021 - Presumed giant cell arteritis by history, symptoms of temporal pain, and response to prednisone. Never had temporal artery biopsy. ESR was 1 back in January, but had been on prednosne at least 10 mg for a few months prior. Dr Banks increased prednisone to 60 and currently on 40 mg / day.  From the neuro-ophthalmic standpoint no evidence of optic nerve invovement with OCT NFL thickness normal at 107 OD and 110 OS. No optic disc pallor and no swelling. In Addition VICKIE was 1:320. Therefore discussed with patient that I recommend being managed by Rheumatology. Would consider Actemra and thus will refer to Dr Michaud in [place]. VICKIE will need to be characterized to determine is has concomitant other autoimmune disorder.   8/25/2021 - Didn't see Dr Michaud. Sept 13th has a telemedicine apt with [place]. On prednisone 10 mg. Initial symptoms headache and lump on the side of the head. Dx made 10/2020. Long discussion regarding GCA, and monitoring symptoms, ESR and CRP on pred taper. Recommended she have discussion via telemed with Rheum about Actemra. Ordered ESR, CRP and TFT's. No evidence of GCA induced optic neuropathy  8/26/2021 - ESR 4.0, TSH 1.16, CRP 0.34  12/1/2021 - Apparently had relapse of headaches in September and went back to 20 mg prednisone. Then at taper again down to 10 mg last week headache returned and increased to 12.5 mg. She discussed with Rheumatologist in [place] where advised TA biopsy to secure the diagnosis. Would need to find provider here in [place] to rx Actemra. OCT NFL thickness stable at 107 OD and 114 OS and nerves healthy, so no evidence of optic neuropathy. However will recheck ESR and CRP. Also refer to Dr Pelaez for temporal artery biopsy  12/3/2021 - Repeat ESR 4.0, CRP 0.6 slightly increased, but still within normal.

## 2022-01-01 ENCOUNTER — HOSPITAL ENCOUNTER (OUTPATIENT)
Facility: MEDICAL CENTER | Age: 72
End: 2022-01-01
Attending: SURGERY | Admitting: SURGERY
Payer: MEDICARE

## 2022-01-10 ENCOUNTER — HOSPITAL ENCOUNTER (OUTPATIENT)
Facility: MEDICAL CENTER | Age: 72
End: 2022-01-14
Attending: EMERGENCY MEDICINE | Admitting: STUDENT IN AN ORGANIZED HEALTH CARE EDUCATION/TRAINING PROGRAM
Payer: MEDICARE

## 2022-01-10 ENCOUNTER — APPOINTMENT (OUTPATIENT)
Dept: RADIOLOGY | Facility: MEDICAL CENTER | Age: 72
End: 2022-01-10
Attending: EMERGENCY MEDICINE
Payer: MEDICARE

## 2022-01-10 DIAGNOSIS — R07.9 CHEST PAIN, UNSPECIFIED TYPE: ICD-10-CM

## 2022-01-10 DIAGNOSIS — E78.5 DYSLIPIDEMIA: ICD-10-CM

## 2022-01-10 DIAGNOSIS — N17.9 AKI (ACUTE KIDNEY INJURY) (HCC): ICD-10-CM

## 2022-01-10 LAB
ALBUMIN SERPL BCP-MCNC: 4.5 G/DL (ref 3.2–4.9)
ALBUMIN/GLOB SERPL: 1.9 G/DL
ALP SERPL-CCNC: 57 U/L (ref 30–99)
ALT SERPL-CCNC: 34 U/L (ref 2–50)
ANION GAP SERPL CALC-SCNC: 13 MMOL/L (ref 7–16)
AST SERPL-CCNC: 32 U/L (ref 12–45)
BASOPHILS # BLD AUTO: 0.7 % (ref 0–1.8)
BASOPHILS # BLD: 0.08 K/UL (ref 0–0.12)
BILIRUB SERPL-MCNC: 0.2 MG/DL (ref 0.1–1.5)
BUN SERPL-MCNC: 35 MG/DL (ref 8–22)
CALCIUM SERPL-MCNC: 9.4 MG/DL (ref 8.5–10.5)
CHLORIDE SERPL-SCNC: 106 MMOL/L (ref 96–112)
CO2 SERPL-SCNC: 22 MMOL/L (ref 20–33)
CREAT SERPL-MCNC: 1.72 MG/DL (ref 0.5–1.4)
EKG IMPRESSION: NORMAL
EOSINOPHIL # BLD AUTO: 0.11 K/UL (ref 0–0.51)
EOSINOPHIL NFR BLD: 1 % (ref 0–6.9)
ERYTHROCYTE [DISTWIDTH] IN BLOOD BY AUTOMATED COUNT: 45.1 FL (ref 35.9–50)
GLOBULIN SER CALC-MCNC: 2.4 G/DL (ref 1.9–3.5)
GLUCOSE SERPL-MCNC: 106 MG/DL (ref 65–99)
HCT VFR BLD AUTO: 43.5 % (ref 37–47)
HGB BLD-MCNC: 13.9 G/DL (ref 12–16)
IMM GRANULOCYTES # BLD AUTO: 0.03 K/UL (ref 0–0.11)
IMM GRANULOCYTES NFR BLD AUTO: 0.3 % (ref 0–0.9)
LYMPHOCYTES # BLD AUTO: 2.24 K/UL (ref 1–4.8)
LYMPHOCYTES NFR BLD: 21 % (ref 22–41)
MCH RBC QN AUTO: 29.2 PG (ref 27–33)
MCHC RBC AUTO-ENTMCNC: 32 G/DL (ref 33.6–35)
MCV RBC AUTO: 91.4 FL (ref 81.4–97.8)
MONOCYTES # BLD AUTO: 1 K/UL (ref 0–0.85)
MONOCYTES NFR BLD AUTO: 9.4 % (ref 0–13.4)
NEUTROPHILS # BLD AUTO: 7.22 K/UL (ref 2–7.15)
NEUTROPHILS NFR BLD: 67.6 % (ref 44–72)
NRBC # BLD AUTO: 0 K/UL
NRBC BLD-RTO: 0 /100 WBC
NT-PROBNP SERPL IA-MCNC: 278 PG/ML (ref 0–125)
PLATELET # BLD AUTO: 247 K/UL (ref 164–446)
PMV BLD AUTO: 10.4 FL (ref 9–12.9)
POTASSIUM SERPL-SCNC: 3.9 MMOL/L (ref 3.6–5.5)
PROT SERPL-MCNC: 6.9 G/DL (ref 6–8.2)
RBC # BLD AUTO: 4.76 M/UL (ref 4.2–5.4)
SODIUM SERPL-SCNC: 141 MMOL/L (ref 135–145)
TROPONIN T SERPL-MCNC: 14 NG/L (ref 6–19)
WBC # BLD AUTO: 10.7 K/UL (ref 4.8–10.8)

## 2022-01-10 PROCEDURE — 80053 COMPREHEN METABOLIC PANEL: CPT

## 2022-01-10 PROCEDURE — 85025 COMPLETE CBC W/AUTO DIFF WBC: CPT

## 2022-01-10 PROCEDURE — 83880 ASSAY OF NATRIURETIC PEPTIDE: CPT

## 2022-01-10 PROCEDURE — A9270 NON-COVERED ITEM OR SERVICE: HCPCS | Performed by: EMERGENCY MEDICINE

## 2022-01-10 PROCEDURE — 71045 X-RAY EXAM CHEST 1 VIEW: CPT

## 2022-01-10 PROCEDURE — 700102 HCHG RX REV CODE 250 W/ 637 OVERRIDE(OP): Performed by: EMERGENCY MEDICINE

## 2022-01-10 PROCEDURE — 99285 EMERGENCY DEPT VISIT HI MDM: CPT

## 2022-01-10 PROCEDURE — 93005 ELECTROCARDIOGRAM TRACING: CPT | Performed by: EMERGENCY MEDICINE

## 2022-01-10 PROCEDURE — 84484 ASSAY OF TROPONIN QUANT: CPT

## 2022-01-10 PROCEDURE — 93005 ELECTROCARDIOGRAM TRACING: CPT

## 2022-01-10 RX ORDER — MORPHINE SULFATE 4 MG/ML
4 INJECTION INTRAVENOUS ONCE
Status: COMPLETED | OUTPATIENT
Start: 2022-01-10 | End: 2022-01-11

## 2022-01-10 RX ORDER — ASPIRIN 81 MG/1
324 TABLET, CHEWABLE ORAL ONCE
Status: COMPLETED | OUTPATIENT
Start: 2022-01-10 | End: 2022-01-10

## 2022-01-10 RX ORDER — SODIUM CHLORIDE 9 MG/ML
500 INJECTION, SOLUTION INTRAVENOUS ONCE
Status: COMPLETED | OUTPATIENT
Start: 2022-01-11 | End: 2022-01-11

## 2022-01-10 RX ADMIN — ASPIRIN 324 MG: 81 TABLET, CHEWABLE ORAL at 23:54

## 2022-01-10 ASSESSMENT — FIBROSIS 4 INDEX: FIB4 SCORE: 1.29

## 2022-01-11 ENCOUNTER — APPOINTMENT (OUTPATIENT)
Dept: CARDIOLOGY | Facility: MEDICAL CENTER | Age: 72
End: 2022-01-11
Attending: INTERNAL MEDICINE
Payer: MEDICARE

## 2022-01-11 PROBLEM — R07.9 CHEST PAIN: Status: ACTIVE | Noted: 2022-01-11

## 2022-01-11 PROBLEM — I10 PRIMARY HYPERTENSION: Status: ACTIVE | Noted: 2022-01-11

## 2022-01-11 PROBLEM — E78.5 DYSLIPIDEMIA: Status: ACTIVE | Noted: 2022-01-11

## 2022-01-11 PROBLEM — I25.119 CORONARY ARTERY DISEASE INVOLVING NATIVE CORONARY ARTERY OF NATIVE HEART WITH ANGINA PECTORIS (HCC): Status: ACTIVE | Noted: 2021-04-15

## 2022-01-11 LAB
ANION GAP SERPL CALC-SCNC: 13 MMOL/L (ref 7–16)
BUN SERPL-MCNC: 30 MG/DL (ref 8–22)
CALCIUM SERPL-MCNC: 8.8 MG/DL (ref 8.5–10.5)
CHLORIDE SERPL-SCNC: 105 MMOL/L (ref 96–112)
CO2 SERPL-SCNC: 21 MMOL/L (ref 20–33)
CREAT SERPL-MCNC: 0.86 MG/DL (ref 0.5–1.4)
D DIMER PPP IA.FEU-MCNC: <0.27 UG/ML (FEU) (ref 0–0.5)
EKG IMPRESSION: NORMAL
GLUCOSE SERPL-MCNC: 195 MG/DL (ref 65–99)
LV EJECT FRACT  99904: 60
LV EJECT FRACT MOD 2C 99903: 66.32
LV EJECT FRACT MOD 4C 99902: 70.18
LV EJECT FRACT MOD BP 99901: 68
POTASSIUM SERPL-SCNC: 4.2 MMOL/L (ref 3.6–5.5)
SODIUM SERPL-SCNC: 139 MMOL/L (ref 135–145)
TROPONIN T SERPL-MCNC: 18 NG/L (ref 6–19)

## 2022-01-11 PROCEDURE — 99220 PR INITIAL OBSERVATION CARE,LEVL III: CPT | Performed by: STUDENT IN AN ORGANIZED HEALTH CARE EDUCATION/TRAINING PROGRAM

## 2022-01-11 PROCEDURE — G0378 HOSPITAL OBSERVATION PER HR: HCPCS

## 2022-01-11 PROCEDURE — 700111 HCHG RX REV CODE 636 W/ 250 OVERRIDE (IP): Performed by: STUDENT IN AN ORGANIZED HEALTH CARE EDUCATION/TRAINING PROGRAM

## 2022-01-11 PROCEDURE — 700105 HCHG RX REV CODE 258: Performed by: STUDENT IN AN ORGANIZED HEALTH CARE EDUCATION/TRAINING PROGRAM

## 2022-01-11 PROCEDURE — 700102 HCHG RX REV CODE 250 W/ 637 OVERRIDE(OP): Performed by: EMERGENCY MEDICINE

## 2022-01-11 PROCEDURE — 36415 COLL VENOUS BLD VENIPUNCTURE: CPT

## 2022-01-11 PROCEDURE — 96374 THER/PROPH/DIAG INJ IV PUSH: CPT | Mod: XU

## 2022-01-11 PROCEDURE — 85379 FIBRIN DEGRADATION QUANT: CPT

## 2022-01-11 PROCEDURE — 700105 HCHG RX REV CODE 258: Performed by: EMERGENCY MEDICINE

## 2022-01-11 PROCEDURE — 700102 HCHG RX REV CODE 250 W/ 637 OVERRIDE(OP): Performed by: STUDENT IN AN ORGANIZED HEALTH CARE EDUCATION/TRAINING PROGRAM

## 2022-01-11 PROCEDURE — 93306 TTE W/DOPPLER COMPLETE: CPT

## 2022-01-11 PROCEDURE — 93306 TTE W/DOPPLER COMPLETE: CPT | Mod: 26 | Performed by: INTERNAL MEDICINE

## 2022-01-11 PROCEDURE — 84484 ASSAY OF TROPONIN QUANT: CPT

## 2022-01-11 PROCEDURE — 93005 ELECTROCARDIOGRAM TRACING: CPT

## 2022-01-11 PROCEDURE — A9270 NON-COVERED ITEM OR SERVICE: HCPCS | Performed by: INTERNAL MEDICINE

## 2022-01-11 PROCEDURE — 99226 PR SUBSEQUENT OBSERVATION CARE,LEVEL III: CPT | Performed by: INTERNAL MEDICINE

## 2022-01-11 PROCEDURE — 700111 HCHG RX REV CODE 636 W/ 250 OVERRIDE (IP): Performed by: EMERGENCY MEDICINE

## 2022-01-11 PROCEDURE — A9270 NON-COVERED ITEM OR SERVICE: HCPCS | Performed by: EMERGENCY MEDICINE

## 2022-01-11 PROCEDURE — 80048 BASIC METABOLIC PNL TOTAL CA: CPT

## 2022-01-11 PROCEDURE — A9270 NON-COVERED ITEM OR SERVICE: HCPCS | Performed by: STUDENT IN AN ORGANIZED HEALTH CARE EDUCATION/TRAINING PROGRAM

## 2022-01-11 PROCEDURE — 700102 HCHG RX REV CODE 250 W/ 637 OVERRIDE(OP): Performed by: INTERNAL MEDICINE

## 2022-01-11 RX ORDER — OXYCODONE HYDROCHLORIDE 5 MG/1
5 TABLET ORAL
Status: DISCONTINUED | OUTPATIENT
Start: 2022-01-11 | End: 2022-01-14 | Stop reason: HOSPADM

## 2022-01-11 RX ORDER — HEPARIN SODIUM 5000 [USP'U]/ML
5000 INJECTION, SOLUTION INTRAVENOUS; SUBCUTANEOUS EVERY 8 HOURS
Status: DISCONTINUED | OUTPATIENT
Start: 2022-01-11 | End: 2022-01-14 | Stop reason: HOSPADM

## 2022-01-11 RX ORDER — POLYETHYLENE GLYCOL 3350 17 G/17G
1 POWDER, FOR SOLUTION ORAL
Status: DISCONTINUED | OUTPATIENT
Start: 2022-01-11 | End: 2022-01-14 | Stop reason: HOSPADM

## 2022-01-11 RX ORDER — OMEPRAZOLE 20 MG/1
20 CAPSULE, DELAYED RELEASE ORAL 2 TIMES DAILY
Status: DISCONTINUED | OUTPATIENT
Start: 2022-01-11 | End: 2022-01-14 | Stop reason: HOSPADM

## 2022-01-11 RX ORDER — OXYCODONE HYDROCHLORIDE 10 MG/1
10 TABLET ORAL
Status: DISCONTINUED | OUTPATIENT
Start: 2022-01-11 | End: 2022-01-14 | Stop reason: HOSPADM

## 2022-01-11 RX ORDER — LABETALOL HYDROCHLORIDE 5 MG/ML
10 INJECTION, SOLUTION INTRAVENOUS EVERY 4 HOURS PRN
Status: DISCONTINUED | OUTPATIENT
Start: 2022-01-11 | End: 2022-01-14 | Stop reason: HOSPADM

## 2022-01-11 RX ORDER — TIZANIDINE 4 MG/1
4 TABLET ORAL EVERY 6 HOURS PRN
Status: DISCONTINUED | OUTPATIENT
Start: 2022-01-11 | End: 2022-01-14 | Stop reason: HOSPADM

## 2022-01-11 RX ORDER — ATORVASTATIN CALCIUM 40 MG/1
20 TABLET, FILM COATED ORAL NIGHTLY
Status: SHIPPED | COMMUNITY
End: 2022-01-24

## 2022-01-11 RX ORDER — ATORVASTATIN CALCIUM 80 MG/1
80 TABLET, FILM COATED ORAL DAILY
Status: DISCONTINUED | OUTPATIENT
Start: 2022-01-11 | End: 2022-01-14 | Stop reason: HOSPADM

## 2022-01-11 RX ORDER — SODIUM CHLORIDE 9 MG/ML
INJECTION, SOLUTION INTRAVENOUS CONTINUOUS
Status: DISCONTINUED | OUTPATIENT
Start: 2022-01-11 | End: 2022-01-11

## 2022-01-11 RX ORDER — ACETAMINOPHEN 325 MG/1
650 TABLET ORAL EVERY 6 HOURS PRN
Status: DISCONTINUED | OUTPATIENT
Start: 2022-01-11 | End: 2022-01-11

## 2022-01-11 RX ORDER — BISACODYL 10 MG
10 SUPPOSITORY, RECTAL RECTAL
Status: DISCONTINUED | OUTPATIENT
Start: 2022-01-11 | End: 2022-01-14 | Stop reason: HOSPADM

## 2022-01-11 RX ORDER — ACETAMINOPHEN 500 MG
1000 TABLET ORAL EVERY 6 HOURS PRN
Status: DISCONTINUED | OUTPATIENT
Start: 2022-01-16 | End: 2022-01-14 | Stop reason: HOSPADM

## 2022-01-11 RX ORDER — ACETAMINOPHEN 500 MG
1000 TABLET ORAL EVERY 6 HOURS
Status: DISCONTINUED | OUTPATIENT
Start: 2022-01-11 | End: 2022-01-14 | Stop reason: HOSPADM

## 2022-01-11 RX ORDER — HYDROMORPHONE HYDROCHLORIDE 1 MG/ML
0.5 INJECTION, SOLUTION INTRAMUSCULAR; INTRAVENOUS; SUBCUTANEOUS
Status: DISCONTINUED | OUTPATIENT
Start: 2022-01-11 | End: 2022-01-14 | Stop reason: HOSPADM

## 2022-01-11 RX ORDER — METOPROLOL SUCCINATE 25 MG/1
25 TABLET, EXTENDED RELEASE ORAL EVERY EVENING
Status: DISCONTINUED | OUTPATIENT
Start: 2022-01-11 | End: 2022-01-14 | Stop reason: HOSPADM

## 2022-01-11 RX ORDER — NITROGLYCERIN 0.4 MG/1
0.4 TABLET SUBLINGUAL
Status: DISCONTINUED | OUTPATIENT
Start: 2022-01-11 | End: 2022-01-14 | Stop reason: HOSPADM

## 2022-01-11 RX ORDER — PREDNISONE 1 MG/1
8 TABLET ORAL DAILY
Status: DISCONTINUED | OUTPATIENT
Start: 2022-01-11 | End: 2022-01-11

## 2022-01-11 RX ORDER — AMOXICILLIN 250 MG
2 CAPSULE ORAL 2 TIMES DAILY
Status: DISCONTINUED | OUTPATIENT
Start: 2022-01-11 | End: 2022-01-14 | Stop reason: HOSPADM

## 2022-01-11 RX ADMIN — MORPHINE SULFATE 4 MG: 4 INJECTION INTRAVENOUS at 00:19

## 2022-01-11 RX ADMIN — LIDOCAINE HYDROCHLORIDE 30 ML: 20 SOLUTION OROPHARYNGEAL at 02:39

## 2022-01-11 RX ADMIN — SODIUM CHLORIDE 500 ML: 9 INJECTION, SOLUTION INTRAVENOUS at 00:20

## 2022-01-11 RX ADMIN — METOPROLOL SUCCINATE 25 MG: 25 TABLET, FILM COATED, EXTENDED RELEASE ORAL at 18:17

## 2022-01-11 RX ADMIN — ACETAMINOPHEN 1000 MG: 500 TABLET ORAL at 12:47

## 2022-01-11 RX ADMIN — OMEPRAZOLE 20 MG: 20 CAPSULE, DELAYED RELEASE ORAL at 18:17

## 2022-01-11 RX ADMIN — OXYCODONE 10 MG: 5 TABLET ORAL at 12:47

## 2022-01-11 RX ADMIN — ACETAMINOPHEN 1000 MG: 500 TABLET ORAL at 18:00

## 2022-01-11 RX ADMIN — SODIUM CHLORIDE: 9 INJECTION, SOLUTION INTRAVENOUS at 05:39

## 2022-01-11 RX ADMIN — PREDNISONE 8 MG: 2.5 TABLET ORAL at 07:00

## 2022-01-11 RX ADMIN — ASPIRIN 81 MG: 81 TABLET, COATED ORAL at 18:17

## 2022-01-11 RX ADMIN — NITROGLYCERIN 0.4 MG: 0.4 TABLET, ORALLY DISINTEGRATING SUBLINGUAL at 06:27

## 2022-01-11 ASSESSMENT — LIFESTYLE VARIABLES
HAVE YOU EVER FELT YOU SHOULD CUT DOWN ON YOUR DRINKING: NO
EVER HAD A DRINK FIRST THING IN THE MORNING TO STEADY YOUR NERVES TO GET RID OF A HANGOVER: NO
TOTAL SCORE: 0
AVERAGE NUMBER OF DAYS PER WEEK YOU HAVE A DRINK CONTAINING ALCOHOL: 0
TOTAL SCORE: 0
HAVE PEOPLE ANNOYED YOU BY CRITICIZING YOUR DRINKING: NO
HOW MANY TIMES IN THE PAST YEAR HAVE YOU HAD 5 OR MORE DRINKS IN A DAY: 0
CONSUMPTION TOTAL: NEGATIVE
TOTAL SCORE: 0
EVER FELT BAD OR GUILTY ABOUT YOUR DRINKING: NO
ALCOHOL_USE: NO
ON A TYPICAL DAY WHEN YOU DRINK ALCOHOL HOW MANY DRINKS DO YOU HAVE: 0

## 2022-01-11 ASSESSMENT — PATIENT HEALTH QUESTIONNAIRE - PHQ9
SUM OF ALL RESPONSES TO PHQ9 QUESTIONS 1 AND 2: 0
1. LITTLE INTEREST OR PLEASURE IN DOING THINGS: NOT AT ALL
2. FEELING DOWN, DEPRESSED, IRRITABLE, OR HOPELESS: NOT AT ALL

## 2022-01-11 ASSESSMENT — ENCOUNTER SYMPTOMS
SHORTNESS OF BREATH: 0
DIZZINESS: 0
DIARRHEA: 0
CHILLS: 0
PALPITATIONS: 0
ABDOMINAL PAIN: 0
NERVOUS/ANXIOUS: 1
ORTHOPNEA: 0
SORE THROAT: 0
HEADACHES: 0
FEVER: 0
COUGH: 0
NAUSEA: 0
VOMITING: 0
HEADACHES: 1

## 2022-01-11 ASSESSMENT — PAIN DESCRIPTION - PAIN TYPE
TYPE: ACUTE PAIN

## 2022-01-11 ASSESSMENT — FIBROSIS 4 INDEX
FIB4 SCORE: 1.6
FIB4 SCORE: 1.6

## 2022-01-11 NOTE — ED TRIAGE NOTES
"Carine Christopher  71 y.o. F  Chief Complaint   Patient presents with   • Chest Pain     x 1 hour. Patient reports 10/10 pain like \"pressure and intense hurting.\" Patient has a history of an MI in April and reports this is the same pain. Patient states \"it might be really bad indigestions.\" PAtient reports taking 6 nitros with some relief.        Vitals:    01/10/22 2200   BP: 141/70   Pulse: 94   Resp: 18   Temp: 36.6 °C (97.8 °F)   SpO2: 92%     Triage process explained to patient, apologized for wait time, and returned to lobby.  Pt informed to notify staff of any change in condition.     "

## 2022-01-11 NOTE — ED PROVIDER NOTES
"ED Provider Note    Scribed for Franchesca Damico M.D. by Ailyn Mcduffie. 1/10/2022  10:52 PM    Means of arrival: walk-in  History obtained from: patient  History limited by: none      CHIEF COMPLAINT  Chief Complaint   Patient presents with   • Chest Pain     x 1 hour. Patient reports 10/10 pain like \"pressure and intense hurting.\" Patient has a history of an MI in April and reports this is the same pain. Patient states \"it might be really bad indigestions.\" PAtient reports taking 6 nitros with some relief.        HPI  Carine Christopher is a 71 y.o. female who has a history of CAD with stent presents to the Emergency Department for evaluation of worsening intermittent chest pain onset two days ago. She describes her chest pain as an intermittent burning sensation that radiates down her left arm. She states it is the same pain she had back in April when she had a myocardial infarction. She feels as if she can't catch her breath, and the last time she felt like that was in April. She denies taking aspirin today. She took 6 nitroglycerin pills with no alleviation of her pain. She states her pain is a 10/10. She admits to associated symptoms of mild shortness of breath, leg swelling, and a headache, but denies nausea or vomiting. She has been taking her steroids and aspirin. She was on blood thinners which she no longer takes. No alleviating or exacerbating factors were reported. She stopped taking hormones for menopause symptoms 4-5 weeks ago. She denies having a cardiologist and notes she was unable to have a follow-up appointment from her last hospitalization. She has a medical history of myocardial infarctions, CAD, Giant cell arteritis, and hyperlipidemia.     REVIEW OF SYSTEMS  Pertinent positive include chest pain, shortness of breath, leg swelling, headache, and diaphoresis (chronic). Pertinent negative include nausea, vomiting. All other systems reviewed and are negative.    PAST MEDICAL " "HISTORY   has a past medical history of Bronchitis, Giant cell arteritis (HCC), Heart attack (HCC) (10/2020), and Hyperlipidemia.    SOCIAL HISTORY  Social History     Tobacco Use   • Smoking status: Never Smoker   • Smokeless tobacco: Never Used   Vaping Use   • Vaping Use: Never used   Substance and Sexual Activity   • Alcohol use: Not Currently     Alcohol/week: 0.6 oz     Types: 1 Glasses of wine per week   • Drug use: Never   • Sexual activity: Not reported       SURGICAL HISTORY   has a past surgical history that includes breast implant revision; abdominal exploration; and cataract extraction with iol.    CURRENT MEDICATIONS  Home Medications     Reviewed by Ashley Aragon R.N. (Registered Nurse) on 01/10/22 at 2212  Med List Status: Not Addressed   Medication Last Dose Status   Ascorbic Acid (VITAMIN C PO)  Active   aspirin (ASPIRIN 81) 81 MG EC tablet  Active   atorvastatin (LIPITOR) 80 MG tablet  Active   Cholecalciferol (VITAMIN D3) 50 MCG ( UT) Tab  Active   clopidogrel (PLAVIX) 75 MG Tab  Active   Coenzyme Q10 (COQ10 PO)  Active   Conj Estrog-Medroxyprogest Ace (PREMPHASE) Tab  Active   fluconazole (DIFLUCAN) 150 MG tablet  Active   LUTEIN PO  Active   metoprolol SR (TOPROL XL) 25 MG TABLET SR 24 HR  Active   nitroglycerin (NITROSTAT) 0.4 MG SL Tab  Active   predniSONE (DELTASONE) 20 MG Tab  Active   Xbcwti-GlMor-XnQyr-FA-CA-Omega (COMPLETE  DHA) 29-1-200 & 250 MG Misc  Active                ALLERGIES  Allergies   Allergen Reactions   • Chloramphenicol Diarrhea     PCN is okay to take   • Codeine    • Sulfa Antibiotics  [Sulfa Drugs] Unspecified       PHYSICAL EXAM   VITAL SIGNS: /70   Pulse 94   Temp 36.6 °C (97.8 °F) (Temporal)   Resp 18   Ht 1.676 m (5' 6\")   Wt 67.7 kg (149 lb 4 oz)   LMP 2021 (Exact Date)   SpO2 92%   BMI 24.09 kg/m²    Constitutional: Nontoxic appearing older female.  Alert in no apparent distress.  HENT: Normocephalic, Atraumatic. Bilateral " external ears normal. Nose normal.  Moist mucous membranes.  Oropharynx clear.  Eyes: Pupils are equal and reactive. Conjunctiva normal.   Neck: Supple, full range of motion  Heart: Regular rate and rhythm.  No murmurs.    Lungs: No respiratory distress, normal work of breathing. Lungs clear to auscultation bilaterally.  Abdomen Soft, no distention.  No tenderness to palpation.  Musculoskeletal: Atraumatic. No obvious deformities noted.  No lower extremity edema.  Skin: Warm, Dry.  No erythema, No rash.   Neurologic: Alert and oriented x3. Moving all extremities spontaneously without focal deficits.  Psychiatric: Anxious.  Affect normal, Mood normal, Appears appropriate and not intoxicated.      DIAGNOSTIC STUDIES    EKG  Results for orders placed or performed during the hospital encounter of 01/10/22   EKG (NOW)   Result Value Ref Range    Report       [facility] Emergency Dept.    Test Date:  2022-01-10  Pt Name:    ANUM RILEY          Department: ER  MRN:        [id number]                      Room:  Gender:     Female                       Technician: [id number]  :        1950                   Requested By:ER TRIAGE PROTOCOL  Order #:    [id number]                    Reading MD: Franchesca Damico MD    Measurements  Intervals                                Axis  Rate:       81                           P:          70  WY:         129                          QRS:        15  QRSD:       97                           T:          65  QT:         385  QTc:        447    Interpretive Statements  Sinus rhythm  Normal intervals, no ectopy  No ST or T wave change  Compared to ECG 2021 16:23:59  No significant changes  Electronically Signed On 1- 23:06:19 PST by Franchesca Damico MD         LABS  Personally reviewed by me  Labs Reviewed   CBC WITH DIFFERENTIAL - Abnormal; Notable for the following components:       Result Value    MCHC 32.0 (*)     Lymphocytes 21.00 (*)     Neutrophils  "(Absolute) 7.22 (*)     Monos (Absolute) 1.00 (*)     All other components within normal limits    Narrative:     Collected By:   COMP METABOLIC PANEL - Abnormal; Notable for the following components:    Glucose 106 (*)     Bun 35 (*)     Creatinine 1.72 (*)     All other components within normal limits    Narrative:     Collected By:   ESTIMATED GFR - Abnormal; Notable for the following components:    GFR If  35 (*)     GFR If Non  29 (*)     All other components within normal limits    Narrative:     Collected By:   PROBRAIN NATRIURETIC PEPTIDE, NT - Abnormal; Notable for the following components:    NT-proBNP 278 (*)     All other components within normal limits    Narrative:     Collected By:   TROPONIN    Narrative:     Collected By:   TROPONIN       RADIOLOGY  Personally reviewed by me  DX-CHEST-PORTABLE (1 VIEW)   Final Result      No radiographic evidence of acute cardiopulmonary process.          ED COURSE  Vitals:    01/10/22 2200 01/10/22 2209   BP: 141/70    Pulse: 94    Resp: 18    Temp: 36.6 °C (97.8 °F)    TempSrc: Temporal    SpO2: 92%    Weight:  67.7 kg (149 lb 4 oz)   Height: 1.676 m (5' 6\")        Medications administered:  Medications   hyoscyamine-lidocaine-Maalox (GI Cocktail) oral susp cup 30 mL (has no administration in time range)   aspirin (ASA) chewable tab 324 mg (324 mg Oral Given 1/10/22 7754)   morphine 4 MG/ML injection 4 mg (4 mg Intravenous Given 1/11/22 0019)   NS infusion 500 mL (500 mL Intravenous New Bag 1/11/22 0020)       Old records personally reviewed:  She has a history CAD with stents in place. Last admission was in April for chest pain and was found to have to a sten restenosis and repeat stenting was preformed. She has a history of giant cell arteritis and is chronic steroids. She has an upcoming temporal artery biopsy on 1/26/2022.       10:52 PM Patient seen and examined at bedside. The patient with a history of multiple stents and CAD " presents with chest pain that radiates to her left arm. Ordered for DX-Chest, Troponin, CMP, CBC with diff, and proBNP to evaluate. Patient will be treated with Aspirin 324 mg chewable tablet and morphine 4 mg for her symptoms.     MEDICAL DECISION MAKING  Patient  with history of coronary artery disease and stents in place who presents with chest pain.  She is afebrile with reassuring vital signs on arrival.  Her EKG is unchanged from prior without acute ischemia or arrhythmia.  Initial troponin is negative.  Clinically doubt aortic dissection or pulmonary embolism based on history and exam.  Chest x-ray is negative for pneumonia or pulmonary edema.  History sounds possibly consistent with acid reflux disease however patient reports similar symptoms the last time her stent restenosed.  She has a HEART score of 4 therefore will be admitted for further work-up and monitoring.  Labs also show an elevated creatinine from her baseline which may suggest dehydration.  No other electrolyte abnormalities.    1:23 AM Patient was reevaluated at bedside.  We discussed plan of care for admission and patient is agreeable at this time.    1:30 AM I discussed the patient's case and the above findings with Dr. Wolfe (Hospitalist) who agrees to hospitalized the patient.           DISPOSITION:  Patient will be hospitalized by Dr. Wolfe in guarded condition.      IMPRESSION  (R07.9) Chest pain, unspecified type  (N17.9) BURAK (acute kidney injury) (HCC)    Results, diagnoses, and treatment options were discussed with the patient and/or family. Patient verbalized understanding of plan of care.     Ailyn EID), am scribing for, and in the presence of, Franchesca Damico M.D..    Electronically signed by: Ailyn Christian), 1/10/2022    Franchesca EID M.D. personally performed the services described in this documentation, as scribed by Ailyn Mcduffie in my presence, and it is both accurate and complete.    The note  accurately reflects work and decisions made by me.  Franchesca Damico M.D.  1/11/2022  5:47 AM

## 2022-01-11 NOTE — ED NOTES
Pt administered nitro sublingual PRN for chest pain at 0627, BP checked prior to administration, BP WNL. Pt stated not feeling well and about to vomit, given emesis bag. Pt had gone clammy, cool and pale. Pt BP dropped about 1-2 mins after administration to 83/48. ERP at bedside ordered EKG and bolus fluids. During fluid administration /59, pt gained color, able to hold conversation. On call hospitalist notified. No new orders placed.

## 2022-01-11 NOTE — ASSESSMENT & PLAN NOTE
Presumed diagnosis since 2020, biopsy planned later this month  Continue prednisone, says she has weaned herself down to 10 mg daily

## 2022-01-11 NOTE — ASSESSMENT & PLAN NOTE
PCI 10/2020 to the circumflex, in-stent restenosis and another PCI 04/2021  On aspirin only, stopped taking clopidogrel due to recurrent large hematomas  Continue statin, metoprolol, nitroglycerin    Cardiac stress test ordered for 1-12 22- cancelled due to rising troponins- cardiology md consulted

## 2022-01-11 NOTE — ED NOTES
"Pt medicated per MAR, refused most of AM medications, pt stating that she only takes her own \"[employer] medications\" and that she usually changes her doses herself. Pt provided education on medication administration, pt verbalized understanding. Pt continued to refuse AM medication. Pt reassured and will contact pharmacy.   "

## 2022-01-11 NOTE — PROGRESS NOTES
Report given to Bruna at 12:32. Pain meds given for chest pain.  RN came to bedside and picked up pt. This RN is off pt care at 12:50.

## 2022-01-11 NOTE — H&P
"Hospital Medicine History & Physical Note    Date of Service  1/11/2022    Primary Care Physician  Khai Deras M.D.    Code Status  Full Code    Chief Complaint  Chief Complaint   Patient presents with   • Chest Pain     x 1 hour. Patient reports 10/10 pain like \"pressure and intense hurting.\" Patient has a history of an MI in April and reports this is the same pain. Patient states \"it might be really bad indigestions.\" PAtient reports taking 6 nitros with some relief.        History of Presenting Illness  Carine Christopher is a 71 y.o. female who presented 1/10/2022 with chest pain.  PMH of CAD (PCI 10/2020 to the circumflex, in-stent restenosis and another PCI 04/2021), temporal arteritis on prednisone, dyslipidemia, hypertension.  Comes in complaining of substernal chest pain radiating to the left arm since 3 days.  Says the pain feels exactly like when she had previous CAD events in 10/20/2020 and 04/20/2021.  Says she was playing \"[employer]\" videogame when pain began which is very relaxing for her.  Denies any physical stressors.  She does have some emotional stress due to finances but no more than usual.    In the ED vitals WNL.  Labs show creatinine 1.72, troponin 14, .  EKG not concerning for acute ischemia.    I discussed the plan of care with patient.    Review of Systems  Review of Systems   Constitutional: Negative for chills and fever.   HENT: Negative for sore throat.    Respiratory: Negative for cough and shortness of breath.    Cardiovascular: Positive for chest pain.   Gastrointestinal: Negative for abdominal pain, diarrhea, nausea and vomiting.   Genitourinary: Negative for dysuria and urgency.   Neurological: Negative for dizziness and headaches.   All other systems reviewed and are negative.      Past Medical History   has a past medical history of Bronchitis, Giant cell arteritis (HCC), Heart attack (HCC) (10/2020), and Hyperlipidemia.    Surgical History   has a " past surgical history that includes breast implant revision; abdominal exploration; and cataract extraction with iol.     Family History  family history includes Glasses in her father; Heart Disease in her father.   Family history reviewed with patient. There is no family history that is pertinent to the chief complaint.     Social History   reports that she has never smoked. She has never used smokeless tobacco. She reports previous alcohol use of about 0.6 oz of alcohol per week. She reports that she does not use drugs.    Allergies  Allergies   Allergen Reactions   • Chloramphenicol Diarrhea     PCN is okay to take   • Codeine    • Sulfa Antibiotics  [Sulfa Drugs] Unspecified       Medications  Prior to Admission Medications   Prescriptions Last Dose Informant Patient Reported? Taking?   Ascorbic Acid (VITAMIN C PO)  Patient Yes No   Sig: Take 1 tablet by mouth every day.   Cholecalciferol (VITAMIN D3) 50 MCG (2000 UT) Tab  Patient Yes No   Sig: Take 2,000 Units by mouth every day at 6 PM.   Coenzyme Q10 (COQ10 PO)  Patient Yes No   Sig: Take 1 tablet by mouth every day.   Conj Estrog-Medroxyprogest Ace (PREMPHASE) Tab  Patient Yes No   Sig: Take 1 tablet by mouth every day.   Patient not taking: Reported on 2021   LUTEIN PO  Patient Yes No   Sig: Take 1 Tablet by mouth every day.   Ljpeym-CyCnv-HpXap-FA-CA-Omega (COMPLETE [place] DHA) 29-1-200 & 250 MG Misc  Patient Yes No   Sig: Take 2 Tablets by mouth every day.   aspirin (ASPIRIN 81) 81 MG EC tablet  Patient Yes No   Sig: Take 81 mg by mouth every evening.   atorvastatin (LIPITOR) 80 MG tablet   No No   Sig: Take 1 tablet by mouth every day.   Patient not taking: Reported on 2021   clopidogrel (PLAVIX) 75 MG Tab   No No   Sig: Take 1 tablet by mouth every day.   Patient not taking: Reported on 2021   fluconazole (DIFLUCAN) 150 MG tablet   Yes No   metoprolol SR (TOPROL XL) 25 MG TABLET SR 24 HR   No No   Sig: Take 1 tablet by mouth every evening.    nitroglycerin (NITROSTAT) 0.4 MG SL Tab   No No   Sig: Place 1 tablet under the tongue as needed for Chest Pain.   predniSONE (DELTASONE) 20 MG Tab   No No   Sig: Take 20mg daily   Patient taking differently: Take 10 mg by mouth every day.      Facility-Administered Medications: None       Physical Exam  Temp:  [36.6 °C (97.8 °F)] 36.6 °C (97.8 °F)  Pulse:  [94] 94  Resp:  [18] 18  BP: (141)/(70) 141/70  SpO2:  [92 %] 92 %  Blood Pressure : 141/70   Temperature: 36.6 °C (97.8 °F)   Pulse: 94   Respiration: 18   Pulse Oximetry: 92 %       Physical Exam  Constitutional:       Appearance: Normal appearance.   HENT:      Head: Normocephalic and atraumatic.      Mouth/Throat:      Mouth: Mucous membranes are moist.      Pharynx: No oropharyngeal exudate or posterior oropharyngeal erythema.   Eyes:      General: No scleral icterus.  Cardiovascular:      Rate and Rhythm: Normal rate and regular rhythm.      Pulses: Normal pulses.      Heart sounds: Normal heart sounds. No murmur heard.      Pulmonary:      Effort: Pulmonary effort is normal. No respiratory distress.      Breath sounds: Normal breath sounds.   Abdominal:      General: There is no distension.      Palpations: Abdomen is soft.      Tenderness: There is no abdominal tenderness.   Musculoskeletal:         General: No swelling or tenderness. Normal range of motion.      Cervical back: Normal range of motion and neck supple.   Skin:     General: Skin is warm and dry.   Neurological:      General: No focal deficit present.      Mental Status: She is alert and oriented to person, place, and time.   Psychiatric:         Mood and Affect: Mood normal.         Laboratory:  Recent Labs     01/10/22  2238   WBC 10.7   RBC 4.76   HEMOGLOBIN 13.9   HEMATOCRIT 43.5   MCV 91.4   MCH 29.2   MCHC 32.0*   RDW 45.1   PLATELETCT 247   MPV 10.4     Recent Labs     01/10/22  2238   SODIUM 141   POTASSIUM 3.9   CHLORIDE 106   CO2 22   GLUCOSE 106*   BUN 35*   CREATININE 1.72*    CALCIUM 9.4     Recent Labs     01/10/22  2238   ALTSGPT 34   ASTSGOT 32   ALKPHOSPHAT 57   TBILIRUBIN 0.2   GLUCOSE 106*         Recent Labs     01/10/22  2238   NTPROBNP 278*         Recent Labs     01/10/22  2238   TROPONINT 14       Imaging:  DX-CHEST-PORTABLE (1 VIEW)   Final Result      No radiographic evidence of acute cardiopulmonary process.          X-Ray:  I have personally reviewed the images and compared with prior images.  EKG:  I have personally reviewed the images and compared with prior images.    Assessment/Plan:  I anticipate this patient is appropriate for observation status at this time.    * Chest pain- (present on admission)  Assessment & Plan  Substernal chest pain radiating to left arm, not relieved or exacerbated by anything.  Persistent despite nitroglycerin, aspirin, morphine  She says it feels like her previous CAD events.  She is only on aspirin, not taking clopidogrel due to recurrent large hematomas  Also s feels like heartburn, she is on chronic steroid use and not on PPI  Due to history of significant CAD we will admit to monitor and trend troponin  Start on PPI  Consider cardiology consult in the morning    Coronary artery disease involving native coronary artery of native heart with angina pectoris (HCC)- (present on admission)  Assessment & Plan  PCI 10/2020 to the circumflex, in-stent restenosis and another PCI 04/2021  On aspirin only, stopped taking clopidogrel due to recurrent large hematomas  Continue statin, metoprolol, nitroglycerin    Dyslipidemia- (present on admission)  Assessment & Plan  Continue atorvastatin    Primary hypertension- (present on admission)  Assessment & Plan  Continue metoprolol    Giant cell arteritis (HCC)- (present on admission)  Assessment & Plan  Presumed diagnosis since 2020, biopsy planned later this month  Continue prednisone, says she has weaned herself down to 10 mg daily      VTE prophylaxis: heparin ppx

## 2022-01-11 NOTE — ASSESSMENT & PLAN NOTE
Troponin negative.  D-dimer is negative.  Chest x-ray unremarkable.  Clinically musculoskeletal    Echocardiac shows no acute antibody    Cardiac stress test has been ordered 1/12/2022--> cancelled due to rising troponins.. cardiology consulted    BNP slightly elevated  Due to history of giant cell disease also pericarditis, pleurisy is on differential   Avoid NSAIDs due to BURAK   No indication for ACS   Empiric PPI is very appropriate and need to continue for at least 2 months   Pain meds ordered

## 2022-01-11 NOTE — PROGRESS NOTES
Report received. Assumed care of pt at 11:48. A/Ox4, discussed plan of care. Pt on room air.     call light within reach, instructed to call for any assistance, hourly rounding in place.

## 2022-01-11 NOTE — ED PROVIDER NOTES
ED PROVIDER NOTE    Scribed for Vikash Sinclair M.D. by Jeremy Arellano. 1/11/2022, 6:45 AM.    This is an addendum to the note on Carine Christopher. For further details and full chart entry, see the previously signed ED Provider Note written by Dr. Damico ([facility]).      6:45 AM - I responded to an overhead page for the patient. She was given nitro sublingual then her blood pressure dropped to 83 systolic. Patient was unresponsive for <1 minute. Came to with fluids and time. Now has a normal BP. Ordered EKG to evaluate. EKG was reassuring.    FINAL IMPRESSION   1. Chest pain, unspecified type    2. BURAK (acute kidney injury) (HCC)         Jeremy EID (Scribe), am scribing for, and in the presence of, Vikash Sinclair M.D..    Electronically signed by: Jeremy Arellano (Scribe), 1/11/2022    Vikash EID M.D. personally performed the services described in this documentation, as scribed by Jeremy Arellano in my presence, and it is both accurate and complete.    The note accurately reflects work and decisions made by me.  Vikash Sinclair M.D.  1/11/2022  3:14 PM

## 2022-01-12 ENCOUNTER — PATIENT OUTREACH (OUTPATIENT)
Dept: HEALTH INFORMATION MANAGEMENT | Facility: OTHER | Age: 72
End: 2022-01-12

## 2022-01-12 LAB
ANION GAP SERPL CALC-SCNC: 12 MMOL/L (ref 7–16)
BUN SERPL-MCNC: 22 MG/DL (ref 8–22)
CALCIUM SERPL-MCNC: 9.2 MG/DL (ref 8.5–10.5)
CHLORIDE SERPL-SCNC: 107 MMOL/L (ref 96–112)
CO2 SERPL-SCNC: 22 MMOL/L (ref 20–33)
CREAT SERPL-MCNC: 0.79 MG/DL (ref 0.5–1.4)
ERYTHROCYTE [DISTWIDTH] IN BLOOD BY AUTOMATED COUNT: 45.5 FL (ref 35.9–50)
GLUCOSE SERPL-MCNC: 98 MG/DL (ref 65–99)
HCT VFR BLD AUTO: 41.4 % (ref 37–47)
HGB BLD-MCNC: 13.2 G/DL (ref 12–16)
MCH RBC QN AUTO: 29 PG (ref 27–33)
MCHC RBC AUTO-ENTMCNC: 31.9 G/DL (ref 33.6–35)
MCV RBC AUTO: 91 FL (ref 81.4–97.8)
PLATELET # BLD AUTO: 207 K/UL (ref 164–446)
PMV BLD AUTO: 10.5 FL (ref 9–12.9)
POTASSIUM SERPL-SCNC: 4 MMOL/L (ref 3.6–5.5)
RBC # BLD AUTO: 4.55 M/UL (ref 4.2–5.4)
SODIUM SERPL-SCNC: 141 MMOL/L (ref 135–145)
TROPONIN T SERPL-MCNC: 323 NG/L (ref 6–19)
TROPONIN T SERPL-MCNC: 363 NG/L (ref 6–19)
WBC # BLD AUTO: 10.6 K/UL (ref 4.8–10.8)

## 2022-01-12 PROCEDURE — 36415 COLL VENOUS BLD VENIPUNCTURE: CPT

## 2022-01-12 PROCEDURE — 80048 BASIC METABOLIC PNL TOTAL CA: CPT

## 2022-01-12 PROCEDURE — 84484 ASSAY OF TROPONIN QUANT: CPT | Mod: 91

## 2022-01-12 PROCEDURE — A9270 NON-COVERED ITEM OR SERVICE: HCPCS | Performed by: STUDENT IN AN ORGANIZED HEALTH CARE EDUCATION/TRAINING PROGRAM

## 2022-01-12 PROCEDURE — G0378 HOSPITAL OBSERVATION PER HR: HCPCS

## 2022-01-12 PROCEDURE — 99226 PR SUBSEQUENT OBSERVATION CARE,LEVEL III: CPT | Performed by: HOSPITALIST

## 2022-01-12 PROCEDURE — 85027 COMPLETE CBC AUTOMATED: CPT

## 2022-01-12 PROCEDURE — 700102 HCHG RX REV CODE 250 W/ 637 OVERRIDE(OP): Performed by: INTERNAL MEDICINE

## 2022-01-12 PROCEDURE — 700102 HCHG RX REV CODE 250 W/ 637 OVERRIDE(OP): Performed by: STUDENT IN AN ORGANIZED HEALTH CARE EDUCATION/TRAINING PROGRAM

## 2022-01-12 PROCEDURE — 700111 HCHG RX REV CODE 636 W/ 250 OVERRIDE (IP): Performed by: STUDENT IN AN ORGANIZED HEALTH CARE EDUCATION/TRAINING PROGRAM

## 2022-01-12 PROCEDURE — A9270 NON-COVERED ITEM OR SERVICE: HCPCS | Performed by: INTERNAL MEDICINE

## 2022-01-12 RX ADMIN — ACETAMINOPHEN 1000 MG: 500 TABLET ORAL at 05:41

## 2022-01-12 RX ADMIN — OMEPRAZOLE 20 MG: 20 CAPSULE, DELAYED RELEASE ORAL at 05:41

## 2022-01-12 RX ADMIN — ASPIRIN 81 MG: 81 TABLET, COATED ORAL at 17:47

## 2022-01-12 RX ADMIN — ACETAMINOPHEN 1000 MG: 500 TABLET ORAL at 17:47

## 2022-01-12 RX ADMIN — ACETAMINOPHEN 1000 MG: 500 TABLET ORAL at 00:00

## 2022-01-12 RX ADMIN — ACETAMINOPHEN 1000 MG: 500 TABLET ORAL at 23:48

## 2022-01-12 RX ADMIN — METOPROLOL SUCCINATE 25 MG: 25 TABLET, FILM COATED, EXTENDED RELEASE ORAL at 17:47

## 2022-01-12 RX ADMIN — OMEPRAZOLE 20 MG: 20 CAPSULE, DELAYED RELEASE ORAL at 17:47

## 2022-01-12 RX ADMIN — PREDNISONE 8 MG: 2.5 TABLET ORAL at 09:00

## 2022-01-12 SDOH — ECONOMIC STABILITY: FOOD INSECURITY: WITHIN THE PAST 12 MONTHS, YOU WORRIED THAT YOUR FOOD WOULD RUN OUT BEFORE YOU GOT MONEY TO BUY MORE.: NEVER TRUE

## 2022-01-12 SDOH — ECONOMIC STABILITY: TRANSPORTATION INSECURITY
IN THE PAST 12 MONTHS, HAS LACK OF TRANSPORTATION KEPT YOU FROM MEETINGS, WORK, OR FROM GETTING THINGS NEEDED FOR DAILY LIVING?: PATIENT DECLINED

## 2022-01-12 SDOH — ECONOMIC STABILITY: TRANSPORTATION INSECURITY
IN THE PAST 12 MONTHS, HAS THE LACK OF TRANSPORTATION KEPT YOU FROM MEDICAL APPOINTMENTS OR FROM GETTING MEDICATIONS?: PATIENT DECLINED

## 2022-01-12 SDOH — ECONOMIC STABILITY: FOOD INSECURITY: WITHIN THE PAST 12 MONTHS, THE FOOD YOU BOUGHT JUST DIDN'T LAST AND YOU DIDN'T HAVE MONEY TO GET MORE.: NEVER TRUE

## 2022-01-12 ASSESSMENT — ENCOUNTER SYMPTOMS
VOMITING: 0
CHILLS: 0
FEVER: 0
DIARRHEA: 0
PALPITATIONS: 0
SORE THROAT: 0
NAUSEA: 0
NERVOUS/ANXIOUS: 0
COUGH: 0
ABDOMINAL PAIN: 0
HEADACHES: 0
SHORTNESS OF BREATH: 0
ORTHOPNEA: 0

## 2022-01-12 ASSESSMENT — PATIENT HEALTH QUESTIONNAIRE - PHQ9
2. FEELING DOWN, DEPRESSED, IRRITABLE, OR HOPELESS: NOT AT ALL
SUM OF ALL RESPONSES TO PHQ9 QUESTIONS 1 AND 2: 0
1. LITTLE INTEREST OR PLEASURE IN DOING THINGS: NOT AT ALL

## 2022-01-12 ASSESSMENT — SOCIAL DETERMINANTS OF HEALTH (SDOH): HOW HARD IS IT FOR YOU TO PAY FOR THE VERY BASICS LIKE FOOD, HOUSING, MEDICAL CARE, AND HEATING?: PATIENT DECLINED

## 2022-01-12 ASSESSMENT — PAIN DESCRIPTION - PAIN TYPE: TYPE: ACUTE PAIN

## 2022-01-12 NOTE — CARE PLAN
Problem: Knowledge Deficit - Standard  Goal: Patient and family/care givers will demonstrate understanding of plan of care, disease process/condition, diagnostic tests and medications  Outcome: Progressing   The patient is Stable - Low risk of patient condition declining or worsening    Progress made toward(s) clinical / shift goals:    Problem: Knowledge Deficit - Standard  Goal: Patient and family/care givers will demonstrate understanding of plan of care, disease process/condition, diagnostic tests and medications  Outcome: Progrrssing    Pt's pain is better controlled.    Patient is not progressing towards the following goals: N/A

## 2022-01-12 NOTE — PROGRESS NOTES
Patient care assumed. Report received from Bruna LA    Assessment completed. Pt A&Ox 4. Respirations are even and unlabored on RA. Pt reports 10/10 chest pain, but denies interventions at this time. Monitors applied, VS stable, call light and belongings within reach. POC updated (Pain control, Echo). Pt educated on room and call light, pt verbalized understanding. Communication board updated. Needs met.

## 2022-01-12 NOTE — PROGRESS NOTES
Carroll from Lab called with critical result of Troponin 363 at 1202. Critical lab result read back to Carroll.   Dr. Zhu notified of critical lab result at 1203.  Critical lab result read back by Dr. Zhu.

## 2022-01-12 NOTE — PROGRESS NOTES
Hospital Medicine Daily Progress Note    Date of Service  1/12/2022    Chief Complaint  Carine Christopher is a 71 y.o. female admitted 1/10/2022 with chest pain     Hospital Course  71 y.o. female who presented 1/10/2022 with chest pain.  PMH of CAD (PCI 10/2020 to the circumflex, in-stent restenosis and another PCI 04/2021), temporal arteritis on prednisone, dyslipidemia, hypertension.  Comes in complaining of substernal chest pain radiating to the left arm since 3 days, reproducible, 10 out of 10.  She takes Motrin daily due to giant cell arteritis.  On arrival troponin negative x2.  , creatinine one 1.72.  Patient started on IV fluid.  D-dimer is negative.  Echocardiogram is pending.  Chest x-ray unremarkable      Interval Problem Update    This patient's pain has improved.    She states that she has 2 cardiac stents prior and that the pain that made her present to the hospital was exactly similar pain in those previous other episodes.    Patient's current echocardiogram is within normal limits.    Stress ordered for this patient    I reviewed patient's laboratory data with the patient and her kidney function has normalized        1 pm troponin has risen to 363- stress test cancelled. Cardiology md consulted      I have personally seen and examined the patient at bedside. I discussed the plan of care with patient and bedside RN.    Consultants/Specialty  None    Code Status  Full Code    Disposition  Patient is not medically cleared for discharge.   Anticipate discharge to to home with close outpatient follow-up.  I have placed the appropriate orders for post-discharge needs.    Review of Systems  Review of Systems   Constitutional: Negative for chills, fever and malaise/fatigue.   HENT: Negative for congestion and sore throat.    Respiratory: Negative for cough and shortness of breath.    Cardiovascular: Negative for chest pain, palpitations, orthopnea and leg swelling.    Gastrointestinal: Negative for abdominal pain, diarrhea, nausea and vomiting.   Genitourinary: Negative for dysuria and urgency.   Musculoskeletal: Negative for joint pain.   Neurological: Negative for headaches.   Psychiatric/Behavioral: The patient is not nervous/anxious.         Physical Exam  Temp:  [35.8 °C (96.5 °F)-36.7 °C (98.1 °F)] 35.8 °C (96.5 °F)  Pulse:  [64-90] 64  Resp:  [15-20] 16  BP: (120-147)/(62-79) 125/69  SpO2:  [90 %-94 %] 91 %    Physical Exam  Vitals and nursing note reviewed.   Constitutional:       Appearance: She is not ill-appearing.   HENT:      Head: Normocephalic and atraumatic.   Eyes:      General: No scleral icterus.  Cardiovascular:      Rate and Rhythm: Normal rate.      Heart sounds: No murmur heard.      Pulmonary:      Effort: Pulmonary effort is normal. No respiratory distress.      Breath sounds: No rales.   Abdominal:      General: There is no distension.      Palpations: Abdomen is soft.      Tenderness: There is no abdominal tenderness. There is no guarding.   Musculoskeletal:         General: No tenderness.   Skin:     Coloration: Skin is not pale.   Neurological:      Mental Status: She is oriented to person, place, and time.   Psychiatric:         Mood and Affect: Mood normal.         Behavior: Behavior normal.         Thought Content: Thought content normal.         Judgment: Judgment normal.         Fluids    Intake/Output Summary (Last 24 hours) at 1/12/2022 1115  Last data filed at 1/11/2022 1330  Gross per 24 hour   Intake 240 ml   Output --   Net 240 ml       Laboratory  Recent Labs     01/10/22  2238 01/12/22  0106   WBC 10.7 10.6   RBC 4.76 4.55   HEMOGLOBIN 13.9 13.2   HEMATOCRIT 43.5 41.4   MCV 91.4 91.0   MCH 29.2 29.0   MCHC 32.0* 31.9*   RDW 45.1 45.5   PLATELETCT 247 207   MPV 10.4 10.5     Recent Labs     01/10/22  2238 01/11/22  1424 01/12/22  0106   SODIUM 141 139 141   POTASSIUM 3.9 4.2 4.0   CHLORIDE 106 105 107   CO2 22 21 22   GLUCOSE 106* 195*  98   BUN 35* 30* 22   CREATININE 1.72* 0.86 0.79   CALCIUM 9.4 8.8 9.2                   Imaging  EC-ECHOCARDIOGRAM COMPLETE W/O CONT   Final Result      DX-CHEST-PORTABLE (1 VIEW)   Final Result      No radiographic evidence of acute cardiopulmonary process.      NM-CARDIAC STRESS TEST    (Results Pending)        Assessment/Plan  * Chest pain- (present on admission)  Assessment & Plan  Troponin negative.  D-dimer is negative.  Chest x-ray unremarkable.  Clinically musculoskeletal    Echocardiac shows no acute antibody    Cardiac stress test has been ordered 1/12/2022--> cancelled due to rising troponins.. cardiology consulted    BNP slightly elevated  Due to history of giant cell disease also pericarditis, pleurisy is on differential   Avoid NSAIDs due to BURAK   No indication for ACS   Empiric PPI is very appropriate and need to continue for at least 2 months   Pain meds ordered       Dyslipidemia- (present on admission)  Assessment & Plan  Continue atorvastatin    Primary hypertension- (present on admission)  Assessment & Plan  Continue metoprolol    Coronary artery disease involving native coronary artery of native heart with angina pectoris (HCC)- (present on admission)  Assessment & Plan  PCI 10/2020 to the circumflex, in-stent restenosis and another PCI 04/2021  On aspirin only, stopped taking clopidogrel due to recurrent large hematomas  Continue statin, metoprolol, nitroglycerin    Cardiac stress test ordered for 1-12 22- cancelled due to rising troponins- cardiology md consulted    Giant cell arteritis (HCC)- (present on admission)  Assessment & Plan  Presumed diagnosis since 2020, biopsy planned later this month  Continue prednisone, says she has weaned herself down to 10 mg daily       VTE prophylaxis: heparin ppx    I have performed a physical exam and reviewed and updated ROS and Plan today (1/12/2022). In review of yesterday's note (1/11/2022), there are no changes except as documented above.

## 2022-01-12 NOTE — CARE PLAN
The patient is Stable - Low risk of patient condition declining or worsening    Shift Goals  Clinical Goals: Pain control, echo  Patient Goals: pain control  Family Goals: not present      Problem: Knowledge Deficit - Standard  Goal: Patient and family/care givers will demonstrate understanding of plan of care, disease process/condition, diagnostic tests and medications  Outcome: Progressing     Problem: Pain - Standard  Goal: Alleviation of pain or a reduction in pain to the patient’s comfort goal  Outcome: Progressing

## 2022-01-12 NOTE — PROGRESS NOTES
CHW went bedside and introduced community care management. Patient declined CCM stating that she was well taken care of and can handle it.     CHW will remove the patient from CCM caseload.

## 2022-01-12 NOTE — PROGRESS NOTES
Report received from Daren Mercedes RN.  Patient brought up to the CDU form the Yellow Pod of the ED via gurney by an ACLS RN on a Tele Box.  POC gone over with the pt and all questions answered.  Patient A & O  X 4.  No apparent signs of distress.  Safety precautions in place.  Patient educated to call for assistance.  Hourly rounding in place.

## 2022-01-12 NOTE — PROGRESS NOTES
Hospital Medicine Daily Progress Note    Date of Service  1/11/2022    Chief Complaint  Carine Christopher is a 71 y.o. female admitted 1/10/2022 with chest pain     Hospital Course  71 y.o. female who presented 1/10/2022 with chest pain.  PMH of CAD (PCI 10/2020 to the circumflex, in-stent restenosis and another PCI 04/2021), temporal arteritis on prednisone, dyslipidemia, hypertension.  Comes in complaining of substernal chest pain radiating to the left arm since 3 days, reproducible, 10 out of 10.  She takes Motrin daily due to giant cell arteritis.  On arrival troponin negative x2.  , creatinine one 1.72.  Patient started on IV fluid.  D-dimer is negative.  Echocardiogram is pending.  Chest x-ray unremarkable      Interval Problem Update  She still reports pain 10 out of 10 pain, reproducible.  Troponin remains negative.  Repeat BMP showed improvement on GFR.  We will stop IV fluids.  Follow-up in echocardiogram.  If normal patient can be followed up as outpatient.    I have personally seen and examined the patient at bedside. I discussed the plan of care with patient and bedside RN.    Consultants/Specialty  None    Code Status  Full Code    Disposition  Patient is not medically cleared for discharge.   Anticipate discharge to to home with close outpatient follow-up.  I have placed the appropriate orders for post-discharge needs.    Review of Systems  Review of Systems   Constitutional: Positive for malaise/fatigue. Negative for chills and fever.   HENT: Negative for congestion and sore throat.    Respiratory: Negative for cough and shortness of breath.    Cardiovascular: Positive for chest pain. Negative for palpitations, orthopnea and leg swelling.   Gastrointestinal: Negative for abdominal pain, diarrhea, nausea and vomiting.   Genitourinary: Negative for dysuria and urgency.   Musculoskeletal: Positive for joint pain.   Neurological: Positive for headaches.   Psychiatric/Behavioral:  The patient is nervous/anxious.         Physical Exam  Temp:  [36.6 °C (97.8 °F)-36.7 °C (98.1 °F)] 36.7 °C (98.1 °F)  Pulse:  [69-94] 90  Resp:  [12-27] 16  BP: ()/(48-84) 147/74  SpO2:  [91 %-100 %] 94 %    Physical Exam  Vitals and nursing note reviewed.   Constitutional:       Appearance: She is not ill-appearing.   HENT:      Head: Normocephalic and atraumatic.   Eyes:      General: No scleral icterus.  Cardiovascular:      Rate and Rhythm: Normal rate.      Heart sounds: No murmur heard.       Comments: Retrosternal chest pain, reproducible  Pulmonary:      Effort: Pulmonary effort is normal. No respiratory distress.      Breath sounds: No rales.   Abdominal:      General: There is no distension.      Palpations: Abdomen is soft.      Tenderness: There is no abdominal tenderness. There is no guarding.   Musculoskeletal:         General: No tenderness.   Skin:     Coloration: Skin is not pale.   Neurological:      Mental Status: She is oriented to person, place, and time.   Psychiatric:         Mood and Affect: Mood normal.         Behavior: Behavior normal.         Thought Content: Thought content normal.         Judgment: Judgment normal.         Fluids    Intake/Output Summary (Last 24 hours) at 1/11/2022 1632  Last data filed at 1/11/2022 0244  Gross per 24 hour   Intake 500 ml   Output --   Net 500 ml       Laboratory  Recent Labs     01/10/22  2238   WBC 10.7   RBC 4.76   HEMOGLOBIN 13.9   HEMATOCRIT 43.5   MCV 91.4   MCH 29.2   MCHC 32.0*   RDW 45.1   PLATELETCT 247   MPV 10.4     Recent Labs     01/10/22  2238 01/11/22  1424   SODIUM 141 139   POTASSIUM 3.9 4.2   CHLORIDE 106 105   CO2 22 21   GLUCOSE 106* 195*   BUN 35* 30*   CREATININE 1.72* 0.86   CALCIUM 9.4 8.8                   Imaging  DX-CHEST-PORTABLE (1 VIEW)   Final Result      No radiographic evidence of acute cardiopulmonary process.      EC-ECHOCARDIOGRAM COMPLETE W/O CONT    (Results Pending)        Assessment/Plan  * Chest pain-  (present on admission)  Assessment & Plan  Troponin negative.  D-dimer is negative.  Chest x-ray unremarkable.  Clinically musculoskeletal  Follow-up on echocardiogram  BNP slightly elevated  Due to history of giant cell disease also pericarditis, pleurisy is on differential   Avoid NSAIDs due to BURAK   No indication for ACS   Empiric PPI is very appropriate and need to continue for at least 2 months   Pain meds ordered       Dyslipidemia- (present on admission)  Assessment & Plan  Continue atorvastatin    Primary hypertension- (present on admission)  Assessment & Plan  Continue metoprolol    Coronary artery disease involving native coronary artery of native heart with angina pectoris (HCC)- (present on admission)  Assessment & Plan  PCI 10/2020 to the circumflex, in-stent restenosis and another PCI 04/2021  On aspirin only, stopped taking clopidogrel due to recurrent large hematomas  Continue statin, metoprolol, nitroglycerin    Giant cell arteritis (HCC)- (present on admission)  Assessment & Plan  Presumed diagnosis since 2020, biopsy planned later this month  Continue prednisone, says she has weaned herself down to 10 mg daily       VTE prophylaxis: heparin ppx    I have performed a physical exam and reviewed and updated ROS and Plan today (1/11/2022). In review of yesterday's note (1/10/2022), there are no changes except as documented above.

## 2022-01-12 NOTE — HOSPITAL COURSE
71 y.o. female who presented 1/10/2022 with chest pain.  PMH of CAD (PCI 10/2020 to the circumflex, in-stent restenosis and another PCI 04/2021), temporal arteritis on prednisone, dyslipidemia, hypertension.  Comes in complaining of substernal chest pain radiating to the left arm since 3 days, reproducible, 10 out of 10.  She takes Motrin daily due to giant cell arteritis.  On arrival troponin negative x2.  , creatinine one 1.72.  Patient started on IV fluid.  D-dimer is negative.  Echocardiogram is pending.  Chest x-ray unremarkable

## 2022-01-13 ENCOUNTER — APPOINTMENT (OUTPATIENT)
Dept: CARDIOLOGY | Facility: MEDICAL CENTER | Age: 72
End: 2022-01-13
Attending: INTERNAL MEDICINE
Payer: MEDICARE

## 2022-01-13 LAB
CHOLEST SERPL-MCNC: 163 MG/DL (ref 100–199)
HDLC SERPL-MCNC: 68 MG/DL
LDLC SERPL CALC-MCNC: 73 MG/DL
TRIGL SERPL-MCNC: 112 MG/DL (ref 0–149)
TROPONIN T SERPL-MCNC: 393 NG/L (ref 6–19)
TROPONIN T SERPL-MCNC: 448 NG/L (ref 6–19)
TROPONIN T SERPL-MCNC: 510 NG/L (ref 6–19)

## 2022-01-13 PROCEDURE — 36415 COLL VENOUS BLD VENIPUNCTURE: CPT

## 2022-01-13 PROCEDURE — 700105 HCHG RX REV CODE 258: Performed by: INTERNAL MEDICINE

## 2022-01-13 PROCEDURE — 99152 MOD SED SAME PHYS/QHP 5/>YRS: CPT | Performed by: INTERNAL MEDICINE

## 2022-01-13 PROCEDURE — 99225 PR SUBSEQUENT OBSERVATION CARE,LEVEL II: CPT | Performed by: HOSPITALIST

## 2022-01-13 PROCEDURE — C1894 INTRO/SHEATH, NON-LASER: HCPCS

## 2022-01-13 PROCEDURE — 700101 HCHG RX REV CODE 250

## 2022-01-13 PROCEDURE — G0378 HOSPITAL OBSERVATION PER HR: HCPCS

## 2022-01-13 PROCEDURE — 84484 ASSAY OF TROPONIN QUANT: CPT

## 2022-01-13 PROCEDURE — 700111 HCHG RX REV CODE 636 W/ 250 OVERRIDE (IP): Performed by: STUDENT IN AN ORGANIZED HEALTH CARE EDUCATION/TRAINING PROGRAM

## 2022-01-13 PROCEDURE — A9270 NON-COVERED ITEM OR SERVICE: HCPCS | Performed by: INTERNAL MEDICINE

## 2022-01-13 PROCEDURE — 700111 HCHG RX REV CODE 636 W/ 250 OVERRIDE (IP)

## 2022-01-13 PROCEDURE — 700102 HCHG RX REV CODE 250 W/ 637 OVERRIDE(OP): Performed by: STUDENT IN AN ORGANIZED HEALTH CARE EDUCATION/TRAINING PROGRAM

## 2022-01-13 PROCEDURE — 700117 HCHG RX CONTRAST REV CODE 255: Performed by: INTERNAL MEDICINE

## 2022-01-13 PROCEDURE — 80061 LIPID PANEL: CPT

## 2022-01-13 PROCEDURE — A9270 NON-COVERED ITEM OR SERVICE: HCPCS | Performed by: STUDENT IN AN ORGANIZED HEALTH CARE EDUCATION/TRAINING PROGRAM

## 2022-01-13 PROCEDURE — 93458 L HRT ARTERY/VENTRICLE ANGIO: CPT | Mod: 26 | Performed by: INTERNAL MEDICINE

## 2022-01-13 PROCEDURE — 700102 HCHG RX REV CODE 250 W/ 637 OVERRIDE(OP): Performed by: INTERNAL MEDICINE

## 2022-01-13 RX ORDER — HEPARIN SODIUM 200 [USP'U]/100ML
INJECTION, SOLUTION INTRAVENOUS
Status: COMPLETED
Start: 2022-01-13 | End: 2022-01-13

## 2022-01-13 RX ORDER — LIDOCAINE HYDROCHLORIDE 20 MG/ML
INJECTION, SOLUTION INFILTRATION; PERINEURAL
Status: COMPLETED
Start: 2022-01-13 | End: 2022-01-13

## 2022-01-13 RX ORDER — HEPARIN SODIUM 1000 [USP'U]/ML
INJECTION, SOLUTION INTRAVENOUS; SUBCUTANEOUS
Status: COMPLETED
Start: 2022-01-13 | End: 2022-01-13

## 2022-01-13 RX ORDER — SODIUM CHLORIDE 9 MG/ML
INJECTION, SOLUTION INTRAVENOUS CONTINUOUS
Status: ACTIVE | OUTPATIENT
Start: 2022-01-13 | End: 2022-01-13

## 2022-01-13 RX ORDER — VERAPAMIL HYDROCHLORIDE 2.5 MG/ML
INJECTION, SOLUTION INTRAVENOUS
Status: COMPLETED
Start: 2022-01-13 | End: 2022-01-13

## 2022-01-13 RX ORDER — MIDAZOLAM HYDROCHLORIDE 1 MG/ML
INJECTION INTRAMUSCULAR; INTRAVENOUS
Status: COMPLETED
Start: 2022-01-13 | End: 2022-01-13

## 2022-01-13 RX ORDER — SODIUM CHLORIDE 9 MG/ML
INJECTION, SOLUTION INTRAVENOUS CONTINUOUS
Status: DISCONTINUED | OUTPATIENT
Start: 2022-01-13 | End: 2022-01-13

## 2022-01-13 RX ADMIN — ACETAMINOPHEN 1000 MG: 500 TABLET ORAL at 05:29

## 2022-01-13 RX ADMIN — OMEPRAZOLE 20 MG: 20 CAPSULE, DELAYED RELEASE ORAL at 05:29

## 2022-01-13 RX ADMIN — FENTANYL CITRATE 100 MCG: 50 INJECTION, SOLUTION INTRAMUSCULAR; INTRAVENOUS at 15:23

## 2022-01-13 RX ADMIN — LIDOCAINE HYDROCHLORIDE: 20 INJECTION, SOLUTION INFILTRATION; PERINEURAL at 15:23

## 2022-01-13 RX ADMIN — VERAPAMIL HYDROCHLORIDE 5 MG: 2.5 INJECTION, SOLUTION INTRAVENOUS at 15:24

## 2022-01-13 RX ADMIN — IOHEXOL 25 ML: 350 INJECTION, SOLUTION INTRAVENOUS at 15:23

## 2022-01-13 RX ADMIN — MIDAZOLAM 2 MG: 1 INJECTION INTRAMUSCULAR; INTRAVENOUS at 15:23

## 2022-01-13 RX ADMIN — ACETAMINOPHEN 1000 MG: 500 TABLET ORAL at 13:31

## 2022-01-13 RX ADMIN — NITROGLYCERIN 10 ML: 20 INJECTION INTRAVENOUS at 15:24

## 2022-01-13 RX ADMIN — FENTANYL CITRATE 100 MCG: 50 INJECTION, SOLUTION INTRAMUSCULAR; INTRAVENOUS at 15:22

## 2022-01-13 RX ADMIN — METOPROLOL SUCCINATE 25 MG: 25 TABLET, FILM COATED, EXTENDED RELEASE ORAL at 18:26

## 2022-01-13 RX ADMIN — ASPIRIN 81 MG: 81 TABLET, COATED ORAL at 18:25

## 2022-01-13 RX ADMIN — HEPARIN SODIUM 2000 UNITS: 200 INJECTION, SOLUTION INTRAVENOUS at 15:24

## 2022-01-13 RX ADMIN — SODIUM CHLORIDE: 9 INJECTION, SOLUTION INTRAVENOUS at 12:45

## 2022-01-13 RX ADMIN — HEPARIN SODIUM: 1000 INJECTION, SOLUTION INTRAVENOUS; SUBCUTANEOUS at 15:23

## 2022-01-13 RX ADMIN — PREDNISONE 8 MG: 2.5 TABLET ORAL at 08:30

## 2022-01-13 RX ADMIN — SODIUM CHLORIDE: 9 INJECTION, SOLUTION INTRAVENOUS at 17:00

## 2022-01-13 ASSESSMENT — PATIENT HEALTH QUESTIONNAIRE - PHQ9
1. LITTLE INTEREST OR PLEASURE IN DOING THINGS: NOT AT ALL
2. FEELING DOWN, DEPRESSED, IRRITABLE, OR HOPELESS: NOT AT ALL
SUM OF ALL RESPONSES TO PHQ9 QUESTIONS 1 AND 2: 0

## 2022-01-13 ASSESSMENT — ENCOUNTER SYMPTOMS
ABDOMINAL PAIN: 0
HEADACHES: 1
NAUSEA: 0
SORE THROAT: 0
NERVOUS/ANXIOUS: 0
ORTHOPNEA: 0
COUGH: 0
FEVER: 0
DIARRHEA: 0
VOMITING: 0
CHILLS: 0
SHORTNESS OF BREATH: 0
PALPITATIONS: 0

## 2022-01-13 ASSESSMENT — PAIN DESCRIPTION - PAIN TYPE
TYPE: ACUTE PAIN
TYPE: ACUTE PAIN;SURGICAL PAIN

## 2022-01-13 NOTE — PROGRESS NOTES
Hospital Medicine Daily Progress Note    Date of Service  1/13/2022    Chief Complaint  Carine Christopher is a 71 y.o. female admitted 1/10/2022 with chest pain     Hospital Course  71 y.o. female who presented 1/10/2022 with chest pain.  PMH of CAD (PCI 10/2020 to the circumflex, in-stent restenosis and another PCI 04/2021), temporal arteritis on prednisone, dyslipidemia, hypertension.  Comes in complaining of substernal chest pain radiating to the left arm since 3 days, reproducible, 10 out of 10.  She takes Motrin daily due to giant cell arteritis.  On arrival troponin negative x2.  , creatinine one 1.72.  Patient started on IV fluid.  D-dimer is negative.  Echocardiogram is pending.  Chest x-ray unremarkable      Interval Problem Update    Patient complain headache    She really states that she is not having active chest pain at this time but her troponin has risen to 510    I have been notified that cardiology is aware of this patient and will be consulting soon    She states that she has 2 cardiac stents prior and that the pain that made her present to the hospital was exactly similar pain in those previous other episodes.    Patient's current echocardiogram is within normal limits.      I have personally seen and examined the patient at bedside. I discussed the plan of care with patient and bedside RN.    Consultants/Specialty  None    Code Status  Full Code    Disposition  Patient is not medically cleared for discharge.   Anticipate discharge to to home with close outpatient follow-up.  I have placed the appropriate orders for post-discharge needs.    Review of Systems  Review of Systems   Constitutional: Negative for chills, fever and malaise/fatigue.   HENT: Negative for congestion and sore throat.    Respiratory: Negative for cough and shortness of breath.    Cardiovascular: Negative for chest pain, palpitations, orthopnea and leg swelling.   Gastrointestinal: Negative for  abdominal pain, diarrhea, nausea and vomiting.   Genitourinary: Negative for dysuria and urgency.   Musculoskeletal: Negative for joint pain.   Neurological: Positive for headaches.   Psychiatric/Behavioral: The patient is not nervous/anxious.         Physical Exam  Temp:  [35.8 °C (96.4 °F)-36.8 °C (98.2 °F)] 36.2 °C (97.2 °F)  Pulse:  [80-90] 84  Resp:  [16-18] 18  BP: (107-141)/(62-78) 121/70  SpO2:  [92 %-94 %] 92 %    Physical Exam  Vitals and nursing note reviewed.   Constitutional:       Appearance: She is not ill-appearing.   HENT:      Head: Normocephalic and atraumatic.   Eyes:      General: No scleral icterus.  Cardiovascular:      Rate and Rhythm: Normal rate.      Heart sounds: No murmur heard.      Pulmonary:      Effort: Pulmonary effort is normal. No respiratory distress.      Breath sounds: No rales.   Abdominal:      General: There is no distension.      Palpations: Abdomen is soft.      Tenderness: There is no abdominal tenderness. There is no guarding.   Musculoskeletal:         General: No tenderness.   Skin:     Coloration: Skin is not pale.   Neurological:      Mental Status: She is oriented to person, place, and time.   Psychiatric:         Mood and Affect: Mood normal.         Behavior: Behavior normal.         Thought Content: Thought content normal.         Judgment: Judgment normal.         Fluids  No intake or output data in the 24 hours ending 01/13/22 1041    Laboratory  Recent Labs     01/10/22  2238 01/12/22  0106   WBC 10.7 10.6   RBC 4.76 4.55   HEMOGLOBIN 13.9 13.2   HEMATOCRIT 43.5 41.4   MCV 91.4 91.0   MCH 29.2 29.0   MCHC 32.0* 31.9*   RDW 45.1 45.5   PLATELETCT 247 207   MPV 10.4 10.5     Recent Labs     01/10/22  2238 01/11/22  1424 01/12/22  0106   SODIUM 141 139 141   POTASSIUM 3.9 4.2 4.0   CHLORIDE 106 105 107   CO2 22 21 22   GLUCOSE 106* 195* 98   BUN 35* 30* 22   CREATININE 1.72* 0.86 0.79   CALCIUM 9.4 8.8 9.2             Recent Labs     01/13/22  0809   TRIGLYCERIDE  112   HDL 68   LDL 73       Imaging  EC-ECHOCARDIOGRAM COMPLETE W/O CONT   Final Result      DX-CHEST-PORTABLE (1 VIEW)   Final Result      No radiographic evidence of acute cardiopulmonary process.           Assessment/Plan  * Chest pain- (present on admission)  Assessment & Plan  Troponin negative.  D-dimer is negative.  Chest x-ray unremarkable.  Clinically musculoskeletal    Echocardiac shows no acute antibody    Cardiac stress test has been ordered 1/12/2022--> cancelled due to rising troponins.. cardiology consulted    BNP slightly elevated  Due to history of giant cell disease also pericarditis, pleurisy is on differential   Avoid NSAIDs due to BURAK   No indication for ACS   Empiric PPI is very appropriate and need to continue for at least 2 months   Pain meds ordered       Dyslipidemia- (present on admission)  Assessment & Plan  Continue atorvastatin    Primary hypertension- (present on admission)  Assessment & Plan  Continue metoprolol    Coronary artery disease involving native coronary artery of native heart with angina pectoris (HCC)- (present on admission)  Assessment & Plan  PCI 10/2020 to the circumflex, in-stent restenosis and another PCI 04/2021  On aspirin only, stopped taking clopidogrel due to recurrent large hematomas  Continue statin, metoprolol, nitroglycerin    Cardiac stress test ordered for 1-12 22- cancelled due to rising troponins- cardiology md consulted    Giant cell arteritis (HCC)- (present on admission)  Assessment & Plan  Presumed diagnosis since 2020, biopsy planned later this month  Continue prednisone, says she has weaned herself down to 10 mg daily       VTE prophylaxis: heparin ppx    I have performed a physical exam and reviewed and updated ROS and Plan today (1/13/2022). In review of yesterday's note (1/12/2022), there are no changes except as documented above.

## 2022-01-13 NOTE — PROGRESS NOTES
"Patient troponin elevated from 323 to 393 on most recent lab draw. Patient states her chest pain has not changed, and is still \"mild\". MD updated. No new orders at this time.  "

## 2022-01-13 NOTE — NON-PROVIDER
"    MEDICAL STUDENT CONSULT NOTE    Reason for Consult:  Asked by Dr Dylan Zhu M.D. to see this patient with progressive troponin trend and chest pain.   Patient's PCP: Khai Deras M.D.    CC:   Chief Complaint   Patient presents with   • Chest Pain     x 1 hour. Patient reports 10/10 pain like \"pressure and intense hurting.\" Patient has a history of an MI in April and reports this is the same pain. Patient states \"it might be really bad indigestions.\" PAtient reports taking 6 nitros with some relief.        HPI: This is a 71 year old female with a history of myocardial infarction secondary to circumflex stenosis in October 2020 and subsequent re stenosis requiring PCI in April 2021 who initially presented to the emergency department four days ago complaining of central chest pain with radiation to her left arm. The patient states this pain was exactly the same as the pain she had during her previous heart attacks. She also had associated shortness of breath. Patient continued to have this pain until last night; the pain has subsequently resolved. She describes the pain as non-exertional in nature, with the pain initially starting while sitting and playing video games at home. She denies any exertional component to this pain. Work up did initially include troponin and EKG, as well as an echocardiogram. Troponins were non elevated until yesterday, and have been steadily increasing to a current value in the 500s. EKG did not show any acute abnormality, and echocardiogram showed in ejection fraction of 65% with no wall motion abnormalities. The patient has no complaints at this time. The patient has recently cut her dose of statin in half to 20 milligrams by her own volition, and has also discontinued Plavix, with a chief concern that this medication is thinning her skin and causing her to bleed excessively.    Medications / Drug list prior to admission:  No current facility-administered medications on file " prior to encounter.     Current Outpatient Medications on File Prior to Encounter   Medication Sig Dispense Refill   • atorvastatin (LIPITOR) 40 MG Tab Take 20 mg by mouth every evening. 1/2 TABLET     • PREDNISONE PO Take 8 mg by mouth every day. TAPERING DOSE: 8MG STARTED 1/10    PT TO GO DOWN 1MG EVERY WEEK     • metoprolol SR (TOPROL XL) 25 MG TABLET SR 24 HR Take 1 tablet by mouth every evening. 90 tablet 3   • nitroglycerin (NITROSTAT) 0.4 MG SL Tab Place 1 tablet under the tongue as needed for Chest Pain. 30 tablet 3   • Ascorbic Acid (VITAMIN C PO) Take 1 tablet by mouth every day.     • Coenzyme Q10 (COQ10 PO) Take 1 tablet by mouth every day.     • LUTEIN PO Take 20 mg by mouth every day.     • Nkrwri-YuBqw-ArKgu-FA-CA-Omega (COMPLETE  DHA) 29-1-200 & 250 MG Misc Take 2 Tablets by mouth every day.     • Cholecalciferol (VITAMIN D3) 50 MCG (2000 UT) Tab Take 2,000 Units by mouth every day at 6 PM.     • aspirin (ASPIRIN 81) 81 MG EC tablet Take 81 mg by mouth every evening.         Current list of administered Medications:    Current Facility-Administered Medications:   •  senna-docusate (PERICOLACE or SENOKOT S) 8.6-50 MG per tablet 2 Tablet, 2 Tablet, Oral, BID **AND** polyethylene glycol/lytes (MIRALAX) PACKET 1 Packet, 1 Packet, Oral, QDAY PRN **AND** magnesium hydroxide (MILK OF MAGNESIA) suspension 30 mL, 30 mL, Oral, QDAY PRN **AND** bisacodyl (DULCOLAX) suppository 10 mg, 10 mg, Rectal, QDAY PRN, Lizzy Wolfe M.D.  •  heparin injection 5,000 Units, 5,000 Units, Subcutaneous, Q8HRS, Lizzy Wolfe M.D.  •  labetalol (NORMODYNE/TRANDATE) injection 10 mg, 10 mg, Intravenous, Q4HRS PRN, Lizzy Wolfe M.D.  •  omeprazole (PRILOSEC) capsule 20 mg, 20 mg, Oral, BID, Lizzy Wolfe M.D., 20 mg at 22 0529  •  nitroglycerin (NITROSTAT) tablet 0.4 mg, 0.4 mg, Sublingual, Q5 MIN PRN, Lizzy Wolfe M.D., 0.4 mg at 22 0627  •  atorvastatin (LIPITOR) tablet 80 mg, 80 mg, Oral, DAILY,  Lizzy Wolfe M.D.  •  aspirin EC (ECOTRIN) tablet 81 mg, 81 mg, Oral, Q EVENING, Lizzy Wolfe M.D., 81 mg at 01/12/22 1747  •  metoprolol SR (TOPROL XL) tablet 25 mg, 25 mg, Oral, Q EVENING, Lizzy Wolfe M.D., 25 mg at 01/12/22 1747  •  predniSONE (DELTASONE) tablet 8 mg, 8 mg, Oral, DAILY, Lizzy Wolfe M.D., 8 mg at 01/13/22 0830  •  Pharmacy Consult Request ...Pain Management Review 1 Each, 1 Each, Other, PHARMACY TO DOSE, Katie Melissa M.D.  •  acetaminophen (TYLENOL) tablet 1,000 mg, 1,000 mg, Oral, Q6HRS, 1,000 mg at 01/13/22 0529 **FOLLOWED BY** [START ON 1/16/2022] acetaminophen (TYLENOL) tablet 1,000 mg, 1,000 mg, Oral, Q6HRS PRN, Katie Melissa M.D.  •  oxyCODONE immediate-release (ROXICODONE) tablet 5 mg, 5 mg, Oral, Q3HRS PRN **OR** oxyCODONE immediate release (ROXICODONE) tablet 10 mg, 10 mg, Oral, Q3HRS PRN, 10 mg at 01/11/22 1247 **OR** HYDROmorphone (Dilaudid) injection 0.5 mg, 0.5 mg, Intravenous, Q3HRS PRN, Katie Melissa M.D.  •  tizanidine (ZANAFLEX) tablet 4 mg, 4 mg, Oral, Q6HRS PRN, Katie Melissa M.D.    Past Medical History:   Diagnosis Date   • Bronchitis    • Giant cell arteritis (HCC)    • Heart attack (HCC) 10/2020   • Hyperlipidemia        Past Surgical History:   Procedure Laterality Date   • ABDOMINAL EXPLORATION     • BREAST IMPLANT REVISION     • CATARACT EXTRACTION WITH IOL         Family History   Problem Relation Age of Onset   • Glasses Father    • Heart Disease Father      Patient family history was personally reviewed, no pertinent family history to current presentation    Social History     Tobacco Use   • Smoking status: Never Smoker   • Smokeless tobacco: Never Used   Vaping Use   • Vaping Use: Never used   Substance Use Topics   • Alcohol use: Not Currently     Alcohol/week: 0.6 oz     Types: 1 Glasses of wine per week   • Drug use: Never       ALLERGIES:  Allergies   Allergen Reactions   • Chloramphenicol Diarrhea     PCN is okay to take   • Codeine    • Sulfa Antibiotics   [Sulfa Drugs] Unspecified       Review of systems:  A complete review of symptoms was reviewed with patient. This is reviewed in H&P and PMH. ALL OTHERS reviewed and negative    Physical exam:  Patient Vitals for the past 24 hrs:   BP Temp Temp src Pulse Resp SpO2   01/13/22 0726 121/70 36.2 °C (97.2 °F) Temporal 84 18 92 %   01/13/22 0516 141/74 36.4 °C (97.5 °F) Temporal 87 16 94 %   01/13/22 0053 107/62 36.4 °C (97.5 °F) Temporal 86 16 94 %   01/12/22 2005 120/62 36.8 °C (98.2 °F) Temporal 80 16 --   01/12/22 1713 140/78 36.1 °C (97 °F) Temporal 90 16 94 %   01/12/22 1227 139/68 (!) 35.8 °C (96.4 °F) Temporal 88 16 94 %     General: No acute distress. Resting comfortably in bed.   EYES: no jaundice  HEENT: OP clear   Neck:  No JVD.   CVS:  RRR. S1 + S2. No M/R/G  Resp: CTAB. No wheezing or crackles/rhonchi.  Abdomen: Soft, ND,  Skin: Diffuse areas of ecchymosis in various stages of healing; no active bleeding appreciated.   Neurological: Alert, Moves all extremities, no cranial nerve defects on limited exam  Extremities:   No peripheral edema. No cyanosis.       Data:  Laboratory studies personally reviewed by me:  Recent Results (from the past 24 hour(s))   TROPONIN    Collection Time: 01/12/22  6:45 PM   Result Value Ref Range    Troponin T 323 (H) 6 - 19 ng/L   TROPONIN    Collection Time: 01/13/22 12:02 AM   Result Value Ref Range    Troponin T 393 (H) 6 - 19 ng/L   TROPONIN    Collection Time: 01/13/22  5:25 AM   Result Value Ref Range    Troponin T 448 (H) 6 - 19 ng/L   Lipid Profile    Collection Time: 01/13/22  8:09 AM   Result Value Ref Range    Cholesterol,Tot 163 100 - 199 mg/dL    Triglycerides 112 0 - 149 mg/dL    HDL 68 >=40 mg/dL    LDL 73 <100 mg/dL   TROPONIN    Collection Time: 01/13/22  8:09 AM   Result Value Ref Range    Troponin T 510 (H) 6 - 19 ng/L       Imaging:  EC-ECHOCARDIOGRAM COMPLETE W/O CONT   Final Result      DX-CHEST-PORTABLE (1 VIEW)   Final Result      No radiographic evidence  of acute cardiopulmonary process.              EKG tracings personally reviewed by me: EKG performed on January 11th 2022 shows a normal sinus rhythm with a rate of 70. Normal access, normal intervals. No St segment elevation or depression. Good R wave progression. Similar to an EKG recorded 04/16/2021.    Echocardiogram images personally reviewed by me show an ejection fraction of 60%, normal left ventricular systolic function, and mild aortic insufficiency.     All pertinent features of laboratory and imaging reviewed including primary images where applicable          Assessment / Plan:  This is a 71 year old female with history of coronary artery disease in myocardial infarction requiring PCI and stenting of the circumflex artery with subsequent re stenosis being assessed by cardiology for the following issue:     #NSTEMI type 1   #Coronary Artery Disease   #Dyslipidemia   #Hypertension     1. Resolved chest pain with resultant troponin progression likely suggestive of myocardial injury, question completed vs. Incomplete infarct.  2. Echocardiogram has no wall motion abnormalities suggesting no hypokinesis secondary to infarct.   3. Plan for cardiac catheterization to investigate restenosis of stent or area of new coronary artery stenosis.   4. Patient hesitant to restart Plavix given recent issues of hematoma formation and easy bleeding; will obtain consent prior to cath for DAPT therapy necessitated if new stent is placed during procedure.   5. Discussed with patient importance of continuing statin therapy as prescribed for LDL management and plaque stabilization; has agreed to continue at 20mg qD.   6. Continue metoprolol as prescribed.     It is my pleasure to participate in the care of Ms. Christopher. Cardiology will continue to follow the patient.     Seen by: Rishi Larkin MS4  Resident: Ashleigh Jc MD PGY3  Attending: Lit Segovia MD    1/13/2022

## 2022-01-13 NOTE — CARE PLAN
The patient is Watcher - Medium risk of patient condition declining or worsening    Shift Goals  Clinical Goals: Cardio consult  Patient Goals: comfort, answers   Family Goals: not present      Problem: Knowledge Deficit - Standard  Goal: Patient and family/care givers will demonstrate understanding of plan of care, disease process/condition, diagnostic tests and medications  Outcome: Progressing     Problem: Pain - Standard  Goal: Alleviation of pain or a reduction in pain to the patient’s comfort goal  Outcome: Progressing

## 2022-01-13 NOTE — PROGRESS NOTES
Patient care assumed. Report received from Jorge LA    Assessment completed. Pt A&Ox 4. Respirations are even and unlabored on RA. Pt reports 3/10 chest pain, but denies interventions at this time. Monitors applied, VS stable, call light and belongings within reach. POC updated (Cardiology consult, pain control). Pt educated on room and call light, pt verbalized understanding. Communication board updated. Needs met.

## 2022-01-13 NOTE — PROGRESS NOTES
"Patient AM Troponin elevated from 393 to 448. Patient states, \"I'm not really having chest pain right now, I just have a constant headache\". MD notified of rise in Troponin.  "

## 2022-01-13 NOTE — CARE PLAN
The patient is Stable - Low risk of patient condition declining or worsening    Shift Goals  Clinical Goals: Pain control, mobility, cardiac monitoring  Patient Goals: pain control, comfort  Family Goals: not present    Progress made toward(s) clinical / shift goals:  Cardiology consult, monitor troponins    Patient is not progressing towards the following goals:      Problem: Knowledge Deficit - Standard  Goal: Patient and family/care givers will demonstrate understanding of plan of care, disease process/condition, diagnostic tests and medications  Outcome: Progressing     Problem: Pain - Standard  Goal: Alleviation of pain or a reduction in pain to the patient’s comfort goal  Outcome: Progressing

## 2022-01-13 NOTE — PROCEDURES
"CARDIAC CATHETERIZATION REPORT    REFERRING: Lit Segovia M.D.    PROCEDURE PHYSICIAN: Tone Davis MD, [facility], [facility]  ASSISTANT: None    IMPRESSIONS:  1. NSTEMI due to recurrent in stent occlusion of the circumflex  2. Normal LVEDP  3. Non-obstructive LAD and RCA diseas    Recommendations:  Usual post cath care    Pre-procedure diagnosis: NSTEMI  Post-procedure diagnosis: Same    Procedure performed  Selective coronary angiography  Left heart catheterization    Conscious sedation was supervised by myself and administered by trained personnel using fentanyl and versed between 1502 and 1520. The patient tolerated sedation without complication.     Procedure Description  1. Access: 5/6 [place] right radial artery Micropuncture technique was utilized following local anesthesia with lidocaine.  A radial cocktail containing verapamil and saline was administered in the radial artery sheath    2. Diagnostic description: The catheter was passed to the central circulation with the aide of J tipped 0.35\" wire. A 5F TIG 4.0 was used to inject the coronary circulationand enter the left ventricle during invasive hemodynamic monitoring.     3. Closing: At completion of the procedure the relevant equipment was removed from the body and hemostasis achieved by Radial band    Findings   Hemodynamics:   Aorta: 133/71 mmHg  LVEDP: 14 mmHg  No significant pullback gradient across the aortic valve    Coronary Anatomy   Left Main: Normal   LAD: 25% mid stenosis   LCx: 100% proximal in stent restenosis   RCA: Dominant, 25% sequential stenoses in the mid segment.      Technical Factors  1. Complications: None  2. Estimated Blood Loss: <50 cc  3. Specimens: None  4. Contrast Volume: 20 ml  5. Medications: Radial cocktail (Verapamil 2.5 mg, Nitroglycerin 100 mcg) Heparin 5000 Units  6. Radiation (air kerma): 103 mGy      "

## 2022-01-14 ENCOUNTER — PATIENT OUTREACH (OUTPATIENT)
Dept: HEALTH INFORMATION MANAGEMENT | Facility: OTHER | Age: 72
End: 2022-01-14

## 2022-01-14 VITALS
WEIGHT: 153.22 LBS | OXYGEN SATURATION: 93 % | HEIGHT: 66 IN | TEMPERATURE: 95.9 F | RESPIRATION RATE: 18 BRPM | HEART RATE: 77 BPM | BODY MASS INDEX: 24.62 KG/M2 | SYSTOLIC BLOOD PRESSURE: 121 MMHG | DIASTOLIC BLOOD PRESSURE: 60 MMHG

## 2022-01-14 LAB
ANION GAP SERPL CALC-SCNC: 12 MMOL/L (ref 7–16)
BUN SERPL-MCNC: 22 MG/DL (ref 8–22)
CALCIUM SERPL-MCNC: 9.1 MG/DL (ref 8.5–10.5)
CHLORIDE SERPL-SCNC: 107 MMOL/L (ref 96–112)
CO2 SERPL-SCNC: 22 MMOL/L (ref 20–33)
CREAT SERPL-MCNC: 0.79 MG/DL (ref 0.5–1.4)
GLUCOSE SERPL-MCNC: 137 MG/DL (ref 65–99)
POTASSIUM SERPL-SCNC: 3.8 MMOL/L (ref 3.6–5.5)
SODIUM SERPL-SCNC: 141 MMOL/L (ref 135–145)
TROPONIN T SERPL-MCNC: 302 NG/L (ref 6–19)

## 2022-01-14 PROCEDURE — 36415 COLL VENOUS BLD VENIPUNCTURE: CPT

## 2022-01-14 PROCEDURE — 700102 HCHG RX REV CODE 250 W/ 637 OVERRIDE(OP): Performed by: INTERNAL MEDICINE

## 2022-01-14 PROCEDURE — A9270 NON-COVERED ITEM OR SERVICE: HCPCS | Performed by: INTERNAL MEDICINE

## 2022-01-14 PROCEDURE — 700111 HCHG RX REV CODE 636 W/ 250 OVERRIDE (IP): Performed by: STUDENT IN AN ORGANIZED HEALTH CARE EDUCATION/TRAINING PROGRAM

## 2022-01-14 PROCEDURE — G0378 HOSPITAL OBSERVATION PER HR: HCPCS

## 2022-01-14 PROCEDURE — 80048 BASIC METABOLIC PNL TOTAL CA: CPT

## 2022-01-14 PROCEDURE — 84484 ASSAY OF TROPONIN QUANT: CPT

## 2022-01-14 RX ORDER — FAMOTIDINE 20 MG/1
20 TABLET, FILM COATED ORAL 2 TIMES DAILY
Qty: 60 TABLET | Refills: 3 | Status: SHIPPED | OUTPATIENT
Start: 2022-01-14 | End: 2022-01-24

## 2022-01-14 RX ORDER — CLOPIDOGREL BISULFATE 75 MG/1
75 TABLET ORAL
Qty: 30 TABLET | Refills: 5 | Status: SHIPPED | OUTPATIENT
Start: 2022-01-14 | End: 2022-01-24

## 2022-01-14 RX ADMIN — PREDNISONE 8 MG: 2.5 TABLET ORAL at 09:00

## 2022-01-14 RX ADMIN — ACETAMINOPHEN 1000 MG: 500 TABLET ORAL at 00:08

## 2022-01-14 RX ADMIN — ACETAMINOPHEN 1000 MG: 500 TABLET ORAL at 05:39

## 2022-01-14 ASSESSMENT — PAIN DESCRIPTION - PAIN TYPE: TYPE: ACUTE PAIN

## 2022-01-14 NOTE — PROGRESS NOTES
Cardiology consultation attending Addendum:    I have seen and examined the patient.  I agree with the documentation and management plan as outlined by Rishi Larkin MS 4 UNRSOM.  I personally reviewed all elements of the history and repeated the physical exam as appropriate.  I have also independently reviewed the laboratory data, EKG and imaging studies.  I have discussed my impression with the patient and the attending physician.    In summary, Carine Christopher is a 71-year-old female with a history of CAD, MI, PCI mid Cx 2.75 x 24 mm JAMES 10/4/2020 and 2.75 x by 26 mm JAMES for ISR who presents with a non-ST elevation MI currently asymptomatic with no recurrent chest pain with additional PMH of giant cell arteritis on chronic steroid therapy and dyslipidemia.  The patient was last seen on 4/29/2021 after last PCI but was lost to follow-up.  She subsequently developed significant bleeding related to DAPT with Plavix and stopped the Plavix with resolution of her bruising and bleeding problems.  She also stopped atorvastatin for period of time but is restarted half the dose.    Vital Signs reviewed.  General: Alert and oriented. No respiratory distress.  Chest/Lungs: Clear.  Cardiac: Normal JVP.  RRR, no murmur.  Extremities: No edema.  Multiple bruising.    Assessment  1.  Recurrent NSTEMI.  2.  PCI mid Cx x2 for ISR.  3.  Giant cell arteritis on chronic steroid therapy.    Recommendation Discussion  1.  After lengthy discussion it was elected to proceed with a repeat cardiac catheterization to rule out either ISR or new coronary lesions.  2.  I have discussed the patient's current clinical condition with Dr. Tone Davis, interventional cardiologist as to the strategy of intervention based on the findings of the cardiac catheterization.    Lit Segovia MD  Cardiologist, [facility]     MEDICAL STUDENT CONSULT NOTE     Reason for Consult:  Asked by Dr Dylan Zhu M.D. to see  "this patient with progressive troponin trend and chest pain.   Patient's PCP: Khai Deras M.D.     CC:        Chief Complaint   Patient presents with   • Chest Pain       x 1 hour. Patient reports 10/10 pain like \"pressure and intense hurting.\" Patient has a history of an MI in April and reports this is the same pain. Patient states \"it might be really bad indigestions.\" PAtient reports taking 6 nitros with some relief.          HPI: This is a 71 year old female with a history of myocardial infarction secondary to circumflex stenosis in October 2020 and subsequent re stenosis requiring PCI in April 2021 who initially presented to the emergency department four days ago complaining of central chest pain with radiation to her left arm. The patient states this pain was exactly the same as the pain she had during her previous heart attacks. She also had associated shortness of breath. Patient continued to have this pain until last night; the pain has subsequently resolved. She describes the pain as non-exertional in nature, with the pain initially starting while sitting and playing video games at home. She denies any exertional component to this pain. Work up did initially include troponin and EKG, as well as an echocardiogram. Troponins were non elevated until yesterday, and have been steadily increasing to a current value in the 500s. EKG did not show any acute abnormality, and echocardiogram showed in ejection fraction of 65% with no wall motion abnormalities. The patient has no complaints at this time. The patient has recently cut her dose of statin in half to 20 milligrams by her own volition, and has also discontinued Plavix, with a chief concern that this medication is thinning her skin and causing her to bleed excessively.     Medications / Drug list prior to admission:  No current facility-administered medications on file prior to encounter.             Current Outpatient Medications on File Prior to " Encounter   Medication Sig Dispense Refill   • atorvastatin (LIPITOR) 40 MG Tab Take 20 mg by mouth every evening. 1/2 TABLET       • PREDNISONE PO Take 8 mg by mouth every day. TAPERING DOSE: 8MG STARTED 1/10    PT TO GO DOWN 1MG EVERY WEEK       • metoprolol SR (TOPROL XL) 25 MG TABLET SR 24 HR Take 1 tablet by mouth every evening. 90 tablet 3   • nitroglycerin (NITROSTAT) 0.4 MG SL Tab Place 1 tablet under the tongue as needed for Chest Pain. 30 tablet 3   • Ascorbic Acid (VITAMIN C PO) Take 1 tablet by mouth every day.       • Coenzyme Q10 (COQ10 PO) Take 1 tablet by mouth every day.       • LUTEIN PO Take 20 mg by mouth every day.       • Wasxzm-QbZaj-IkOlx-FA-CA-Omega (COMPLETE  DHA) 29-1-200 & 250 MG Misc Take 2 Tablets by mouth every day.       • Cholecalciferol (VITAMIN D3) 50 MCG (2000 UT) Tab Take 2,000 Units by mouth every day at 6 PM.       • aspirin (ASPIRIN 81) 81 MG EC tablet Take 81 mg by mouth every evening.             Current list of administered Medications:     Current Facility-Administered Medications:   •  senna-docusate (PERICOLACE or SENOKOT S) 8.6-50 MG per tablet 2 Tablet, 2 Tablet, Oral, BID **AND** polyethylene glycol/lytes (MIRALAX) PACKET 1 Packet, 1 Packet, Oral, QDAY PRN **AND** magnesium hydroxide (MILK OF MAGNESIA) suspension 30 mL, 30 mL, Oral, QDAY PRN **AND** bisacodyl (DULCOLAX) suppository 10 mg, 10 mg, Rectal, QDAY PRN, Lizzy Wolfe M.D.  •  heparin injection 5,000 Units, 5,000 Units, Subcutaneous, Q8HRS, Lizzy Wolfe M.D.  •  labetalol (NORMODYNE/TRANDATE) injection 10 mg, 10 mg, Intravenous, Q4HRS PRN, Lizzy Wolfe M.D.  •  omeprazole (PRILOSEC) capsule 20 mg, 20 mg, Oral, BID, Lizzy Wolfe M.D., 20 mg at 22 0529  •  nitroglycerin (NITROSTAT) tablet 0.4 mg, 0.4 mg, Sublingual, Q5 MIN PRN, Lizzy Wolfe M.D., 0.4 mg at 22 0627  •  atorvastatin (LIPITOR) tablet 80 mg, 80 mg, Oral, DAILY, Lizzy Wolfe M.D.  •  aspirin EC (ECOTRIN) tablet 81  mg, 81 mg, Oral, Q EVENING, Lizzy Wolfe M.D., 81 mg at 01/12/22 1747  •  metoprolol SR (TOPROL XL) tablet 25 mg, 25 mg, Oral, Q EVENING, Lizzy Wolfe M.D., 25 mg at 01/12/22 1747  •  predniSONE (DELTASONE) tablet 8 mg, 8 mg, Oral, DAILY, Lizzy Wolfe M.D., 8 mg at 01/13/22 0830  •  Pharmacy Consult Request ...Pain Management Review 1 Each, 1 Each, Other, PHARMACY TO DOSE, Katie Melissa M.D.  •  acetaminophen (TYLENOL) tablet 1,000 mg, 1,000 mg, Oral, Q6HRS, 1,000 mg at 01/13/22 0529 **FOLLOWED BY** [START ON 1/16/2022] acetaminophen (TYLENOL) tablet 1,000 mg, 1,000 mg, Oral, Q6HRS PRN, Katie Melissa M.D.  •  oxyCODONE immediate-release (ROXICODONE) tablet 5 mg, 5 mg, Oral, Q3HRS PRN **OR** oxyCODONE immediate release (ROXICODONE) tablet 10 mg, 10 mg, Oral, Q3HRS PRN, 10 mg at 01/11/22 1247 **OR** HYDROmorphone (Dilaudid) injection 0.5 mg, 0.5 mg, Intravenous, Q3HRS PRN, Katie Melissa M.D.  •  tizanidine (ZANAFLEX) tablet 4 mg, 4 mg, Oral, Q6HRS PRN, Katie Melissa M.D.     Past Medical History        Past Medical History:   Diagnosis Date   • Bronchitis     • Giant cell arteritis (HCC)     • Heart attack (HCC) 10/2020   • Hyperlipidemia              Past Surgical History         Past Surgical History:   Procedure Laterality Date   • ABDOMINAL EXPLORATION       • BREAST IMPLANT REVISION       • CATARACT EXTRACTION WITH IOL                Family History         Family History   Problem Relation Age of Onset   • Glasses Father     • Heart Disease Father           Patient family history was personally reviewed, no pertinent family history to current presentation     Social History            Tobacco Use   • Smoking status: Never Smoker   • Smokeless tobacco: Never Used   Vaping Use   • Vaping Use: Never used   Substance Use Topics   • Alcohol use: Not Currently       Alcohol/week: 0.6 oz       Types: 1 Glasses of wine per week   • Drug use: Never         ALLERGIES:        Allergies   Allergen Reactions   •  Chloramphenicol Diarrhea       PCN is okay to take   • Codeine     • Sulfa Antibiotics  [Sulfa Drugs] Unspecified         Review of systems:  A complete review of symptoms was reviewed with patient. This is reviewed in H&P and PMH. ALL OTHERS reviewed and negative     Physical exam:  Patient Vitals for the past 24 hrs:    BP Temp Temp src Pulse Resp SpO2   01/13/22 0726 121/70 36.2 °C (97.2 °F) Temporal 84 18 92 %   01/13/22 0516 141/74 36.4 °C (97.5 °F) Temporal 87 16 94 %   01/13/22 0053 107/62 36.4 °C (97.5 °F) Temporal 86 16 94 %   01/12/22 2005 120/62 36.8 °C (98.2 °F) Temporal 80 16 --   01/12/22 1713 140/78 36.1 °C (97 °F) Temporal 90 16 94 %   01/12/22 1227 139/68 (!) 35.8 °C (96.4 °F) Temporal 88 16 94 %      General: No acute distress. Resting comfortably in bed.   EYES: no jaundice  HEENT: OP clear   Neck:  No JVD.   CVS:  RRR. S1 + S2. No M/R/G  Resp: CTAB. No wheezing or crackles/rhonchi.  Abdomen: Soft, ND,  Skin: Diffuse areas of ecchymosis in various stages of healing; no active bleeding appreciated.   Neurological: Alert, Moves all extremities, no cranial nerve defects on limited exam  Extremities:   No peripheral edema. No cyanosis.         Data:  Laboratory studies personally reviewed by me:  Recent Results         Recent Results (from the past 24 hour(s))   TROPONIN     Collection Time: 01/12/22  6:45 PM   Result Value Ref Range     Troponin T 323 (H) 6 - 19 ng/L   TROPONIN     Collection Time: 01/13/22 12:02 AM   Result Value Ref Range     Troponin T 393 (H) 6 - 19 ng/L   TROPONIN     Collection Time: 01/13/22  5:25 AM   Result Value Ref Range     Troponin T 448 (H) 6 - 19 ng/L   Lipid Profile     Collection Time: 01/13/22  8:09 AM   Result Value Ref Range     Cholesterol,Tot 163 100 - 199 mg/dL     Triglycerides 112 0 - 149 mg/dL     HDL 68 >=40 mg/dL     LDL 73 <100 mg/dL   TROPONIN     Collection Time: 01/13/22  8:09 AM   Result Value Ref Range     Troponin T 510 (H) 6 - 19 ng/L             Imaging:  EC-ECHOCARDIOGRAM COMPLETE W/O CONT   Final Result       DX-CHEST-PORTABLE (1 VIEW)   Final Result       No radiographic evidence of acute cardiopulmonary process.                   EKG tracings personally reviewed by me: EKG performed on January 11th 2022 shows a normal sinus rhythm with a rate of 70. Normal access, normal intervals. No St segment elevation or depression. Good R wave progression. Similar to an EKG recorded 04/16/2021.     Echocardiogram images personally reviewed by me show an ejection fraction of 60%, normal left ventricular systolic function, and mild aortic insufficiency.      All pertinent features of laboratory and imaging reviewed including primary images where applicable              Assessment / Plan:  This is a 71 year old female with history of coronary artery disease in myocardial infarction requiring PCI and stenting of the circumflex artery with subsequent re stenosis being assessed by cardiology for the following issue:                 #NSTEMI type 1              #Coronary Artery Disease              #Dyslipidemia              #Hypertension      1. Resolved chest pain with resultant troponin progression likely suggestive of myocardial injury, question completed vs. Incomplete infarct.  2. Echocardiogram has no wall motion abnormalities suggesting no hypokinesis secondary to infarct.   3. Plan for cardiac catheterization to investigate restenosis of stent or area of new coronary artery stenosis.   4. Patient hesitant to restart Plavix given recent issues of hematoma formation and easy bleeding; will obtain consent prior to cath for DAPT therapy necessitated if new stent is placed during procedure.   5. Discussed with patient importance of continuing statin therapy as prescribed for LDL management and plaque stabilization; has agreed to continue at 20mg qD.   6. Continue metoprolol as prescribed.      It is my pleasure to participate in the care of Ms. Christopher.  Cardiology will continue to follow the patient.      Seen by: Rishi Larkin MS4  Resident: Ashleigh Jc MD PGY3  Attending: Lit Segovia MD     1/13/2022

## 2022-01-14 NOTE — PROGRESS NOTES
Report received from BESSIE Ramirez.  Patient sitting up in bed.  POC gone over with the pt and all questions answered.  Patient A & O  X 4.  No apparent signs of distress.  Safety precautions in place.  Patient educated to call for assistance.  Hourly rounding in place.

## 2022-01-14 NOTE — PROGRESS NOTES
Patient care assumed. Report received from Bruna LA    Assessment completed. Pt A&Ox 4. Respirations are even and unlabored on RA. Pt denies any pain at this time. Monitors applied, VS stable, call light and belongings within reach. POC updated (Monitor VS, DC). Pt educated on room and call light, pt verbalized understanding. Communication board updated. Needs met.

## 2022-01-14 NOTE — NON-PROVIDER
MEDICAL STUDENT CONSULT NOTE    Reason for Consult:  NSTEMI.   Patient's PCP: Khai Deras M.D.    ID: This is a 71 year old female with history of coronary artery disease and myocardial infarction requiring PCIx2 with re-stenosis of the circumflex artery nine months ago, as well as giant cell arteritis, admitted for NSTEMI with PCI demonstrating re-stenosis of the circumflex artery.    Interval Changes: PCI performed by Dr. Davis yesterday did demonstrate re-stenosis of the circumflex artery and loss of distal perfusion. Decision has been made, given likely development of collateral circulation, to treat the patient medically, and a new stent has not been placed.     Overnight Events: The patient reports a restless night of sleep last night, and reports a momentary episode of chest pain located to the left of her original central chest pain. She denies any pain with exertion on walking, and denies any shortness of breath. She denies any new symptoms or concerns today.     Medications / Drug list prior to admission:  No current facility-administered medications on file prior to encounter.     Current Outpatient Medications on File Prior to Encounter   Medication Sig Dispense Refill   • atorvastatin (LIPITOR) 40 MG Tab Take 20 mg by mouth every evening. 1/2 TABLET     • PREDNISONE PO Take 8 mg by mouth every day. TAPERING DOSE: 8MG STARTED 1/10    PT TO GO DOWN 1MG EVERY WEEK     • metoprolol SR (TOPROL XL) 25 MG TABLET SR 24 HR Take 1 tablet by mouth every evening. 90 tablet 3   • nitroglycerin (NITROSTAT) 0.4 MG SL Tab Place 1 tablet under the tongue as needed for Chest Pain. 30 tablet 3   • Ascorbic Acid (VITAMIN C PO) Take 1 tablet by mouth every day.     • Coenzyme Q10 (COQ10 PO) Take 1 tablet by mouth every day.     • LUTEIN PO Take 20 mg by mouth every day.     • Srzoer-WiDwf-GeFxu-FA-CA-Omega (COMPLETE  DHA) 29-1-200 & 250 MG Misc Take 2 Tablets by mouth every day.     • Cholecalciferol (VITAMIN  D3) 50 MCG (2000 UT) Tab Take 2,000 Units by mouth every day at 6 PM.     • aspirin (ASPIRIN 81) 81 MG EC tablet Take 81 mg by mouth every evening.         Current list of administered Medications:    Current Facility-Administered Medications:   •  senna-docusate (PERICOLACE or SENOKOT S) 8.6-50 MG per tablet 2 Tablet, 2 Tablet, Oral, BID **AND** polyethylene glycol/lytes (MIRALAX) PACKET 1 Packet, 1 Packet, Oral, QDAY PRN **AND** magnesium hydroxide (MILK OF MAGNESIA) suspension 30 mL, 30 mL, Oral, QDAY PRN **AND** bisacodyl (DULCOLAX) suppository 10 mg, 10 mg, Rectal, QDAY PRN, Lizzy Wolfe M.D.  •  heparin injection 5,000 Units, 5,000 Units, Subcutaneous, Q8HRS, Lizzy Wolfe M.D.  •  labetalol (NORMODYNE/TRANDATE) injection 10 mg, 10 mg, Intravenous, Q4HRS PRN, Lizyz Wolfe M.D.  •  omeprazole (PRILOSEC) capsule 20 mg, 20 mg, Oral, BID, Lizzy Wolfe M.D., 20 mg at 01/13/22 0529  •  nitroglycerin (NITROSTAT) tablet 0.4 mg, 0.4 mg, Sublingual, Q5 MIN PRN, Lizzy Wolfe M.D., 0.4 mg at 01/11/22 0627  •  atorvastatin (LIPITOR) tablet 80 mg, 80 mg, Oral, DAILY, Lizzy Wolfe M.D.  •  aspirin EC (ECOTRIN) tablet 81 mg, 81 mg, Oral, Q EVENING, Lizzy Wolfe M.D., 81 mg at 01/13/22 1825  •  metoprolol SR (TOPROL XL) tablet 25 mg, 25 mg, Oral, Q EVENING, Lizzy Wolfe M.D., 25 mg at 01/13/22 1826  •  predniSONE (DELTASONE) tablet 8 mg, 8 mg, Oral, DAILY, Lizzy Wolfe M.D., 8 mg at 01/13/22 0830  •  Pharmacy Consult Request ...Pain Management Review 1 Each, 1 Each, Other, PHARMACY TO DOSE, Katie Melissa M.D.  •  acetaminophen (TYLENOL) tablet 1,000 mg, 1,000 mg, Oral, Q6HRS, 1,000 mg at 01/14/22 0539 **FOLLOWED BY** [START ON 1/16/2022] acetaminophen (TYLENOL) tablet 1,000 mg, 1,000 mg, Oral, Q6HRS PRN, Katie Melissa M.D.  •  oxyCODONE immediate-release (ROXICODONE) tablet 5 mg, 5 mg, Oral, Q3HRS PRN **OR** oxyCODONE immediate release (ROXICODONE) tablet 10 mg, 10 mg, Oral, Q3HRS PRN, 10 mg at 01/11/22  1247 **OR** HYDROmorphone (Dilaudid) injection 0.5 mg, 0.5 mg, Intravenous, Q3HRS PRN, Katie Melissa M.D.  •  tizanidine (ZANAFLEX) tablet 4 mg, 4 mg, Oral, Q6HRS PRN, Katie Melissa M.D.    Past Medical History:   Diagnosis Date   • Bronchitis    • Giant cell arteritis (HCC)    • Heart attack (HCC) 10/2020   • Hyperlipidemia        Past Surgical History:   Procedure Laterality Date   • ABDOMINAL EXPLORATION     • BREAST IMPLANT REVISION     • CATARACT EXTRACTION WITH IOL         Family History   Problem Relation Age of Onset   • Glasses Father    • Heart Disease Father      Patient family history was personally reviewed, no pertinent family history to current presentation    Social History     Tobacco Use   • Smoking status: Never Smoker   • Smokeless tobacco: Never Used   Vaping Use   • Vaping Use: Never used   Substance Use Topics   • Alcohol use: Not Currently     Alcohol/week: 0.6 oz     Types: 1 Glasses of wine per week   • Drug use: Never       ALLERGIES:  Allergies   Allergen Reactions   • Chloramphenicol Diarrhea     PCN is okay to take   • Codeine    • Sulfa Antibiotics  [Sulfa Drugs] Unspecified       Review of systems:  A complete review of symptoms was reviewed with patient. This is reviewed in H&P and PMH. ALL OTHERS reviewed and negative    Physical exam:  Patient Vitals for the past 24 hrs:  Patient Vitals for the past 24 hrs:   BP Temp Temp src Pulse Resp SpO2   01/14/22 0729 121/60 (!) 35.5 °C (95.9 °F) Temporal 77 18 93 %   01/14/22 0400 110/68 36.1 °C (97 °F) Temporal 78 18 90 %   01/14/22 0000 111/58 36.6 °C (97.9 °F) Temporal 71 18 91 %   01/13/22 2003 105/59 36.3 °C (97.4 °F) Temporal 75 18 92 %   01/13/22 1623 125/58 35.9 °C (96.7 °F) Temporal 77 16 91 %   01/13/22 1241 136/62 35.9 °C (96.7 °F) Temporal 98 16 94 %       General: No acute distress. Resting comfortably in bed.   EYES: no jaundice  HEENT: OP clear   Neck:  No JVD.   CVS:  RRR. S1 + S2. No M/R/G  Resp: CTAB. No wheezing or  crackles/rhonchi.  Abdomen: Soft, ND,  Skin: Diffuse areas of ecchymosis in various stages of healing; no active bleeding appreciated.   Neurological: Alert, Moves all extremities, no cranial nerve defects on limited exam  Extremities:   No peripheral edema. No cyanosis.       Data:  Laboratory studies personally reviewed by me:  Recent Results (from the past 24 hour(s))   TROPONIN    Collection Time: 01/14/22 12:29 AM   Result Value Ref Range    Troponin T 302 (H) 6 - 19 ng/L   Basic Metabolic Panel    Collection Time: 01/14/22 12:29 AM   Result Value Ref Range    Sodium 141 135 - 145 mmol/L    Potassium 3.8 3.6 - 5.5 mmol/L    Chloride 107 96 - 112 mmol/L    Co2 22 20 - 33 mmol/L    Glucose 137 (H) 65 - 99 mg/dL    Bun 22 8 - 22 mg/dL    Creatinine 0.79 0.50 - 1.40 mg/dL    Calcium 9.1 8.5 - 10.5 mg/dL    Anion Gap 12.0 7.0 - 16.0   ESTIMATED GFR    Collection Time: 01/14/22 12:29 AM   Result Value Ref Range    GFR If African American >60 >60 mL/min/1.73 m 2    GFR If Non African American >60 >60 mL/min/1.73 m 2       Imaging:  EC-ECHOCARDIOGRAM COMPLETE W/O CONT   Final Result      DX-CHEST-PORTABLE (1 VIEW)   Final Result      No radiographic evidence of acute cardiopulmonary process.      CL-LEFT HEART CATHETERIZATION WITH POSSIBLE INTERVENTION    (Results Pending)           EKG tracings personally reviewed by me: EKG performed on January 11th 2022 shows a normal sinus rhythm with a rate of 70. Normal access, normal intervals. No St segment elevation or depression. Good R wave progression. Similar to an EKG recorded 04/16/2021.    Echocardiogram images personally reviewed by me show an ejection fraction of 60%, normal left ventricular systolic function, and mild aortic insufficiency.     All pertinent features of laboratory and imaging reviewed including primary images where applicable        Assessment / Plan:  This is a 71 year old female with history of coronary artery disease in myocardial infarction  requiring PCI and stenting of the circumflex artery with subsequent re stenosis being assessed by cardiology for the following issue:     #NSTEMI type 1   #Coronary Artery Disease   #Dyslipidemia   #Hypertension     1. PCI demonstrated re-stenosis of the circumflex with loss of distal perfusion.   2. Echocardiogram demonstrating adequate function and wall motion, and reassuring EKG suggests compensatory collateral artery formation.   3. Patient functioning appropriately with continued resolution of her pain.  4. Troponin decrease today suggests completion of myocardial injury.  5. Patient agreeable with 20mg atorvastatin PO qD.   6. Plavix to be discontinued.  7. Metoprolol 25mg po qD to be continued.  8. Close patient follow up; will be scheduled with [facility] cardiology in 7-10 days.   9. Plan for safe discharge and follow up discussed with the patient, who is agreeable.     It is my pleasure to participate in the care of Ms. Christopher; at this time we will sign off on the patient.     Seen by: Rishi Larkin MS4  Resident: Ashleigh Jc MD PGY3  Attending: Lit Segovia MD    1/13/2022

## 2022-01-14 NOTE — CARE PLAN
The patient is Stable - Low risk of patient condition declining or worsening    Shift Goals  Clinical Goals: Monitor VS, DC  Patient Goals: DC  Family Goals: not present      Problem: Knowledge Deficit - Standard  Goal: Patient and family/care givers will demonstrate understanding of plan of care, disease process/condition, diagnostic tests and medications  Outcome: Progressing     Problem: Pain - Standard  Goal: Alleviation of pain or a reduction in pain to the patient’s comfort goal  Outcome: Progressing

## 2022-01-14 NOTE — DISCHARGE INSTRUCTIONS
Discharge Instructions    Discharged to home by car with relative. Discharged via wheelchair, hospital escort: Yes.  Special equipment needed: Not Applicable    Be sure to schedule a follow-up appointment with your primary care doctor or any specialists as instructed.     Discharge Plan:   Diet Plan: Discussed  Activity Level: Discussed  Confirmed Follow up Appointment: Patient to Call and Schedule Appointment  Confirmed Symptoms Management: Discussed  Medication Reconciliation Updated: Yes  Influenza Vaccine Indication: Patient Refuses    I understand that a diet low in cholesterol, fat, and sodium is recommended for good health. Unless I have been given specific instructions below for another diet, I accept this instruction as my diet prescription.   Other diet: heart healthy    Special Instructions: None    · Is patient discharged on Warfarin / Coumadin?   No     Depression / Suicide Risk    As you are discharged from this [facility] facility, it is important to learn how to keep safe from harming yourself.    Recognize the warning signs:  · Abrupt changes in personality, positive or negative- including increase in energy   · Giving away possessions  · Change in eating patterns- significant weight changes-  positive or negative  · Change in sleeping patterns- unable to sleep or sleeping all the time   · Unwillingness or inability to communicate  · Depression  · Unusual sadness, discouragement and loneliness  · Talk of wanting to die  · Neglect of personal appearance   · Rebelliousness- reckless behavior  · Withdrawal from people/activities they love  · Confusion- inability to concentrate     If you or a loved one observes any of these behaviors or has concerns about self-harm, here's what you can do:  · Talk about it- your feelings and reasons for harming yourself  · Remove any means that you might use to hurt yourself (examples: pills, rope, extension cords, firearm)  · Get professional help from the  community (Mental Health, Substance Abuse, psychological counseling)  · Do not be alone:Call your Safe Contact- someone whom you trust who will be there for you.  · Call your local CRISIS HOTLINE 962-1147 or 275-960-0848  · Call your local Children's Mobile Crisis Response Team [place] (812) 412-3742 or www.[employer]  · Call the toll free National Suicide Prevention Hotlines   · National Suicide Prevention Lifeline 702-363-YSKU (3494)  · National Hope Line Network 800-SUICIDE (629-8530)

## 2022-01-14 NOTE — CARE PLAN
The patient is Stable - Low risk of patient condition declining or worsening    Shift Goals  Clinical Goals: re-evaluation  Patient Goals: d/c  Family Goals: n/a    Progress made toward(s) clinical / shift goals:        Problem: Knowledge Deficit - Standard  Goal: Patient and family/care givers will demonstrate understanding of plan of care, disease process/condition, diagnostic tests and medications  Outcome: Progressing     Problem: Pain - Standard  Goal: Alleviation of pain or a reduction in pain to the patient’s comfort goal  Outcome: Progressing

## 2022-01-14 NOTE — PROGRESS NOTES
Assessment completed. Pt A&Ox4. Respirations are even and unlabored on RA. Pt denies pain at this time. Monitors applied, VS stable, call light and belongings within reach. POC updated (discharge). Pt educated on room and call light, pt verbalized understanding. Communication board updated. Needs met.

## 2022-01-14 NOTE — CONSULTS
Consults   Cardiology consultation attending Addendum:     I have seen and examined the patient.  I agree with the documentation and management plan as outlined by Rishi Larkin MS 4 UNRSOM.  I personally reviewed all elements of the history and repeated the physical exam as appropriate.  I have also independently reviewed the laboratory data, EKG and imaging studies.  I have discussed my impression with the patient and the attending physician.     In summary, Carine Christopher is a 71-year-old female with a history of CAD, MI, PCI mid Cx 2.75 x 24 mm JAMES 10/4/2020 and 2.75 x by 26 mm JAMES for ISR who presents with a non-ST elevation MI currently asymptomatic with no recurrent chest pain with additional PMH of giant cell arteritis on chronic steroid therapy and dyslipidemia.  The patient was last seen on 4/29/2021 after last PCI but was lost to follow-up.  She subsequently developed significant bleeding related to DAPT with Plavix and stopped the Plavix with resolution of her bruising and bleeding problems.  She also stopped atorvastatin for period of time but is restarted half the dose.     Vital Signs reviewed.  General: Alert and oriented. No respiratory distress.  Chest/Lungs: Clear.  Cardiac: Normal JVP.  RRR, no murmur.  Extremities: No edema.  Multiple bruising.     Assessment  1.  Recurrent NSTEMI.  2.  PCI mid Cx x2 for ISR.  3.  Giant cell arteritis on chronic steroid therapy.     Recommendation Discussion  1.  After lengthy discussion it was elected to proceed with a repeat cardiac catheterization to rule out either ISR or new coronary lesions.  2.  I have discussed the patient's current clinical condition with Dr. Tone Davis, interventional cardiologist as to the strategy of intervention based on the findings of the cardiac catheterization.     Lit Segovia MD  Cardiologist, [facility] [place]      MEDICAL STUDENT CONSULT NOTE     Reason for Consult:  Asked by Dr Dylan TABARES  "LEE Zhu to see this patient with progressive troponin trend and chest pain.   Patient's PCP: Khai Deras M.D.     CC:           Chief Complaint   Patient presents with   • Chest Pain       x 1 hour. Patient reports 10/10 pain like \"pressure and intense hurting.\" Patient has a history of an MI in April and reports this is the same pain. Patient states \"it might be really bad indigestions.\" PAtient reports taking 6 nitros with some relief.          HPI: This is a 71 year old female with a history of myocardial infarction secondary to circumflex stenosis in October 2020 and subsequent re stenosis requiring PCI in April 2021 who initially presented to the emergency department four days ago complaining of central chest pain with radiation to her left arm. The patient states this pain was exactly the same as the pain she had during her previous heart attacks. She also had associated shortness of breath. Patient continued to have this pain until last night; the pain has subsequently resolved. She describes the pain as non-exertional in nature, with the pain initially starting while sitting and playing video games at home. She denies any exertional component to this pain. Work up did initially include troponin and EKG, as well as an echocardiogram. Troponins were non elevated until yesterday, and have been steadily increasing to a current value in the 500s. EKG did not show any acute abnormality, and echocardiogram showed in ejection fraction of 65% with no wall motion abnormalities. The patient has no complaints at this time. The patient has recently cut her dose of statin in half to 20 milligrams by her own volition, and has also discontinued Plavix, with a chief concern that this medication is thinning her skin and causing her to bleed excessively.     Medications / Drug list prior to admission:  No current facility-administered medications on file prior to encounter.                  Current Outpatient " Medications on File Prior to Encounter   Medication Sig Dispense Refill   • atorvastatin (LIPITOR) 40 MG Tab Take 20 mg by mouth every evening. 1/2 TABLET       • PREDNISONE PO Take 8 mg by mouth every day. TAPERING DOSE: 8MG STARTED 1/10    PT TO GO DOWN 1MG EVERY WEEK       • metoprolol SR (TOPROL XL) 25 MG TABLET SR 24 HR Take 1 tablet by mouth every evening. 90 tablet 3   • nitroglycerin (NITROSTAT) 0.4 MG SL Tab Place 1 tablet under the tongue as needed for Chest Pain. 30 tablet 3   • Ascorbic Acid (VITAMIN C PO) Take 1 tablet by mouth every day.       • Coenzyme Q10 (COQ10 PO) Take 1 tablet by mouth every day.       • LUTEIN PO Take 20 mg by mouth every day.       • Tfwixk-HdSyb-FkWqi-FA-CA-Omega (COMPLETE [place] DHA) 29-1-200 & 250 MG Misc Take 2 Tablets by mouth every day.       • Cholecalciferol (VITAMIN D3) 50 MCG (2000 UT) Tab Take 2,000 Units by mouth every day at 6 PM.       • aspirin (ASPIRIN 81) 81 MG EC tablet Take 81 mg by mouth every evening.             Current list of administered Medications:     Current Facility-Administered Medications:   •  senna-docusate (PERICOLACE or SENOKOT S) 8.6-50 MG per tablet 2 Tablet, 2 Tablet, Oral, BID **AND** polyethylene glycol/lytes (MIRALAX) PACKET 1 Packet, 1 Packet, Oral, QDAY PRN **AND** magnesium hydroxide (MILK OF MAGNESIA) suspension 30 mL, 30 mL, Oral, QDAY PRN **AND** bisacodyl (DULCOLAX) suppository 10 mg, 10 mg, Rectal, QDAY PRN, Lizzy Wolfe M.D.  •  heparin injection 5,000 Units, 5,000 Units, Subcutaneous, Q8HRS, Lizzy Wolfe M.D.  •  labetalol (NORMODYNE/TRANDATE) injection 10 mg, 10 mg, Intravenous, Q4HRS PRN, Lizzy Wolfe M.D.  •  omeprazole (PRILOSEC) capsule 20 mg, 20 mg, Oral, BID, Lizzy Wolfe M.D., 20 mg at 22 0529  •  nitroglycerin (NITROSTAT) tablet 0.4 mg, 0.4 mg, Sublingual, Q5 MIN PRN, Lizzy Wolfe M.D., 0.4 mg at 22  •  atorvastatin (LIPITOR) tablet 80 mg, 80 mg, Oral, DAILY, Lizzy Wolfe,  M.D.  •  aspirin EC (ECOTRIN) tablet 81 mg, 81 mg, Oral, Q EVENING, Lizzy Wolfe M.D., 81 mg at 01/12/22 1747  •  metoprolol SR (TOPROL XL) tablet 25 mg, 25 mg, Oral, Q EVENING, Lizzy Wolfe M.D., 25 mg at 01/12/22 1747  •  predniSONE (DELTASONE) tablet 8 mg, 8 mg, Oral, DAILY, Lizzy Wolfe M.D., 8 mg at 01/13/22 0830  •  Pharmacy Consult Request ...Pain Management Review 1 Each, 1 Each, Other, PHARMACY TO DOSE, Katie Melissa M.D.  •  acetaminophen (TYLENOL) tablet 1,000 mg, 1,000 mg, Oral, Q6HRS, 1,000 mg at 01/13/22 0529 **FOLLOWED BY** [START ON 1/16/2022] acetaminophen (TYLENOL) tablet 1,000 mg, 1,000 mg, Oral, Q6HRS PRN, Katie Melissa M.D.  •  oxyCODONE immediate-release (ROXICODONE) tablet 5 mg, 5 mg, Oral, Q3HRS PRN **OR** oxyCODONE immediate release (ROXICODONE) tablet 10 mg, 10 mg, Oral, Q3HRS PRN, 10 mg at 01/11/22 1247 **OR** HYDROmorphone (Dilaudid) injection 0.5 mg, 0.5 mg, Intravenous, Q3HRS PRN, Katie Melissa M.D.  •  tizanidine (ZANAFLEX) tablet 4 mg, 4 mg, Oral, Q6HRS PRN, Katie Melissa M.D.     Past Medical History           Past Medical History:   Diagnosis Date   • Bronchitis     • Giant cell arteritis (HCC)     • Heart attack (HCC) 10/2020   • Hyperlipidemia              Past Surgical History             Past Surgical History:   Procedure Laterality Date   • ABDOMINAL EXPLORATION       • BREAST IMPLANT REVISION       • CATARACT EXTRACTION WITH IOL                Family History             Family History   Problem Relation Age of Onset   • Glasses Father     • Heart Disease Father           Patient family history was personally reviewed, no pertinent family history to current presentation     Social History                Tobacco Use   • Smoking status: Never Smoker   • Smokeless tobacco: Never Used   Vaping Use   • Vaping Use: Never used   Substance Use Topics   • Alcohol use: Not Currently       Alcohol/week: 0.6 oz       Types: 1 Glasses of wine per week   • Drug use: Never          ALLERGIES:            Allergies   Allergen Reactions   • Chloramphenicol Diarrhea       PCN is okay to take   • Codeine     • Sulfa Antibiotics  [Sulfa Drugs] Unspecified         Review of systems:  A complete review of symptoms was reviewed with patient. This is reviewed in H&P and PMH. ALL OTHERS reviewed and negative     Physical exam:  Patient Vitals for the past 24 hrs:    BP Temp Temp src Pulse Resp SpO2   01/13/22 0726 121/70 36.2 °C (97.2 °F) Temporal 84 18 92 %   01/13/22 0516 141/74 36.4 °C (97.5 °F) Temporal 87 16 94 %   01/13/22 0053 107/62 36.4 °C (97.5 °F) Temporal 86 16 94 %   01/12/22 2005 120/62 36.8 °C (98.2 °F) Temporal 80 16 --   01/12/22 1713 140/78 36.1 °C (97 °F) Temporal 90 16 94 %   01/12/22 1227 139/68 (!) 35.8 °C (96.4 °F) Temporal 88 16 94 %      General: No acute distress. Resting comfortably in bed.   EYES: no jaundice  HEENT: OP clear   Neck:  No JVD.   CVS:  RRR. S1 + S2. No M/R/G  Resp: CTAB. No wheezing or crackles/rhonchi.  Abdomen: Soft, ND,  Skin: Diffuse areas of ecchymosis in various stages of healing; no active bleeding appreciated.   Neurological: Alert, Moves all extremities, no cranial nerve defects on limited exam  Extremities:   No peripheral edema. No cyanosis.         Data:  Laboratory studies personally reviewed by me:  Recent Results             Recent Results (from the past 24 hour(s))   TROPONIN     Collection Time: 01/12/22  6:45 PM   Result Value Ref Range     Troponin T 323 (H) 6 - 19 ng/L   TROPONIN     Collection Time: 01/13/22 12:02 AM   Result Value Ref Range     Troponin T 393 (H) 6 - 19 ng/L   TROPONIN     Collection Time: 01/13/22  5:25 AM   Result Value Ref Range     Troponin T 448 (H) 6 - 19 ng/L   Lipid Profile     Collection Time: 01/13/22  8:09 AM   Result Value Ref Range     Cholesterol,Tot 163 100 - 199 mg/dL     Triglycerides 112 0 - 149 mg/dL     HDL 68 >=40 mg/dL     LDL 73 <100 mg/dL   TROPONIN     Collection Time: 01/13/22  8:09 AM    Result Value Ref Range     Troponin T 510 (H) 6 - 19 ng/L            Imaging:  EC-ECHOCARDIOGRAM COMPLETE W/O CONT   Final Result       DX-CHEST-PORTABLE (1 VIEW)   Final Result       No radiographic evidence of acute cardiopulmonary process.                   EKG tracings personally reviewed by me: EKG performed on January 11th 2022 shows a normal sinus rhythm with a rate of 70. Normal access, normal intervals. No St segment elevation or depression. Good R wave progression. Similar to an EKG recorded 04/16/2021.     Echocardiogram images personally reviewed by me show an ejection fraction of 60%, normal left ventricular systolic function, and mild aortic insufficiency.      All pertinent features of laboratory and imaging reviewed including primary images where applicable              Assessment / Plan:  This is a 71 year old female with history of coronary artery disease in myocardial infarction requiring PCI and stenting of the circumflex artery with subsequent re stenosis being assessed by cardiology for the following issue:                 #NSTEMI type 1              #Coronary Artery Disease              #Dyslipidemia              #Hypertension      1. Resolved chest pain with resultant troponin progression likely suggestive of myocardial injury, question completed vs. Incomplete infarct.  2. Echocardiogram has no wall motion abnormalities suggesting no hypokinesis secondary to infarct.   3. Plan for cardiac catheterization to investigate restenosis of stent or area of new coronary artery stenosis.   4. Patient hesitant to restart Plavix given recent issues of hematoma formation and easy bleeding; will obtain consent prior to cath for DAPT therapy necessitated if new stent is placed during procedure.   5. Discussed with patient importance of continuing statin therapy as prescribed for LDL management and plaque stabilization; has agreed to continue at 20mg qD.   6. Continue metoprolol as prescribed.      It is  my pleasure to participate in the care of Ms. Christopher. Cardiology will continue to follow the patient.      Seen by: Rishi Larkin MS4  Resident: Ashleigh Jc MD PGY3  Attending: Lit Segovia MD     1/13/2022

## 2022-01-14 NOTE — CARE PLAN
Problem: Knowledge Deficit - Standard  Goal: Patient and family/care givers will demonstrate understanding of plan of care, disease process/condition, diagnostic tests and medications  Outcome: Progressing     Problem: Pain - Standard  Goal: Alleviation of pain or a reduction in pain to the patient’s comfort goal  Outcome: Progressing   The patient is Watcher - Medium risk of patient condition declining or worsening    Shift Goals  Clinical Goals: Cardio consult  Patient Goals: comfort, answers   Family Goals: not present    Progress made toward(s) clinical / shift goals:  Pt has no more chest pain.    Patient is not progressing towards the following goals: N/A

## 2022-01-14 NOTE — PROGRESS NOTES
IV Dc 'd. Discharge instructions provided to patient. Pt verbalizes understanding. Pt states all questions have been answered. Copy of DC provided to patient. Signed copy in chart. 2 prescriptions sent to her pharmacy-states she will not need them. Pt states all personal belongings are in possession.  Pt escorted off unit by [facility] without incident. Refused w/c.

## 2022-01-15 PROCEDURE — 99217 PR OBSERVATION CARE DISCHARGE: CPT | Performed by: HOSPITALIST

## 2022-01-15 NOTE — DISCHARGE SUMMARY
"Discharge Summary    CHIEF COMPLAINT ON ADMISSION  Chief Complaint   Patient presents with   • Chest Pain     x 1 hour. Patient reports 10/10 pain like \"pressure and intense hurting.\" Patient has a history of an MI in April and reports this is the same pain. Patient states \"it might be really bad indigestions.\" PAtient reports taking 6 nitros with some relief.        Reason for Admission  chest pain     Admission Date  1/10/2022    CODE STATUS  Prior    HPI & HOSPITAL COURSE    71 y.o. female who presented 1/10/2022 with chest pain.  PMH of CAD (PCI 10/2020 to the circumflex, in-stent restenosis and another PCI 04/2021), temporal arteritis on prednisone, dyslipidemia, hypertension.  Comes in complaining of substernal chest pain radiating to the left arm since 3 days, reproducible, 10 out of 10.  She takes Motrin daily due to giant cell arteritis.  On arrival troponin negative x2.  , creatinine one 1.72.  Patient started on IV fluid.  D-dimer is negative.  Echocardiogram is pending.  Chest x-ray unremarkable      Although herchest  pain resolved and her troponin continued to rise.  We consulted cardiology for evaluation and they took the patient for a coronary angiogram.  Her circumflex artery stent was apparently occluded again.  No intervention was done at that time but medical management was recommended.  Patient had discontinued her Plavix in the past due to her easy bruising.  Patient states that she would not take Plavix daily but would consider taking Plavix every 48 hours which was prescribed.  Patient will follow for cardiologist for further care and management.    Therefore, she is discharged in good and stable condition to home with close outpatient follow-up.    The patient met 2-midnight criteria for an inpatient stay at the time of discharge.    Discharge Date  1/14/2022    FOLLOW UP ITEMS POST DISCHARGE      DISCHARGE DIAGNOSES  Principal Problem:    Chest pain POA: Yes  Active Problems:    " Giant cell arteritis (HCC) POA: Yes      Overview: Unconfirmed, no prior biopsy. Dx started 2020.    Coronary artery disease involving native coronary artery of native heart with angina pectoris (HCC) POA: Yes      Overview: PCI to circumflex 4/15/2021    Primary hypertension POA: Yes    Dyslipidemia POA: Yes  Resolved Problems:    * No resolved hospital problems. *      FOLLOW UP  Future Appointments   Date Time Provider Department Center   2022  9:15 AM PREADMIT 2 [facility] None   6/15/2022  1:30 PM Jon Wilde M.D. [facility] None     [facility]  [address]  [address]  [facility] [address] [phone]  Call  Your doctor has referred you to Cardiac Rehab which is important in your recovery. Please call to make an appointment.    Khai Deras M.D.  [address] [place] [id number]  [phone]    Call  Please call your primary care provider to schedule a hospital follow up. Thank you.       MEDICATIONS ON DISCHARGE     Medication List      START taking these medications      Instructions   clopidogrel 75 MG Tabs  Commonly known as: PLAVIX   Take 1 Tablet by mouth every 48 hours.  Dose: 75 mg     famotidine 20 MG Tabs  Commonly known as: PEPCID   Take 1 Tablet by mouth 2 times a day.  Dose: 20 mg        CONTINUE taking these medications      Instructions   Aspirin 81 81 MG EC tablet  Generic drug: aspirin   Take 81 mg by mouth every evening.  Dose: 81 mg     atorvastatin 40 MG Tabs  Commonly known as: LIPITOR   Take 20 mg by mouth every evening. 1/2 TABLET  Dose: 20 mg     Complete Juan R DHA 29-1-200 & 250 MG Misc   Take 2 Tablets by mouth every day.  Dose: 2 Tablet     COQ10 PO   Take 1 tablet by mouth every day.  Dose: 1 Tablet     LUTEIN PO   Take 20 mg by mouth every day.  Dose: 20 mg     metoprolol SR 25 MG Tb24  Commonly known as: TOPROL XL   Take 1 tablet by mouth every evening.  Dose: 25 mg     nitroglycerin 0.4 MG Subl  Commonly known as: NITROSTAT    Doctor's comments: PRN ONLY FOR CHEST PAIN  Place 1 tablet under the tongue as needed for Chest Pain.  Dose: 0.4 mg     PREDNISONE PO   Take 8 mg by mouth every day. TAPERING DOSE: 8MG STARTED 1/10    PT TO GO DOWN 1MG EVERY WEEK  Dose: 8 mg     VITAMIN C PO   Take 1 tablet by mouth every day.  Dose: 1 Tablet     Vitamin D3 50 MCG (2000 UT) Tabs   Take 2,000 Units by mouth every day at 6 PM.  Dose: 2,000 Units            Allergies  Allergies   Allergen Reactions   • Chloramphenicol Diarrhea     PCN is okay to take   • Codeine    • Sulfa Antibiotics  [Sulfa Drugs] Unspecified       DIET  No orders of the defined types were placed in this encounter.      ACTIVITY  As tolerated.  Weight bearing as tolerated    CONSULTATIONS  Dr narvaez cards      PROCEDURES  Clin Decision Unit [address] [address]       Carine Christopher   MRN: [id number], : 1950 , Sex: F   Admit: 1/10/2022, D/C: 2022             Tone Davis M.D.   Physician   Interventional Cardiology   Procedures      Signed   Date of Service:  2022  3:25 PM                       []Hide copied text    []Hover for details      CARDIAC CATHETERIZATION REPORT     REFERRING: Lit Segovia M.D.     PROCEDURE PHYSICIAN: Tone Davis MD, [facility]  ASSISTANT: None     IMPRESSIONS:  1. NSTEMI due to recurrent in stent occlusion of the circumflex  2. Normal LVEDP  3. Non-obstructive LAD and RCA diseas     Recommendations:  Usual post cath care     Pre-procedure diagnosis: NSTEMI  Post-procedure diagnosis: Same     Procedure performed  Selective coronary angiography  Left heart catheterization     Conscious sedation was supervised by myself and administered by trained personnel using fentanyl and versed between 1502 and 1520. The patient tolerated sedation without complication.      Procedure Description  1. Access: 5/6 French right radial artery Micropuncture technique was utilized following local  "anesthesia with lidocaine.  A radial cocktail containing verapamil and saline was administered in the radial artery sheath     2. Diagnostic description: The catheter was passed to the central circulation with the aide of J tipped 0.35\" wire. A 5F TIG 4.0 was used to inject the coronary circulationand enter the left ventricle during invasive hemodynamic monitoring.      3. Closing: At completion of the procedure the relevant equipment was removed from the body and hemostasis achieved by Radial band     Findings   Hemodynamics:   Aorta: 133/71 mmHg  LVEDP: 14 mmHg  No significant pullback gradient across the aortic valve     Coronary Anatomy              Left Main: Normal              LAD: 25% mid stenosis              LCx: 100% proximal in stent restenosis              RCA: Dominant, 25% sequential stenoses in the mid segment.                 Technical Factors  1. Complications: None  2. Estimated Blood Loss: <50 cc  3. Specimens: None  4. Contrast Volume: 20 ml  5. Medications: Radial cocktail (Verapamil 2.5 mg, Nitroglycerin 100 mcg) Heparin 5000 Units  6. Radiation (air kerma): 103 mGy                LABORATORY  Lab Results   Component Value Date    SODIUM 141 01/14/2022    POTASSIUM 3.8 01/14/2022    CHLORIDE 107 01/14/2022    CO2 22 01/14/2022    GLUCOSE 137 (H) 01/14/2022    BUN 22 01/14/2022    CREATININE 0.79 01/14/2022        Lab Results   Component Value Date    WBC 10.6 01/12/2022    HEMOGLOBIN 13.2 01/12/2022    HEMATOCRIT 41.4 01/12/2022    PLATELETCT 207 01/12/2022        Total time of the discharge process exceeds 40  minutes.  "

## 2022-01-20 ENCOUNTER — APPOINTMENT (OUTPATIENT)
Dept: ADMISSIONS | Facility: MEDICAL CENTER | Age: 72
End: 2022-01-20
Payer: MEDICARE

## 2022-01-24 ENCOUNTER — OFFICE VISIT (OUTPATIENT)
Dept: CARDIOLOGY | Facility: MEDICAL CENTER | Age: 72
End: 2022-01-24
Payer: MEDICARE

## 2022-01-24 VITALS
HEART RATE: 74 BPM | RESPIRATION RATE: 14 BRPM | OXYGEN SATURATION: 98 % | WEIGHT: 146 LBS | HEIGHT: 65 IN | SYSTOLIC BLOOD PRESSURE: 122 MMHG | BODY MASS INDEX: 24.32 KG/M2 | DIASTOLIC BLOOD PRESSURE: 64 MMHG

## 2022-01-24 DIAGNOSIS — E78.5 DYSLIPIDEMIA: ICD-10-CM

## 2022-01-24 DIAGNOSIS — M31.6 GIANT CELL ARTERITIS (HCC): ICD-10-CM

## 2022-01-24 DIAGNOSIS — I25.119 CORONARY ARTERY DISEASE INVOLVING NATIVE CORONARY ARTERY OF NATIVE HEART WITH ANGINA PECTORIS (HCC): ICD-10-CM

## 2022-01-24 DIAGNOSIS — Z01.810 PREOPERATIVE CARDIOVASCULAR EXAMINATION: ICD-10-CM

## 2022-01-24 DIAGNOSIS — R10.9 FLANK PAIN: ICD-10-CM

## 2022-01-24 DIAGNOSIS — I35.1 MILD AORTIC INSUFFICIENCY: ICD-10-CM

## 2022-01-24 LAB — EKG IMPRESSION: NORMAL

## 2022-01-24 PROCEDURE — 93000 ELECTROCARDIOGRAM COMPLETE: CPT | Performed by: INTERNAL MEDICINE

## 2022-01-24 PROCEDURE — 99214 OFFICE O/P EST MOD 30 MIN: CPT | Performed by: INTERNAL MEDICINE

## 2022-01-24 ASSESSMENT — FIBROSIS 4 INDEX: FIB4 SCORE: 1.91
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
## 2022-03-08 ENCOUNTER — HOSPITAL ENCOUNTER (OUTPATIENT)
Facility: MEDICAL CENTER | Age: 72
End: 2022-03-08
Attending: SURGERY | Admitting: SURGERY
Payer: MEDICARE

## 2022-03-10 ENCOUNTER — TELEPHONE (OUTPATIENT)
Dept: CARDIOLOGY | Facility: MEDICAL CENTER | Age: 72
End: 2022-03-10
Payer: MEDICARE

## 2022-03-10 NOTE — TELEPHONE ENCOUNTER
Received warm transfer from pt's dental office. Spoke with Meche dental assistant. Pt is there now for an emergency tooth extraction. Did advise pt is currently on DAPT with ASA and Plavix. Will confirm with [facility] if any contraindications and for risk stratification.    To Dr. Davis - please advise if pt's ok to proceed with emergent procedure. Thank you!    Dr. Deepika Mcgregor, dental assistant  [phone]

## 2022-03-10 NOTE — TELEPHONE ENCOUNTER
Tone Davis M.D.  You Just now (10:11 AM)       May proceed with dental extraction, continue dual antiplatelet therapy.  Intermediate risk during for low risk surgery.     Thanks   [facility]      Called back Meche at Dr. Poe's office. She did also just hear from pt that in October, pt did stop taking Plavix. Advised will notify [facility] and reach out to pt if any new recommendations. Meche requesting clearance in writing, advised electronically singed letter can be drafted. She verbalized understanding and was appreciative.    Electronically signed letter drafted and faxed to:    Dr. Poe  Attn: Meche  [phone] F    Received receipt for confirmation.

## 2022-03-17 ENCOUNTER — APPOINTMENT (OUTPATIENT)
Dept: ADMISSIONS | Facility: MEDICAL CENTER | Age: 72
End: 2022-03-17
Payer: MEDICARE

## 2022-03-24 ENCOUNTER — TELEPHONE (OUTPATIENT)
Dept: NEPHROLOGY | Facility: MEDICAL CENTER | Age: 72
End: 2022-03-24
Payer: MEDICARE

## 2022-03-25 DIAGNOSIS — R80.9 PROTEINURIA, UNSPECIFIED TYPE: ICD-10-CM

## 2022-03-25 DIAGNOSIS — R31.9 HEMATURIA, UNSPECIFIED TYPE: ICD-10-CM

## 2022-03-28 ENCOUNTER — HOSPITAL ENCOUNTER (OUTPATIENT)
Dept: LAB | Facility: MEDICAL CENTER | Age: 72
End: 2022-03-28
Attending: INTERNAL MEDICINE
Payer: MEDICARE

## 2022-03-28 DIAGNOSIS — R80.9 PROTEINURIA, UNSPECIFIED TYPE: ICD-10-CM

## 2022-03-28 DIAGNOSIS — R31.9 HEMATURIA, UNSPECIFIED TYPE: ICD-10-CM

## 2022-03-28 LAB
ALBUMIN SERPL BCP-MCNC: 4.6 G/DL (ref 3.2–4.9)
ANION GAP SERPL CALC-SCNC: 14 MMOL/L (ref 7–16)
APPEARANCE UR: CLEAR
BILIRUB UR QL STRIP.AUTO: NEGATIVE
BUN SERPL-MCNC: 26 MG/DL (ref 8–22)
C3 SERPL-MCNC: 131.8 MG/DL (ref 87–200)
C4 SERPL-MCNC: 22.3 MG/DL (ref 19–52)
CALCIUM SERPL-MCNC: 9.1 MG/DL (ref 8.5–10.5)
CHLORIDE SERPL-SCNC: 105 MMOL/L (ref 96–112)
CO2 SERPL-SCNC: 21 MMOL/L (ref 20–33)
COLOR UR: NORMAL
CREAT SERPL-MCNC: 0.75 MG/DL (ref 0.5–1.4)
CREAT UR-MCNC: 117.68 MG/DL
CREAT UR-MCNC: 117.88 MG/DL
ERYTHROCYTE [DISTWIDTH] IN BLOOD BY AUTOMATED COUNT: 48.3 FL (ref 35.9–50)
GFR SERPLBLD CREATININE-BSD FMLA CKD-EPI: 84 ML/MIN/1.73 M 2
GLUCOSE SERPL-MCNC: 145 MG/DL (ref 65–99)
GLUCOSE UR STRIP.AUTO-MCNC: NEGATIVE MG/DL
HCT VFR BLD AUTO: 43.8 % (ref 37–47)
HGB BLD-MCNC: 13.6 G/DL (ref 12–16)
INR PPP: 1.08 (ref 0.87–1.13)
KETONES UR STRIP.AUTO-MCNC: NEGATIVE MG/DL
LEUKOCYTE ESTERASE UR QL STRIP.AUTO: NEGATIVE
MCH RBC QN AUTO: 28.9 PG (ref 27–33)
MCHC RBC AUTO-ENTMCNC: 31.1 G/DL (ref 33.6–35)
MCV RBC AUTO: 93 FL (ref 81.4–97.8)
MICRO URNS: NORMAL
MICROALBUMIN UR-MCNC: <1.2 MG/DL
MICROALBUMIN/CREAT UR: NORMAL MG/G (ref 0–30)
NITRITE UR QL STRIP.AUTO: NEGATIVE
PH UR STRIP.AUTO: 5.5 [PH] (ref 5–8)
PLATELET # BLD AUTO: 259 K/UL (ref 164–446)
PMV BLD AUTO: 11.2 FL (ref 9–12.9)
POTASSIUM SERPL-SCNC: 4.4 MMOL/L (ref 3.6–5.5)
PROT UR QL STRIP: NEGATIVE MG/DL
PROT UR-MCNC: 13 MG/DL (ref 0–15)
PROT/CREAT UR: 110 MG/G (ref 10–107)
PROTHROMBIN TIME: 13.7 SEC (ref 12–14.6)
RBC # BLD AUTO: 4.71 M/UL (ref 4.2–5.4)
RBC UR QL AUTO: NEGATIVE
SODIUM SERPL-SCNC: 140 MMOL/L (ref 135–145)
SP GR UR STRIP.AUTO: 1.02
UROBILINOGEN UR STRIP.AUTO-MCNC: 0.2 MG/DL
WBC # BLD AUTO: 10.6 K/UL (ref 4.8–10.8)

## 2022-03-28 PROCEDURE — 85610 PROTHROMBIN TIME: CPT

## 2022-03-28 PROCEDURE — 82570 ASSAY OF URINE CREATININE: CPT | Mod: 91

## 2022-03-28 PROCEDURE — 82040 ASSAY OF SERUM ALBUMIN: CPT

## 2022-03-28 PROCEDURE — 84156 ASSAY OF PROTEIN URINE: CPT

## 2022-03-28 PROCEDURE — 80048 BASIC METABOLIC PNL TOTAL CA: CPT

## 2022-03-28 PROCEDURE — 86160 COMPLEMENT ANTIGEN: CPT | Mod: 91

## 2022-03-28 PROCEDURE — 85027 COMPLETE CBC AUTOMATED: CPT

## 2022-03-28 PROCEDURE — 82043 UR ALBUMIN QUANTITATIVE: CPT

## 2022-03-28 PROCEDURE — 36415 COLL VENOUS BLD VENIPUNCTURE: CPT

## 2022-03-28 PROCEDURE — 81003 URINALYSIS AUTO W/O SCOPE: CPT

## 2022-04-01 ENCOUNTER — OFFICE VISIT (OUTPATIENT)
Dept: NEPHROLOGY | Facility: MEDICAL CENTER | Age: 72
End: 2022-04-01
Payer: MEDICARE

## 2022-04-01 VITALS
OXYGEN SATURATION: 95 % | TEMPERATURE: 97.1 F | HEART RATE: 79 BPM | DIASTOLIC BLOOD PRESSURE: 76 MMHG | WEIGHT: 147 LBS | BODY MASS INDEX: 23.63 KG/M2 | SYSTOLIC BLOOD PRESSURE: 126 MMHG | HEIGHT: 66 IN

## 2022-04-01 DIAGNOSIS — M31.6 GIANT CELL ARTERITIS (HCC): ICD-10-CM

## 2022-04-01 DIAGNOSIS — R10.9 FLANK PAIN: ICD-10-CM

## 2022-04-01 PROCEDURE — 99204 OFFICE O/P NEW MOD 45 MIN: CPT | Performed by: INTERNAL MEDICINE

## 2022-04-01 RX ORDER — CONJUGATED ESTROGENS AND MEDROXYPROGESTERONE ACETATE 0.625 (14)
1 KIT ORAL EVERY EVENING
COMMUNITY
Start: 2022-02-18 | End: 2023-04-20

## 2022-04-01 RX ORDER — AZELASTINE 1 MG/ML
2 SPRAY, METERED NASAL PRN
COMMUNITY
Start: 2022-02-17

## 2022-04-01 ASSESSMENT — ENCOUNTER SYMPTOMS
FEVER: 0
SHORTNESS OF BREATH: 0
ABDOMINAL PAIN: 0

## 2022-04-01 ASSESSMENT — FIBROSIS 4 INDEX: FIB4 SCORE: 1.53
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
## 2022-05-17 ENCOUNTER — TELEPHONE (OUTPATIENT)
Dept: OPHTHALMOLOGY | Facility: MEDICAL CENTER | Age: 72
End: 2022-05-17
Payer: MEDICARE

## 2022-07-07 ENCOUNTER — PRE-ADMISSION TESTING (OUTPATIENT)
Dept: ADMISSIONS | Facility: MEDICAL CENTER | Age: 72
End: 2022-07-07
Attending: SURGERY
Payer: MEDICARE

## 2022-07-07 DIAGNOSIS — Z01.810 PRE-OPERATIVE CARDIOVASCULAR EXAMINATION: ICD-10-CM

## 2022-07-07 DIAGNOSIS — Z01.812 PRE-OPERATIVE LABORATORY EXAMINATION: ICD-10-CM

## 2022-07-07 LAB
ANION GAP SERPL CALC-SCNC: 12 MMOL/L (ref 7–16)
BUN SERPL-MCNC: 22 MG/DL (ref 8–22)
CALCIUM SERPL-MCNC: 9.4 MG/DL (ref 8.5–10.5)
CHLORIDE SERPL-SCNC: 106 MMOL/L (ref 96–112)
CO2 SERPL-SCNC: 21 MMOL/L (ref 20–33)
CREAT SERPL-MCNC: 0.81 MG/DL (ref 0.5–1.4)
EKG IMPRESSION: NORMAL
ERYTHROCYTE [DISTWIDTH] IN BLOOD BY AUTOMATED COUNT: 45.3 FL (ref 35.9–50)
GFR SERPLBLD CREATININE-BSD FMLA CKD-EPI: 77 ML/MIN/1.73 M 2
GLUCOSE SERPL-MCNC: 81 MG/DL (ref 65–99)
HCT VFR BLD AUTO: 41.5 % (ref 37–47)
HGB BLD-MCNC: 13.4 G/DL (ref 12–16)
MCH RBC QN AUTO: 29.1 PG (ref 27–33)
MCHC RBC AUTO-ENTMCNC: 32.3 G/DL (ref 33.6–35)
MCV RBC AUTO: 90 FL (ref 81.4–97.8)
PLATELET # BLD AUTO: 294 K/UL (ref 164–446)
PMV BLD AUTO: 11 FL (ref 9–12.9)
POTASSIUM SERPL-SCNC: 4 MMOL/L (ref 3.6–5.5)
RBC # BLD AUTO: 4.61 M/UL (ref 4.2–5.4)
SODIUM SERPL-SCNC: 139 MMOL/L (ref 135–145)
WBC # BLD AUTO: 13.8 K/UL (ref 4.8–10.8)

## 2022-07-07 PROCEDURE — 85027 COMPLETE CBC AUTOMATED: CPT

## 2022-07-07 PROCEDURE — 93005 ELECTROCARDIOGRAM TRACING: CPT

## 2022-07-07 PROCEDURE — 80048 BASIC METABOLIC PNL TOTAL CA: CPT

## 2022-07-07 PROCEDURE — 36415 COLL VENOUS BLD VENIPUNCTURE: CPT

## 2022-07-07 PROCEDURE — 93010 ELECTROCARDIOGRAM REPORT: CPT | Performed by: INTERNAL MEDICINE

## 2022-07-07 ASSESSMENT — FIBROSIS 4 INDEX: FIB4 SCORE: 1.53

## 2022-07-27 ENCOUNTER — HOSPITAL ENCOUNTER (OUTPATIENT)
Dept: LAB | Facility: MEDICAL CENTER | Age: 72
End: 2022-07-27
Attending: OPHTHALMOLOGY
Payer: MEDICARE

## 2022-07-27 ENCOUNTER — OFFICE VISIT (OUTPATIENT)
Dept: OPHTHALMOLOGY | Facility: MEDICAL CENTER | Age: 72
End: 2022-07-27
Payer: MEDICARE

## 2022-07-27 DIAGNOSIS — H49.9 OPHTHALMOPLEGIA: ICD-10-CM

## 2022-07-27 DIAGNOSIS — R04.0 BLEEDING FROM THE NOSE: ICD-10-CM

## 2022-07-27 DIAGNOSIS — H52.31 ANISOMETROPIA: ICD-10-CM

## 2022-07-27 DIAGNOSIS — M31.6 GIANT CELL ARTERITIS (HCC): ICD-10-CM

## 2022-07-27 DIAGNOSIS — H35.371 EPIRETINAL MEMBRANE (ERM) OF RIGHT EYE: ICD-10-CM

## 2022-07-27 LAB
CRP SERPL HS-MCNC: 1.73 MG/DL (ref 0–0.75)
ERYTHROCYTE [SEDIMENTATION RATE] IN BLOOD BY WESTERGREN METHOD: 7 MM/HOUR (ref 0–25)

## 2022-07-27 PROCEDURE — 85652 RBC SED RATE AUTOMATED: CPT

## 2022-07-27 PROCEDURE — 86140 C-REACTIVE PROTEIN: CPT

## 2022-07-27 PROCEDURE — 99214 OFFICE O/P EST MOD 30 MIN: CPT | Performed by: OPHTHALMOLOGY

## 2022-07-27 PROCEDURE — 92060 SENSORIMOTOR EXAMINATION: CPT | Performed by: OPHTHALMOLOGY

## 2022-07-27 PROCEDURE — 36415 COLL VENOUS BLD VENIPUNCTURE: CPT

## 2022-07-27 PROCEDURE — 92250 FUNDUS PHOTOGRAPHY W/I&R: CPT | Performed by: OPHTHALMOLOGY

## 2022-07-27 ASSESSMENT — REFRACTION_FINALRX
OS_HBASE: IN
OS_HPRISM: 2.0
OD_VPRISM: 2.0

## 2022-07-27 ASSESSMENT — REFRACTION_WEARINGRX
OD_VPRISM: 2.0
OS_CYLINDER: +0.75
OD_ADD: +2.75
OD_VBASE: UP
OS_AXIS: 159
OS_ADD: +2.75
OS_HBASE: IN
OS_SPHERE: -1.50
OD_SPHERE: +0.25
OS_HPRISM: 2.0
OD_CYLINDER: SPHERE

## 2022-07-27 ASSESSMENT — CONF VISUAL FIELD
OD_NORMAL: 1
OS_NORMAL: 1

## 2022-07-27 ASSESSMENT — VISUAL ACUITY
OS_CC: 20/30
METHOD: SNELLEN - LINEAR
OD_CC: 20/25

## 2022-07-27 ASSESSMENT — SLIT LAMP EXAM - LIDS
COMMENTS: NORMAL
COMMENTS: NORMAL

## 2022-07-27 ASSESSMENT — CUP TO DISC RATIO
OD_RATIO: 0.1
OS_RATIO: 0.1

## 2022-07-27 ASSESSMENT — EXTERNAL EXAM - LEFT EYE: OS_EXAM: NORMAL

## 2022-07-27 ASSESSMENT — TONOMETRY
OS_IOP_MMHG: 18
OD_IOP_MMHG: 18
IOP_METHOD: I-CARE

## 2022-07-27 ASSESSMENT — REFRACTION
OD_SPHERE: +0.25
OS_CYLINDER: +0.75
OS_SPHERE: -1.50
OS_AXIS: 152
OD_CYLINDER: +0.25
OD_AXIS: 069

## 2022-07-27 ASSESSMENT — ENCOUNTER SYMPTOMS
BLURRED VISION: 1
EYE PAIN: 1
HEADACHES: 1

## 2022-07-27 ASSESSMENT — EXTERNAL EXAM - RIGHT EYE: OD_EXAM: NORMAL

## 2022-07-27 NOTE — PROGRESS NOTES
Peds/Neuro Ophthalmology:   Jon Wilde M.D.    Date & Time note created:    7/27/2022   3:55 PM     Referring MD / APRN:  Khai Deras M.D., No att. providers found    Patient ID:  Name:             Carine Christopher   YOB: 1950  Age:                 71 y.o.  female   MRN:               [id number]    Chief Complaint/Reason for Visit:     Other (7 month F/u for Giant cell arteritis and Ophthalmoplegia///)      History of Present Illness:    Carine Christopher is a 71 y.o. female   7 month F/u for Giant cell arteritis and Ophthalmoplegia in both eyes. Pt states vision has changed for near and distance in both eyes. The right eye gets swollen and sometimes hard to open. She has dry and itchy eyes all the times uses eye drops Refresh 4-5 times a day. Pt does get headaches everyday on medication so slightly control it.       Review of Systems:  Review of Systems   Eyes: Positive for blurred vision and pain.        Dry eyes OU  Itchy eyes OU   Neurological: Positive for headaches.   All other systems reviewed and are negative.      Past Medical History:   Past Medical History:   Diagnosis Date   • Arthritis 07/07/2022    hands   • Breath shortness 07/07/2022    with exertion   • Bronchitis 07/07/2022    chronic broncitis since age 15   • CAD (coronary artery disease) 07/07/2022   • Cataract 07/07/2022    IOLOU   • COVID 07/06/2022   • Giant cell arteritis (HCC) 2019   • Gynecological disorder 07/07/2022    pt still having monthly menses, on hormones   • Heart attack (HCC) 10/2020    taking metoprolol, stents placed times 2   • Heart valve disease 07/07/2022   • High cholesterol 07/07/2022    non medicated   • Hyperlipidemia    • Pain 07/07/2022    constant headaches times 3 years   • Pneumonia 07/07/2022    walking pneumonia 1980   • Urinary incontinence 07/07/2022    at times       Past Surgical History:  Past Surgical History:   Procedure Laterality Date    • OTHER CARDIAC SURGERY      stents placed   • CATARACT EXTRACTION WITH IOL Bilateral    • ABDOMINAL EXPLORATION     • BREAST IMPLANT REVISION         Current Outpatient Medications:  Current Outpatient Medications   Medication Sig Dispense Refill   • Non Formulary Request Take  by mouth every day. Relief Factor.  Fish oil supplement     • PREMPHASE 0.625-5 MG Tab      • azelastine (ASTELIN) 137 MCG/SPRAY nasal spray Administer 2 Sprays into affected nostril(S) as needed.     • PREDNISONE PO Take 3 mg by mouth every day. TAPERING DOSE: 8MG STARTED 1/10    PT TO GO DOWN 1MG EVERY WEEK     • metoprolol SR (TOPROL XL) 25 MG TABLET SR 24 HR Take 1 tablet by mouth every evening. 90 tablet 3   • nitroglycerin (NITROSTAT) 0.4 MG SL Tab Place 1 tablet under the tongue as needed for Chest Pain. 30 tablet 3   • Ascorbic Acid (VITAMIN C PO) Take 1 tablet by mouth every day.     • LUTEIN PO Take 20 mg by mouth every day.     • Mhueqk-DcRep-CfKgh-FA-CA-Omega (COMPLETE  DHA) 29-1-200 & 250 MG Misc Take 2 Tablets by mouth every day.     • Cholecalciferol (VITAMIN D3) 50 MCG (2000 UT) Tab Take 2,000 Units by mouth every day at 6 PM.     • aspirin 81 MG EC tablet Take 81 mg by mouth every evening.       No current facility-administered medications for this visit.       Allergies:  Allergies   Allergen Reactions   • Codeine Vomiting and Nausea   • Other Drug Unspecified     States that most antibiotics cause yellow stools.   • Atorvastatin Unspecified     Caused bruising general body, pt doesn't want to take   • Chloramphenicol Diarrhea     PCN is okay to take   • Sulfa Antibiotics [Sulfa Drugs] Unspecified     Can't remember reaction       Family History:  Family History   Problem Relation Age of Onset   • Glasses Father    • Heart Disease Father    • Hypertension Mother    • Kidney Disease Neg Hx        Social History:  Social History     Socioeconomic History   • Marital status:      Spouse name: Not on file    • Number of children: Not on file   • Years of education: Not on file   • Highest education level: Not on file   Occupational History   • Not on file   Tobacco Use   • Smoking status: Never Smoker   • Smokeless tobacco: Never Used   Vaping Use   • Vaping Use: Never used   Substance and Sexual Activity   • Alcohol use: Not Currently     Alcohol/week: 0.6 oz     Types: 1 Glasses of wine per week   • Drug use: Never   • Sexual activity: Not on file   Other Topics Concern   • Not on file   Social History Narrative    Retired- Office manger for [employer]      Social Determinants of Health     Financial Resource Strain: Unknown   • Difficulty of Paying Living Expenses: Patient refused   Food Insecurity: No Food Insecurity   • Worried About Running Out of Food in the Last Year: Never true   • Ran Out of Food in the Last Year: Never true   Transportation Needs: Unknown   • Lack of Transportation (Medical): Patient refused   • Lack of Transportation (Non-Medical): Patient refused   Physical Activity: Not on file   Stress: Not on file   Social Connections: Not on file   Intimate Partner Violence: Not on file   Housing Stability: Not on file          Physical Exam:  Physical Exam    Oriented x 3  Weight/BMI: There is no height or weight on file to calculate BMI.  There were no vitals taken for this visit.    Base Eye Exam     Visual Acuity (Snellen - Linear)       Right Left    Dist cc 20/25 20/30          Tonometry (I-care, 9:07 AM)       Right Left    Pressure 18 18          Pupils       Pupils    Right PERRL    Left PERRL          Visual Fields       Right Left     Full Full          Extraocular Movement       Right Left     Full, Ortho Full, Ortho          Neuro/Psych     Oriented x3: Yes    Mood/Affect: Normal            Slit Lamp and Fundus Exam     External Exam       Right Left    External Normal Normal          Slit Lamp Exam       Right Left    Lids/Lashes Normal Normal    Conjunctiva/Sclera White and  quiet White and quiet    Cornea Clear Clear    Anterior Chamber Deep and quiet Deep and quiet    Iris Round and reactive Round and reactive    Lens Posterior chamber intraocular lens Posterior chamber intraocular lens    Vitreous Normal Normal          Fundus Exam       Right Left    Disc Normal Normal    C/D Ratio 0.1 0.1    Macula Epiretinal membrane Normal    Vessels Normal Normal    Periphery Normal Normal            Refraction     Wearing Rx       Sphere Cylinder Axis Add Horz Prism Vert Prism    Right +0.25 Sphere  +2.75  2.0 up    Left -1.50 +0.75 159 +2.75 2.0 in           Cycloplegic Refraction (Auto)       Sphere Cylinder Axis    Right +0.25 +0.25 069    Left -1.50 +0.75 152          Final Rx       Sphere Cylinder Axis Add Horz Prism Vert Prism    Right +0.25 Sphere  +2.75  2.0 up    Left -1.50 +0.75 159 +2.75 2.0 in                 Pertinent Lab/Test/Imaging Review:      Assessment and Plan:     Ophthalmoplegia  8/25/2021 - small RHypo and XT. 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 However difficulty fusion secondary to the aniseikonia form the monovision as well as the ERM OD. In Pat did better with a 2 base up OD and 2 base in OS. However still having some asthenopic findings secondary to the ERM. Discussed trial of RX with prisms and bifocal and correcting for the monovision.   12/1/2021 - ortho with new glasses, but not wearing because of nose bleed. No progression of the misalignment  7/27/2022 - continue current rx with prism    Bleeding from the nose  12/1/2021 -Nose bleed, not stopping. Will refer to Dr Gutiérrez  7/27/2022 - never made appointment. Gave number to call for [facility]    Epiretinal membrane (ERM) of right eye  2/3/2021 - Epiretinal membrane seen on 5-line OCT. Discussed that since vision still 20/20 would hold on referral unless becomes more symptomatic  12/1/2021 - OCT 5-line no significant change and vision still  good  7/27/2022 - ERM stable on 5-line    Giant cell arteritis (HCC)  2/3/2021 - Presumed giant cell arteritis by history, symptoms of temporal pain, and response to prednisone. Never had temporal artery biopsy. ESR was 1 back in January, but had been on prednosne at least 10 mg for a few months prior. Dr Banks increased prednisone to 60 and currently on 40 mg / day.  From the neuro-ophthalmic standpoint no evidence of optic nerve invovement with OCT NFL thickness normal at 107 OD and 110 OS. No optic disc pallor and no swelling. In Addition VICKIE was 1:320. Therefore discussed with patient that I recommend being managed by Rheumatology. Would consider Actemra and thus will refer to Dr Michaud in [place]. VICKIE will need to be characterized to determine is has concomitant other autoimmune disorder.   8/25/2021 - Didn't see Dr Michaud. Sept 13th has a telemedicine apt with [place]. On prednisone 10 mg. Initial symptoms headache and lump on the side of the head. Dx made 10/2020. Long discussion regarding GCA, and monitoring symptoms, ESR and CRP on pred taper. Recommended she have discussion via telemed with Rheum about Actemra. Ordered ESR, CRP and TFT's. No evidence of GCA induced optic neuropathy  8/26/2021 - ESR 4.0, TSH 1.16, CRP 0.34  12/1/2021 - Apparently had relapse of headaches in September and went back to 20 mg prednisone. Then at taper again down to 10 mg last week headache returned and increased to 12.5 mg. She discussed with Rheumatologist in [place] where advised TA biopsy to secure the diagnosis. Would need to find provider here in [place] to rx Actemra. OCT NFL thickness stable at 107 OD and 114 OS and nerves healthy, so no evidence of optic neuropathy. However will recheck ESR and CRP. Also refer to Dr Pelaez for temporal artery biopsy  12/3/2021 - Repeat ESR 4.0, CRP 0.6 slightly increased, but still within normal.  7/27/2022 - Recent MI with subsequent COVID. Self discontinued prednisone, but  was on 3 mg. Will repeat ESR and CRP. Had biopsy scheduled august 10 th. Still no evidence of an optic neuropathy or motility disorder secondary to GCA. 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 Causing some symptomatic aniseikonbia. Will therefore refer to Dr Navjot Feliz for consideration of IOL exchange  8/25/2021 - Will give trial of glasses rx to help correct some of the aniseikonia to see if tolerates. Discussed that even with IOL exchange has the ERM OD that would affect fusion  7/27/2022 - Continue current rx with prism. Re-gave rx        Jon Wilde M.D.

## 2022-07-27 NOTE — ASSESSMENT & PLAN NOTE
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 Causing some symptomatic aniseikonbia. Will therefore refer to Dr Navjot Feliz for consideration of IOL exchange  8/25/2021 - Will give trial of glasses rx to help correct some of the aniseikonia to see if tolerates. Discussed that even with IOL exchange has the ERM OD that would affect fusion  7/27/2022 - Continue current rx with prism. Re-gave rx

## 2022-07-27 NOTE — ASSESSMENT & PLAN NOTE
2/3/2021 - Presumed giant cell arteritis by history, symptoms of temporal pain, and response to prednisone. Never had temporal artery biopsy. ESR was 1 back in January, but had been on prednosne at least 10 mg for a few months prior. Dr Banks increased prednisone to 60 and currently on 40 mg / day.  From the neuro-ophthalmic standpoint no evidence of optic nerve invovement with OCT NFL thickness normal at 107 OD and 110 OS. No optic disc pallor and no swelling. In Addition VICKIE was 1:320. Therefore discussed with patient that I recommend being managed by Rheumatology. Would consider Actemra and thus will refer to Dr Michaud in [place]. VICKIE will need to be characterized to determine is has concomitant other autoimmune disorder.   8/25/2021 - Didn't see Dr Michaud. Sept 13th has a telemedicine apt with [place]. On prednisone 10 mg. Initial symptoms headache and lump on the side of the head. Dx made 10/2020. Long discussion regarding GCA, and monitoring symptoms, ESR and CRP on pred taper. Recommended she have discussion via telemed with Rheum about Actemra. Ordered ESR, CRP and TFT's. No evidence of GCA induced optic neuropathy  8/26/2021 - ESR 4.0, TSH 1.16, CRP 0.34  12/1/2021 - Apparently had relapse of headaches in September and went back to 20 mg prednisone. Then at taper again down to 10 mg last week headache returned and increased to 12.5 mg. She discussed with Rheumatologist in [place] where advised TA biopsy to secure the diagnosis. Would need to find provider here in [place] to rx Actemra. OCT NFL thickness stable at 107 OD and 114 OS and nerves healthy, so no evidence of optic neuropathy. However will recheck ESR and CRP. Also refer to Dr Pelaez for temporal artery biopsy  12/3/2021 - Repeat ESR 4.0, CRP 0.6 slightly increased, but still within normal.  7/27/2022 - Recent MI with subsequent COVID. Self discontinued prednisone, but was on 3 mg. Will repeat ESR and CRP. Had biopsy scheduled august 10  th. Still no evidence of an optic neuropathy or motility disorder secondary to GCA. OCT NFL thickness stable at 106 OD and 106 OS  Addendum CRP 1.73 slightly above normal range

## 2022-07-27 NOTE — ASSESSMENT & PLAN NOTE
2/3/2021 - Epiretinal membrane seen on 5-line OCT. Discussed that since vision still 20/20 would hold on referral unless becomes more symptomatic  12/1/2021 - OCT 5-line no significant change and vision still good  7/27/2022 - ERM stable on 5-line

## 2022-07-27 NOTE — ASSESSMENT & PLAN NOTE
12/1/2021 -Nose bleed, not stopping. Will refer to Dr Gutiérrez  7/27/2022 - never made appointment. Gave number to call for [facility]

## 2022-07-27 NOTE — ASSESSMENT & PLAN NOTE
8/25/2021 - small RHypo and XT. 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 However difficulty fusion secondary to the aniseikonia form the monovision as well as the ERM OD. In Pat did better with a 2 base up OD and 2 base in OS. However still having some asthenopic findings secondary to the ERM. Discussed trial of RX with prisms and bifocal and correcting for the monovision.   12/1/2021 - ortho with new glasses, but not wearing because of nose bleed. No progression of the misalignment  7/27/2022 - continue current rx with prism

## 2022-08-03 ENCOUNTER — PRE-ADMISSION TESTING (OUTPATIENT)
Dept: ADMISSIONS | Facility: MEDICAL CENTER | Age: 72
End: 2022-08-03
Attending: SURGERY
Payer: MEDICARE

## 2022-08-10 ENCOUNTER — ANESTHESIA (OUTPATIENT)
Dept: SURGERY | Facility: MEDICAL CENTER | Age: 72
End: 2022-08-10
Payer: MEDICARE

## 2022-08-10 ENCOUNTER — HOSPITAL ENCOUNTER (OUTPATIENT)
Facility: MEDICAL CENTER | Age: 72
End: 2022-08-10
Attending: SURGERY | Admitting: SURGERY
Payer: MEDICARE

## 2022-08-10 ENCOUNTER — ANESTHESIA EVENT (OUTPATIENT)
Dept: SURGERY | Facility: MEDICAL CENTER | Age: 72
End: 2022-08-10
Payer: MEDICARE

## 2022-08-10 VITALS
DIASTOLIC BLOOD PRESSURE: 70 MMHG | HEIGHT: 65 IN | SYSTOLIC BLOOD PRESSURE: 149 MMHG | RESPIRATION RATE: 16 BRPM | WEIGHT: 145.28 LBS | OXYGEN SATURATION: 94 % | HEART RATE: 96 BPM | BODY MASS INDEX: 24.21 KG/M2 | TEMPERATURE: 97.3 F

## 2022-08-10 DIAGNOSIS — G89.18 POSTOPERATIVE PAIN: ICD-10-CM

## 2022-08-10 LAB — PATHOLOGY CONSULT NOTE: NORMAL

## 2022-08-10 PROCEDURE — 700111 HCHG RX REV CODE 636 W/ 250 OVERRIDE (IP): Performed by: ANESTHESIOLOGY

## 2022-08-10 PROCEDURE — 160039 HCHG SURGERY MINUTES - EA ADDL 1 MIN LEVEL 3: Performed by: SURGERY

## 2022-08-10 PROCEDURE — 700105 HCHG RX REV CODE 258: Performed by: SURGERY

## 2022-08-10 PROCEDURE — 160035 HCHG PACU - 1ST 60 MINS PHASE I: Performed by: SURGERY

## 2022-08-10 PROCEDURE — 160025 RECOVERY II MINUTES (STATS): Performed by: SURGERY

## 2022-08-10 PROCEDURE — 160009 HCHG ANES TIME/MIN: Performed by: SURGERY

## 2022-08-10 PROCEDURE — 700101 HCHG RX REV CODE 250: Performed by: ANESTHESIOLOGY

## 2022-08-10 PROCEDURE — 00352 ANES PX MAJ VSL NCK SMPL LIG: CPT | Performed by: ANESTHESIOLOGY

## 2022-08-10 PROCEDURE — 160002 HCHG RECOVERY MINUTES (STAT): Performed by: SURGERY

## 2022-08-10 PROCEDURE — 88305 TISSUE EXAM BY PATHOLOGIST: CPT

## 2022-08-10 PROCEDURE — 160028 HCHG SURGERY MINUTES - 1ST 30 MINS LEVEL 3: Performed by: SURGERY

## 2022-08-10 PROCEDURE — 88313 SPECIAL STAINS GROUP 2: CPT | Mod: 91

## 2022-08-10 PROCEDURE — 160046 HCHG PACU - 1ST 60 MINS PHASE II: Performed by: SURGERY

## 2022-08-10 PROCEDURE — 700101 HCHG RX REV CODE 250: Performed by: SURGERY

## 2022-08-10 PROCEDURE — 99100 ANES PT EXTEME AGE<1 YR&>70: CPT | Performed by: ANESTHESIOLOGY

## 2022-08-10 PROCEDURE — 160048 HCHG OR STATISTICAL LEVEL 1-5: Performed by: SURGERY

## 2022-08-10 RX ORDER — CEFAZOLIN SODIUM 1 G/3ML
INJECTION, POWDER, FOR SOLUTION INTRAMUSCULAR; INTRAVENOUS PRN
Status: DISCONTINUED | OUTPATIENT
Start: 2022-08-10 | End: 2022-08-10 | Stop reason: SURG

## 2022-08-10 RX ORDER — HYDROMORPHONE HYDROCHLORIDE 1 MG/ML
0.1 INJECTION, SOLUTION INTRAMUSCULAR; INTRAVENOUS; SUBCUTANEOUS
Status: DISCONTINUED | OUTPATIENT
Start: 2022-08-10 | End: 2022-08-10 | Stop reason: HOSPADM

## 2022-08-10 RX ORDER — SODIUM CHLORIDE, SODIUM LACTATE, POTASSIUM CHLORIDE, CALCIUM CHLORIDE 600; 310; 30; 20 MG/100ML; MG/100ML; MG/100ML; MG/100ML
INJECTION, SOLUTION INTRAVENOUS CONTINUOUS
Status: DISCONTINUED | OUTPATIENT
Start: 2022-08-10 | End: 2022-08-10 | Stop reason: HOSPADM

## 2022-08-10 RX ORDER — OXYCODONE HCL 5 MG/5 ML
5 SOLUTION, ORAL ORAL
Status: DISCONTINUED | OUTPATIENT
Start: 2022-08-10 | End: 2022-08-10 | Stop reason: HOSPADM

## 2022-08-10 RX ORDER — HYDROMORPHONE HYDROCHLORIDE 1 MG/ML
0.4 INJECTION, SOLUTION INTRAMUSCULAR; INTRAVENOUS; SUBCUTANEOUS
Status: DISCONTINUED | OUTPATIENT
Start: 2022-08-10 | End: 2022-08-10 | Stop reason: HOSPADM

## 2022-08-10 RX ORDER — HALOPERIDOL 5 MG/ML
1 INJECTION INTRAMUSCULAR
Status: DISCONTINUED | OUTPATIENT
Start: 2022-08-10 | End: 2022-08-10 | Stop reason: HOSPADM

## 2022-08-10 RX ORDER — SUCCINYLCHOLINE CHLORIDE 20 MG/ML
INJECTION INTRAMUSCULAR; INTRAVENOUS PRN
Status: DISCONTINUED | OUTPATIENT
Start: 2022-08-10 | End: 2022-08-10 | Stop reason: SURG

## 2022-08-10 RX ORDER — ACETAMINOPHEN 500 MG
1000 TABLET ORAL 2 TIMES DAILY PRN
COMMUNITY
End: 2023-01-13

## 2022-08-10 RX ORDER — DIPHENHYDRAMINE HYDROCHLORIDE 50 MG/ML
12.5 INJECTION INTRAMUSCULAR; INTRAVENOUS
Status: DISCONTINUED | OUTPATIENT
Start: 2022-08-10 | End: 2022-08-10 | Stop reason: HOSPADM

## 2022-08-10 RX ORDER — IBUPROFEN 800 MG/1
800 TABLET ORAL EVERY 8 HOURS PRN
COMMUNITY

## 2022-08-10 RX ORDER — TRAMADOL HYDROCHLORIDE 50 MG/1
50-100 TABLET ORAL EVERY 6 HOURS PRN
Qty: 20 TABLET | Refills: 0 | Status: SHIPPED | OUTPATIENT
Start: 2022-08-10 | End: 2022-08-13

## 2022-08-10 RX ORDER — ALBUTEROL SULFATE 2.5 MG/3ML
2.5 SOLUTION RESPIRATORY (INHALATION)
Status: DISCONTINUED | OUTPATIENT
Start: 2022-08-10 | End: 2022-08-10 | Stop reason: HOSPADM

## 2022-08-10 RX ORDER — ONDANSETRON 2 MG/ML
4 INJECTION INTRAMUSCULAR; INTRAVENOUS
Status: DISCONTINUED | OUTPATIENT
Start: 2022-08-10 | End: 2022-08-10 | Stop reason: HOSPADM

## 2022-08-10 RX ORDER — HYDROCODONE BITARTRATE AND ACETAMINOPHEN 5; 325 MG/1; MG/1
1 TABLET ORAL 2 TIMES DAILY PRN
COMMUNITY
Start: 2022-07-09 | End: 2023-01-13

## 2022-08-10 RX ORDER — BUPIVACAINE HYDROCHLORIDE AND EPINEPHRINE 5; 5 MG/ML; UG/ML
INJECTION, SOLUTION PERINEURAL
Status: DISCONTINUED | OUTPATIENT
Start: 2022-08-10 | End: 2022-08-10 | Stop reason: HOSPADM

## 2022-08-10 RX ORDER — HYDROMORPHONE HYDROCHLORIDE 1 MG/ML
0.2 INJECTION, SOLUTION INTRAMUSCULAR; INTRAVENOUS; SUBCUTANEOUS
Status: DISCONTINUED | OUTPATIENT
Start: 2022-08-10 | End: 2022-08-10 | Stop reason: HOSPADM

## 2022-08-10 RX ORDER — SODIUM CHLORIDE, SODIUM LACTATE, POTASSIUM CHLORIDE, CALCIUM CHLORIDE 600; 310; 30; 20 MG/100ML; MG/100ML; MG/100ML; MG/100ML
INJECTION, SOLUTION INTRAVENOUS CONTINUOUS
Status: DISCONTINUED | OUTPATIENT
Start: 2022-08-10 | End: 2022-08-10

## 2022-08-10 RX ORDER — OXYCODONE HCL 5 MG/5 ML
10 SOLUTION, ORAL ORAL
Status: DISCONTINUED | OUTPATIENT
Start: 2022-08-10 | End: 2022-08-10 | Stop reason: HOSPADM

## 2022-08-10 RX ORDER — LIDOCAINE HYDROCHLORIDE 40 MG/ML
SOLUTION TOPICAL PRN
Status: DISCONTINUED | OUTPATIENT
Start: 2022-08-10 | End: 2022-08-10 | Stop reason: SURG

## 2022-08-10 RX ADMIN — CEFAZOLIN 2 G: 330 INJECTION, POWDER, FOR SOLUTION INTRAMUSCULAR; INTRAVENOUS at 08:54

## 2022-08-10 RX ADMIN — SUCCINYLCHOLINE CHLORIDE 60 MG: 20 INJECTION, SOLUTION INTRAMUSCULAR; INTRAVENOUS; PARENTERAL at 08:48

## 2022-08-10 RX ADMIN — PROPOFOL 150 MG: 10 INJECTION, EMULSION INTRAVENOUS at 08:48

## 2022-08-10 RX ADMIN — EPHEDRINE SULFATE 15 MG: 50 INJECTION INTRAMUSCULAR; INTRAVENOUS; SUBCUTANEOUS at 09:04

## 2022-08-10 RX ADMIN — SODIUM CHLORIDE, POTASSIUM CHLORIDE, SODIUM LACTATE AND CALCIUM CHLORIDE: 600; 310; 30; 20 INJECTION, SOLUTION INTRAVENOUS at 07:45

## 2022-08-10 RX ADMIN — LIDOCAINE HYDROCHLORIDE 4 ML: 40 SOLUTION TOPICAL at 08:49

## 2022-08-10 RX ADMIN — PROPOFOL 50 MG: 10 INJECTION, EMULSION INTRAVENOUS at 09:08

## 2022-08-10 RX ADMIN — FENTANYL CITRATE 100 MCG: 50 INJECTION, SOLUTION INTRAMUSCULAR; INTRAVENOUS at 08:48

## 2022-08-10 RX ADMIN — LIDOCAINE HYDROCHLORIDE 0.5 ML: 10 INJECTION, SOLUTION EPIDURAL; INFILTRATION; INTRACAUDAL; PERINEURAL at 07:45

## 2022-08-10 RX ADMIN — EPHEDRINE SULFATE 15 MG: 50 INJECTION INTRAMUSCULAR; INTRAVENOUS; SUBCUTANEOUS at 09:02

## 2022-08-10 ASSESSMENT — FIBROSIS 4 INDEX: FIB4 SCORE: 1.34

## 2022-08-10 ASSESSMENT — PAIN DESCRIPTION - PAIN TYPE
TYPE: CHRONIC PAIN
TYPE: REFERRED PAIN
TYPE: CHRONIC PAIN

## 2022-08-10 ASSESSMENT — PAIN SCALES - GENERAL: PAIN_LEVEL: 5

## 2022-08-10 NOTE — ANESTHESIA PREPROCEDURE EVALUATION
" Case: [id number] Date/Time: 08/10/22 0815    Procedure: RIGHT TEMPORAL ARTERY BIOPSY, POSSIBLE LEFT TEMPORAL BIOPSY    Pre-op diagnosis: HEADACHE    Location: [facility] OR 09 / SURGERY [facility]    Surgeons: Tone Fields M.D.          Relevant Problems   CARDIAC   (positive) Coronary artery disease involving native coronary artery of native heart with angina pectoris (HCC)   (positive) Giant cell arteritis (HCC)   (positive) Mild aortic insufficiency   (positive) Primary hypertension   BP (!) 146/76   Pulse 78   Temp 36.8 °C (98.3 °F) (Temporal)   Resp 16   Ht 1.651 m (5' 5\")   Wt 65.9 kg (145 lb 4.5 oz)   SpO2 94%   BMI 24.18 kg/m²       Physical Exam    Airway   Mallampati: II  TM distance: >3 FB  Neck ROM: full       Cardiovascular - normal exam  Rhythm: regular  Rate: normal  (-) murmur     Dental - normal exam           Pulmonary - normal exam  Breath sounds clear to auscultation     Abdominal    Neurological - normal exam                 Anesthesia Plan    ASA 3       Plan - general       Airway plan will be ETT          Induction: intravenous    Postoperative Plan: Postoperative administration of opioids is intended.    Pertinent diagnostic labs and testing reviewed    Informed Consent:    Anesthetic plan and risks discussed with patient.    Use of blood products discussed with: patient whom consented to blood products.         "

## 2022-08-10 NOTE — ANESTHESIA PROCEDURE NOTES
Airway    Date/Time: 8/10/2022 8:49 AM  Performed by: Juan Pablo Le M.D.  Authorized by: Juan Pablo Le M.D.     Location:  OR  Urgency:  Elective  Difficult Airway: No    Indications for Airway Management:  Anesthesia      Spontaneous Ventilation: absent    Sedation Level:  Deep  Preoxygenated: Yes    Patient Position:  Sniffing  Mask Difficulty Assessment:  0 - not attempted  Final Airway Type:  Endotracheal airway  Final Endotracheal Airway:  ETT  Cuffed: Yes    Technique Used for Successful ETT Placement:  Direct laryngoscopy    Insertion Site:  Oral  Blade Type:  Maria Isabel  Laryngoscope Blade/Videolaryngoscope Blade Size:  3  ETT Size (mm):  6.5  Measured from:  Teeth  ETT to Teeth (cm):  22  Placement Verified by: auscultation and capnometry    Cormack-Lehane Classification:  Grade I - full view of glottis  Number of Attempts at Approach:  1

## 2022-08-10 NOTE — ANESTHESIA TIME REPORT
Anesthesia Start and Stop Event Times     Date Time Event    8/10/2022 0813 Ready for Procedure     0841 Anesthesia Start     0945 Anesthesia Stop        Responsible Staff  08/10/22    Name Role Begin End    Juan Pablo Le M.D. Anesth 0841 0945        Overtime Reason:  no overtime (within assigned shift)    Comments:

## 2022-08-10 NOTE — OP REPORT
-------------------------------------------------    Date of Service: 8/10/2022   Patient Name:  Carine Christopher  Patient MRN:  [id number]    --------------------------------------------------------------------------------------------------    Preoperative Diagnosis:  -  Headache    Postoperative Diagnosis:  -  Headache    Procedure:  - Right temporal artery biopsy    -------------------------------------------------------------------------------------------------    Surgeon:                                 Tone Fields MD    Assistant:   none    Anesthesia:                            Sedation plus local anesthetic    EBL:                                        minimal    Specimen:   Temporal artery sent to path    Findings:   Appeared grossly normal    Complications:                        none    Disposition:                             Tolerated well, sent to recovery in stable condition    -----------------------------------------------------------------------------------------------------    History:  Carine Christopher is a 71 y.o. female who has been experiencing headache and jaw pain for the past several years.  Temporal artery biopsy was requested.  I explained the details of the operation, alternatives, and potential risks, including but not limited to bleeding, infection, and risks of anesthesia.  All questions were answered. Patient understands and agrees to proceed.      Procedure Summary:  The patient was properly identified in the preoperative area, taken to the operating room, and placed in a supine position where IV sedation was administered.  Intravenous antibiotics were administered by the anesthesiologist in the correct time interval.  The patient was prepped and draped in the usual sterile fashion.  Surgical timeout was called to identify the correct patient, procedure, and equipment.  Everyone was in agreement.    Local anesthetic was infiltrated  over the course of the temporal artery and then an incision was made following the course of the temporal artery along the hairline.  Subcutaneous tissue was divided with electrocautery and the artery was identified.  It appeared grossly normal.  The artery was dissected out circumferentially for a length of about 2 cm and divided between clips and then the specimen was sent to pathology in formalin.  Hemostasis was achieved with cautery.  The incision was closed with multiple layers of absorbable suture and skin glue.    All sponge, instrument and needle counts were correct at the conclusion of the case.  Patient tolerated the well and was sent to recovery in stable condition.    Tone Fields MD  General and Vascular Surgery  [facility]  [phone]

## 2022-08-10 NOTE — DISCHARGE INSTRUCTIONS
HOME CARE INSTRUCTIONS    ACTIVITY: Rest and take it easy for the first 24 hours.  A responsible adult is recommended to remain with you during that time.  It is normal to feel sleepy.  We encourage you to not do anything that requires balance, judgment or coordination.    FOR 24 HOURS DO NOT:  Drive, operate machinery or run household appliances.  Drink beer or alcoholic beverages.  Make important decisions or sign legal documents.    SPECIAL INSTRUCTIONS: Discharge Instructions     Procedure: Temporal artery biopsy     1. ACTIVITIES: Upon discharge from the hospital, the day of surgery it is requested that you do no significant physical activity and no driving as you have had sedation. The day after surgery, you may resume activities of daily living, but for 1 week, no strenuous activities or heavy lifting (greater than 15 pounds).      2. DRIVING: You may drive whenever you are off pain medications and are able to perform the activities needed to drive, i.e. turning, bending, twisting, etc.      3. WOUND: It is not unusual for patients to experience swelling and even bruising, as well as a small amount of drainage from the incision. This is normal and will resolve over the next few days.      4. ICE: please use ice on the wound to decrease the swelling for the first 24 hours and then discontinue. Apply ice for 20 minutes and then remove for 20 minutes, alternating while awake.     5. BATHING: If your incision is covered with skin glue, you do not need to cover it. You can shower starting the day of the operation.   Avoid submerging the incision in water (tub, bath, pool) for at least a week.     6. PAIN MEDICATION: You will be given a prescription for pain medication at discharge. Please take these as directed. It is important to remember not to take medications on an empty stomach as this may cause nausea.      7. BOWEL FUNCTION: After surgery, it is not uncommon for patients to experience constipation. This is  due to decreasing activity levels as well as pain medications. You may wish to use a stool softener beginning immediately after surgery, and you may or may not need to use a laxative (Milk of Magnesia, Ex-lax; Senokot, etc.) as well.      8 .CALL IF YOU HAVE: (1) Fevers to more than 101.5 F, (2) Unusual or excessive pain, (3) Drainage or fluid from incision that may be foul smelling, increased tenderness or soreness at the wound or the wound edges are no longer together, redness or swelling at the incision site. Please do not hesitate to call with any other questions.      9. APPOINTMENT: Contact our office at [phone] for a follow-up appointment about 2 weeks following your procedure.      If you have any additional questions, please do not hesitate to call the office and speak to either myself or the physician on call.      Tone Fields MD, [facility]  [address] [place] [address] [address], [facility] [address]  [phone]    DIET: To avoid nausea, slowly advance diet as tolerated, avoiding spicy or greasy foods for the first day.  Add more substantial food to your diet according to your physician's instructions.  Babies can be fed formula or breast milk as soon as they are hungry.  INCREASE FLUIDS AND FIBER TO AVOID CONSTIPATION.    MEDICATIONS: Resume taking daily medication.  Take prescribed pain medication with food.  If no medication is prescribed, you may take non-aspirin pain medication if needed.  PAIN MEDICATION CAN BE VERY CONSTIPATING.  Take a stool softener or laxative such as senokot, pericolace, or milk of magnesia if needed.    Prescription sent to pharmacy.      A follow-up appointment should be arranged with your doctor; call to schedule.    You should CALL YOUR PHYSICIAN if you develop:  Fever greater than 101 degrees F.  Pain not relieved by medication, or persistent nausea or vomiting.  Excessive bleeding (blood soaking through dressing) or unexpected drainage from the wound.  Extreme  redness or swelling around the incision site, drainage of pus or foul smelling drainage.  Inability to urinate or empty your bladder within 8 hours.  Problems with breathing or chest pain.    You should call 911 if you develop problems with breathing or chest pain.  If you are unable to contact your doctor or surgical center, you should go to the nearest emergency room or urgent care center.  Physician's telephone #: [phone]    MILD FLU-LIKE SYMPTOMS ARE NORMAL.  YOU MAY EXPERIENCE GENERALIZED MUSCLE ACHES, THROAT IRRITATION, HEADACHE AND/OR SOME NAUSEA.    If any questions arise, call your doctor.  If your doctor is not available, please feel free to call the Surgical Center at [phone].  The Center is open Monday through Friday from 7AM to 7PM.      A registered nurse may call you a few days after your surgery to see how you are doing after your procedure.    You may also receive a survey in the mail within the next two weeks and we ask that you take a few moments to complete the survey and return it to us.  Our goal is to provide you with very good care and we value your comments.     Depression / Suicide Risk    As you are discharged from this [facility] facility, it is important to learn how to keep safe from harming yourself.    Recognize the warning signs:  Abrupt changes in personality, positive or negative- including increase in energy   Giving away possessions  Change in eating patterns- significant weight changes-  positive or negative  Change in sleeping patterns- unable to sleep or sleeping all the time   Unwillingness or inability to communicate  Depression  Unusual sadness, discouragement and loneliness  Talk of wanting to die  Neglect of personal appearance   Rebelliousness- reckless behavior  Withdrawal from people/activities they love  Confusion- inability to concentrate     If you or a loved one observes any of these behaviors or has concerns about self-harm, here's what you can  do:  Talk about it- your feelings and reasons for harming yourself  Remove any means that you might use to hurt yourself (examples: pills, rope, extension cords, firearm)  Get professional help from the community (Mental Health, Substance Abuse, psychological counseling)  Do not be alone:Call your Safe Contact- someone whom you trust who will be there for you.  Call your local CRISIS HOTLINE 644-9719 or 552-254-3183  Call your local Children's Mobile Crisis Response Team [place] (304) 639-6985 or www.[employer]  Call the toll free National Suicide Prevention Hotlines   National Suicide Prevention Lifeline 558-012-NUSZ (3116)  National Hope Line Network 800-SUICIDE (707-0958)    I acknowledge receipt and understanding of these Home Care instructions.

## 2022-08-10 NOTE — ANESTHESIA POSTPROCEDURE EVALUATION
Patient: Carine Christopher    Procedure Summary     Date: 08/10/22 Room / Location: [facility] OR 09 / SURGERY [facility]    Anesthesia Start: 0841 Anesthesia Stop: 0945    Procedure: RIGHT TEMPORAL ARTERY BIOPSY (Right: Head) Diagnosis: (HEADACHE)    Surgeons: Tone Fields M.D. Responsible Provider: Juan Pablo Le M.D.    Anesthesia Type: general ASA Status: 3          Final Anesthesia Type: general  Last vitals  BP   Blood Pressure : 119/61    Temp   36.3 °C (97.3 °F)    Pulse   91   Resp   16    SpO2   98 %      Anesthesia Post Evaluation    Patient location during evaluation: PACU  Patient participation: complete - patient participated  Level of consciousness: awake and alert  Pain score: 5    Airway patency: patent  Anesthetic complications: no  Cardiovascular status: hemodynamically stable  Respiratory status: acceptable  Hydration status: euvolemic    PONV: none          No notable events documented.     Nurse Pain Score: 10 (NPRS)

## 2022-08-10 NOTE — OR NURSING
Report called to Estela LA. Plan of care discussed. Patient denies pain or nausea. Patient expresses readiness to discharge. Surgical site CDI. VSS.

## 2022-08-10 NOTE — H&P
-------------------------------------------------    General Surgery H&P  -------------------------------------------------------------------------------------------------  Date: 8/10/2022    Surgeon: Tone Fields M.D. - [facility]  -------------------------------------------------------------------------------------------------    HPI:  This is a 71 y.o. female who is presenting today for elective surgery.  No specific complaints at this time. Questions addressed.      Past Medical History:   Diagnosis Date    Arthritis 07/07/2022    hands    Breath shortness 07/07/2022    with exertion    Bronchitis 07/07/2022    chronic broncitis since age 15    CAD (coronary artery disease) 07/07/2022    Cataract 07/07/2022    IOLOU    COVID 07/06/2022    Giant cell arteritis (HCC) 2019    Gynecological disorder 07/07/2022    pt still having monthly menses, on hormones    Heart attack (HCC) 10/2020    taking metoprolol, stents placed times 2    Heart valve disease 07/07/2022    High cholesterol 07/07/2022    non medicated    Hyperlipidemia     Pain 07/07/2022    constant headaches times 3 years    Pneumonia 07/07/2022    walking pneumonia 1980    Urinary incontinence 07/07/2022    at times       Past Surgical History:   Procedure Laterality Date    OTHER CARDIAC SURGERY  2021    stents placed    CATARACT EXTRACTION WITH IOL Bilateral 2019    ABDOMINAL EXPLORATION      BREAST IMPLANT REVISION         Current Facility-Administered Medications   Medication Dose Route Frequency Provider Last Rate Last Admin    lidocaine (XYLOCAINE) 1 % injection 0.5 mL  0.5 mL Intradermal Once PRN Tone Fields M.D.   0.5 mL at 08/10/22 0745    lactated ringers infusion   Intravenous Continuous Tone Fields M.D. 10 mL/hr at 08/10/22 0745 New Bag at 08/10/22 0745       Social History     Socioeconomic History    Marital status:      Spouse name: Not on file    Number of children: Not on file    Years of  "education: Not on file    Highest education level: Not on file   Occupational History    Not on file   Tobacco Use    Smoking status: Never    Smokeless tobacco: Never   Vaping Use    Vaping Use: Never used   Substance and Sexual Activity    Alcohol use: Not Currently     Alcohol/week: 0.6 oz     Types: 1 Glasses of wine per week    Drug use: Never    Sexual activity: Not on file   Other Topics Concern    Not on file   Social History Narrative    Retired- Office manger for [employer]      Social Determinants of Health     Financial Resource Strain: Unknown    Difficulty of Paying Living Expenses: Patient refused   Food Insecurity: No Food Insecurity    Worried About Running Out of Food in the Last Year: Never true    Ran Out of Food in the Last Year: Never true   Transportation Needs: Unknown    Lack of Transportation (Medical): Patient refused    Lack of Transportation (Non-Medical): Patient refused   Physical Activity: Not on file   Stress: Not on file   Social Connections: Not on file   Intimate Partner Violence: Not on file   Housing Stability: Not on file       Family History   Problem Relation Age of Onset    Glasses Father     Heart Disease Father     Hypertension Mother     Kidney Disease Neg Hx        Allergies:  Codeine, Other drug, Atorvastatin, Chloramphenicol, and Sulfa antibiotics [sulfa drugs]    Review of Systems:  Noncontributory except as per HPI    Physical Exam:  BP (!) 146/76   Pulse 78   Temp 36.8 °C (98.3 °F) (Temporal)   Resp 16   Ht 1.651 m (5' 5\")   Wt 65.9 kg (145 lb 4.5 oz)   SpO2 94%     Constitutional: Alert, oriented, no acute distress  HEENT:  Normocephalic and atraumatic, EOMI  Neck:   Supple, no JVD,   Cardiovascular: Regular rate and rhythm,   Pulmonary:  Good air entry bilaterally,    Abdominal:  Soft, non-tender, non-distended     Aortic impulse not widened  Musculoskeletal: No edema, no tenderness  Neurological:  CN II-XII grossly intact, no focal " deficits  Skin:   Skin is warm and dry. No rash noted.  Psychiatric:  Normal mood and affect.      Assessment/Plan:  This is a 71 y.o. female here today for elective surgery: right temporal artery biopsy    I explained the details of the operation, alternatives, and potential risks, including but not limited to bleeding, infection, and risks of anesthesia.  All questions were answered. Patient understands and agrees to proceed.      Tone Fields MD  [facility] (General and Vascular Surgery)  Cell: [phone] (text or call is fine, if you don't reach me please try my office)  Office: [phone]    8/10/2022    8:32 AM  ___________________________________________________________________  Patient:Carine Christopher   MRN:[id number]   [facility]:[id number]

## 2022-08-10 NOTE — OR NURSING
Patient arrived in phase 2 @1038. Patient A&Ox4. Vitals stable. Strong, non-productive cough noted. Able to transfer self without assistance or complication. Patient voided x1. Patient's  in room with her. Dermabond to right temporal surgical site. No complaints of pain or N/V upon arrival to phase 2.     Patient changed into clothing without difficulty. Discharge instructions given. Pain medication education given. Questions answered. Vitals remain stable. No changes to surgical site. PIV removed, bleeding controlled, dressing placed.     Patient discharged @1103.

## 2022-08-10 NOTE — OR NURSING
Patient's  called and provided with update. Patient denies pain or nausea. States all needs are currently met. VSS

## 2022-08-24 ENCOUNTER — DOCUMENTATION (OUTPATIENT)
Dept: OPHTHALMOLOGY | Facility: MEDICAL CENTER | Age: 72
End: 2022-08-24
Payer: MEDICARE

## 2022-08-24 DIAGNOSIS — M31.6 GIANT CELL ARTERITIS (HCC): ICD-10-CM

## 2022-08-24 NOTE — ASSESSMENT & PLAN NOTE
2/3/2021 - Presumed giant cell arteritis by history, symptoms of temporal pain, and response to prednisone. Never had temporal artery biopsy. ESR was 1 back in January, but had been on prednosne at least 10 mg for a few months prior. Dr Banks increased prednisone to 60 and currently on 40 mg / day.  From the neuro-ophthalmic standpoint no evidence of optic nerve invovement with OCT NFL thickness normal at 107 OD and 110 OS. No optic disc pallor and no swelling. In Addition VICKIE was 1:320. Therefore discussed with patient that I recommend being managed by Rheumatology. Would consider Actemra and thus will refer to Dr Michaud in [place]. VICKIE will need to be characterized to determine is has concomitant other autoimmune disorder.   8/25/2021 - Didn't see Dr Michaud. Sept 13th has a telemedicine apt with [facility]. On prednisone 10 mg. Initial symptoms headache and lump on the side of the head. Dx made 10/2020. Long discussion regarding GCA, and monitoring symptoms, ESR and CRP on pred taper. Recommended she have discussion via telemed with Rheum about Actemra. Ordered ESR, CRP and TFT's. No evidence of GCA induced optic neuropathy  8/26/2021 - ESR 4.0, TSH 1.16, CRP 0.34  12/1/2021 - Apparently had relapse of headaches in September and went back to 20 mg prednisone. Then at taper again down to 10 mg last week headache returned and increased to 12.5 mg. She discussed with Rheumatologist in [place] where advised TA biopsy to secure the diagnosis. Would need to find provider here in [place] to rx Actemra. OCT NFL thickness stable at 107 OD and 114 OS and nerves healthy, so no evidence of optic neuropathy. However will recheck ESR and CRP. Also refer to Dr Pelaez for temporal artery biopsy  12/3/2021 - Repeat ESR 4.0, CRP 0.6 slightly increased, but still within normal.  7/27/2022 - Recent MI with subsequent COVID. Self discontinued prednisone, but was on 3 mg. Will repeat ESR and CRP. Had biopsy scheduled august 10  th. Still no evidence of an optic neuropathy or motility disorder secondary to GCA. OCT NFL thickness stable at 106 OD and 106 OS  Addendum CRP 1.73 slightly above normal range  8/24/2022 - Report of Temporal Artery Biopsy by Dr Fields at [facility].  Sows evidence of treated temporal arteritis. Will send report to her immunologist Dr Brandi Hester at [facility]

## 2022-08-24 NOTE — PROGRESS NOTES
2/3/2021 - Presumed giant cell arteritis by history, symptoms of temporal pain, and response to prednisone. Never had temporal artery biopsy. ESR was 1 back in January, but had been on prednosne at least 10 mg for a few months prior. Dr Banks increased prednisone to 60 and currently on 40 mg / day.  From the neuro-ophthalmic standpoint no evidence of optic nerve invovement with OCT NFL thickness normal at 107 OD and 110 OS. No optic disc pallor and no swelling. In Addition VICKIE was 1:320. Therefore discussed with patient that I recommend being managed by Rheumatology. Would consider Actemra and thus will refer to Dr Michaud in [place]. VICKIE will need to be characterized to determine is has concomitant other autoimmune disorder.   8/25/2021 - Didn't see Dr Michaud. Sept 13th has a telemedicine apt with [facility]. On prednisone 10 mg. Initial symptoms headache and lump on the side of the head. Dx made 10/2020. Long discussion regarding GCA, and monitoring symptoms, ESR and CRP on pred taper. Recommended she have discussion via telemed with Rheum about Actemra. Ordered ESR, CRP and TFT's. No evidence of GCA induced optic neuropathy  8/26/2021 - ESR 4.0, TSH 1.16, CRP 0.34  12/1/2021 - Apparently had relapse of headaches in September and went back to 20 mg prednisone. Then at taper again down to 10 mg last week headache returned and increased to 12.5 mg. She discussed with Rheumatologist in [place] where advised TA biopsy to secure the diagnosis. Would need to find provider here in [place] to rx Actemra. OCT NFL thickness stable at 107 OD and 114 OS and nerves healthy, so no evidence of optic neuropathy. However will recheck ESR and CRP. Also refer to Dr Pelaez for temporal artery biopsy  12/3/2021 - Repeat ESR 4.0, CRP 0.6 slightly increased, but still within normal.  7/27/2022 - Recent MI with subsequent COVID. Self discontinued prednisone, but was on 3 mg. Will repeat ESR and CRP. Had biopsy scheduled august 10  th. Still no evidence of an optic neuropathy or motility disorder secondary to GCA. OCT NFL thickness stable at 106 OD and 106 OS  Addendum CRP 1.73 slightly above normal range  8/24/2022 - Report of Temporal Artery Biopsy by Dr Fields at [facility].  Sows evidence of treated temporal arteritis. Will send report to her immunologist Dr Brandi Hester at [place]

## 2022-08-26 ENCOUNTER — TELEPHONE (OUTPATIENT)
Dept: OPHTHALMOLOGY | Facility: MEDICAL CENTER | Age: 72
End: 2022-08-26

## 2022-10-18 NOTE — CARE PLAN
Problem: Knowledge Deficit  Goal: Knowledge of disease process/condition, treatment plan, diagnostic tests, and medications will improve  Outcome: PROGRESSING AS EXPECTED  Intervention: Assess knowledge level of disease process/condition, treatment plan, diagnostic tests, and medications  Note: Provided patient with a verbal discussion related to POC. Discussed plan for D/C of hema-band. Patient educated on purpose of ASA and Plavix. Patient verbalized understanding and has no questions or concerns at this time.       Problem: Pain Management  Goal: Pain level will decrease to patient's comfort goal  Outcome: PROGRESSING AS EXPECTED  Intervention: Follow pain managment plan developed in collaboration with patient and Interdisciplinary Team  Note: Educated patient about 1-10 pain scale, regular pain assessments, and available pharmaciological and non-pharmacological pain relief.  . Patient expressed understanding and had no further questions at this time.         CHIEF COMPLAINT:    Denis Bunn is a 67 y.o. male presents today for Post RALP ED.     HISTORY OF PRESENTING ILLINESS:    Denis Bunn is a 67 y.o. male with Prostate Cancer; s/p RALP on 09/02/2020 with Dr. White.   S/p XRT completed 10/27/2021.   Last PSA was <0.01 07/2022  He has good bladder control.  He c/o ED since surgery. No issues before.   Tried and failed the oral agents.   Last clinic visit was 10/03/2022.     Here today for ICI education and 1st injection.   He brought in his own medication from [facility]        REVIEW OF SYSTEMS:  Review of Systems   Constitutional: Negative.  Negative for chills and fever.   Eyes:  Negative for double vision.   Respiratory:  Negative for cough and shortness of breath.    Cardiovascular:  Negative for chest pain.   Gastrointestinal:  Negative for abdominal pain, constipation, diarrhea, nausea and vomiting.   Genitourinary: Negative.  Negative for dysuria, flank pain and hematuria.        Good bladder control     Neurological:  Negative for dizziness and seizures.   Endo/Heme/Allergies:  Negative for polydipsia.       PATIENT HISTORY:    Past Medical History:   Diagnosis Date    Anxiety     Back pain     Cataract     Constipation - functional     Dyslipidemia     ED (erectile dysfunction)     FH: CAD (coronary artery disease) 4/24/2013    But he had 0 CAC    History of gout     HTN (hypertension), benign 04/24/2013    Personal history of colonic polyps     Prostate cancer     Vision changes        Past Surgical History:   Procedure Laterality Date    BACK SURGERY      CATARACT EXTRACTION      left eye    COLONOSCOPY N/A 7/29/2019    Procedure: COLONOSCOPY;  Surgeon: Mingo Freeman MD;  Location: [facility];  Service: Endoscopy;  Laterality: N/A;  due July 2019    ELBOW SURGERY      scope/right    EYE SURGERY      Dr. Hope.retina x2 left    LAMINECTOMY      ROBOT-ASSISTED LAPAROSCOPIC PROSTATECTOMY N/A 9/2/2020    Procedure: ROBOTIC PROSTATECTOMY;   Surgeon: Edson White MD;  Location: [address];  Service: Urology;  Laterality: N/A;  3hrs gen with regional    WRIST SURGERY      right       Family History   Problem Relation Age of Onset    Blindness Mother         from cva    Cataracts Mother     Cataracts Father     Cancer Father     No Known Problems Sister     No Known Problems Brother     No Known Problems Maternal Aunt     No Known Problems Maternal Uncle     No Known Problems Paternal Aunt     No Known Problems Paternal Uncle     No Known Problems Maternal Grandmother     No Known Problems Maternal Grandfather     No Known Problems Paternal Grandmother     No Known Problems Paternal Grandfather     Amblyopia Neg Hx     Diabetes Neg Hx     Glaucoma Neg Hx     Hypertension Neg Hx     Macular degeneration Neg Hx     Retinal detachment Neg Hx     Strabismus Neg Hx     Stroke Neg Hx     Thyroid disease Neg Hx        Social History     Socioeconomic History    Marital status:    Tobacco Use    Smoking status: Never    Smokeless tobacco: Never    Tobacco comments:     , no kids.  Self employed.   Substance and Sexual Activity    Alcohol use: Yes     Alcohol/week: 3.0 standard drinks     Types: 3 Glasses of wine per week     Comment: wine daily    Drug use: No    Sexual activity: Yes     Partners: Female     Social Determinants of Health     Financial Resource Strain: Low Risk     Difficulty of Paying Living Expenses: Not hard at all   Food Insecurity: No Food Insecurity    Worried About Running Out of Food in the Last Year: Never true    Ran Out of Food in the Last Year: Never true   Transportation Needs: No Transportation Needs    Lack of Transportation (Medical): No    Lack of Transportation (Non-Medical): No   Physical Activity: Insufficiently Active    Days of Exercise per Week: 3 days    Minutes of Exercise per Session: 30 min   Stress: Stress Concern Present    Feeling of Stress : To some extent   Social Connections: Unknown     Frequency of Communication with Friends and Family: More than three times a week    Frequency of Social Gatherings with Friends and Family: Twice a week    Active Member of Clubs or Organizations: No    Attends Club or Organization Meetings: Patient refused    Marital Status:    Housing Stability: Low Risk     Unable to Pay for Housing in the Last Year: No    Number of Places Lived in the Last Year: 1    Unstable Housing in the Last Year: No       Allergies:  Amoxicillin-pot clavulanate and Celecoxib    Medications:    Current Outpatient Medications:     allopurinoL (ZYLOPRIM) 100 MG tablet, TAKE 1 TABLET (100 MG TOTAL) BY MOUTH 2 (TWO) TIMES DAILY., Disp: 180 tablet, Rfl: 3    ascorbic acid, vitamin C, (VITAMIN C) 500 MG tablet, Take 500 mg by mouth once daily. Hold for 1 week prior to surgery, Disp: , Rfl:     coenzyme Q10 100 mg capsule, Take 100 mg by mouth once daily. Hold for 1 week prior to surgery, Disp: , Rfl:     ergocalciferol, vitamin D2, (VITAMIN D ORAL), Take by mouth. Hold for 1 week prior to surgery, Disp: , Rfl:     ferrous sulfate (SLOW RELEASE IRON) 142 mg (45 mg iron) TbSR, Take by mouth., Disp: , Rfl:     gabapentin (NEURONTIN) 300 MG capsule, TAKE 1 CAPSULE BY MOUTH EVERY EVENING, Disp: 90 capsule, Rfl: 1    garlic 1,000 mg Cap, Take by mouth. Hold for 1 week prior to surgery, Disp: , Rfl:     losartan (COZAAR) 100 MG tablet, Take 1 tablet (100 mg total) by mouth once daily., Disp: 90 tablet, Rfl: 3    magnesium 250 mg Tab, Take 250 mg by mouth once. Takes two 250 mg tablets daily  Hold for 1 week prior to surgery, Disp: , Rfl:     omega-3 fatty acids 1,000 mg Cap, Take 1,000 mg by mouth., Disp: , Rfl:     pravastatin (PRAVACHOL) 20 MG tablet, TAKE 1 TABLET(20 MG) BY MOUTH EVERY DAY, Disp: 90 tablet, Rfl: 3    verapamiL (CALAN-SR) 120 MG CR tablet, TAKE 1 TABLET (120 MG TOTAL) BY MOUTH EVERY EVENING., Disp: 90 tablet, Rfl: 3    verapamiL (VERELAN) 240 MG C24P, Take 1 capsule (240 mg  total) by mouth every morning., Disp: 90 capsule, Rfl: 3    zinc 50 mg Tab, Take by mouth. Hold for 1 week prior to surgery, Disp: , Rfl:     ZIOPTAN, PF, 0.0015 % Dpet, Place 1 drop into both eyes nightly., Disp: , Rfl:     ALPRAZolam (XANAX) 0.5 MG tablet, Take 1 tablet (0.5 mg total) by mouth 2 (two) times daily as needed for Anxiety., Disp: 30 tablet, Rfl: 1    fluticasone propionate (FLONASE) 50 mcg/actuation nasal spray, USE 1 SPRAY INTO EACH NOSTRIL TWICE A DAY (Patient not taking: Reported on 10/18/2022), Disp: 48 mL, Rfl: 2    hydrocortisone 2.5 % cream, APPLY TO AFFECTED AREA TWICE A DAY (Patient not taking: Reported on 10/18/2022), Disp: 28.35 g, Rfl: 1    papaverine 30 mg/mL injection, Add Phentolamine 1 mg/cc Add PGE1 10 mcg/cc  SIG: Bring to office for test dose in physician office (Patient not taking: Reported on 10/18/2022), Disp: 5 mL, Rfl: 0    PHYSICAL EXAMINATION:  Physical Exam  Vitals and nursing note reviewed.   Constitutional:       General: He is awake.      Appearance: Normal appearance.   HENT:      Head: Normocephalic.      Right Ear: External ear normal.      Left Ear: External ear normal.      Nose: Nose normal.   Cardiovascular:      Rate and Rhythm: Normal rate.   Pulmonary:      Effort: Pulmonary effort is normal. No respiratory distress.   Abdominal:      Tenderness: There is no abdominal tenderness. There is no right CVA tenderness or left CVA tenderness.   Genitourinary:     Penis: Normal.       Testes: Normal.      Comments: Prostate is surgically absent    Musculoskeletal:         General: Normal range of motion.      Cervical back: Normal range of motion.   Skin:     General: Skin is warm and dry.   Neurological:      General: No focal deficit present.      Mental Status: He is alert and oriented to person, place, and time.   Psychiatric:         Mood and Affect: Mood normal.         Behavior: Behavior is cooperative.         LABS:        Lab Results   Component Value Date     PSA 3.8 05/29/2019    PSA 3.1 11/09/2018    PSA 3.6 06/11/2018    PSADIAG <0.01 07/21/2022    PSADIAG <0.01 12/22/2021    PSADIAG <0.01 11/08/2021             IMPRESSION:    Encounter Diagnoses   Name Primary?    Prostate cancer Yes    Erectile dysfunction following radical prostatectomy     History of external beam radiation therapy          Assessment:       1. Prostate cancer    2. Erectile dysfunction following radical prostatectomy    3. History of external beam radiation therapy        Plan:       I spent 60 minutes with the patient. Over 50% of the visit was spent in counseling.    We discussed ED and the contributing factors. We reviewed his personal factors that contribute to ED. Patient was educated on ED treatments. We discussed PDE-5 inhibitors, PRAVIN, Muse, and IPP.    He is here today for the Intracorporal injection/ICI education and first injection.  Educational materials were supplied.    ICI is an injectable medication that consists of three different medications to produce an erection. It is known as a Trimix Compound or PEP. The medication is a compound medication and is only mixed up at certain pharmacies known as a compound pharmacy. The medication has to be stored in the refrigerator. Improper storage decreases the effectiveness of the medication.     He was educated that it is an injection. With any injection there is risk for possible pain at the injection site as well as risk for an infection. Clean technique must be followed to help prevent infection. Proper needle disposable is necessary. He voices understanding.    The injection is best given when standing up and the penis is positioned perpendicular to the body. The urethral opening should be facing away from the body. Injection is always given on the lateral or side of the penis. Never give the injection to the top of the penis to avoid possible trauma to arteries and veins. Never give the injection to the bottom of the penis to avoid  trauma to the urethra. He should alternate the injection sites to avoid the build up of scar tissue due to multiple injections.    This medication can increase the risk of erections lasting longer than 4 hours. This is known as a priapism and is a medical emergency. This requires immediate medical attention. This is main reason we give the first dose in the office today. We start at low dose and see how well the patients reacts. We can increase the medication if needed depending on the reaction the patient receives today. We discussed the fine line between enough medication for penetration and too much leading to a priapism. He voices understanding.    Correctly janneth up the initial dose 15 units and he injected himself in the right lateral aspect of the penis. Within 10 minutes, he achieved a good filling but not firm enough for penetration. No pain noted.  30 minutes later some detumescence noted. He was pleased with initial results    He was instructed to keep the dosage at 20units. If noticing a decrease in reaction he can increase the dosage by 5 units. He may also refill the Rx.   Refills were sent to [facility].    If have any questions, please give me a call. Educational materials were given to patient and all questions were answered. He voiced understanding and left the office without a priapism. Follow up 3 months

## 2022-11-09 ENCOUNTER — PATIENT MESSAGE (OUTPATIENT)
Dept: HEALTH INFORMATION MANAGEMENT | Facility: OTHER | Age: 72
End: 2022-11-09

## 2022-12-16 ENCOUNTER — HOSPITAL ENCOUNTER (OUTPATIENT)
Dept: LAB | Facility: MEDICAL CENTER | Age: 72
End: 2022-12-16
Attending: FAMILY MEDICINE
Payer: MEDICARE

## 2022-12-16 LAB
ALBUMIN SERPL BCP-MCNC: 4.3 G/DL (ref 3.2–4.9)
ALBUMIN/GLOB SERPL: 1.5 G/DL
ALP SERPL-CCNC: 77 U/L (ref 30–99)
ALT SERPL-CCNC: 10 U/L (ref 2–50)
ANION GAP SERPL CALC-SCNC: 12 MMOL/L (ref 7–16)
AST SERPL-CCNC: 19 U/L (ref 12–45)
BILIRUB SERPL-MCNC: <0.2 MG/DL (ref 0.1–1.5)
BUN SERPL-MCNC: 24 MG/DL (ref 8–22)
CALCIUM ALBUM COR SERPL-MCNC: 8.9 MG/DL (ref 8.5–10.5)
CALCIUM SERPL-MCNC: 9.1 MG/DL (ref 8.5–10.5)
CHLORIDE SERPL-SCNC: 108 MMOL/L (ref 96–112)
CO2 SERPL-SCNC: 23 MMOL/L (ref 20–33)
CREAT SERPL-MCNC: 0.69 MG/DL (ref 0.5–1.4)
GFR SERPLBLD CREATININE-BSD FMLA CKD-EPI: 91 ML/MIN/1.73 M 2
GLOBULIN SER CALC-MCNC: 2.8 G/DL (ref 1.9–3.5)
GLUCOSE SERPL-MCNC: 87 MG/DL (ref 65–99)
POTASSIUM SERPL-SCNC: 4.3 MMOL/L (ref 3.6–5.5)
PROT SERPL-MCNC: 7.1 G/DL (ref 6–8.2)
SODIUM SERPL-SCNC: 143 MMOL/L (ref 135–145)

## 2022-12-16 PROCEDURE — 80053 COMPREHEN METABOLIC PANEL: CPT

## 2022-12-16 PROCEDURE — 36415 COLL VENOUS BLD VENIPUNCTURE: CPT

## 2022-12-22 ENCOUNTER — HOSPITAL ENCOUNTER (OUTPATIENT)
Dept: RADIOLOGY | Facility: MEDICAL CENTER | Age: 72
End: 2022-12-22
Attending: FAMILY MEDICINE
Payer: MEDICARE

## 2022-12-22 DIAGNOSIS — R22.0 LOCALIZED SWELLING, MASS AND LUMP, HEAD: ICD-10-CM

## 2022-12-22 PROCEDURE — 70460 CT HEAD/BRAIN W/DYE: CPT

## 2022-12-22 PROCEDURE — 700117 HCHG RX CONTRAST REV CODE 255: Performed by: FAMILY MEDICINE

## 2022-12-22 RX ADMIN — IOHEXOL 50 ML: 350 INJECTION, SOLUTION INTRAVENOUS at 16:03

## 2023-01-04 ENCOUNTER — OFFICE VISIT (OUTPATIENT)
Dept: OPHTHALMOLOGY | Facility: MEDICAL CENTER | Age: 73
End: 2023-01-04
Payer: MEDICARE

## 2023-01-04 ENCOUNTER — HOSPITAL ENCOUNTER (OUTPATIENT)
Dept: LAB | Facility: MEDICAL CENTER | Age: 73
End: 2023-01-04
Attending: OPHTHALMOLOGY
Payer: MEDICARE

## 2023-01-04 DIAGNOSIS — H49.9 OPHTHALMOPLEGIA: ICD-10-CM

## 2023-01-04 DIAGNOSIS — M31.6 GIANT CELL ARTERITIS (HCC): ICD-10-CM

## 2023-01-04 DIAGNOSIS — H46.9 OPTIC NEUROPATHY: ICD-10-CM

## 2023-01-04 DIAGNOSIS — H35.371 EPIRETINAL MEMBRANE (ERM) OF RIGHT EYE: ICD-10-CM

## 2023-01-04 LAB
BASOPHILS # BLD AUTO: 0.7 % (ref 0–1.8)
BASOPHILS # BLD: 0.06 K/UL (ref 0–0.12)
CRP SERPL HS-MCNC: 0.75 MG/DL (ref 0–0.75)
EOSINOPHIL # BLD AUTO: 0.16 K/UL (ref 0–0.51)
EOSINOPHIL NFR BLD: 1.9 % (ref 0–6.9)
ERYTHROCYTE [DISTWIDTH] IN BLOOD BY AUTOMATED COUNT: 46.4 FL (ref 35.9–50)
ERYTHROCYTE [SEDIMENTATION RATE] IN BLOOD BY WESTERGREN METHOD: 6 MM/HOUR (ref 0–25)
HCT VFR BLD AUTO: 40.7 % (ref 37–47)
HGB BLD-MCNC: 12.8 G/DL (ref 12–16)
IMM GRANULOCYTES # BLD AUTO: 0.03 K/UL (ref 0–0.11)
IMM GRANULOCYTES NFR BLD AUTO: 0.4 % (ref 0–0.9)
LYMPHOCYTES # BLD AUTO: 1.86 K/UL (ref 1–4.8)
LYMPHOCYTES NFR BLD: 22.2 % (ref 22–41)
MCH RBC QN AUTO: 28.4 PG (ref 27–33)
MCHC RBC AUTO-ENTMCNC: 31.4 G/DL (ref 33.6–35)
MCV RBC AUTO: 90.2 FL (ref 81.4–97.8)
MONOCYTES # BLD AUTO: 0.56 K/UL (ref 0–0.85)
MONOCYTES NFR BLD AUTO: 6.7 % (ref 0–13.4)
NEUTROPHILS # BLD AUTO: 5.69 K/UL (ref 2–7.15)
NEUTROPHILS NFR BLD: 68.1 % (ref 44–72)
NRBC # BLD AUTO: 0 K/UL
NRBC BLD-RTO: 0 /100 WBC
PLATELET # BLD AUTO: 247 K/UL (ref 164–446)
PMV BLD AUTO: 11.2 FL (ref 9–12.9)
RBC # BLD AUTO: 4.51 M/UL (ref 4.2–5.4)
WBC # BLD AUTO: 8.4 K/UL (ref 4.8–10.8)

## 2023-01-04 PROCEDURE — 92250 FUNDUS PHOTOGRAPHY W/I&R: CPT | Performed by: OPHTHALMOLOGY

## 2023-01-04 PROCEDURE — 99214 OFFICE O/P EST MOD 30 MIN: CPT | Mod: 25 | Performed by: OPHTHALMOLOGY

## 2023-01-04 PROCEDURE — 92060 SENSORIMOTOR EXAMINATION: CPT | Performed by: OPHTHALMOLOGY

## 2023-01-04 PROCEDURE — 36415 COLL VENOUS BLD VENIPUNCTURE: CPT

## 2023-01-04 PROCEDURE — 86140 C-REACTIVE PROTEIN: CPT

## 2023-01-04 PROCEDURE — 85025 COMPLETE CBC W/AUTO DIFF WBC: CPT

## 2023-01-04 PROCEDURE — 85652 RBC SED RATE AUTOMATED: CPT

## 2023-01-04 ASSESSMENT — SLIT LAMP EXAM - LIDS
COMMENTS: NORMAL
COMMENTS: NORMAL

## 2023-01-04 ASSESSMENT — EXTERNAL EXAM - RIGHT EYE: OD_EXAM: NORMAL

## 2023-01-04 ASSESSMENT — REFRACTION_WEARINGRX
OS_HPRISM: 2.0
OS_AXIS: 159
OD_VPRISM: 2.0
OS_CYLINDER: +0.75
OD_VBASE: UP
OS_HBASE: IN
OD_ADD: +2.75
OS_ADD: +2.75
OD_SPHERE: +0.25
OS_SPHERE: -1.50
OD_CYLINDER: SPHERE

## 2023-01-04 ASSESSMENT — REFRACTION_MANIFEST
OS_CYLINDER: +1.00
OS_AXIS: 156
OD_AXIS: 071
OD_CYLINDER: +0.25
OD_SPHERE: +0.25
METHOD_AUTOREFRACTION: 1
OS_SPHERE: -1.25

## 2023-01-04 ASSESSMENT — ENCOUNTER SYMPTOMS
HEADACHES: 1
BLURRED VISION: 1
DIZZINESS: 1
EYE DISCHARGE: 1
DOUBLE VISION: 1

## 2023-01-04 ASSESSMENT — CUP TO DISC RATIO
OS_RATIO: 0.1
OD_RATIO: 0.1

## 2023-01-04 ASSESSMENT — VISUAL ACUITY
OS_SC: 20/20
METHOD: SNELLEN - LINEAR
OS_SC+: -1
OD_SC: 20/40

## 2023-01-04 ASSESSMENT — TONOMETRY
OD_IOP_MMHG: 10
IOP_METHOD: I-CARE
OS_IOP_MMHG: 10

## 2023-01-04 ASSESSMENT — CONF VISUAL FIELD
OD_NORMAL: 1
OS_INFERIOR_TEMPORAL_RESTRICTION: 0
OD_INFERIOR_NASAL_RESTRICTION: 0
OD_SUPERIOR_TEMPORAL_RESTRICTION: 0
OS_SUPERIOR_NASAL_RESTRICTION: 0
OS_INFERIOR_NASAL_RESTRICTION: 0
OD_INFERIOR_TEMPORAL_RESTRICTION: 0
OS_SUPERIOR_TEMPORAL_RESTRICTION: 0
OD_SUPERIOR_NASAL_RESTRICTION: 0
OS_NORMAL: 1

## 2023-01-04 ASSESSMENT — EXTERNAL EXAM - LEFT EYE: OS_EXAM: NORMAL

## 2023-01-04 NOTE — ASSESSMENT & PLAN NOTE
2/3/2021 - Epiretinal membrane seen on 5-line OCT. Discussed that since vision still 20/20 would hold on referral unless becomes more symptomatic  12/1/2021 - OCT 5-line no significant change and vision still good  7/27/2022 - ERM stable on 5-line  1/4/2023 -OCT shows improvement of ERM OD.  Vision stable.

## 2023-01-04 NOTE — ASSESSMENT & PLAN NOTE
8/25/2021 - small RHypo and XT. 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 However difficulty fusion secondary to the aniseikonia form the monovision as well as the ERM OD. In Pat did better with a 2 base up OD and 2 base in OS. However still having some asthenopic findings secondary to the ERM. Discussed trial of RX with prisms and bifocal and correcting for the monovision.   12/1/2021 - ortho with new glasses, but not wearing because of nose bleed. No progression of the misalignment  7/27/2022 - continue current rx with prism  1/4/2023 -overall motility appears stable.  However he does not like to wear prism glasses because of the same irritating her temple on the right.

## 2023-01-04 NOTE — Clinical Note
Tab, can you please review my note and possible see this patient. I am concerned that she is having the acute exacerbation of GCA. Thanks

## 2023-01-04 NOTE — PROGRESS NOTES
Peds/Neuro Ophthalmology:   Jon Wilde M.D.    Date & Time note created:    1/4/2023   12:22 PM     Referring MD / APRN:  Khai Deras M.D., No att. providers found    Patient ID:  Name:             Carine Christopher   YOB: 1950  Age:                 72 y.o.  female   MRN:               [id number]    Chief Complaint/Reason for Visit:     Giant Cell Arteritis (5 month follow up for both eyes)      History of Present Illness:    Carine Christopher is a 72 y.o. female   5 month F/u for Giant cell arteritis and Ophthalmoplegia in both eyes. Pt states vision is still blurry. Has double vision all the time. When she gets up both eyes are swollen and has dry discharge. She uses OTC eye drops as needed. Pt gets headaches everyday uses medication daily to keep it from affect him.       Review of Systems:  Review of Systems   Eyes:  Positive for blurred vision, double vision and discharge.        Swollen eye OU   Neurological:  Positive for dizziness and headaches.   All other systems reviewed and are negative.    Past Medical History:   Past Medical History:   Diagnosis Date    Arthritis 07/07/2022    hands    Breath shortness 07/07/2022    with exertion    Bronchitis 07/07/2022    chronic broncitis since age 15    CAD (coronary artery disease) 07/07/2022    Cataract 07/07/2022    IOLOU    COVID 07/06/2022    Giant cell arteritis (HCC) 2019    Gynecological disorder 07/07/2022    pt still having monthly menses, on hormones    Head ache     Heart attack (HCC) 10/2020    taking metoprolol, stents placed times 2    Heart valve disease 07/07/2022    High cholesterol 07/07/2022    non medicated    Hyperlipidemia     Hypertension     Pain 07/07/2022    constant headaches times 3 years    Pneumonia 07/07/2022    walking pneumonia 1980    Urinary incontinence 07/07/2022    at times       Past Surgical History:  Past Surgical History:   Procedure Laterality Date    CA  TEMPORAL ARTERY LIGATN OR BX Right 8/10/2022    Procedure: RIGHT TEMPORAL ARTERY BIOPSY;  Surgeon: Tone Fields M.D.;  Location: SURGERY [facility];  Service: Vascular    OTHER CARDIAC SURGERY      stents placed    CATARACT EXTRACTION WITH IOL Bilateral     ABDOMINAL EXPLORATION      BREAST IMPLANT REVISION         Current Outpatient Medications:  Current Outpatient Medications   Medication Sig Dispense Refill    ibuprofen (MOTRIN) 800 MG Tab Take 800 mg by mouth every 8 hours as needed.      Non Formulary Request Take 1 Tablet by mouth every day. Relief Factor.  Fish oil supplement      PREMPHASE 0.625-5 MG Tab Take 1 Tablet by mouth every evening.      azelastine (ASTELIN) 137 MCG/SPRAY nasal spray Administer 2 Sprays into affected nostril(S) as needed.      nitroglycerin (NITROSTAT) 0.4 MG SL Tab Place 1 tablet under the tongue as needed for Chest Pain. 30 tablet 3    Ascorbic Acid (VITAMIN C PO) Take 1 tablet by mouth every day.      LUTEIN PO Take 20 mg by mouth every day.      Ybcorx-SlUmu-BcIdo-FA-CA-Omega (COMPLETE [place] DHA) 29-1-200 & 250 MG Misc Take 2 Tablets by mouth every day.      Cholecalciferol (VITAMIN D3) 50 MCG (2000 UT) Tab Take 2,000 Units by mouth every day at 6 PM.      aspirin 81 MG EC tablet Take 81 mg by mouth every evening.      HYDROcodone-acetaminophen (NORCO) 5-325 MG Tab per tablet Take 1 Tablet by mouth 2 times a day as needed. Indications: Pain (Patient not taking: Reported on 2023)      acetaminophen (TYLENOL) 500 MG Tab Take 1,000 mg by mouth 2 times a day as needed. Indications: Pain (Patient not taking: Reported on 2023)      metoprolol SR (TOPROL XL) 25 MG TABLET SR 24 HR Take 1 tablet by mouth every evening. (Patient not taking: Reported on 2023) 90 tablet 3     No current facility-administered medications for this visit.       Allergies:  Allergies   Allergen Reactions    Codeine Vomiting and Nausea    Other Drug Unspecified     States that most  antibiotics cause yellow stools.    Atorvastatin Unspecified     Caused bruising general body, pt doesn't want to take    Chloramphenicol Diarrhea     PCN is okay to take    Sulfa Antibiotics [Sulfa Drugs] Unspecified     Can't remember reaction       Family History:  Family History   Problem Relation Age of Onset    Glasses Father     Heart Disease Father     Hypertension Mother     Kidney Disease Neg Hx        Social History:  Social History     Socioeconomic History    Marital status:      Spouse name: Not on file    Number of children: Not on file    Years of education: Not on file    Highest education level: Not on file   Occupational History    Not on file   Tobacco Use    Smoking status: Never    Smokeless tobacco: Never   Vaping Use    Vaping Use: Never used   Substance and Sexual Activity    Alcohol use: Not Currently     Alcohol/week: 0.6 oz     Types: 1 Glasses of wine per week    Drug use: Never    Sexual activity: Not on file   Other Topics Concern    Not on file   Social History Narrative    Retired- Office manger for [employer]      Social Determinants of Health     Financial Resource Strain: Unknown    Difficulty of Paying Living Expenses: Patient refused   Food Insecurity: No Food Insecurity    Worried About Running Out of Food in the Last Year: Never true    Ran Out of Food in the Last Year: Never true   Transportation Needs: Unknown    Lack of Transportation (Medical): Patient refused    Lack of Transportation (Non-Medical): Patient refused   Physical Activity: Not on file   Stress: Not on file   Social Connections: Not on file   Intimate Partner Violence: Not on file   Housing Stability: Not on file          Physical Exam:  Physical Exam    Oriented x 3  Weight/BMI: There is no height or weight on file to calculate BMI.  There were no vitals taken for this visit.    Base Eye Exam       Visual Acuity (Snellen - Linear)         Right Left    Dist sc 20/40 20/20 -1               Tonometry (i-care, 10:01 AM)         Right Left    Pressure 10 10              Pupils         Pupils    Right PERRL    Left PERRL              Visual Fields         Right Left     Full Full              Neuro/Psych       Oriented x3: Yes    Mood/Affect: Normal                  Strabismus Exam       Reading #1   (Edited by: Jon Wilde M.D.)      Correction: sc    Observations: Ortho      Distance Near Near +3DS N Bifocals                      0 0 0   0 0 0                       0  0  ET 2 0  0            LHypoT 2           0 0 0   0 0 0                           Reading #2   (Edited by: Jon Wilde M.D.)      Observations: Ortho      Distance Near Near +3DS N Bifocals                            Reading #3   (Edited by: Jon Wilde M.D.)      Observations: Ortho      Distance Near Near +3DS N Bifocals                                Slit Lamp and Fundus Exam       External Exam         Right Left    External Normal Normal              Slit Lamp Exam         Right Left    Lids/Lashes Normal Normal    Conjunctiva/Sclera White and quiet White and quiet    Cornea Clear Clear    Anterior Chamber Deep and quiet Deep and quiet    Iris Round and reactive Round and reactive    Lens Posterior chamber intraocular lens Posterior chamber intraocular lens    Vitreous Normal Normal              Fundus Exam         Right Left    Disc Normal Normal    C/D Ratio 0.1 0.1    Macula Epiretinal membrane Normal    Vessels Normal Normal    Periphery Normal Normal                  Refraction       Wearing Rx         Sphere Cylinder Axis Add Horz Prism Vert Prism    Right +0.25 Sphere  +2.75  2.0 up    Left -1.50 +0.75 159 +2.75 2.0 in               Manifest Refraction (Auto)         Sphere Cylinder Axis    Right +0.25 +0.25 071    Left -1.25 +1.00 156                    Pertinent Lab/Test/Imaging Review:      Assessment and Plan:     Giant cell arteritis (HCC)  2/3/2021 - Presumed giant cell arteritis by  history, symptoms of temporal pain, and response to prednisone. Never had temporal artery biopsy. ESR was 1 back in January, but had been on prednosne at least 10 mg for a few months prior. Dr Banks increased prednisone to 60 and currently on 40 mg / day.  From the neuro-ophthalmic standpoint no evidence of optic nerve invovement with OCT NFL thickness normal at 107 OD and 110 OS. No optic disc pallor and no swelling. In Addition VICKIE was 1:320. Therefore discussed with patient that I recommend being managed by Rheumatology. Would consider Actemra and thus will refer to Dr Michaud in [place]. VICKIE will need to be characterized to determine is has concomitant other autoimmune disorder.   8/25/2021 - Didn't see Dr Michaud. Sept 13th has a telemedicine apt with [facility]. On prednisone 10 mg. Initial symptoms headache and lump on the side of the head. Dx made 10/2020. Long discussion regarding GCA, and monitoring symptoms, ESR and CRP on pred taper. Recommended she have discussion via telemed with Rheum about Actemra. Ordered ESR, CRP and TFT's. No evidence of GCA induced optic neuropathy  8/26/2021 - ESR 4.0, TSH 1.16, CRP 0.34  12/1/2021 - Apparently had relapse of headaches in September and went back to 20 mg prednisone. Then at taper again down to 10 mg last week headache returned and increased to 12.5 mg. She discussed with Rheumatologist in [place] where advised TA biopsy to secure the diagnosis. Would need to find provider here in [place] to rx Actemra. OCT NFL thickness stable at 107 OD and 114 OS and nerves healthy, so no evidence of optic neuropathy. However will recheck ESR and CRP. Also refer to Dr Pelaez for temporal artery biopsy  12/3/2021 - Repeat ESR 4.0, CRP 0.6 slightly increased, but still within normal.  7/27/2022 - Recent MI with subsequent COVID. Self discontinued prednisone, but was on 3 mg. Will repeat ESR and CRP. Had biopsy scheduled august 10 th. Still no evidence of an optic neuropathy  or motility disorder secondary to GCA. OCT NFL thickness stable at 106 OD and 106 OS  Addendum CRP 1.73 slightly above normal range  8/24/2022 - Report of Temporal Artery Biopsy by Dr Fields at [facility]. Shows evidence of treated temporal arteritis. Will send report to her immunologist Dr Brandi Hester at [place]  1/4/2023 -has now been off prednisone since the biopsy in July.  However states that her temporal headaches have started to come back as well as some jaw discomfort on the right.  She Was History of Temporal and Facial Headaches That Improved in the past with Botox but Her Current Return of Headaches Is More Similar to the Ones That Caused a Lump in the Region of Her Temporal Artery.  She Also Has Some Other Cranial Lumps and a CT Scan Done with Contrast by Her Primary Was Nonrevealing.  Is Therefore concerning That Her Headaches Have Worsened.  I am therefore repeating her sed rate CRP, and also sending her as a consultation to rheumatology here at [facility].  I discussed that if her CRP and sedimentation rate are starting to increase that this would be consistent with a return of giant cell arteritis.  At this point there is no neuro ophthalmologic manifestations, but I did stress to her that if she were to have any change in her vision to call immediately or present to the emergency room    Ophthalmoplegia  8/25/2021 - small RHypo and XT. 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 However difficulty fusion secondary to the aniseikonia form the monovision as well as the ERM OD. In Pat did better with a 2 base up OD and 2 base in OS. However still having some asthenopic findings secondary to the ERM. Discussed trial of RX with prisms and bifocal and correcting for the monovision.   12/1/2021 - ortho with new glasses, but not wearing because of nose bleed. No progression of the misalignment  7/27/2022 - continue current rx with  prism  1/4/2023 -overall motility appears stable.  However he does not like to wear prism glasses because of the same irritating her temple on the right.    Epiretinal membrane (ERM) of right eye  2/3/2021 - Epiretinal membrane seen on 5-line OCT. Discussed that since vision still 20/20 would hold on referral unless becomes more symptomatic  12/1/2021 - OCT 5-line no significant change and vision still good  7/27/2022 - ERM stable on 5-line  1/4/2023 -OCT shows improvement of ERM OD.  Vision stable.        Jon Wilde M.D.

## 2023-01-04 NOTE — ASSESSMENT & PLAN NOTE
2/3/2021 - Presumed giant cell arteritis by history, symptoms of temporal pain, and response to prednisone. Never had temporal artery biopsy. ESR was 1 back in January, but had been on prednosne at least 10 mg for a few months prior. Dr Banks increased prednisone to 60 and currently on 40 mg / day.  From the neuro-ophthalmic standpoint no evidence of optic nerve invovement with OCT NFL thickness normal at 107 OD and 110 OS. No optic disc pallor and no swelling. In Addition VICKIE was 1:320. Therefore discussed with patient that I recommend being managed by Rheumatology. Would consider Actemra and thus will refer to Dr Michaud in [place]. VICKIE will need to be characterized to determine is has concomitant other autoimmune disorder.   8/25/2021 - Didn't see Dr Michaud. Sept 13th has a telemedicine apt with [place]. On prednisone 10 mg. Initial symptoms headache and lump on the side of the head. Dx made 10/2020. Long discussion regarding GCA, and monitoring symptoms, ESR and CRP on pred taper. Recommended she have discussion via telemed with Rheum about Actemra. Ordered ESR, CRP and TFT's. No evidence of GCA induced optic neuropathy  8/26/2021 - ESR 4.0, TSH 1.16, CRP 0.34  12/1/2021 - Apparently had relapse of headaches in September and went back to 20 mg prednisone. Then at taper again down to 10 mg last week headache returned and increased to 12.5 mg. She discussed with Rheumatologist in [place] where advised TA biopsy to secure the diagnosis. Would need to find provider here in [place] to rx Actemra. OCT NFL thickness stable at 107 OD and 114 OS and nerves healthy, so no evidence of optic neuropathy. However will recheck ESR and CRP. Also refer to Dr Pelaez for temporal artery biopsy  12/3/2021 - Repeat ESR 4.0, CRP 0.6 slightly increased, but still within normal.  7/27/2022 - Recent MI with subsequent COVID. Self discontinued prednisone, but was on 3 mg. Will repeat ESR and CRP. Had biopsy scheduled august 10  th. Still no evidence of an optic neuropathy or motility disorder secondary to GCA. OCT NFL thickness stable at 106 OD and 106 OS  Addendum CRP 1.73 slightly above normal range  8/24/2022 - Report of Temporal Artery Biopsy by Dr Fields at [facility]. Shows evidence of treated temporal arteritis. Will send report to her immunologist Dr Brandi Hester at [place]  1/4/2023 -has now been off prednisone since the biopsy in July.  However states that her temporal headaches have started to come back as well as some jaw discomfort on the right.  She Was History of Temporal and Facial Headaches That Improved in the past with Botox but Her Current Return of Headaches Is More Similar to the Ones That Caused a Lump in the Region of Her Temporal Artery.  She Also Has Some Other Cranial Lumps and a CT Scan Done with Contrast by Her Primary Was Nonrevealing.  Is Therefore concerning That Her Headaches Have Worsened.  I am therefore repeating her sed rate CRP, and also sending her as a consultation to rheumatology here at [facility].  I discussed that if her CRP and sedimentation rate are starting to increase that this would be consistent with a return of giant cell arteritis.  At this point there is no neuro ophthalmologic manifestations, but I did stress to her that if she were to have any change in her vision to call immediately or present to the emergency room

## 2023-01-13 ENCOUNTER — OFFICE VISIT (OUTPATIENT)
Dept: RHEUMATOLOGY | Facility: MEDICAL CENTER | Age: 73
End: 2023-01-13
Attending: STUDENT IN AN ORGANIZED HEALTH CARE EDUCATION/TRAINING PROGRAM
Payer: MEDICARE

## 2023-01-13 VITALS
HEIGHT: 65 IN | HEART RATE: 84 BPM | OXYGEN SATURATION: 95 % | TEMPERATURE: 98.1 F | DIASTOLIC BLOOD PRESSURE: 62 MMHG | RESPIRATION RATE: 16 BRPM | BODY MASS INDEX: 23.32 KG/M2 | WEIGHT: 140 LBS | SYSTOLIC BLOOD PRESSURE: 102 MMHG

## 2023-01-13 DIAGNOSIS — M31.5 GIANT CELL ARTERITIS WITH POLYMYALGIA RHEUMATICA (HCC): ICD-10-CM

## 2023-01-13 DIAGNOSIS — R76.8 SEROLOGIC AUTOIMMUNITY: ICD-10-CM

## 2023-01-13 DIAGNOSIS — Z79.60 LONG-TERM USE OF IMMUNOSUPPRESSANT MEDICATION: ICD-10-CM

## 2023-01-13 PROCEDURE — 99205 OFFICE O/P NEW HI 60 MIN: CPT | Performed by: STUDENT IN AN ORGANIZED HEALTH CARE EDUCATION/TRAINING PROGRAM

## 2023-01-13 PROCEDURE — 99211 OFF/OP EST MAY X REQ PHY/QHP: CPT | Performed by: STUDENT IN AN ORGANIZED HEALTH CARE EDUCATION/TRAINING PROGRAM

## 2023-01-13 RX ORDER — FOLIC ACID 1 MG/1
1 TABLET ORAL DAILY
Qty: 30 TABLET | Refills: 5 | Status: SHIPPED
Start: 2023-01-13 | End: 2023-01-25

## 2023-01-13 ASSESSMENT — RHEUMATOLOGY NEW PATIENT QUESTIONNAIRE
HAIR LOSS WITH BALD SPOTS: Y
BODY ACHES: Y
COLD-INDUCED COLOR CHANGES (WHITE, PURPLE, RED ON REWARMING): FINGERS
HAIR SHEDDING: Y
NIGHT SWEATS: Y
CHILLS: N
NOSEBLEEDS: Y
NUMBNESS: Y
FEVERS: N
COLD-INDUCED COLOR CHANGES (WHITE, PURPLE, RED ON REWARMING): TOES
JOINT SWELLING: Y
NECK PAIN: Y
EAR PAIN: Y

## 2023-01-13 ASSESSMENT — FIBROSIS 4 INDEX: FIB4 SCORE: 1.78

## 2023-01-13 NOTE — PATIENT INSTRUCTIONS
AFTER VISIT INSTRUCTIONS    Below are important information to help you navigate your healthcare needs and help us serve you safely and effectively:  If laboratory tests and/or imaging studies were ordered, remember to go get them done as instructed.  If new prescriptions and/or refills were sent, remember to go pick them up from your local pharmacy, or call the specialty pharmacy to request shipment.  Always take your prescription medications exactly as prescribed unless instructed otherwise.  Note that antirheumatic drugs and steroids are immunosuppressive which means they increase your risk of infections and have multiple potential adverse effects on various organ systems in your body, though most of them are uncommon.  It is important that you are up-to-date on age-appropriate immunizations, particularly shingles and bacterial/viral pneumonia vaccines, which you can request from me or your primary care provider.  Be sure to read the drug package inserts to learn about the potential side effects of your medications before you start taking them.  If you experience any significant drug side effects, stop taking the medication and notify me promptly, and depending on the severity of the side effects, consider going to an urgent care or emergency department for immediate attention.  If there are significant findings on your lab tests and imaging studies that warrant further action, I will notify you with explanations via [employer] or phone call, otherwise you can view them on [employer] and let me know if you have any questions.  Note that [employer] messages are typically read during office hours and may take 1-7 business days before a response depending on the urgency of the situation and how busy my clinic schedule is.  In general, [employer] messaging is for non-urgent matters that do not require immediate attention, so for urgent matters that cannot wait, you are advised to go to an urgent care.  Lastly, you are granted  MyChart access to my documentation of your visit and are encouraged to read my note which details my assessment and plan for your condition.
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
## 2023-01-25 ENCOUNTER — OFFICE VISIT (OUTPATIENT)
Dept: CARDIOLOGY | Facility: MEDICAL CENTER | Age: 73
End: 2023-01-25
Payer: MEDICARE

## 2023-01-25 VITALS
RESPIRATION RATE: 14 BRPM | HEART RATE: 91 BPM | BODY MASS INDEX: 22.18 KG/M2 | DIASTOLIC BLOOD PRESSURE: 62 MMHG | HEIGHT: 66 IN | WEIGHT: 138 LBS | OXYGEN SATURATION: 97 % | SYSTOLIC BLOOD PRESSURE: 112 MMHG

## 2023-01-25 DIAGNOSIS — I35.1 MILD AORTIC INSUFFICIENCY: ICD-10-CM

## 2023-01-25 DIAGNOSIS — I25.119 CORONARY ARTERY DISEASE INVOLVING NATIVE CORONARY ARTERY OF NATIVE HEART WITH ANGINA PECTORIS (HCC): ICD-10-CM

## 2023-01-25 DIAGNOSIS — Z78.9 STATIN INTOLERANCE: ICD-10-CM

## 2023-01-25 DIAGNOSIS — M31.6 TEMPORAL GIANT CELL ARTERITIS (HCC): ICD-10-CM

## 2023-01-25 PROCEDURE — 99214 OFFICE O/P EST MOD 30 MIN: CPT | Performed by: INTERNAL MEDICINE

## 2023-01-25 ASSESSMENT — FIBROSIS 4 INDEX: FIB4 SCORE: 1.78

## 2023-01-26 NOTE — PROGRESS NOTES
"CARDIOLOGY OUTPATIENT FOLLOWUP    PCP: Khai Deras M.D.    1. Coronary artery disease involving native coronary artery of native heart with angina pectoris (HCC)    2. Mild aortic insufficiency    3. Temporal giant cell arteritis (HCC)    4. Statin intolerance        Carine Christopher is stable from a cardiovascular standpoint but may derive further risk reduction with PCSK9 inhibition.  I offered a prescription, she will do some research and get back to me.  She will continue on aspirin.    Follow up: 1 year    Chief Complaint   Patient presents with    Coronary Artery Disease     F/v Dx : Coronary artery disease involving native heart with unstable angina pectoris, unspecified vessel or lesion type (HCC)    Hypertension    Chest Pain       History: Carine Christopher is a 72 y.o. female with history of giant cell arteritis, recurrent in-stent stenosis involving the circumflex with subsequent occlusion, non-STEMI and preserved ventricular function presenting for follow-up.  She has been intolerant of antiplatelet medications as well as statins in the past.    Since her last evaluation she has been in stable health.  Giant cell arteritis had been confirmed.  She was offered methotrexate but declined.    We discussed risk for recurrent ischemic events.  She relates concerns over medication side effects with steroids and statins in the past.      ROS:   10 point review systems is otherwise negative except as per the HPI    PE:  /62 (BP Location: Left arm, Patient Position: Sitting, BP Cuff Size: Adult)   Pulse 91   Resp 14   Ht 1.664 m (5' 5.5\")   Wt 62.6 kg (138 lb)   SpO2 97%   BMI 22.62 kg/m²   Gen: no acute distress  HEENT: Symmetric face. Anicteric sclerae. Moist mucus membranes  NECK: No JVD. No lymphadenopathy  CARDIAC: Regular, Normal S1, S2, No murmur  VASCULATURE: carotids are normal bilaterally without bruit  RESP: Clear to auscultation bilaterally  ABD: " Soft, non-tender, non-distended  EXT: No edema, no clubbing or cyanosis  SKIN: Warm and dry  NEURO: No gross deficits  PSYCH: Appropriate affect, participates in conversation    The ASCVD Risk score (Eduardo ENRIQUEZ, et al., 2019) failed to calculate.    Past Medical History:   Diagnosis Date    Arthritis 07/07/2022    hands    Breath shortness 07/07/2022    with exertion    Bronchitis 07/07/2022    chronic broncitis since age 15    CAD (coronary artery disease) 07/07/2022    Cataract 07/07/2022    IOLOU    COVID 07/06/2022    Giant cell arteritis (HCC) 2019    Gynecological disorder 07/07/2022    pt still having monthly menses, on hormones    Head ache     Heart attack (HCC) 10/2020    taking metoprolol, stents placed times 2    Heart valve disease 07/07/2022    High cholesterol 07/07/2022    non medicated    Hyperlipidemia     Hypertension     Pain 07/07/2022    constant headaches times 3 years    Pneumonia 07/07/2022    walking pneumonia 1980    Urinary incontinence 07/07/2022    at times     Allergies   Allergen Reactions    Codeine Vomiting and Nausea    Other Drug Unspecified     States that most antibiotics cause yellow stools.    Atorvastatin Unspecified     Caused bruising general body, pt doesn't want to take    Chloramphenicol Diarrhea     PCN is okay to take    Sulfa Antibiotics [Sulfa Drugs] Unspecified     Can't remember reaction     Outpatient Encounter Medications as of 1/25/2023   Medication Sig Dispense Refill    ibuprofen (MOTRIN) 800 MG Tab Take 800 mg by mouth every 8 hours as needed.      Non Formulary Request Take 1 Tablet by mouth every day. Relief Factor.  Fish oil supplement      PREMPHASE 0.625-5 MG Tab Take 1 Tablet by mouth every evening.      azelastine (ASTELIN) 137 MCG/SPRAY nasal spray Administer 2 Sprays into affected nostril(S) as needed.      nitroglycerin (NITROSTAT) 0.4 MG SL Tab Place 1 tablet under the tongue as needed for Chest Pain. 30 tablet 3    Ascorbic Acid (VITAMIN C PO) Take  1 tablet by mouth every day.      LUTEIN PO Take 20 mg by mouth every day.      Nddljn-BcFou-UnIgv-FA-CA-Omega (COMPLETE  DHA) 29-1-200 & 250 MG Misc Take 2 Tablets by mouth every day.      Cholecalciferol (VITAMIN D3) 50 MCG (2000 UT) Tab Take 2,000 Units by mouth every day at 6 PM.      aspirin 81 MG EC tablet Take 81 mg by mouth every evening.      [DISCONTINUED] methotrexate 2.5 MG Tab Take 8 Tablets by mouth every 7 days for 30 days. Split dose 4 tablets in the AM and 4 tablets in the PM same day. 32 Tablet 5    [DISCONTINUED] folic acid (FOLVITE) 1 MG Tab Take 1 Tablet by mouth every day. (Patient not taking: Reported on 2023) 30 Tablet 5     No facility-administered encounter medications on file as of 2023.     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[employer]      Social Determinants of Health     Financial Resource Strain: Not on file   Food Insecurity: Not on file   Transportation Needs: Not on file   Physical Activity: Not on file   Stress: Not on file   Social Connections: Not on file   Intimate Partner Violence: Not on file   Housing Stability: Not on file       Studies  Lab Results   Component Value Date/Time    CHOLSTRLTOT 163 2022 08:09 AM    LDL 73 2022 08:09 AM    HDL 68 2022 08:09 AM    TRIGLYCERIDE 112 2022 08:09 AM       Lab Results   Component Value Date/Time    SODIUM 143 2022 03:04 PM    POTASSIUM 4.3  12/16/2022 03:04 PM    CHLORIDE 108 12/16/2022 03:04 PM    CO2 23 12/16/2022 03:04 PM    GLUCOSE 87 12/16/2022 03:04 PM    BUN 24 (H) 12/16/2022 03:04 PM    CREATININE 0.69 12/16/2022 03:04 PM     Lab Results   Component Value Date/Time    ALKPHOSPHAT 77 12/16/2022 03:04 PM    ASTSGOT 19 12/16/2022 03:04 PM    ALTSGPT 10 12/16/2022 03:04 PM    TBILIRUBIN <0.2 12/16/2022 03:04 PM            45 minutes spent during today's encounter.

## 2023-02-27 ENCOUNTER — APPOINTMENT (OUTPATIENT)
Dept: NEUROLOGY | Facility: MEDICAL CENTER | Age: 73
End: 2023-02-27
Attending: PSYCHIATRY & NEUROLOGY
Payer: MEDICARE

## 2023-04-05 ENCOUNTER — OFFICE VISIT (OUTPATIENT)
Dept: OPHTHALMOLOGY | Facility: MEDICAL CENTER | Age: 73
End: 2023-04-05
Payer: MEDICARE

## 2023-04-05 DIAGNOSIS — H35.371 EPIRETINAL MEMBRANE (ERM) OF RIGHT EYE: ICD-10-CM

## 2023-04-05 DIAGNOSIS — M31.6 TEMPORAL GIANT CELL ARTERITIS (HCC): ICD-10-CM

## 2023-04-05 DIAGNOSIS — H49.9 OPHTHALMOPLEGIA: ICD-10-CM

## 2023-04-05 PROCEDURE — 92250 FUNDUS PHOTOGRAPHY W/I&R: CPT | Performed by: OPHTHALMOLOGY

## 2023-04-05 PROCEDURE — 99214 OFFICE O/P EST MOD 30 MIN: CPT | Mod: 25 | Performed by: OPHTHALMOLOGY

## 2023-04-05 PROCEDURE — 92060 SENSORIMOTOR EXAMINATION: CPT | Performed by: OPHTHALMOLOGY

## 2023-04-05 ASSESSMENT — CONF VISUAL FIELD
OS_INFERIOR_TEMPORAL_RESTRICTION: 0
OD_SUPERIOR_TEMPORAL_RESTRICTION: 0
OS_INFERIOR_NASAL_RESTRICTION: 0
OS_SUPERIOR_TEMPORAL_RESTRICTION: 0
OS_SUPERIOR_NASAL_RESTRICTION: 0
OD_INFERIOR_TEMPORAL_RESTRICTION: 0
OS_NORMAL: 1
OD_NORMAL: 1
OD_SUPERIOR_NASAL_RESTRICTION: 0
OD_INFERIOR_NASAL_RESTRICTION: 0

## 2023-04-05 ASSESSMENT — TONOMETRY
OD_IOP_MMHG: 12
IOP_METHOD: I-CARE
OS_IOP_MMHG: 12

## 2023-04-05 ASSESSMENT — CUP TO DISC RATIO
OD_RATIO: 0.1
OS_RATIO: 0.1

## 2023-04-05 ASSESSMENT — SLIT LAMP EXAM - LIDS
COMMENTS: NORMAL
COMMENTS: NORMAL

## 2023-04-05 ASSESSMENT — REFRACTION_MANIFEST
OD_CYLINDER: +0.25
METHOD_AUTOREFRACTION: 1
OS_SPHERE: -1.25
OD_AXIS: 066
OS_AXIS: 154
OD_SPHERE: +0.25
OS_CYLINDER: +0.75

## 2023-04-05 ASSESSMENT — VISUAL ACUITY
METHOD: SNELLEN - LINEAR
OD_SC: 20/20
OS_SC: 20/20

## 2023-04-05 ASSESSMENT — ENCOUNTER SYMPTOMS: DOUBLE VISION: 1

## 2023-04-05 ASSESSMENT — EXTERNAL EXAM - RIGHT EYE: OD_EXAM: NORMAL

## 2023-04-05 ASSESSMENT — EXTERNAL EXAM - LEFT EYE: OS_EXAM: NORMAL

## 2023-04-05 NOTE — PROGRESS NOTES
Peds/Neuro Ophthalmology:   Jon Wilde M.D.    Date & Time note created:    4/5/2023   3:49 PM     Referring MD / APRN:  Khai Deras M.D., No att. providers found    Patient ID:  Name:             Carine Christopher   YOB: 1950  Age:                 72 y.o.  female   MRN:               [id number]    Chief Complaint/Reason for Visit:     Optic Neuropathy (3 month f.v. OU)      History of Present Illness:    Carine Christopher is a 72 y.o. female   3 month f.v. for Optic neuropathy OU. Pt states she has double vision all the time. Pt has dry eyes and uses refresh eye drops. Pt states she has pain on the right side of head all the time and is going to see a neurologist on 4/20.        Review of Systems:  Review of Systems   Eyes:  Positive for double vision.        Optic Neuropathy OU  Dry Eyes OU   Neurological:         Pain on right side of head   All other systems reviewed and are negative.    Past Medical History:   Past Medical History:   Diagnosis Date    Arthritis 07/07/2022    hands    Breath shortness 07/07/2022    with exertion    Bronchitis 07/07/2022    chronic broncitis since age 15    CAD (coronary artery disease) 07/07/2022    Cataract 07/07/2022    IOLOU    COVID 07/06/2022    Giant cell arteritis (HCC) 2019    Gynecological disorder 07/07/2022    pt still having monthly menses, on hormones    Head ache     Heart attack (HCC) 10/2020    taking metoprolol, stents placed times 2    Heart valve disease 07/07/2022    High cholesterol 07/07/2022    non medicated    Hyperlipidemia     Hypertension     Pain 07/07/2022    constant headaches times 3 years    Pneumonia 07/07/2022    walking pneumonia 1980    Urinary incontinence 07/07/2022    at times       Past Surgical History:  Past Surgical History:   Procedure Laterality Date    CA TEMPORAL ARTERY LIGATN OR BX Right 8/10/2022    Procedure: RIGHT TEMPORAL ARTERY BIOPSY;  Surgeon: Tone MORA  LEE Fields;  Location: SURGERY [facility];  Service: Vascular    OTHER CARDIAC SURGERY      stents placed    CATARACT EXTRACTION WITH IOL Bilateral     ABDOMINAL EXPLORATION      BREAST IMPLANT REVISION         Current Outpatient Medications:  Current Outpatient Medications   Medication Sig Dispense Refill    azelastine (ASTELIN) 137 MCG/SPRAY nasal spray Administer 2 Sprays into affected nostril(S) as needed.      Ojnuli-ZyAeb-OcKqx-FA-CA-Omega (COMPLETE [place] DHA) 29-1-200 & 250 MG Misc Take 2 Tablets by mouth every day.      aspirin 81 MG EC tablet Take 81 mg by mouth every evening.      ibuprofen (MOTRIN) 800 MG Tab Take 800 mg by mouth every 8 hours as needed. (Patient not taking: Reported on 2023)      Non Formulary Request Take 1 Tablet by mouth every day. Relief Factor.  Fish oil supplement (Patient not taking: Reported on 2023)      PREMPHASE 0.625-5 MG Tab Take 1 Tablet by mouth every evening. (Patient not taking: Reported on 2023)      nitroglycerin (NITROSTAT) 0.4 MG SL Tab Place 1 tablet under the tongue as needed for Chest Pain. (Patient not taking: Reported on 2023) 30 tablet 3    Ascorbic Acid (VITAMIN C PO) Take 1 tablet by mouth every day. (Patient not taking: Reported on 2023)      LUTEIN PO Take 20 mg by mouth every day. (Patient not taking: Reported on 2023)      Cholecalciferol (VITAMIN D3) 50 MCG (2000 UT) Tab Take 2,000 Units by mouth every day at 6 PM. (Patient not taking: Reported on 2023)       No current facility-administered medications for this visit.       Allergies:  Allergies   Allergen Reactions    Codeine Vomiting and Nausea    Other Drug Unspecified     States that most antibiotics cause yellow stools.    Atorvastatin Unspecified     Caused bruising general body, pt doesn't want to take    Chloramphenicol Diarrhea     PCN is okay to take    Sulfa Antibiotics [Sulfa Drugs] Unspecified     Can't remember reaction       Family History:  Family  History   Problem Relation Age of Onset    Glasses Father     Heart Disease Father     Hypertension Mother     Kidney Disease Neg Hx        Social History:  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[employer]      Social Determinants of Health     Financial Resource Strain: Not on file   Food Insecurity: Not on file   Transportation Needs: Not on file   Physical Activity: Not on file   Stress: Not on file   Social Connections: Not on file   Intimate Partner Violence: Not on file   Housing Stability: Not on file          Physical Exam:  Physical Exam    Oriented x 3  Weight/BMI: There is no height or weight on file to calculate BMI.  There were no vitals taken for this visit.    Base Eye Exam       Visual Acuity (Snellen - Linear)         Right Left    Dist sc 20/20 20/20              Tonometry (i-care, 1:40 PM)         Right Left    Pressure 12 12              Pupils         Pupils    Right PERRL    Left PERRL              Visual Fields         Right Left     Full Full              Neuro/Psych       Oriented x3: Yes    Mood/Affect: Normal                  Additional Tests       Color         Right Left    Ishihara 12/12 12/12                  Strabismus Exam       Reading #1   (Edited by: Jon Wilde M.D.)      Correction: sc    Observations: Ortho      Distance Near Near +3DS N Bifocals                      0 0 0   0 0 0                       0  0  ET 2 0  0             LHypoT 2           0 0 0   0 0 0                           Reading #2   (Edited by: Jon Wilde M.D.)      Correction: sc    Observations: Ortho      Distance Near Near +3DS N Bifocals                      0 0 0   0 0 0                       0  0  ET 2 0  0            LHypoT 2           0 0 0   0 0 0                               Slit Lamp and Fundus Exam       External Exam         Right Left    External Normal Normal              Slit Lamp Exam         Right Left    Lids/Lashes Normal Normal    Conjunctiva/Sclera White and quiet White and quiet    Cornea Clear Clear    Anterior Chamber Deep and quiet Deep and quiet    Iris Round and reactive Round and reactive    Lens Posterior chamber intraocular lens Posterior chamber intraocular lens    Vitreous Normal Normal              Fundus Exam         Right Left    Disc Normal Normal    C/D Ratio 0.1 0.1    Macula Epiretinal membrane Normal    Vessels Normal Normal    Periphery Normal Normal                  Refraction       Manifest Refraction (Auto)         Sphere Cylinder Axis    Right +0.25 +0.25 066    Left -1.25 +0.75 154                    Pertinent Lab/Test/Imaging Review:      Assessment and Plan:     Ophthalmoplegia  8/25/2021 - small RHypo and XT. 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 However difficulty fusion secondary to the aniseikonia form the monovision as well as the ERM OD. In Pat did better with a 2 base up OD and 2 base in OS. However still having some asthenopic findings secondary to the ERM. Discussed trial of RX with prisms and bifocal and correcting for the monovision.   12/1/2021 - ortho with new glasses, but not wearing because of nose bleed. No progression of the misalignment  7/27/2022 - continue current rx with prism  1/4/2023 -overall motility appears stable.  However she does not like to wear prism glasses because of the same irritating her temple on the  right.  4/5/2023 -stable small right hypo and exotropia.  Has monovision so not that bothered by the motility disorder and double vision.    Temporal giant cell arteritis (HCC)  2/3/2021 - Presumed giant cell arteritis by history, symptoms of temporal pain, and response to prednisone. Never had temporal artery biopsy. ESR was 1 back in January, but had been on prednosne at least 10 mg for a few months prior. Dr Banks increased prednisone to 60 and currently on 40 mg / day.  From the neuro-ophthalmic standpoint no evidence of optic nerve invovement with OCT NFL thickness normal at 107 OD and 110 OS. No optic disc pallor and no swelling. In Addition VICKIE was 1:320. Therefore discussed with patient that I recommend being managed by Rheumatology. Would consider Actemra and thus will refer to Dr Michaud in [place]. VICKIE will need to be characterized to determine is has concomitant other autoimmune disorder.   8/25/2021 - Didn't see Dr Michaud. Sept 13th has a telemedicine apt with [place]. On prednisone 10 mg. Initial symptoms headache and lump on the side of the head. Dx made 10/2020. Long discussion regarding GCA, and monitoring symptoms, ESR and CRP on pred taper. Recommended she have discussion via telemed with Rheum about Actemra. Ordered ESR, CRP and TFT's. No evidence of GCA induced optic neuropathy  8/26/2021 - ESR 4.0, TSH 1.16, CRP 0.34  12/1/2021 - Apparently had relapse of headaches in September and went back to 20 mg prednisone. Then at taper again down to 10 mg last week headache returned and increased to 12.5 mg. She discussed with Rheumatologist in [place] where advised TA biopsy to secure the diagnosis. Would need to find provider here in [place] to rx Actemra. OCT NFL thickness stable at 107 OD and 114 OS and nerves healthy, so no evidence of optic neuropathy. However will recheck ESR and CRP. Also refer to Dr Pelaez for temporal artery biopsy  12/3/2021 - Repeat ESR 4.0, CRP 0.6 slightly  increased, but still within normal.  7/27/2022 - Recent MI with subsequent COVID. Self discontinued prednisone, but was on 3 mg. Will repeat ESR and CRP. Had biopsy scheduled august 10 th. Still no evidence of an optic neuropathy or motility disorder secondary to GCA. OCT NFL thickness stable at 106 OD and 106 OS  Addendum CRP 1.73 slightly above normal range  8/24/2022 - Report of Temporal Artery Biopsy by Dr Fields at hospitals. Shows evidence of treated temporal arteritis. Will send report to her immunologist Dr Brandi Hester at [place]  1/4/2023 -has now been off prednisone since the biopsy in July.  However states that her temporal headaches have started to come back as well as some jaw discomfort on the right.  She Was History of Temporal and Facial Headaches That Improved in the past with Botox but Her Current Return of Headaches Is More Similar to the Ones That Caused a Lump in the Region of Her Temporal Artery.  She Also Has Some Other Cranial Lumps and a CT Scan Done with Contrast by Her Primary Was Nonrevealing.  Is Therefore concerning That Her Headaches Have Worsened.  I am therefore repeating her sed rate CRP, and also sending her as a consultation to rheumatology here at [facility].  I discussed that if her CRP and sedimentation rate are starting to increase that this would be consistent with a return of giant cell arteritis.  At this point there is no neuro ophthalmologic manifestations, but I did stress to her that if she were to have any change in her vision to call immediately or present to the emergency room  4/5/2023-saw Dr. Hager who recommended beginning on methotrexate.  However she no longer wants to take any medications.  She has had no recurrence of her temporal arteritis symptoms.  Her last CRP was normal at 0.75 and sed rate 6.0.  I discussed that if following her off medication she should be aware of the recurrence of any subtle signs of her temporal arteritis.  If these occur  she should start prednisone immediately and if she has any vision changes present to the emergency room.  Her optic nerve heads appear stable without any edema and 105  OS OCT NFL thickness.  This can repeat her laboratories again at the end of this month    Epiretinal membrane (ERM) of right eye  2/3/2021 - Epiretinal membrane seen on 5-line OCT. Discussed that since vision still 20/20 would hold on referral unless becomes more symptomatic  12/1/2021 - OCT 5-line no significant change and vision still good  7/27/2022 - ERM stable on 5-line  1/4/2023 -OCT shows improvement of ERM OD.  Vision stable.  4/5/2023-stable epiretinal membrane.  Not visually significant at this time    30 minutes total time spent.  This included extensive discussion regarding symptoms of giant cell arteritis including visual loss, if any of the symptoms were to return she needs to start prednisone emergently.  Also discussed importance of follow-up blood test later this month.  Formulation of note in epic    Jon Wilde M.D.

## 2023-04-05 NOTE — ASSESSMENT & PLAN NOTE
8/25/2021 - small RHypo and XT. 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 However difficulty fusion secondary to the aniseikonia form the monovision as well as the ERM OD. In Pat did better with a 2 base up OD and 2 base in OS. However still having some asthenopic findings secondary to the ERM. Discussed trial of RX with prisms and bifocal and correcting for the monovision.   12/1/2021 - ortho with new glasses, but not wearing because of nose bleed. No progression of the misalignment  7/27/2022 - continue current rx with prism  1/4/2023 -overall motility appears stable.  However she does not like to wear prism glasses because of the same irritating her temple on the right.  4/5/2023 -stable small right hypo and exotropia.  Has monovision so not that bothered by the motility disorder and double vision.

## 2023-04-05 NOTE — ASSESSMENT & PLAN NOTE
2/3/2021 - Epiretinal membrane seen on 5-line OCT. Discussed that since vision still 20/20 would hold on referral unless becomes more symptomatic  12/1/2021 - OCT 5-line no significant change and vision still good  7/27/2022 - ERM stable on 5-line  1/4/2023 -OCT shows improvement of ERM OD.  Vision stable.  4/5/2023-stable epiretinal membrane.  Not visually significant at this time

## 2023-04-05 NOTE — ASSESSMENT & PLAN NOTE
2/3/2021 - Presumed giant cell arteritis by history, symptoms of temporal pain, and response to prednisone. Never had temporal artery biopsy. ESR was 1 back in January, but had been on prednosne at least 10 mg for a few months prior. Dr Banks increased prednisone to 60 and currently on 40 mg / day.  From the neuro-ophthalmic standpoint no evidence of optic nerve invovement with OCT NFL thickness normal at 107 OD and 110 OS. No optic disc pallor and no swelling. In Addition VICKIE was 1:320. Therefore discussed with patient that I recommend being managed by Rheumatology. Would consider Actemra and thus will refer to Dr Michaud in [place]. VICKIE will need to be characterized to determine is has concomitant other autoimmune disorder.   8/25/2021 - Didn't see Dr Michaud. Sept 13th has a telemedicine apt with [place]. On prednisone 10 mg. Initial symptoms headache and lump on the side of the head. Dx made 10/2020. Long discussion regarding GCA, and monitoring symptoms, ESR and CRP on pred taper. Recommended she have discussion via telemed with Rheum about Actemra. Ordered ESR, CRP and TFT's. No evidence of GCA induced optic neuropathy  8/26/2021 - ESR 4.0, TSH 1.16, CRP 0.34  12/1/2021 - Apparently had relapse of headaches in September and went back to 20 mg prednisone. Then at taper again down to 10 mg last week headache returned and increased to 12.5 mg. She discussed with Rheumatologist in [place] where advised TA biopsy to secure the diagnosis. Would need to find provider here in [place] to rx Actemra. OCT NFL thickness stable at 107 OD and 114 OS and nerves healthy, so no evidence of optic neuropathy. However will recheck ESR and CRP. Also refer to Dr Pelaez for temporal artery biopsy  12/3/2021 - Repeat ESR 4.0, CRP 0.6 slightly increased, but still within normal.  7/27/2022 - Recent MI with subsequent COVID. Self discontinued prednisone, but was on 3 mg. Will repeat ESR and CRP. Had biopsy scheduled august 10  th. Still no evidence of an optic neuropathy or motility disorder secondary to GCA. OCT NFL thickness stable at 106 OD and 106 OS  Addendum CRP 1.73 slightly above normal range  8/24/2022 - Report of Temporal Artery Biopsy by Dr Fields at [facility]. Shows evidence of treated temporal arteritis. Will send report to her immunologist Dr Brandi Hester at [place]  1/4/2023 -has now been off prednisone since the biopsy in July.  However states that her temporal headaches have started to come back as well as some jaw discomfort on the right.  She Was History of Temporal and Facial Headaches That Improved in the past with Botox but Her Current Return of Headaches Is More Similar to the Ones That Caused a Lump in the Region of Her Temporal Artery.  She Also Has Some Other Cranial Lumps and a CT Scan Done with Contrast by Her Primary Was Nonrevealing.  Is Therefore concerning That Her Headaches Have Worsened.  I am therefore repeating her sed rate CRP, and also sending her as a consultation to rheumatology here at [facility].  I discussed that if her CRP and sedimentation rate are starting to increase that this would be consistent with a return of giant cell arteritis.  At this point there is no neuro ophthalmologic manifestations, but I did stress to her that if she were to have any change in her vision to call immediately or present to the emergency room  4/5/2023-saw Dr. Hager who recommended beginning on methotrexate.  However she no longer wants to take any medications.  She has had no recurrence of her temporal arteritis symptoms.  Her last CRP was normal at 0.75 and sed rate 6.0.  I discussed that if following her off medication she should be aware of the recurrence of any subtle signs of her temporal arteritis.  If these occur she should start prednisone immediately and if she has any vision changes present to the emergency room.  Her optic nerve heads appear stable without any edema and 105  OS OCT NFL  thickness.  This can repeat her laboratories again at the end of this month

## 2023-04-19 ENCOUNTER — TELEPHONE (OUTPATIENT)
Dept: NEUROLOGY | Facility: MEDICAL CENTER | Age: 73
End: 2023-04-19
Payer: MEDICARE

## 2023-04-19 NOTE — TELEPHONE ENCOUNTER
NEUROLOGY PATIENT PRE-VISIT PLANNING     Patient was NOT contacted to complete PVP.    Patient Appointment is scheduled as: Established Patient     Is visit type and length scheduled correctly? Yes    [employer] Patient is checked in Patient Demographics? Yes    3.   Is referral attached to visit? Yes    4. Were records received from referring provider? Yes    4. Patient was NOT contacted to have someone accompany them to visit.     5. Is this appointment scheduled as a Hospital Follow-Up?  No    6. Does the patient require any pre procedure or post procedure follow up? No    7. If any orders were placed at last visit or intended to be done for this visit do we have Results/Consult Notes? Yes  Labs -  Recent labs in Epic.   Imaging - Imaging ordered, completed and results are in chart.  Referrals - A referral was placed at last visit to Neuro Ophthalmologist. It is not known if patient has been seen.     8. If patient appointment is for Botox - is order pended for provider? N/A

## 2023-04-20 ENCOUNTER — OFFICE VISIT (OUTPATIENT)
Dept: NEUROLOGY | Facility: MEDICAL CENTER | Age: 73
End: 2023-04-20
Attending: PSYCHIATRY & NEUROLOGY
Payer: MEDICARE

## 2023-04-20 ENCOUNTER — HOSPITAL ENCOUNTER (OUTPATIENT)
Dept: LAB | Facility: MEDICAL CENTER | Age: 73
End: 2023-04-20
Attending: STUDENT IN AN ORGANIZED HEALTH CARE EDUCATION/TRAINING PROGRAM
Payer: MEDICARE

## 2023-04-20 VITALS
WEIGHT: 139.55 LBS | HEIGHT: 65 IN | BODY MASS INDEX: 23.25 KG/M2 | SYSTOLIC BLOOD PRESSURE: 106 MMHG | DIASTOLIC BLOOD PRESSURE: 52 MMHG | HEART RATE: 96 BPM | TEMPERATURE: 98.3 F | OXYGEN SATURATION: 95 %

## 2023-04-20 DIAGNOSIS — R52 PAIN OF RIGHT SIDE OF BODY: ICD-10-CM

## 2023-04-20 DIAGNOSIS — M31.5 GIANT CELL ARTERITIS WITH POLYMYALGIA RHEUMATICA (HCC): ICD-10-CM

## 2023-04-20 DIAGNOSIS — Z79.60 LONG-TERM USE OF IMMUNOSUPPRESSANT MEDICATION: ICD-10-CM

## 2023-04-20 LAB
ALBUMIN SERPL BCP-MCNC: 4.3 G/DL (ref 3.2–4.9)
ALBUMIN/GLOB SERPL: 1.3 G/DL
ALP SERPL-CCNC: 78 U/L (ref 30–99)
ALT SERPL-CCNC: 23 U/L (ref 2–50)
ANION GAP SERPL CALC-SCNC: 15 MMOL/L (ref 7–16)
AST SERPL-CCNC: 25 U/L (ref 12–45)
BASOPHILS # BLD AUTO: 1.1 % (ref 0–1.8)
BASOPHILS # BLD: 0.09 K/UL (ref 0–0.12)
BILIRUB SERPL-MCNC: <0.2 MG/DL (ref 0.1–1.5)
BUN SERPL-MCNC: 24 MG/DL (ref 8–22)
CALCIUM ALBUM COR SERPL-MCNC: 9.6 MG/DL (ref 8.5–10.5)
CALCIUM SERPL-MCNC: 9.8 MG/DL (ref 8.5–10.5)
CHLORIDE SERPL-SCNC: 110 MMOL/L (ref 96–112)
CO2 SERPL-SCNC: 22 MMOL/L (ref 20–33)
CREAT SERPL-MCNC: 0.72 MG/DL (ref 0.5–1.4)
CRP SERPL HS-MCNC: <0.3 MG/DL (ref 0–0.75)
EOSINOPHIL # BLD AUTO: 0.18 K/UL (ref 0–0.51)
EOSINOPHIL NFR BLD: 2.2 % (ref 0–6.9)
ERYTHROCYTE [DISTWIDTH] IN BLOOD BY AUTOMATED COUNT: 44.3 FL (ref 35.9–50)
ERYTHROCYTE [SEDIMENTATION RATE] IN BLOOD BY WESTERGREN METHOD: 5 MM/HOUR (ref 0–25)
GFR SERPLBLD CREATININE-BSD FMLA CKD-EPI: 88 ML/MIN/1.73 M 2
GLOBULIN SER CALC-MCNC: 3.3 G/DL (ref 1.9–3.5)
GLUCOSE SERPL-MCNC: 100 MG/DL (ref 65–99)
HCT VFR BLD AUTO: 42.4 % (ref 37–47)
HGB BLD-MCNC: 13.8 G/DL (ref 12–16)
IMM GRANULOCYTES # BLD AUTO: 0.01 K/UL (ref 0–0.11)
IMM GRANULOCYTES NFR BLD AUTO: 0.1 % (ref 0–0.9)
LYMPHOCYTES # BLD AUTO: 2.34 K/UL (ref 1–4.8)
LYMPHOCYTES NFR BLD: 28.6 % (ref 22–41)
MCH RBC QN AUTO: 27.9 PG (ref 27–33)
MCHC RBC AUTO-ENTMCNC: 32.5 G/DL (ref 33.6–35)
MCV RBC AUTO: 85.8 FL (ref 81.4–97.8)
MONOCYTES # BLD AUTO: 0.64 K/UL (ref 0–0.85)
MONOCYTES NFR BLD AUTO: 7.8 % (ref 0–13.4)
NEUTROPHILS # BLD AUTO: 4.91 K/UL (ref 2–7.15)
NEUTROPHILS NFR BLD: 60.2 % (ref 44–72)
NRBC # BLD AUTO: 0 K/UL
NRBC BLD-RTO: 0 /100 WBC
PLATELET # BLD AUTO: 249 K/UL (ref 164–446)
PMV BLD AUTO: 11.1 FL (ref 9–12.9)
POTASSIUM SERPL-SCNC: 4.3 MMOL/L (ref 3.6–5.5)
PROT SERPL-MCNC: 7.6 G/DL (ref 6–8.2)
RBC # BLD AUTO: 4.94 M/UL (ref 4.2–5.4)
SODIUM SERPL-SCNC: 147 MMOL/L (ref 135–145)
WBC # BLD AUTO: 8.2 K/UL (ref 4.8–10.8)

## 2023-04-20 PROCEDURE — 99212 OFFICE O/P EST SF 10 MIN: CPT | Performed by: PSYCHIATRY & NEUROLOGY

## 2023-04-20 PROCEDURE — 85652 RBC SED RATE AUTOMATED: CPT

## 2023-04-20 PROCEDURE — 36415 COLL VENOUS BLD VENIPUNCTURE: CPT

## 2023-04-20 PROCEDURE — 80053 COMPREHEN METABOLIC PANEL: CPT

## 2023-04-20 PROCEDURE — 86140 C-REACTIVE PROTEIN: CPT

## 2023-04-20 PROCEDURE — 99215 OFFICE O/P EST HI 40 MIN: CPT | Performed by: PSYCHIATRY & NEUROLOGY

## 2023-04-20 PROCEDURE — 85025 COMPLETE CBC W/AUTO DIFF WBC: CPT

## 2023-04-20 ASSESSMENT — ENCOUNTER SYMPTOMS
MEMORY LOSS: 0
NECK PAIN: 1
MYALGIAS: 0
INSOMNIA: 1
FALLS: 0
DEPRESSION: 0

## 2023-04-20 ASSESSMENT — FIBROSIS 4 INDEX: FIB4 SCORE: 1.78

## 2023-04-20 ASSESSMENT — PATIENT HEALTH QUESTIONNAIRE - PHQ9: CLINICAL INTERPRETATION OF PHQ2 SCORE: 0

## 2023-04-21 NOTE — PROGRESS NOTES
"Subjective     Carine Moralesmarylu Christopher is a 72 y.o. female who presents for follow-up, last seen about 2 years ago, with a diagnosis of GCA, and whose headaches have resolved, but who has been left with right-sided face and scalp pain and hypersensitivity, now spreading to involve most of the right side of her body.     HPI    When I had last seen her, I had forwarded her to the appropriate rheumatologic and neuro ophthalmologic specialist.  She finally underwent temporal artery biopsy which showed findings consistent with temporal arteritis.  On steroids, the headaches improved remarkably, and were held at bay over time.  She was able to get off steroids, and though she was offered immunosuppressive treatments by her rheumatologist, she opted out given how well she was doing.  She is continue to follow with a neuro-ophthalmologist.  She still has some intermittent double vision and visual obscuration but this is being followed closely.    During this time initially she had some head discomfort but the headache itself overwhelmed all of this.  As the headaches improved, she became more aware of constant discomfort and \"lumps\" over the right temple and occiput.  It seems to be tender to touch, she then began to notice a skin hypersensitivity over the scalp and neck.  This then spread to involve the right hip and knee.  The whole right side seems to be hypersensitive, she can no longer lie on that side when she goes to sleep.  Laying down on the left side simply stretches the skin over the hip and knee resulting in discomfort.    There is no rash, swelling, focal neurologic signs or symptoms.  Though she has chronic neck pain this is not increased.  She has reduced range of motion because of musculoskeletal issues in the right shoulder, this is simply been made worse.  She denies any such symptomatology on the left side of her face and body.    When the steroids were eventually discontinued, the symptoms " "did not suddenly worsen.  She has found that Motrin 800 mg, twice daily, provides consistent benefit, but she is concerned about taking the drug at this dose over the long-term.    She did undergo a rather thorough rheumatologic evaluation as part of her GCA work-up, all of this proving unremarkable.  When she was last seen by me, I did order MRI of the brain and MRA of the brain, the former revealing only minimal nonspecific white matter changes likely atherosclerotic in nature, the latter was completely normal.    Medical, surgical and family histories are reviewed, there are no new drug allergies.  She is on Motrin 800 mg, twice daily, baby aspirin daily, vitamin D and calcium along with Astelin nasal spray.    Review of Systems   Cardiovascular:  Negative for leg swelling.   Musculoskeletal:  Positive for joint pain and neck pain. Negative for falls and myalgias.   Skin:  Negative for rash.   Psychiatric/Behavioral:  Negative for depression and memory loss. The patient has insomnia.    All other systems reviewed and are negative.    Objective     /52 (BP Location: Right arm, Patient Position: Sitting, BP Cuff Size: Adult)   Pulse 96   Temp 36.8 °C (98.3 °F) (Temporal)   Ht 1.651 m (5' 5\")   Wt 63.3 kg (139 lb 8.8 oz)   SpO2 95%   BMI 23.22 kg/m²      Physical Exam    She appears in no acute distress.  Vital signs are stable.  There is no malar rash.  There is some tenderness over the right temple, I cannot palpate any subcutaneous soft tissue mass.  The right occipital notch is exquisitely sensitive though compression does not elicit pain radiating over the occiput.  There is no such tenderness over the left side of the head or occipital ridge.  Her neck is supple, range of motion is full.  There is tenderness of the right-sided trapezius and cervical paraspinal muscle bodies.  There is no spasm.  Cardiac evaluation is unremarkable.    There is restricted range of motion of the right shoulder, no " tenderness down through the right arm and leg.  There are no trigger points identified bilaterally above and below the diaphragm.  Distal pulses are intact in lower extremities.  There is no evidence of rash.     Neurological Exam    She is fully oriented, there is no aphasia, apraxia, or inattention.    PERRLA/EOMI, visual fields are full to finger counting on confrontation bilaterally.  I did not perform funduscopic exam.  Facial movements are symmetric, sensory exam is intact to temperature and light touch bilaterally.  The tongue and uvula are midline.  Shoulder shrug and head rotation are normal.    Musculoskeletal exam reveals normal tone bilaterally, there is no tremor, asterixis, or drift.  Strength is 5/5 throughout.  Reflexes are brisk and present without asymmetries, the ankle jerks are diminished but present.  The toes are downgoing bilaterally.    She stands easily, armswing is symmetric, heel, toe, and tandem walking are normal.  There is no appendicular dystaxia.  Fine motor control is intact in all 4 extremities.    Sensory exam is intact to vibration, temperature and light touch.  Romberg is absent.    Assessment & Plan     1. Pain of right side of body  I am hard pressed to give her a neurologic diagnosis as a cause for these right sided pain symptoms and the hyperalgesia.  The autoimmune work-up has already been done, I do not think that repeat imaging of the brain is necessary since the symptoms were probably present at the time of the initial study in January, 2021, simply less intense, superseded by the headache from the GCA.  Also, her neurologic examination was at the time and still is essentially normal.    Whether this may be a manifestation of fibromyalgia, this could at least explain the chronic pain but also some of the other symptoms that she can have which is episodic paresthesias, malaise, the insomnia she has, etc.  I recommended following up with a pain specialist and I gave her the  names of several in town.  This includes Hernán Hanna MD, Rashaun Ware MD, and Tre Turcios MD.    There is no need for additional neurologic follow-up at this time.  I did reassure her that taking Motrin 800 mg twice daily is certainly safe, she has had routine CMP checks done revealing normal liver and renal profiles.  She has had no GI symptoms suggesting irritation.  This at least will buy her some time until she finds a specialist that might be able to give her clear answers in regards to cause of her pain.    Time: 40 minutes in total spent on patient care including pre-charting, record review, discussion with healthcare staff and documentation.  This includes face-to-face time including exam, review, discussion, as well as counseling and coordinating care.

## 2023-09-19 ENCOUNTER — HOSPITAL ENCOUNTER (OUTPATIENT)
Dept: RADIOLOGY | Facility: MEDICAL CENTER | Age: 73
End: 2023-09-19
Attending: FAMILY MEDICINE
Payer: MEDICARE

## 2023-09-19 DIAGNOSIS — I72.9 PSEUDOANEURYSM (HCC): ICD-10-CM

## 2023-09-19 PROCEDURE — 93926 LOWER EXTREMITY STUDY: CPT | Mod: RT

## 2023-09-19 PROCEDURE — 93926 LOWER EXTREMITY STUDY: CPT | Mod: 26,RT | Performed by: INTERNAL MEDICINE

## 2023-10-11 ENCOUNTER — OFFICE VISIT (OUTPATIENT)
Dept: OPHTHALMOLOGY | Facility: MEDICAL CENTER | Age: 73
End: 2023-10-11
Payer: MEDICARE

## 2023-10-11 DIAGNOSIS — H52.31 ANISOMETROPIA: ICD-10-CM

## 2023-10-11 DIAGNOSIS — H49.9 OPHTHALMOPLEGIA: ICD-10-CM

## 2023-10-11 DIAGNOSIS — Z96.1 PSEUDOPHAKIA OF BOTH EYES: ICD-10-CM

## 2023-10-11 DIAGNOSIS — H35.371 EPIRETINAL MEMBRANE (ERM) OF RIGHT EYE: ICD-10-CM

## 2023-10-11 DIAGNOSIS — M31.6 TEMPORAL GIANT CELL ARTERITIS (HCC): ICD-10-CM

## 2023-10-11 PROCEDURE — 99214 OFFICE O/P EST MOD 30 MIN: CPT | Mod: 25 | Performed by: OPHTHALMOLOGY

## 2023-10-11 PROCEDURE — 92250 FUNDUS PHOTOGRAPHY W/I&R: CPT | Performed by: OPHTHALMOLOGY

## 2023-10-11 RX ORDER — ROSUVASTATIN CALCIUM 10 MG/1
10 TABLET, COATED ORAL EVERY EVENING
COMMUNITY
End: 2024-03-27

## 2023-10-11 RX ORDER — METOPROLOL SUCCINATE 25 MG/1
25 TABLET, EXTENDED RELEASE ORAL DAILY
COMMUNITY
End: 2024-03-27 | Stop reason: SDUPTHER

## 2023-10-11 RX ORDER — PRASUGREL 10 MG/1
10 TABLET, FILM COATED ORAL DAILY
COMMUNITY
End: 2024-03-27

## 2023-10-11 ASSESSMENT — REFRACTION_MANIFEST
METHOD_AUTOREFRACTION: 1
OS_AXIS: 156
OD_CYLINDER: +0.75
OD_AXIS: 071
OS_SPHERE: -1.25
OD_SPHERE: +0.25
OS_CYLINDER: +0.75

## 2023-10-11 ASSESSMENT — SLIT LAMP EXAM - LIDS
COMMENTS: NORMAL
COMMENTS: NORMAL

## 2023-10-11 ASSESSMENT — EXTERNAL EXAM - LEFT EYE: OS_EXAM: NORMAL

## 2023-10-11 ASSESSMENT — REFRACTION_WEARINGRX
OS_HBASE: IN
OS_AXIS: 159
OD_SPHERE: +0.25
SPECS_TYPE: TRIFOCAL
OD_SPHERE: +0.25
OD_VBASE: UP
OS_HBASE: IN
OD_CYLINDER: SPHERE
OS_CYLINDER: +0.75
OD_VPRISM: 1.5
OS_HPRISM: 1.5
OD_ADD: +2.75
OS_SPHERE: -1.25
OS_SPHERE: -1.50
OD_CYLINDER: SPHERE
OS_HPRISM: 2.0
OS_ADD: +2.50
OS_ADD: +2.75
OS_CYLINDER: +0.75
OD_VBASE: UP
OS_AXIS: 168
OD_VPRISM: 2.0
OD_ADD: +2.50

## 2023-10-11 ASSESSMENT — EXTERNAL EXAM - RIGHT EYE: OD_EXAM: NORMAL

## 2023-10-11 ASSESSMENT — ENCOUNTER SYMPTOMS
DOUBLE VISION: 1
HEADACHES: 1

## 2023-10-11 ASSESSMENT — VISUAL ACUITY
METHOD: SNELLEN - LINEAR
OS_SC: J3
OS_SC: 20/20
OD_SC: 20/20-2
OD_SC: J3

## 2023-10-11 ASSESSMENT — CUP TO DISC RATIO
OD_RATIO: 0.1
OS_RATIO: 0.1

## 2023-10-11 ASSESSMENT — TONOMETRY
OS_IOP_MMHG: 11
OD_IOP_MMHG: 11

## 2023-10-11 NOTE — ASSESSMENT & PLAN NOTE
8/25/2021 - small RHypo and XT. 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 However difficulty fusion secondary to the aniseikonia form the monovision as well as the ERM OD. In Pat did better with a 2 base up OD and 2 base in OS. However still having some asthenopic findings secondary to the ERM. Discussed trial of RX with prisms and bifocal and correcting for the monovision.   12/1/2021 - ortho with new glasses, but not wearing because of nose bleed. No progression of the misalignment  7/27/2022 - continue current rx with prism  1/4/2023 -overall motility appears stable.  However she does not like to wear prism glasses because of the same irritating her temple on the right.  4/5/2023 -stable small right hypo and exotropia.  Has monovision so not that bothered by the motility disorder and double vision.  10/11/2023-overall stable.  Doing well with prism glasses

## 2023-10-11 NOTE — ASSESSMENT & PLAN NOTE
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 Causing some symptomatic aniseikonbia. Will therefore refer to Dr Navjot Feliz for consideration of IOL exchange  8/25/2021 - Will give trial of glasses rx to help correct some of the aniseikonia to see if tolerates. Discussed that even with IOL exchange has the ERM OD that would affect fusion  7/27/2022 - Continue current rx with prism. Re-gave rx  10/11/2023-continue Rx with prisms.  Getting full frame distance because somewhat bothered by progressive

## 2023-10-11 NOTE — ASSESSMENT & PLAN NOTE
2/3/2021 - Epiretinal membrane seen on 5-line OCT. Discussed that since vision still 20/20 would hold on referral unless becomes more symptomatic  12/1/2021 - OCT 5-line no significant change and vision still good  7/27/2022 - ERM stable on 5-line  1/4/2023 -OCT shows improvement of ERM OD.  Vision stable.  4/5/2023-stable epiretinal membrane.  Not visually significant at this time  10/11/2023-stable epiretinal membrane

## 2023-10-11 NOTE — ASSESSMENT & PLAN NOTE
2/3/2021 - IOL in position  8/25/2021 - sp Yag  12/1/2021 - IOL in position  10/11/2023-IOL intact

## 2023-10-11 NOTE — ASSESSMENT & PLAN NOTE
2/3/2021 - Presumed giant cell arteritis by history, symptoms of temporal pain, and response to prednisone. Never had temporal artery biopsy. ESR was 1 back in January, but had been on prednosne at least 10 mg for a few months prior. Dr Banks increased prednisone to 60 and currently on 40 mg / day.  From the neuro-ophthalmic standpoint no evidence of optic nerve invovement with OCT NFL thickness normal at 107 OD and 110 OS. No optic disc pallor and no swelling. In Addition VICKIE was 1:320. Therefore discussed with patient that I recommend being managed by Rheumatology. Would consider Actemra and thus will refer to Dr Michaud in [place]. VICKIE will need to be characterized to determine is has concomitant other autoimmune disorder.   8/25/2021 - Didn't see Dr Michaud. Sept 13th has a telemedicine apt with [place]. On prednisone 10 mg. Initial symptoms headache and lump on the side of the head. Dx made 10/2020. Long discussion regarding GCA, and monitoring symptoms, ESR and CRP on pred taper. Recommended she have discussion via telemed with Rheum about Actemra. Ordered ESR, CRP and TFT's. No evidence of GCA induced optic neuropathy  8/26/2021 - ESR 4.0, TSH 1.16, CRP 0.34  12/1/2021 - Apparently had relapse of headaches in September and went back to 20 mg prednisone. Then at taper again down to 10 mg last week headache returned and increased to 12.5 mg. She discussed with Rheumatologist in [place] where advised TA biopsy to secure the diagnosis. Would need to find provider here in [place] to rx Actemra. OCT NFL thickness stable at 107 OD and 114 OS and nerves healthy, so no evidence of optic neuropathy. However will recheck ESR and CRP. Also refer to Dr Pelaez for temporal artery biopsy  12/3/2021 - Repeat ESR 4.0, CRP 0.6 slightly increased, but still within normal.  7/27/2022 - Recent MI with subsequent COVID. Self discontinued prednisone, but was on 3 mg. Will repeat ESR and CRP. Had biopsy scheduled august 10  th. Still no evidence of an optic neuropathy or motility disorder secondary to GCA. OCT NFL thickness stable at 106 OD and 106 OS  Addendum CRP 1.73 slightly above normal range  8/24/2022 - Report of Temporal Artery Biopsy by Dr Fields at [facility]. Shows evidence of treated temporal arteritis. Will send report to her immunologist Dr Brandi Hester at [place]  1/4/2023 -has now been off prednisone since the biopsy in July.  However states that her temporal headaches have started to come back as well as some jaw discomfort on the right.  She Was History of Temporal and Facial Headaches That Improved in the past with Botox but Her Current Return of Headaches Is More Similar to the Ones That Caused a Lump in the Region of Her Temporal Artery.  She Also Has Some Other Cranial Lumps and a CT Scan Done with Contrast by Her Primary Was Nonrevealing.  Is Therefore concerning That Her Headaches Have Worsened.  I am therefore repeating her sed rate CRP, and also sending her as a consultation to rheumatology here at [facility].  I discussed that if her CRP and sedimentation rate are starting to increase that this would be consistent with a return of giant cell arteritis.  At this point there is no neuro ophthalmologic manifestations, but I did stress to her that if she were to have any change in her vision to call immediately or present to the emergency room  4/5/2023-saw Dr. Hager who recommended beginning on methotrexate.  However she no longer wants to take any medications.  She has had no recurrence of her temporal arteritis symptoms.  Her last CRP was normal at 0.75 and sed rate 6.0.  I discussed that if following her off medication she should be aware of the recurrence of any subtle signs of her temporal arteritis.  If these occur she should start prednisone immediately and if she has any vision changes present to the emergency room.  Her optic nerve heads appear stable without any edema and 105  OS OCT NFL  thickness.  This can repeat her laboratories again at the end of this month  10/11/2023-overall stable.  No development of an optic neuropathy.  Currently taking prasugrel and nonsteroidals.  Again discussed that if any change in vision to present emergently for high-dose steroids and then consideration of methotrexate

## 2023-10-11 NOTE — PROGRESS NOTES
Peds/Neuro Ophthalmology:   Jon Wilde M.D.    Date & Time note created:    10/11/2023   3:45 PM     Referring MD / APRN:  Khai Deras M.D., No att. providers found    Patient ID:  Name:             Carine Christopher   YOB: 1950  Age:                 73 y.o.  female   MRN:               [id number]    Chief Complaint/Reason for Visit:     Other (ophthalmoplegia)      History of Present Illness:    Carine Christopher is a 73 y.o. female   Follow up giant cell arteritis.GCA in remission per .Patient cant wear glasses.She was told her frames are to small for prism and progressive lens.Patient considering single vision glasses.Heart attack on august 25th of 2023.        Review of Systems:  Review of Systems   Eyes:  Positive for double vision.   Neurological:  Positive for headaches.   All other systems reviewed and are negative.      Past Medical History:   Past Medical History:   Diagnosis Date    Arthritis 07/07/2022    hands    Breath shortness 07/07/2022    with exertion    Bronchitis 07/07/2022    chronic broncitis since age 15    CAD (coronary artery disease) 07/07/2022    Cataract 07/07/2022    IOLOU    COVID 07/06/2022    Giant cell arteritis (HCC) 2019    Gynecological disorder 07/07/2022    pt still having monthly menses, on hormones    Head ache     Heart attack (HCC) 10/2020    taking metoprolol, stents placed times 2    Heart valve disease 07/07/2022    High cholesterol 07/07/2022    non medicated    Hyperlipidemia     Hypertension     Pain 07/07/2022    constant headaches times 3 years    Pneumonia 07/07/2022    walking pneumonia 1980    Urinary incontinence 07/07/2022    at times       Past Surgical History:  Past Surgical History:   Procedure Laterality Date    DC TEMPORAL ARTERY LIGATN OR BX Right 8/10/2022    Procedure: RIGHT TEMPORAL ARTERY BIOPSY;  Surgeon: Tone Fields M.D.;  Location: SURGERY [facility];  Service:  Vascular    OTHER CARDIAC SURGERY      stents placed    CATARACT EXTRACTION WITH IOL Bilateral 2019    ABDOMINAL EXPLORATION      BREAST IMPLANT REVISION         Current Outpatient Medications:  Current Outpatient Medications   Medication Sig Dispense Refill    prasugrel (EFFIENT) 10 MG Tab Take 10 mg by mouth every day.      rosuvastatin (CRESTOR) 10 MG Tab Take 10 mg by mouth every evening.      metoprolol SR (TOPROL XL) 25 MG TABLET SR 24 HR Take 25 mg by mouth every day.      ibuprofen (MOTRIN) 800 MG Tab Take 800 mg by mouth every 8 hours as needed.      azelastine (ASTELIN) 137 MCG/SPRAY nasal spray Administer 2 Sprays into affected nostril(S) as needed.      LUTEIN PO Take 20 mg by mouth every day.      Uhjlet-CzGtw-SkMyn-FA-CA-Omega (COMPLETE [place] DHA) 29-1-200 & 250 MG Misc Take 2 Tablets by mouth every day.      Cholecalciferol (VITAMIN D3) 50 MCG (2000 UT) Tab Take 2,000 Units by mouth every day at 6 PM.      aspirin 81 MG EC tablet Take 81 mg by mouth every evening.       No current facility-administered medications for this visit.       Allergies:  Allergies   Allergen Reactions    Codeine Vomiting and Nausea    Other Drug Unspecified     States that most antibiotics cause yellow stools.    Atorvastatin Unspecified     Caused bruising general body, pt doesn't want to take    Chloramphenicol Diarrhea     PCN is okay to take    Sulfa Antibiotics [Sulfa Drugs] Unspecified     Can't remember reaction       Family History:  Family History   Problem Relation Age of Onset    Glasses Father     Heart Disease Father     Hypertension Mother     Kidney Disease Neg Hx        Social History:  Social History     Socioeconomic History    Marital status: Single     Spouse name: Not on file    Number of children: Not on file    Years of education: Not on file    Highest education level: Not on file   Occupational History    Not on file   Tobacco Use    Smoking status: Never    Smokeless tobacco: Never   Vaping Use     Vaping Use: Never used   Substance and Sexual Activity    Alcohol use: Not Currently     Comment: Very rare    Drug use: Never    Sexual activity: Not on file   Other Topics Concern    Not on file   Social History Narrative    Retired- Office manger for [employer]      Social Determinants of Health     Financial Resource Strain: Unknown (1/12/2022)    Overall Financial Resource Strain (CARDIA)     Difficulty of Paying Living Expenses: Patient refused   Food Insecurity: No Food Insecurity (1/12/2022)    Hunger Vital Sign     Worried About Running Out of Food in the Last Year: Never true     Ran Out of Food in the Last Year: Never true   Transportation Needs: Unknown (1/12/2022)    PRAPARE - Transportation     Lack of Transportation (Medical): Patient refused     Lack of Transportation (Non-Medical): Patient refused   Physical Activity: Not on file   Stress: Not on file   Social Connections: Not on file   Intimate Partner Violence: Not on file   Housing Stability: Not on file          Physical Exam:  Physical Exam    Oriented x 3  Weight/BMI: There is no height or weight on file to calculate BMI.  There were no vitals taken for this visit.    Base Eye Exam       Visual Acuity (Snellen - Linear)         Right Left    Dist sc 20/20-2 20/20    Near sc J3 J3              Tonometry ([facility], 2:21 PM)         Right Left    Pressure 11 11              Pupils         Pupils    Right PERRL    Left PERRL              Neuro/Psych       Oriented x3: Yes    Mood/Affect: Normal                  Additional Tests       Color         Right Left    Ishihara 9/9 9/9              Stereo       Fly: +    Animals: 3/3    Circles: 3/9                  Slit Lamp and Fundus Exam       External Exam         Right Left    External Normal Normal              Slit Lamp Exam         Right Left    Lids/Lashes Normal Normal    Conjunctiva/Sclera White and quiet White and quiet    Cornea Clear Clear    Anterior Chamber Deep and quiet Deep  and quiet    Iris Round and reactive Round and reactive    Lens Posterior chamber intraocular lens Posterior chamber intraocular lens    Vitreous Normal Normal              Fundus Exam         Right Left    Disc Normal Normal    C/D Ratio 0.1 0.1    Macula Epiretinal membrane Normal    Vessels Normal Normal    Periphery Normal Normal                  Refraction       Wearing Rx         Sphere Cylinder Axis Add Horz Prism Vert Prism    Right +0.25 Sphere  +2.50  1.5 up    Left -1.25 +0.75 168 +2.50 1.5 in       Type: Trifocal              Wearing Rx #2         Sphere Cylinder Axis Add Horz Prism Vert Prism    Right +0.25 Sphere  +2.75  2.0 up    Left -1.50 +0.75 159 +2.75 2.0 in               Manifest Refraction (Auto)         Sphere Cylinder Axis    Right +0.25 +0.75 071    Left -1.25 +0.75 156                    Pertinent Lab/Test/Imaging Review:      Assessment and Plan:     Epiretinal membrane (ERM) of right eye  2/3/2021 - Epiretinal membrane seen on 5-line OCT. Discussed that since vision still 20/20 would hold on referral unless becomes more symptomatic  12/1/2021 - OCT 5-line no significant change and vision still good  7/27/2022 - ERM stable on 5-line  1/4/2023 -OCT shows improvement of ERM OD.  Vision stable.  4/5/2023-stable epiretinal membrane.  Not visually significant at this time  10/11/2023-stable epiretinal membrane    Ophthalmoplegia  8/25/2021 - small RHypo and XT. 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 However difficulty fusion secondary to the aniseikonia form the monovision as well as the ERM OD. In Pat did better with a 2 base up OD and 2 base in OS. However still having some asthenopic findings secondary to the ERM. Discussed trial of RX with prisms and bifocal and correcting for the monovision.   12/1/2021 - ortho with new glasses, but not wearing because of nose bleed. No progression of the misalignment  7/27/2022 - continue  current rx with prism  1/4/2023 -overall motility appears stable.  However she does not like to wear prism glasses because of the same irritating her temple on the right.  4/5/2023 -stable small right hypo and exotropia.  Has monovision so not that bothered by the motility disorder and double vision.  10/11/2023-overall stable.  Doing well with prism glasses    Temporal giant cell arteritis (HCC)  2/3/2021 - Presumed giant cell arteritis by history, symptoms of temporal pain, and response to prednisone. Never had temporal artery biopsy. ESR was 1 back in January, but had been on prednosne at least 10 mg for a few months prior. Dr Banks increased prednisone to 60 and currently on 40 mg / day.  From the neuro-ophthalmic standpoint no evidence of optic nerve invovement with OCT NFL thickness normal at 107 OD and 110 OS. No optic disc pallor and no swelling. In Addition VICKIE was 1:320. Therefore discussed with patient that I recommend being managed by Rheumatology. Would consider Actemra and thus will refer to Dr Michaud in [place]. VICKIE will need to be characterized to determine is has concomitant other autoimmune disorder.   8/25/2021 - Didn't see Dr Michaud. Sept 13th has a telemedicine apt with [place]. On prednisone 10 mg. Initial symptoms headache and lump on the side of the head. Dx made 10/2020. Long discussion regarding GCA, and monitoring symptoms, ESR and CRP on pred taper. Recommended she have discussion via telemed with Rheum about Actemra. Ordered ESR, CRP and TFT's. No evidence of GCA induced optic neuropathy  8/26/2021 - ESR 4.0, TSH 1.16, CRP 0.34  12/1/2021 - Apparently had relapse of headaches in September and went back to 20 mg prednisone. Then at taper again down to 10 mg last week headache returned and increased to 12.5 mg. She discussed with Rheumatologist in [place] where advised TA biopsy to secure the diagnosis. Would need to find provider here in [place] to rx Actemra. OCT NFL thickness  stable at 107 OD and 114 OS and nerves healthy, so no evidence of optic neuropathy. However will recheck ESR and CRP. Also refer to Dr Pelaez for temporal artery biopsy  12/3/2021 - Repeat ESR 4.0, CRP 0.6 slightly increased, but still within normal.  7/27/2022 - Recent MI with subsequent COVID. Self discontinued prednisone, but was on 3 mg. Will repeat ESR and CRP. Had biopsy scheduled august 10 th. Still no evidence of an optic neuropathy or motility disorder secondary to GCA. OCT NFL thickness stable at 106 OD and 106 OS  Addendum CRP 1.73 slightly above normal range  8/24/2022 - Report of Temporal Artery Biopsy by Dr Fields at [facility]. Shows evidence of treated temporal arteritis. Will send report to her immunologist Dr Brandi Hester at [place]  1/4/2023 -has now been off prednisone since the biopsy in July.  However states that her temporal headaches have started to come back as well as some jaw discomfort on the right.  She Was History of Temporal and Facial Headaches That Improved in the past with Botox but Her Current Return of Headaches Is More Similar to the Ones That Caused a Lump in the Region of Her Temporal Artery.  She Also Has Some Other Cranial Lumps and a CT Scan Done with Contrast by Her Primary Was Nonrevealing.  Is Therefore concerning That Her Headaches Have Worsened.  I am therefore repeating her sed rate CRP, and also sending her as a consultation to rheumatology here at [facility].  I discussed that if her CRP and sedimentation rate are starting to increase that this would be consistent with a return of giant cell arteritis.  At this point there is no neuro ophthalmologic manifestations, but I did stress to her that if she were to have any change in her vision to call immediately or present to the emergency room  4/5/2023-saw Dr. Hager who recommended beginning on methotrexate.  However she no longer wants to take any medications.  She has had no recurrence of her temporal  arteritis symptoms.  Her last CRP was normal at 0.75 and sed rate 6.0.  I discussed that if following her off medication she should be aware of the recurrence of any subtle signs of her temporal arteritis.  If these occur she should start prednisone immediately and if she has any vision changes present to the emergency room.  Her optic nerve heads appear stable without any edema and 105  OS OCT NFL thickness.  This can repeat her laboratories again at the end of this month  10/11/2023-overall stable.  No development of an optic neuropathy.  Currently taking prasugrel and nonsteroidals.  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 Causing some symptomatic aniseikonbia. Will therefore refer to Dr Navjot Feliz for consideration of IOL exchange  8/25/2021 - Will give trial of glasses rx to help correct some of the aniseikonia to see if tolerates. Discussed that even with IOL exchange has the ERM OD that would affect fusion  7/27/2022 - Continue current rx with prism. Re-gave rx  10/11/2023-continue Rx with prisms.  Getting full frame distance because somewhat bothered by progressive    Pseudophakia of both eyes  2/3/2021 - IOL in position  8/25/2021 - sp Yag  12/1/2021 - IOL in position  10/11/2023-IOL intact        Jon Wilde M.D.

## 2023-11-29 ENCOUNTER — PATIENT MESSAGE (OUTPATIENT)
Dept: HEALTH INFORMATION MANAGEMENT | Facility: OTHER | Age: 73
End: 2023-11-29

## 2024-01-27 ENCOUNTER — HOSPITAL ENCOUNTER (OUTPATIENT)
Dept: LAB | Facility: MEDICAL CENTER | Age: 74
End: 2024-01-27
Attending: FAMILY MEDICINE
Payer: MEDICARE

## 2024-01-27 LAB
ALBUMIN SERPL BCP-MCNC: 3.8 G/DL (ref 3.2–4.9)
ALBUMIN/GLOB SERPL: 1.1 G/DL
ALP SERPL-CCNC: 110 U/L (ref 30–99)
ALT SERPL-CCNC: 14 U/L (ref 2–50)
ANION GAP SERPL CALC-SCNC: 14 MMOL/L (ref 7–16)
AST SERPL-CCNC: 27 U/L (ref 12–45)
BASOPHILS # BLD AUTO: 0.6 % (ref 0–1.8)
BASOPHILS # BLD: 0.06 K/UL (ref 0–0.12)
BILIRUB SERPL-MCNC: 0.2 MG/DL (ref 0.1–1.5)
BUN SERPL-MCNC: 21 MG/DL (ref 8–22)
CALCIUM ALBUM COR SERPL-MCNC: 9.5 MG/DL (ref 8.5–10.5)
CALCIUM SERPL-MCNC: 9.3 MG/DL (ref 8.5–10.5)
CHLORIDE SERPL-SCNC: 106 MMOL/L (ref 96–112)
CO2 SERPL-SCNC: 23 MMOL/L (ref 20–33)
CREAT SERPL-MCNC: 0.72 MG/DL (ref 0.5–1.4)
EOSINOPHIL # BLD AUTO: 0.18 K/UL (ref 0–0.51)
EOSINOPHIL NFR BLD: 1.9 % (ref 0–6.9)
ERYTHROCYTE [DISTWIDTH] IN BLOOD BY AUTOMATED COUNT: 47.5 FL (ref 35.9–50)
GFR SERPLBLD CREATININE-BSD FMLA CKD-EPI: 87 ML/MIN/1.73 M 2
GLOBULIN SER CALC-MCNC: 3.6 G/DL (ref 1.9–3.5)
GLUCOSE SERPL-MCNC: 119 MG/DL (ref 65–99)
HCT VFR BLD AUTO: 40.4 % (ref 37–47)
HGB BLD-MCNC: 13.1 G/DL (ref 12–16)
IMM GRANULOCYTES # BLD AUTO: 0.03 K/UL (ref 0–0.11)
IMM GRANULOCYTES NFR BLD AUTO: 0.3 % (ref 0–0.9)
LYMPHOCYTES # BLD AUTO: 2.07 K/UL (ref 1–4.8)
LYMPHOCYTES NFR BLD: 21.6 % (ref 22–41)
MCH RBC QN AUTO: 28.5 PG (ref 27–33)
MCHC RBC AUTO-ENTMCNC: 32.4 G/DL (ref 32.2–35.5)
MCV RBC AUTO: 87.8 FL (ref 81.4–97.8)
MONOCYTES # BLD AUTO: 0.64 K/UL (ref 0–0.85)
MONOCYTES NFR BLD AUTO: 6.7 % (ref 0–13.4)
NEUTROPHILS # BLD AUTO: 6.62 K/UL (ref 1.82–7.42)
NEUTROPHILS NFR BLD: 68.9 % (ref 44–72)
NRBC # BLD AUTO: 0 K/UL
NRBC BLD-RTO: 0 /100 WBC (ref 0–0.2)
PLATELET # BLD AUTO: 268 K/UL (ref 164–446)
PMV BLD AUTO: 11 FL (ref 9–12.9)
POTASSIUM SERPL-SCNC: 4 MMOL/L (ref 3.6–5.5)
PROT SERPL-MCNC: 7.4 G/DL (ref 6–8.2)
RBC # BLD AUTO: 4.6 M/UL (ref 4.2–5.4)
SODIUM SERPL-SCNC: 143 MMOL/L (ref 135–145)
WBC # BLD AUTO: 9.6 K/UL (ref 4.8–10.8)

## 2024-01-27 PROCEDURE — 80053 COMPREHEN METABOLIC PANEL: CPT

## 2024-01-27 PROCEDURE — 36415 COLL VENOUS BLD VENIPUNCTURE: CPT

## 2024-01-27 PROCEDURE — 85025 COMPLETE CBC W/AUTO DIFF WBC: CPT

## 2024-02-21 ENCOUNTER — HOSPITAL ENCOUNTER (INPATIENT)
Facility: MEDICAL CENTER | Age: 74
LOS: 12 days | DRG: 233 | End: 2024-03-04
Attending: EMERGENCY MEDICINE | Admitting: HOSPITALIST
Payer: MEDICARE

## 2024-02-21 ENCOUNTER — APPOINTMENT (OUTPATIENT)
Dept: CARDIOLOGY | Facility: MEDICAL CENTER | Age: 74
DRG: 233 | End: 2024-02-21
Attending: INTERNAL MEDICINE
Payer: MEDICARE

## 2024-02-21 ENCOUNTER — APPOINTMENT (OUTPATIENT)
Dept: RADIOLOGY | Facility: MEDICAL CENTER | Age: 74
DRG: 233 | End: 2024-02-21
Attending: EMERGENCY MEDICINE
Payer: MEDICARE

## 2024-02-21 ENCOUNTER — APPOINTMENT (OUTPATIENT)
Dept: CARDIOLOGY | Facility: MEDICAL CENTER | Age: 74
DRG: 233 | End: 2024-02-21
Attending: NURSE PRACTITIONER
Payer: MEDICARE

## 2024-02-21 DIAGNOSIS — Z95.1 S/P CABG X 3: ICD-10-CM

## 2024-02-21 DIAGNOSIS — G89.18 POST-OP PAIN: ICD-10-CM

## 2024-02-21 DIAGNOSIS — R05.1 ACUTE COUGH: ICD-10-CM

## 2024-02-21 DIAGNOSIS — R07.9 CHEST PAIN, UNSPECIFIED TYPE: ICD-10-CM

## 2024-02-21 PROBLEM — I25.10 CAD (CORONARY ARTERY DISEASE): Status: ACTIVE | Noted: 2024-02-21

## 2024-02-21 PROBLEM — M31.6 GIANT CELL ARTERITIS (HCC): Status: ACTIVE | Noted: 2024-02-21

## 2024-02-21 PROBLEM — I10 PRIMARY HYPERTENSION: Status: ACTIVE | Noted: 2024-02-21

## 2024-02-21 PROBLEM — E78.5 DYSLIPIDEMIA: Status: ACTIVE | Noted: 2024-02-21

## 2024-02-21 PROBLEM — I20.0 UNSTABLE ANGINA (HCC): Status: ACTIVE | Noted: 2024-02-21

## 2024-02-21 LAB
ALBUMIN SERPL BCP-MCNC: 4.1 G/DL (ref 3.2–4.9)
ALBUMIN/GLOB SERPL: 1.4 G/DL
ALP SERPL-CCNC: 99 U/L (ref 30–99)
ALT SERPL-CCNC: 18 U/L (ref 2–50)
ANION GAP SERPL CALC-SCNC: 13 MMOL/L (ref 7–16)
APTT PPP: 29.8 SEC (ref 24.7–36)
APTT PPP: 35 SEC (ref 24.7–36)
APTT PPP: >240 SEC (ref 24.7–36)
AST SERPL-CCNC: 23 U/L (ref 12–45)
BASOPHILS # BLD AUTO: 0.6 % (ref 0–1.8)
BASOPHILS # BLD: 0.06 K/UL (ref 0–0.12)
BILIRUB SERPL-MCNC: 0.2 MG/DL (ref 0.1–1.5)
BUN SERPL-MCNC: 27 MG/DL (ref 8–22)
CALCIUM ALBUM COR SERPL-MCNC: 9.5 MG/DL (ref 8.5–10.5)
CALCIUM SERPL-MCNC: 9.6 MG/DL (ref 8.5–10.5)
CHLORIDE SERPL-SCNC: 104 MMOL/L (ref 96–112)
CO2 SERPL-SCNC: 24 MMOL/L (ref 20–33)
CREAT SERPL-MCNC: 0.77 MG/DL (ref 0.5–1.4)
CRP SERPL HS-MCNC: 1.6 MG/L (ref 0–3)
EKG IMPRESSION: NORMAL
EKG IMPRESSION: NORMAL
EOSINOPHIL # BLD AUTO: 0.22 K/UL (ref 0–0.51)
EOSINOPHIL NFR BLD: 2.1 % (ref 0–6.9)
ERYTHROCYTE [DISTWIDTH] IN BLOOD BY AUTOMATED COUNT: 46.5 FL (ref 35.9–50)
ERYTHROCYTE [SEDIMENTATION RATE] IN BLOOD BY WESTERGREN METHOD: 9 MM/HOUR (ref 0–25)
FLUAV RNA SPEC QL NAA+PROBE: NEGATIVE
FLUBV RNA SPEC QL NAA+PROBE: NEGATIVE
GFR SERPLBLD CREATININE-BSD FMLA CKD-EPI: 81 ML/MIN/1.73 M 2
GLOBULIN SER CALC-MCNC: 2.9 G/DL (ref 1.9–3.5)
GLUCOSE SERPL-MCNC: 104 MG/DL (ref 65–99)
HCT VFR BLD AUTO: 39.6 % (ref 37–47)
HGB BLD-MCNC: 13 G/DL (ref 12–16)
IMM GRANULOCYTES # BLD AUTO: 0.03 K/UL (ref 0–0.11)
IMM GRANULOCYTES NFR BLD AUTO: 0.3 % (ref 0–0.9)
INR PPP: 0.99 (ref 0.87–1.13)
INR PPP: 1.14 (ref 0.87–1.13)
LV EJECT FRACT MOD 2C 99903: 16.82
LV EJECT FRACT MOD 4C 99902: 67.25
LV EJECT FRACT MOD BP 99901: 47.62
LYMPHOCYTES # BLD AUTO: 2.65 K/UL (ref 1–4.8)
LYMPHOCYTES NFR BLD: 24.8 % (ref 22–41)
MCH RBC QN AUTO: 28.3 PG (ref 27–33)
MCHC RBC AUTO-ENTMCNC: 32.8 G/DL (ref 32.2–35.5)
MCV RBC AUTO: 86.3 FL (ref 81.4–97.8)
MONOCYTES # BLD AUTO: 0.86 K/UL (ref 0–0.85)
MONOCYTES NFR BLD AUTO: 8 % (ref 0–13.4)
NEUTROPHILS # BLD AUTO: 6.87 K/UL (ref 1.82–7.42)
NEUTROPHILS NFR BLD: 64.2 % (ref 44–72)
NRBC # BLD AUTO: 0 K/UL
NRBC BLD-RTO: 0 /100 WBC (ref 0–0.2)
PLATELET # BLD AUTO: 207 K/UL (ref 164–446)
PMV BLD AUTO: 10.3 FL (ref 9–12.9)
POTASSIUM SERPL-SCNC: 4 MMOL/L (ref 3.6–5.5)
PROT SERPL-MCNC: 7 G/DL (ref 6–8.2)
PROTHROMBIN TIME: 13.2 SEC (ref 12–14.6)
PROTHROMBIN TIME: 14.7 SEC (ref 12–14.6)
RBC # BLD AUTO: 4.59 M/UL (ref 4.2–5.4)
RSV RNA SPEC QL NAA+PROBE: NEGATIVE
SARS-COV-2 RNA RESP QL NAA+PROBE: NOTDETECTED
SODIUM SERPL-SCNC: 141 MMOL/L (ref 135–145)
TROPONIN T SERPL-MCNC: 16 NG/L (ref 6–19)
TROPONIN T SERPL-MCNC: 25 NG/L (ref 6–19)
UFH PPP CHRO-ACNC: 0.25 IU/ML
UFH PPP CHRO-ACNC: 0.78 IU/ML
UFH PPP CHRO-ACNC: <0.1 IU/ML
WBC # BLD AUTO: 10.7 K/UL (ref 4.8–10.8)

## 2024-02-21 PROCEDURE — 93005 ELECTROCARDIOGRAM TRACING: CPT

## 2024-02-21 PROCEDURE — B2151ZZ FLUOROSCOPY OF LEFT HEART USING LOW OSMOLAR CONTRAST: ICD-10-PCS | Performed by: INTERNAL MEDICINE

## 2024-02-21 PROCEDURE — 99291 CRITICAL CARE FIRST HOUR: CPT | Performed by: INTERNAL MEDICINE

## 2024-02-21 PROCEDURE — 85610 PROTHROMBIN TIME: CPT

## 2024-02-21 PROCEDURE — 86141 C-REACTIVE PROTEIN HS: CPT

## 2024-02-21 PROCEDURE — 85520 HEPARIN ASSAY: CPT

## 2024-02-21 PROCEDURE — 700101 HCHG RX REV CODE 250

## 2024-02-21 PROCEDURE — 700102 HCHG RX REV CODE 250 W/ 637 OVERRIDE(OP): Performed by: HOSPITALIST

## 2024-02-21 PROCEDURE — A9270 NON-COVERED ITEM OR SERVICE: HCPCS | Performed by: INTERNAL MEDICINE

## 2024-02-21 PROCEDURE — 93306 TTE W/DOPPLER COMPLETE: CPT

## 2024-02-21 PROCEDURE — 700102 HCHG RX REV CODE 250 W/ 637 OVERRIDE(OP): Performed by: INTERNAL MEDICINE

## 2024-02-21 PROCEDURE — 700117 HCHG RX CONTRAST REV CODE 255: Performed by: INTERNAL MEDICINE

## 2024-02-21 PROCEDURE — 93005 ELECTROCARDIOGRAM TRACING: CPT | Performed by: EMERGENCY MEDICINE

## 2024-02-21 PROCEDURE — A9270 NON-COVERED ITEM OR SERVICE: HCPCS | Performed by: HOSPITALIST

## 2024-02-21 PROCEDURE — 4A023N7 MEASUREMENT OF CARDIAC SAMPLING AND PRESSURE, LEFT HEART, PERCUTANEOUS APPROACH: ICD-10-PCS | Performed by: INTERNAL MEDICINE

## 2024-02-21 PROCEDURE — 36415 COLL VENOUS BLD VENIPUNCTURE: CPT

## 2024-02-21 PROCEDURE — 700111 HCHG RX REV CODE 636 W/ 250 OVERRIDE (IP): Mod: JZ,JG | Performed by: INTERNAL MEDICINE

## 2024-02-21 PROCEDURE — B2111ZZ FLUOROSCOPY OF MULTIPLE CORONARY ARTERIES USING LOW OSMOLAR CONTRAST: ICD-10-PCS | Performed by: INTERNAL MEDICINE

## 2024-02-21 PROCEDURE — 84484 ASSAY OF TROPONIN QUANT: CPT

## 2024-02-21 PROCEDURE — 96374 THER/PROPH/DIAG INJ IV PUSH: CPT

## 2024-02-21 PROCEDURE — 85730 THROMBOPLASTIN TIME PARTIAL: CPT

## 2024-02-21 PROCEDURE — 700102 HCHG RX REV CODE 250 W/ 637 OVERRIDE(OP): Performed by: EMERGENCY MEDICINE

## 2024-02-21 PROCEDURE — 80053 COMPREHEN METABOLIC PANEL: CPT

## 2024-02-21 PROCEDURE — 93010 ELECTROCARDIOGRAM REPORT: CPT | Mod: 77 | Performed by: STUDENT IN AN ORGANIZED HEALTH CARE EDUCATION/TRAINING PROGRAM

## 2024-02-21 PROCEDURE — 71045 X-RAY EXAM CHEST 1 VIEW: CPT

## 2024-02-21 PROCEDURE — 96375 TX/PRO/DX INJ NEW DRUG ADDON: CPT

## 2024-02-21 PROCEDURE — A9270 NON-COVERED ITEM OR SERVICE: HCPCS | Performed by: EMERGENCY MEDICINE

## 2024-02-21 PROCEDURE — 0241U HCHG SARS-COV-2 COVID-19 NFCT DS RESP RNA 4 TRGT ED POC: CPT

## 2024-02-21 PROCEDURE — 85652 RBC SED RATE AUTOMATED: CPT

## 2024-02-21 PROCEDURE — 700111 HCHG RX REV CODE 636 W/ 250 OVERRIDE (IP): Mod: JG

## 2024-02-21 PROCEDURE — 770000 HCHG ROOM/CARE - INTERMEDIATE ICU *

## 2024-02-21 PROCEDURE — 93458 L HRT ARTERY/VENTRICLE ANGIO: CPT | Mod: 26 | Performed by: INTERNAL MEDICINE

## 2024-02-21 PROCEDURE — 93005 ELECTROCARDIOGRAM TRACING: CPT | Performed by: STUDENT IN AN ORGANIZED HEALTH CARE EDUCATION/TRAINING PROGRAM

## 2024-02-21 PROCEDURE — 99153 MOD SED SAME PHYS/QHP EA: CPT

## 2024-02-21 PROCEDURE — 99223 1ST HOSP IP/OBS HIGH 75: CPT | Mod: 25 | Performed by: INTERNAL MEDICINE

## 2024-02-21 PROCEDURE — 99223 1ST HOSP IP/OBS HIGH 75: CPT | Mod: AI | Performed by: HOSPITALIST

## 2024-02-21 PROCEDURE — 99285 EMERGENCY DEPT VISIT HI MDM: CPT

## 2024-02-21 PROCEDURE — 99152 MOD SED SAME PHYS/QHP 5/>YRS: CPT | Performed by: INTERNAL MEDICINE

## 2024-02-21 PROCEDURE — 93306 TTE W/DOPPLER COMPLETE: CPT | Mod: 26 | Performed by: STUDENT IN AN ORGANIZED HEALTH CARE EDUCATION/TRAINING PROGRAM

## 2024-02-21 PROCEDURE — 700111 HCHG RX REV CODE 636 W/ 250 OVERRIDE (IP): Mod: JZ,JG | Performed by: HOSPITALIST

## 2024-02-21 PROCEDURE — 85025 COMPLETE CBC W/AUTO DIFF WBC: CPT

## 2024-02-21 RX ORDER — VIT A/VIT C/VIT E/ZINC/COPPER 2148-113
1 TABLET ORAL EVERY EVENING
COMMUNITY

## 2024-02-21 RX ORDER — HEPARIN SODIUM 1000 [USP'U]/ML
60 INJECTION, SOLUTION INTRAVENOUS; SUBCUTANEOUS ONCE
Status: COMPLETED | OUTPATIENT
Start: 2024-02-21 | End: 2024-02-21

## 2024-02-21 RX ORDER — PRASUGREL 10 MG/1
10 TABLET, FILM COATED ORAL EVERY EVENING
Status: DISCONTINUED | OUTPATIENT
Start: 2024-02-21 | End: 2024-02-21

## 2024-02-21 RX ORDER — HEPARIN SODIUM 1000 [USP'U]/ML
30 INJECTION, SOLUTION INTRAVENOUS; SUBCUTANEOUS PRN
Status: DISPENSED | OUTPATIENT
Start: 2024-02-21 | End: 2024-02-28

## 2024-02-21 RX ORDER — IBUPROFEN 800 MG/1
800 TABLET ORAL 2 TIMES DAILY PRN
Status: ON HOLD | COMMUNITY
End: 2024-03-04

## 2024-02-21 RX ORDER — GUAIFENESIN/DEXTROMETHORPHAN 100-10MG/5
5 SYRUP ORAL EVERY 6 HOURS PRN
Status: DISCONTINUED | OUTPATIENT
Start: 2024-02-21 | End: 2024-02-28

## 2024-02-21 RX ORDER — VERAPAMIL HYDROCHLORIDE 2.5 MG/ML
INJECTION, SOLUTION INTRAVENOUS
Status: COMPLETED
Start: 2024-02-21 | End: 2024-02-21

## 2024-02-21 RX ORDER — NITROGLYCERIN 0.4 MG/1
0.4 TABLET SUBLINGUAL
Status: DISCONTINUED | OUTPATIENT
Start: 2024-02-21 | End: 2024-02-21

## 2024-02-21 RX ORDER — PRASUGREL 10 MG/1
10 TABLET, FILM COATED ORAL EVERY EVENING
Status: ON HOLD | COMMUNITY
End: 2024-03-04

## 2024-02-21 RX ORDER — NITROGLYCERIN 0.4 MG/1
0.4 TABLET SUBLINGUAL ONCE
Status: COMPLETED | OUTPATIENT
Start: 2024-02-21 | End: 2024-02-21

## 2024-02-21 RX ORDER — ASPIRIN 81 MG/1
81 TABLET ORAL EVERY EVENING
Status: DISCONTINUED | OUTPATIENT
Start: 2024-02-21 | End: 2024-02-28

## 2024-02-21 RX ORDER — HEPARIN SODIUM 1000 [USP'U]/ML
INJECTION, SOLUTION INTRAVENOUS; SUBCUTANEOUS
Status: COMPLETED
Start: 2024-02-21 | End: 2024-02-21

## 2024-02-21 RX ORDER — ERYTHROMYCIN 250 MG/1
750 CAPSULE, DELAYED RELEASE ORAL DAILY
COMMUNITY
Start: 2024-02-16 | End: 2024-03-27

## 2024-02-21 RX ORDER — HEPARIN SODIUM 200 [USP'U]/100ML
INJECTION, SOLUTION INTRAVENOUS
Status: COMPLETED
Start: 2024-02-21 | End: 2024-02-21

## 2024-02-21 RX ORDER — PNV NO.52/IRON/FA/OMEGA-3/DHA 29-1-200MG
2 COMBINATION PACKAGE (EA) ORAL EVERY EVENING
Status: ON HOLD | COMMUNITY
End: 2024-03-04

## 2024-02-21 RX ORDER — DIPHENHYDRAMINE HCL 25 MG
CAPSULE ORAL
COMMUNITY

## 2024-02-21 RX ORDER — ROSUVASTATIN CALCIUM 10 MG/1
10 TABLET, COATED ORAL EVERY EVENING
Status: ON HOLD | COMMUNITY
End: 2024-03-04

## 2024-02-21 RX ORDER — LIDOCAINE HYDROCHLORIDE 20 MG/ML
INJECTION, SOLUTION INFILTRATION; PERINEURAL
Status: COMPLETED
Start: 2024-02-21 | End: 2024-02-21

## 2024-02-21 RX ORDER — NITROGLYCERIN 20 MG/100ML
0-200 INJECTION INTRAVENOUS CONTINUOUS
Status: DISCONTINUED | OUTPATIENT
Start: 2024-02-21 | End: 2024-02-23

## 2024-02-21 RX ORDER — MORPHINE SULFATE 4 MG/ML
2 INJECTION INTRAVENOUS
Status: DISCONTINUED | OUTPATIENT
Start: 2024-02-21 | End: 2024-02-28

## 2024-02-21 RX ORDER — ROSUVASTATIN CALCIUM 10 MG/1
10 TABLET, COATED ORAL EVERY EVENING
Status: DISCONTINUED | OUTPATIENT
Start: 2024-02-21 | End: 2024-02-28

## 2024-02-21 RX ORDER — NITROGLYCERIN 20 MG/100ML
INJECTION INTRAVENOUS
Status: ACTIVE
Start: 2024-02-21 | End: 2024-02-22

## 2024-02-21 RX ORDER — ALBUTEROL SULFATE 90 UG/1
2 AEROSOL, METERED RESPIRATORY (INHALATION) 3 TIMES DAILY PRN
COMMUNITY

## 2024-02-21 RX ORDER — MIDAZOLAM HYDROCHLORIDE 1 MG/ML
INJECTION INTRAMUSCULAR; INTRAVENOUS
Status: COMPLETED
Start: 2024-02-21 | End: 2024-02-21

## 2024-02-21 RX ORDER — HEPARIN SODIUM 5000 [USP'U]/100ML
0-30 INJECTION, SOLUTION INTRAVENOUS CONTINUOUS
Status: DISPENSED | OUTPATIENT
Start: 2024-02-21 | End: 2024-02-28

## 2024-02-21 RX ORDER — ACETAMINOPHEN 325 MG/1
650 TABLET ORAL EVERY 4 HOURS PRN
Status: DISCONTINUED | OUTPATIENT
Start: 2024-02-21 | End: 2024-02-28

## 2024-02-21 RX ORDER — ASPIRIN 81 MG/1
81 TABLET ORAL EVERY EVENING
COMMUNITY
End: 2024-03-27

## 2024-02-21 RX ORDER — ASPIRIN 325 MG
325 TABLET ORAL ONCE
Status: COMPLETED | OUTPATIENT
Start: 2024-02-21 | End: 2024-02-21

## 2024-02-21 RX ORDER — AZELASTINE 1 MG/ML
2 SPRAY, METERED NASAL
COMMUNITY

## 2024-02-21 RX ORDER — ALBUTEROL SULFATE 90 UG/1
2 AEROSOL, METERED RESPIRATORY (INHALATION)
Status: DISCONTINUED | OUTPATIENT
Start: 2024-02-21 | End: 2024-02-23

## 2024-02-21 RX ADMIN — ROSUVASTATIN 10 MG: 10 TABLET, FILM COATED ORAL at 17:46

## 2024-02-21 RX ADMIN — IOHEXOL 15 ML: 350 INJECTION, SOLUTION INTRAVENOUS at 13:41

## 2024-02-21 RX ADMIN — ALBUTEROL SULFATE 2 PUFF: 90 AEROSOL, METERED RESPIRATORY (INHALATION) at 23:04

## 2024-02-21 RX ADMIN — NITROGLYCERIN 10 ML: 20 INJECTION INTRAVENOUS at 12:55

## 2024-02-21 RX ADMIN — FENTANYL CITRATE 75 MCG: 50 INJECTION, SOLUTION INTRAMUSCULAR; INTRAVENOUS at 13:35

## 2024-02-21 RX ADMIN — MORPHINE SULFATE 2 MG: 4 INJECTION, SOLUTION INTRAMUSCULAR; INTRAVENOUS at 09:14

## 2024-02-21 RX ADMIN — HEPARIN SODIUM: 1000 INJECTION, SOLUTION INTRAVENOUS; SUBCUTANEOUS at 12:54

## 2024-02-21 RX ADMIN — ASPIRIN 81 MG: 81 TABLET, COATED ORAL at 17:46

## 2024-02-21 RX ADMIN — MORPHINE SULFATE 2 MG: 4 INJECTION, SOLUTION INTRAMUSCULAR; INTRAVENOUS at 23:58

## 2024-02-21 RX ADMIN — MORPHINE SULFATE 2 MG: 4 INJECTION, SOLUTION INTRAMUSCULAR; INTRAVENOUS at 16:25

## 2024-02-21 RX ADMIN — NITROGLYCERIN 20 MCG/MIN: 20 INJECTION INTRAVENOUS at 12:03

## 2024-02-21 RX ADMIN — NITROGLYCERIN 0.4 MG: 0.4 TABLET, ORALLY DISINTEGRATING SUBLINGUAL at 10:06

## 2024-02-21 RX ADMIN — ASPIRIN 325 MG: 325 TABLET ORAL at 10:07

## 2024-02-21 RX ADMIN — MIDAZOLAM HYDROCHLORIDE 1.5 MG: 1 INJECTION, SOLUTION INTRAMUSCULAR; INTRAVENOUS at 13:35

## 2024-02-21 RX ADMIN — HEPARIN SODIUM 12 UNITS/KG/HR: 5000 INJECTION, SOLUTION INTRAVENOUS at 10:24

## 2024-02-21 RX ADMIN — HEPARIN SODIUM 3700 UNITS: 1000 INJECTION, SOLUTION INTRAVENOUS; SUBCUTANEOUS at 10:18

## 2024-02-21 RX ADMIN — VERAPAMIL HYDROCHLORIDE 2.5 MG: 2.5 INJECTION, SOLUTION INTRAVENOUS at 12:55

## 2024-02-21 RX ADMIN — HEPARIN SODIUM 1900 UNITS: 1000 INJECTION, SOLUTION INTRAVENOUS; SUBCUTANEOUS at 23:32

## 2024-02-21 RX ADMIN — HEPARIN SODIUM 12 UNITS/KG/HR: 5000 INJECTION, SOLUTION INTRAVENOUS at 16:05

## 2024-02-21 RX ADMIN — HEPARIN SODIUM 2000 UNITS: 200 INJECTION, SOLUTION INTRAVENOUS at 12:55

## 2024-02-21 RX ADMIN — NITROGLYCERIN 0.4 MG: 0.4 TABLET, ORALLY DISINTEGRATING SUBLINGUAL at 07:35

## 2024-02-21 RX ADMIN — LIDOCAINE HYDROCHLORIDE: 20 INJECTION, SOLUTION INFILTRATION; PERINEURAL at 12:54

## 2024-02-21 ASSESSMENT — ENCOUNTER SYMPTOMS
COUGH: 1
MUSCULOSKELETAL NEGATIVE: 1
SHORTNESS OF BREATH: 1
PALPITATIONS: 0
CHILLS: 0
WEIGHT LOSS: 0
NEUROLOGICAL NEGATIVE: 1
FEVER: 0
SPUTUM PRODUCTION: 1
PSYCHIATRIC NEGATIVE: 1

## 2024-02-21 ASSESSMENT — HEART SCORE
TROPONIN: LESS THAN OR EQUAL TO NORMAL LIMIT
AGE: 65+
HEART SCORE: 5
RISK FACTORS: >2 RISK FACTORS OR HX OF ATHEROSCLEROTIC DISEASE
ECG: NORMAL
HISTORY: MODERATELY SUSPICIOUS

## 2024-02-21 ASSESSMENT — FIBROSIS 4 INDEX: FIB4 SCORE: 1.937996363252019141

## 2024-02-21 ASSESSMENT — PAIN DESCRIPTION - PAIN TYPE
TYPE: ACUTE PAIN

## 2024-02-21 NOTE — H&P (VIEW-ONLY)
I,  Fe Chicas MD performed a substantial portion of the service face-to-face with the same patient on the same date of service. I have reviewed and agree with the care plan and complexity of problems documented by me,  and/or CARI Rubi. I was personally involved in the medical decision making, including the information below:  reviewing and interpreting the films, conducted elements of the history and physical exam, and provided the plan of care. All medical decision making was made by me.     REFERRING PHYSICIAN: Charlie Singh MD.     CONSULTING PHYSICIAN: Fe Chicas MD.    CHIEF COMPLAINT: Chest pain    HISTORY OF PRESENT ILLNESS: The patient is a 73 y.o. female with a past medical history of CAD s/p PCIx 3 08/2023 at [facility], chronic bronchitis, giant cell arteritis (courses of steroids), MI x 4 over the last 4 years who presented to the hospital with sharp/burning chest pain and left arm numbness on 2/21/24. She has been having ongoing bronchitis on oral azithromycin. She has associated weakness. She was seen by cardiology and underwent a LHC which shows in-stent restenosis and multivessel disease.  Her last dose of effient was on 2/20/24    PAST MEDICAL HISTORY:   Past Medical History:   Diagnosis Date    Chronic bronchitis (HCC)     Giant cell arteritis (HCC)     History of heart artery stent     x3 stents    MI (myocardial infarction) (HCC)     x 4       PAST SURGICAL HISTORY:   Past Surgical History:   Procedure Laterality Date    BREAST IMPLANT REVISION Bilateral     implants       FAMILY HISTORY:   Negative for early CAD; mother and father both 92 yo still in good health    SOCIAL HISTORY:   Social History     Socioeconomic History    Marital status:      Spouse name: Not on file    Number of children: Not on file    Years of education: Not on file    Highest education level: Not on file   Occupational History    Not on file   Tobacco Use    Smoking  status: Not on file    Smokeless tobacco: Not on file   Substance and Sexual Activity    Alcohol use: Not on file    Drug use: Not on file    Sexual activity: Not on file   Other Topics Concern    Not on file   Social History Narrative    Not on file     Social Determinants of Health     Financial Resource Strain: Not on file   Food Insecurity: Not on file   Transportation Needs: Not on file   Physical Activity: Not on file   Stress: Not on file   Social Connections: Not on file   Intimate Partner Violence: Not on file   Housing Stability: Not on file       ALLERGIES:   Allergies   Allergen Reactions    Atorvastatin Unspecified     .    Chloramphenicol Unspecified     .    Codeine Unspecified     .    Other Drug Diarrhea     Most antibiotics turn stool yellow    Sulfa Drugs Diarrhea     .        CURRENT MEDICATIONS:     Current Facility-Administered Medications:     morphine 4 MG/ML injection 2 mg, 2 mg, Intravenous, Q3HRS PRN, Dylan Zhu M.D., 2 mg at 02/21/24 0914    heparin infusion 25,000 units in 500 mL 0.45% NACL, 0-30 Units/kg/hr, Intravenous, Continuous, Abhi Gann M.D., Stopped at 02/21/24 1309    heparin injection 1,900 Units, 30 Units/kg, Intravenous, PRN, Abhi Gann M.D.    nitroglycerin 50 mg in D5W 250 ml infusion, 0-200 mcg/min, Intravenous, Continuous, Abhi Gann M.D., Last Rate: 6 mL/hr at 02/21/24 1304, 20 mcg/min at 02/21/24 1304    albuterol inhaler 2 Puff, 2 Puff, Inhalation, Q4HRS (RT), Dylan Zhu M.D.    guaiFENesin dextromethorphan (Robitussin DM) 100-10 MG/5ML syrup 5 mL, 5 mL, Oral, Q6HRS PRN, Dylan Zhu M.D.    aspirin EC tablet 81 mg, 81 mg, Oral, Q EVENING, Dylan Zhu M.D.    [MAR Hold] metoprolol tartrate (Lopressor) tablet 25 mg, 25 mg, Oral, BID, Dylan Zhu M.D.    prasugrel (Effient) tablet 10 mg, 10 mg, Oral, Q EVENING, Dylan Zhu M.D.    rosuvastatin (Crestor) tablet 10 mg, 10 mg, Oral, Q EVENING, Dylan Zhu M.D.     LABS  REVIEWED:  Lab Results   Component Value Date/Time    SODIUM 141 02/21/2024 06:30 AM    POTASSIUM 4.0 02/21/2024 06:30 AM    CHLORIDE 104 02/21/2024 06:30 AM    CO2 24 02/21/2024 06:30 AM    GLUCOSE 104 (H) 02/21/2024 06:30 AM    BUN 27 (H) 02/21/2024 06:30 AM    CREATININE 0.77 02/21/2024 06:30 AM      Lab Results   Component Value Date/Time    PROTHROMBTM 14.7 (H) 02/21/2024 10:39 AM    INR 1.14 (H) 02/21/2024 10:39 AM      Lab Results   Component Value Date/Time    WBC 10.7 02/21/2024 06:30 AM    RBC 4.59 02/21/2024 06:30 AM    HEMOGLOBIN 13.0 02/21/2024 06:30 AM    HEMATOCRIT 39.6 02/21/2024 06:30 AM    MCV 86.3 02/21/2024 06:30 AM    MCH 28.3 02/21/2024 06:30 AM    MCHC 32.8 02/21/2024 06:30 AM    MPV 10.3 02/21/2024 06:30 AM    NEUTSPOLYS 64.20 02/21/2024 06:30 AM    LYMPHOCYTES 24.80 02/21/2024 06:30 AM    MONOCYTES 8.00 02/21/2024 06:30 AM    EOSINOPHILS 2.10 02/21/2024 06:30 AM    BASOPHILS 0.60 02/21/2024 06:30 AM        IMAGING REVIEWED AND INTERPRETED:    ECHOCARDIOGRAM: CONCLUSIONS  Normal left ventricular systolic function. The left ventricular   ejection fraction is visually estimated to be 55%.   Normal right ventricular size and systolic function.  Mild aortic insufficiency.   Right ventricular systolic pressure is estimated to be  23 mmHg   (normal).   No prior study is available for comparison.     ANGIOGRAM: 2/21/24 [facility]  1.  Left main coronary artery:  Normal.  2.  Left anterior descending artery: Recently placed stent in large first diagonal branch has 95% stenosis.  After diagonal takeoff left anterior descending artery has 99% stenosis.  Distal vessel, apical LAD free of significant disease.    3.  Left circumflex coronary artery: Gives 1 large marginal branch, which has 2 layers of stents, chronically occluded, fills through left to left collaterals  4.  Right coronary artery: Diffuse 60% stenosis in midportion, posterior descending artery, posterolateral branch is free of significant  disease.  This is a right dominant system.  5.  Left ventricular end diastolic pressure:  13 mmHg.  No signficant gradient across the aortic valve.  6.  Left ventriculogram:  Ejection fraction of 60%.    REVIEW OF SYSTEMS:   Review of Systems   Constitutional:  Negative for chills, fever and weight loss.   HENT:  Positive for nosebleeds (severe nosebleeds since her 20's). Negative for ear pain and tinnitus.    Eyes:  Negative for double vision, photophobia and pain.   Respiratory:  Negative for cough, hemoptysis and shortness of breath.         Recurrent episodes of bronchitis since early life   Cardiovascular:  Negative for chest pain (central chest pressure associated with left arm pain), palpitations, orthopnea, leg swelling and PND.   Gastrointestinal:  Negative for abdominal pain, blood in stool, nausea and vomiting.   Genitourinary:  Negative for frequency, hematuria and urgency.   Musculoskeletal: Negative.    Skin:  Negative for rash.   Neurological:  Negative for dizziness, tremors, speech change, focal weakness, seizures and headaches.   Endo/Heme/Allergies:  Negative for polydipsia. Bruises/bleeds easily.   Psychiatric/Behavioral:  Negative for hallucinations and memory loss.          PHYSICAL EXAMINATION:   Physical Exam  Vitals reviewed.   Constitutional:       General: She is not in acute distress.     Appearance: Normal appearance.   HENT:      Head: Normocephalic and atraumatic.      Right Ear: External ear normal.      Left Ear: External ear normal.      Nose: Nose normal. No congestion.   Eyes:      General: No scleral icterus.     Extraocular Movements: Extraocular movements intact.      Conjunctiva/sclera: Conjunctivae normal.   Cardiovascular:      Rate and Rhythm: Normal rate and regular rhythm.   Pulmonary:      Effort: Pulmonary effort is normal. No respiratory distress.      Comments: Unlabored breathing on nasal cannula  Abdominal:      General: Abdomen is flat. There is no distension.  "  Musculoskeletal:         General: Normal range of motion.      Cervical back: Normal range of motion.   Skin:     General: Skin is warm and dry.   Neurological:      Mental Status: She is alert and oriented to person, place, and time. Mental status is at baseline.      Cranial Nerves: No cranial nerve deficit.   Psychiatric:         Mood and Affect: Mood normal.         Judgment: Judgment normal.       /59   Pulse 81   Temp 36.5 °C (97.7 °F) (Temporal)   Resp (!) 44   Ht 1.651 m (5' 5\")   Wt 63.5 kg (139 lb 15.9 oz)   SpO2 96%   BMI 23.30 kg/m²        IMPRESSION:  72 yo woman with known conditions of CAD s/p PCIx 3 08/2023 at [facility], chronic bronchitis, giant cell arteritis (courses of steroids), MI x 4 over the last 4 years now with early in-stent restenosis and additional CAD here with unstable angina.      PLAN:  I recommend CABG x3 this admission; the patient's surgery will be necessarily delayed due to Eliquis therapy and need for at least 5 days off. Given severity of her CAD and the symptoms on presentation I recommend this time is spent inpatient as per [facility] protocols.    The procedure, its risks, benefits, potential complications and alternative treatments were discussed with the patient in detail including the risks should she decide not to undergo my recommended treatment. All of her questions were answered to her satisfaction and she is willing to proceed with the operation. The risks include death, stroke,  infection: to include a rare bacterial infection related to the use of the heart/lung machine, joesph-operative myocardial infarction, dysrhythmias, diaphragmatic paralysis, chest wall paresthesia, tracheostomy, kidney or other organ failure, possible return to the operating room for bleeding, bleeding requiring transfusion with its attendant risks including AIDS or hepatitis, dehiscence of surgical incisions, respiratory complications including the need for prolonged " ventilator support, Protamine or other drug reaction, peripheral neuropathy, loss of limb, and miscount of surgical items. The operative mortality risk is approximately 2%. The STS mortality risk score is 1.8% and the morbidity and mortality risk score is 6.5%. The scores were discussed with patient.    The operation,CABG x3 is scheduled for Wednesday Feb 28 at 8 AM at [facility].    Thank you for this very challenging consultation and participation in the patient’s care.  I will keep you apprised of all future developments.          Sincerely,       Fe Chicas MD.

## 2024-02-21 NOTE — ED PROVIDER NOTES
ED Provider Note    Scribed for Amanda Salgado M.D. by Amanda Salgado M.D.. 2/21/2024, 6:39 AM.    Primary care provider: No primary care provider noted.  Means of arrival: [facility]  History obtained from: Patient  History limited by: None    CHIEF COMPLAINT  Chief Complaint   Patient presents with    Chest Pain     x1 week. Burning pain in center of chest. +SOB.Numbness in left arm starting tonight. Hx of 4 MI's with stent placement.        HPI/ROS  Antionette Christopher is a 73 y.o. female who presents to the Emergency Department for evaluation of chest pain onset one week ago. The patient explains that she has been having burning pain at the center of her chest for the past week.  She notes that she has had a viral syndrome and has a history of chronic bronchitis.  Therefore she attributed her symptoms to initial viral illness.  She saw her primary care physician a few days ago and was prescribed azithromycin for management of cough this.  She adds that today at 3 PM she noted paresthesia to her left arm which prompted her visit.  She notes that when she has chest pain with paresthesias to her left arm this is more consistent with her anginal pain and therefore she came in for further evaluation.  She notes that moving her fingers hurt as well. The patient explains that by the time she entered the ED she was in severe pain that made it hard for her to walk. The patient has history of heart attacks, the last one being in August at [facility]. The patient took ibuprofen 800 mg and Nyquil to alleviate the pain. The patient is on blood thinners.     EXTERNAL RECORDS REVIEWED  Prior records reviewed under a different MRN under merged chart, patient noted to have NSTEMI in August 2023 where a stent was placed.    LIMITATION TO HISTORY   Select: : None    OUTSIDE HISTORIAN(S):  None      PAST MEDICAL HISTORY   has a past medical history of Chronic bronchitis (HCC), Giant cell arteritis (HCC), History of heart artery  "stent, and MI (myocardial infarction) (HCC).    SURGICAL HISTORY   has a past surgical history that includes breast implant revision (Bilateral).    SOCIAL HISTORY      Social History     Substance and Sexual Activity   Drug Use Not on file       FAMILY HISTORY  No family history pertinent    CURRENT MEDICATIONS  Home Medications       Reviewed by Lilibeth Calero (Pharmacy Tech) on 24 at 0815  Med List Status: Complete     Medication Last Dose Status   albuterol 108 (90 Base) MCG/ACT Aero Soln inhalation aerosol 2024 Active   aspirin (ASPIRIN EC LOW STRENGTH) 81 MG EC tablet 2024 Active   azelastine (ASTELIN) 137 MCG/SPRAY nasal spray <month Active   Dextromethorphan HBr (VICKS DAYQUIL COUGH) 15 MG/15ML Liquid 2024 Active   erythromycin base 250 MG Cap DR Particles 2024 Active   ibuprofen (IBU) 800 MG Tab 2024 Active   metoprolol tartrate (LOPRESSOR) 25 MG Tab 2024 Active   Multiple Vitamins-Minerals (PRESERVISION AREDS) Tab 2024 Active   Phenylephrine-Doxylamine-DM (NYQUIL COUGH DM + CONGESTION) 5-6.25-10 MG/15ML Liquid 2024 Active   prasugrel (EFFIENT) 10 MG Tab 2024 Active   Kilxpj-StFge-HrZoy-FA-CA-Omega (COMPLETE [place] DHA) 29-1-200 & 200 MG Misc 2024 Active   rosuvastatin (CRESTOR) 10 MG Tab 2024 Active                    ALLERGIES  Allergies   Allergen Reactions    Atorvastatin Unspecified     .    Chloramphenicol Unspecified     .    Codeine Unspecified     .    Other Drug Diarrhea     Most antibiotics turn stool yellow    Sulfa Drugs Diarrhea     .       PHYSICAL EXAM  VITAL SIGNS: BP (!) 180/84   Pulse 82   Resp (!) 22   Ht 1.651 m (5' 5\")   Wt 62.1 kg (137 lb)   SpO2 97%   BMI 22.80 kg/m²   Vitals reviewed by myself.  Physical Exam  Nursing note and vitals reviewed.  Constitutional: Well-developed and well-nourished.  Patient appears uncomfortable  HENT: Head is normocephalic and atraumatic.  Eyes: extra-ocular movements " intact  Cardiovascular: Regular rate and  regular rhythm. No murmur heard.  Pulmonary/Chest: Breath sounds normal. No wheezes or rales.   Abdominal: Soft and non-tender. No distention.    Musculoskeletal: Extremities exhibit normal range of motion without edema or tenderness.   Neurological: Awake and alert  Skin: Skin is warm and dry. No rash.     DIAGNOSTIC STUDIES:  LABS  Labs Reviewed   CBC WITH DIFFERENTIAL - Abnormal; Notable for the following components:       Result Value    Monos (Absolute) 0.86 (*)     All other components within normal limits    Narrative:     Biotin intake of greater than 5 mg per day may interfere with  troponin levels, causing false low values.   COMP METABOLIC PANEL - Abnormal; Notable for the following components:    Glucose 104 (*)     Bun 27 (*)     All other components within normal limits    Narrative:     Biotin intake of greater than 5 mg per day may interfere with  troponin levels, causing false low values.   TROPONIN - Abnormal; Notable for the following components:    Troponin T 25 (*)     All other components within normal limits    Narrative:     Biotin intake of greater than 5 mg per day may interfere with  troponin levels, causing false low values.   PROTHROMBIN TIME - Abnormal; Notable for the following components:    PT 14.7 (*)     INR 1.14 (*)     All other components within normal limits    Narrative:     Indicate which anticoagulants the patient is on:->HEPARIN   APTT - Abnormal; Notable for the following components:    APTT >240.0 (*)     All other components within normal limits    Narrative:     Indicate which anticoagulants the patient is on:->HEPARIN   TROPONIN    Narrative:     Biotin intake of greater than 5 mg per day may interfere with  troponin levels, causing false low values.   ESTIMATED GFR    Narrative:     Biotin intake of greater than 5 mg per day may interfere with  troponin levels, causing false low values.   SED RATE   CRP HIGH SENSITIVE  (CARDIAC)    Narrative:     Biotin intake of greater than 5 mg per day may interfere with  troponin levels, causing false low values.   APTT    Narrative:     Indicate which anticoagulants the patient is on:->HEPARIN   PROTHROMBIN TIME    Narrative:     Indicate which anticoagulants the patient is on:->HEPARIN   HEPARIN XA (UNFRACTIONATED)    Narrative:     Indicate which anticoagulants the patient is on:->HEPARIN   HEPARIN XA (UNFRACTIONATED)    Narrative:     Indicate which anticoagulants the patient is on:->HEPARIN   CULTURE RESPIRATORY W/ GRM STN   POCT COV-2, FLU A/B, RSV BY PCR   POC COV-2, FLU A/B, RSV BY PCR       All labs reviewed and independently interpreted by myself    EKG Interpretation:    12 Lead EKG independently interpreted by myself to show:  EKG at 6:24 AM: Normal sinus rhythm, heart rate 83, normal axis, normal intervals, , QRS 99, QTc 469, no acute ST-T segment changes, no evidence of acute arrhythmia or ischemia per my independent interpretation    RADIOLOGY    Final interpretation by radiology demonstrates:    DX-CHEST-PORTABLE (1 VIEW)   Final Result      No acute process.        The radiologist's interpretation of all radiological studies have been reviewed by me.    COURSE & MEDICAL DECISION MAKING    ED Observation Status? No; Patient does not meet criteria for ED Observation.     INITIAL ASSESSMENT, ED COURSE AND PLAN    Patient is a 73-year-old female who presents for evaluation of chest pain.  Differential diagnosis includes viral syndrome, pericarditis, ACS, angina.  Diagnostic workup includes labs, EKG and chest x-ray.    Patient's initial vitals are within normal limits.  Patient advised that we should give her full dose aspirin for her chest pain but declined as it causes her severe nausea.  She is amenable to receiving nitroglycerin for her chest pain EKG returns and demonstrates no evidence of acute arrhythmia or ischemia.  Labs returned are independently interpreted by  myself to demonstrate:  -Troponin is initially within normal limits,  Patient has a heart score 5, will hospitalize her for ongoing cardiac workup  -Electrolytes and renal function are within normal limits  -Labs otherwise unremarkable    Chest x-ray returns and demonstrates no acute cardiopulmonary processes.  At this time patient will be hospitalized for further cardiac workup and management due to underlying risk factors.  I discussed the case with Dr. Zhu who has accepted patient for hospitalization, patient is in guarded condition.       REASSESSMENTS   6:39 AM - I evaluated the patient at bedside. The plan of care was discussed. Patient verbalizes understanding and agreement to this plan of care.     7:42 AM- I spoke with Dr. Zhu (Hospitalist) for hospitalization. He is agreeable. Patient was reevaluated at bedside. Discussed lab and radiology results with the patient and informed them of hospitalization. Patient verbalizes understanding and agreement to this plan of care.         DISPOSITION AND DISCUSSIONS  I have discussed management of the patient with the following physicians and LIBBY's:   (hospitalist)     Discussion of management with other QHP or appropriate source(s): None     Escalation of care considered, and ultimately not performed:see above    Barriers to care at this time, including but not limited to: Patient does not have established PCP.     Decision tools and prescription drugs considered including, but not limited to: HEART Score 5 .    Please see review of records as noted above  DISPOSITION:  Patient will be hospitalized by Dr. Zhu in guarded condition.      FINAL IMPRESSION  1. Chest pain, unspecified type    2. Acute cough      Bette EID (Alfonzo), am scribing for, and in the presence of, Dylan Zhu M.D..    Electronically signed by: Bette Christian), 2/21/2024    Dylan EID M.D. personally performed the services described in this documentation, as  scribed by Bette Fall in my presence, and it is both accurate and complete.

## 2024-02-21 NOTE — PROCEDURES
Cardiac Catheterization report    2/21/2024  1:38 PM    Referring MD:       Indication for procedure: Non-ST elevation myocardial infarction    Procedures performed:  Coronary arteriograms  Left heart catheterization and Left ventriculogram     Final impression:  Severe multivessel CAD, recurrent in-stent restenosis    Recommendations:  Coronary artery bypass graft surgery    Findings:  1.  Left main coronary artery:  Normal.  2.  Left anterior descending artery: Recently placed stent in large first diagonal branch has 95% stenosis.  After diagonal takeoff left anterior descending artery has 99% stenosis.  Distal vessel, apical LAD free of significant disease.    3.  Left circumflex coronary artery: Gives 1 large marginal branch, which has 2 layers of stents, chronically occluded, fills through left to left collaterals  4.  Right coronary artery: Diffuse 60% stenosis in midportion, posterior descending artery, posterolateral branch is free of significant disease.  This is a right dominant system.  5.  Left ventricular end diastolic pressure:  13 mmHg.  No signficant gradient across the aortic valve.  6.  Left ventriculogram:  Ejection fraction of 60%.      EBL: <10 CC    Specimens: None    Procedure details:  The risks and benefits of cardiac catheterization and possible intervention were explained to the patient including death, heart attack, stroke, and emergency surgery.  The patient verbalized understanding and wished to proceed.  The patient was brought to the cardiac catheterization laboratory in the fasting state and prepped and draped in the usual sterile fashion.  Timeout was performed.  The right wrist was locally anesthetized with lidocaine and the right radial artery was cannulated with 5/6-Hebrew equipment and standard radial cocktail was given.  Coronary angiography was performed using JR 4 and JL 3.5  diagnostic catheters in the usual fashion, results mentioned below.  JR 4 catheter was  used to cross the aortic valve to perform  left heart catheterization and left ventriculogram.    Once all the views were obtained, all wires and catheters were removed from the patient without difficulty.  A Vasc-Band was placed over the right radial artery and the radial artery sheath was removed without difficulty.      Complications: None    Contrast: 15 cc    Sedation time:  I supervised moderate sedation over a trained independent nursing staff,  Sedation Start time:  13:13   Sedation Stop time: 13:35      MIKE Taylor  [facility]

## 2024-02-21 NOTE — CATH LAB
Cath lab RN x2 transported pt to T633 on zoll. Cath lab RN x2 and bedside RN assessed R groin site. Groin site soft and non tender without evidence of hematoma. Dressing is clean, dry, and intact. Pedal pulses are 2+ bilaterally. Cath lab RN reviewed groin management protocol with bedside RN. Pt to remain on bedrest until 1730. No further questions per bedside RN.

## 2024-02-21 NOTE — CONSULTS
Reason for Consult:  Asked by Dr Dylan Zhu M.D. to see this patient with unstable angina    CC:   Chief Complaint   Patient presents with    Chest Pain     x1 week. Burning pain in center of chest. +SOB.Numbness in left arm starting tonight. Hx of 4 MI's with stent placement.        HPI:      73 year old woman PMH CAD / multiple PCI, giant cell arteritis, HTN, HLD presents with days of chest pain. Initially suspected bronchitis however today with left arm pain similar to anginal equivalent initial MI. Associated with dyspnea. No aggravating/relieving factors. She is concerned for repeat MI. Since  she is compliant with medications but that prior had stopped everything leading to repeat coronary event she suspects. Denies toxic social habits.     Medications / Drug list prior to admission:  No current facility-administered medications on file prior to encounter.     Current Outpatient Medications on File Prior to Encounter   Medication Sig Dispense Refill    aspirin (ASPIRIN EC LOW STRENGTH) 81 MG EC tablet Take 81 mg by mouth every evening.      azelastine (ASTELIN) 137 MCG/SPRAY nasal spray Administer 2 Sprays into affected nostril(S) 1 time a day as needed for Rhinitis.      ibuprofen (IBU) 800 MG Tab Take 800 mg by mouth 2 times a day as needed for Mild Pain.      metoprolol tartrate (LOPRESSOR) 25 MG Tab Take 25 mg by mouth 2 times a day.      rosuvastatin (CRESTOR) 10 MG Tab Take 10 mg by mouth every evening.      prasugrel (EFFIENT) 10 MG Tab Take 10 mg by mouth every evening.      Woaoma-OqUns-CpWgd-FA-CA-Omega (COMPLETE [place] DHA) 29-1-200 & 200 MG Misc Take 2 Tablets by mouth every evening.      Multiple Vitamins-Minerals (PRESERVISION AREDS) Tab Take 1 Tablet by mouth every evening.      erythromycin base 250 MG Cap DR Particles Take 750 mg by mouth every day. X 7 days      Dextromethorphan HBr (VICKS DAYQUIL COUGH) 15 MG/15ML Liquid Take  by mouth 1 time a day as needed. 1 each   Indications: Cough      Phenylephrine-Doxylamine-DM (NYQUIL COUGH DM + CONGESTION) 5-6.25-10 MG/15ML Liquid Take 1 Each by mouth every evening as needed (chronic bronchitis).      albuterol 108 (90 Base) MCG/ACT Aero Soln inhalation aerosol Inhale 2 Puffs 3 times a day as needed for Shortness of Breath (chronic bronchitis).         Current list of administered Medications:    Current Facility-Administered Medications:     morphine 4 MG/ML injection 2 mg, 2 mg, Intravenous, Q3HRS PRN, Dylan Zhu M.D.    heparin infusion 25,000 units in 500 mL 0.45% NACL, 0-30 Units/kg/hr, Intravenous, Continuous, Abhi Gann M.D.    heparin injection 3,700 Units, 60 Units/kg, Intravenous, Once, Abhi Gann M.D.    heparin injection 1,900 Units, 30 Units/kg, Intravenous, PRN, Abhi Gann M.D.    aspirin (Asa) tablet 325 mg, 325 mg, Oral, Once, Abhi Gann M.D.    nitroglycerin 50 mg in D5W 250 ml infusion, 0-200 mcg/min, Intravenous, Continuous, Abhi Gann M.D.    Current Outpatient Medications:     aspirin (ASPIRIN EC LOW STRENGTH) 81 MG EC tablet, Take 81 mg by mouth every evening., Disp: , Rfl:     azelastine (ASTELIN) 137 MCG/SPRAY nasal spray, Administer 2 Sprays into affected nostril(S) 1 time a day as needed for Rhinitis., Disp: , Rfl:     ibuprofen (IBU) 800 MG Tab, Take 800 mg by mouth 2 times a day as needed for Mild Pain., Disp: , Rfl:     metoprolol tartrate (LOPRESSOR) 25 MG Tab, Take 25 mg by mouth 2 times a day., Disp: , Rfl:     rosuvastatin (CRESTOR) 10 MG Tab, Take 10 mg by mouth every evening., Disp: , Rfl:     prasugrel (EFFIENT) 10 MG Tab, Take 10 mg by mouth every evening., Disp: , Rfl:     Jancaa-JhCjy-SkFxq-FA-CA-Omega (COMPLETE [place] DHA) 29-1-200 & 200 MG Misc, Take 2 Tablets by mouth every evening., Disp: , Rfl:     Multiple Vitamins-Minerals (PRESERVISION AREDS) Tab, Take 1 Tablet by mouth every evening., Disp: , Rfl:     erythromycin base 250 MG Cap DR Particles, Take 750 mg by  mouth every day. X 7 days, Disp: , Rfl:     Dextromethorphan HBr (VICKS DAYQUIL COUGH) 15 MG/15ML Liquid, Take  by mouth 1 time a day as needed. 1 each  Indications: Cough, Disp: , Rfl:     Phenylephrine-Doxylamine-DM (NYQUIL COUGH DM + CONGESTION) 5-6.25-10 MG/15ML Liquid, Take 1 Each by mouth every evening as needed (chronic bronchitis)., Disp: , Rfl:     albuterol 108 (90 Base) MCG/ACT Aero Soln inhalation aerosol, Inhale 2 Puffs 3 times a day as needed for Shortness of Breath (chronic bronchitis)., Disp: , Rfl:     Past Medical History:   Diagnosis Date    Chronic bronchitis (HCC)     Giant cell arteritis (HCC)     History of heart artery stent     x3 stents    MI (myocardial infarction) (HCC)     x 4       Past Surgical History:   Procedure Laterality Date    BREAST IMPLANT REVISION Bilateral     implants       No family history on file.  Patient family history was personally reviewed, no pertinent family history to current presentation    Social History     Socioeconomic History    Marital status:      Spouse name: Not on file    Number of children: Not on file    Years of education: Not on file    Highest education level: Not on file   Occupational History    Not on file   Tobacco Use    Smoking status: Not on file    Smokeless tobacco: Not on file   Substance and Sexual Activity    Alcohol use: Not on file    Drug use: Not on file    Sexual activity: Not on file   Other Topics Concern    Not on file   Social History Narrative    Not on file     Social Determinants of Health     Financial Resource Strain: Not on file   Food Insecurity: Not on file   Transportation Needs: Not on file   Physical Activity: Not on file   Stress: Not on file   Social Connections: Not on file   Intimate Partner Violence: Not on file   Housing Stability: Not on file       ALLERGIES:  Allergies   Allergen Reactions    Atorvastatin Unspecified     .    Chloramphenicol Unspecified     .    Codeine Unspecified     .    Other Drug  "Diarrhea     Most antibiotics turn stool yellow    Sulfa Drugs Diarrhea     .       Review of systems:  A complete review of symptoms was reviewed with patient. This is reviewed in H&P and PMH. ALL OTHERS reviewed and negative    Physical exam:  Patient Vitals for the past 24 hrs:   BP Temp Temp src Pulse Resp SpO2 Height Weight   24 0841 129/58 -- -- 83 20 93 % -- --   24 0800 129/60 -- -- 85 20 96 % -- --   24 0703 (!) 140/60 -- -- 76 20 95 % -- --   24 0700 -- -- -- -- (!) 22 95 % -- --   24 0623 (!) 180/84 36.3 °C (97.3 °F) Temporal 82 (!) 22 97 % 1.651 m (5' 5\") 62.1 kg (137 lb)     General: No acute distress.   EYES: no jaundice  HEENT: OP clear   Neck: No bruits No JVD.   CVS: RRR. S1 + S2. No M/R/G. No edema.  Resp: CTAB. No wheezing or crackles/rhonchi.  Abdomen: Soft, NT, ND,  Skin: Grossly nothing acute no obvious rashes  Neurological: Alert, Moves all extremities, no cranial nerve defects on limited exam  Extremities: Pulse 2+ in b/l LE. No cyanosis.     Data:  Laboratory studies personally reviewed by me:  Recent Results (from the past 24 hour(s))   EKG    Collection Time: 24  6:24 AM   Result Value Ref Range    Report       [facility] Emergency Dept.    Test Date:  2024  Pt Name:    STEPHANIE RILEY           Department: ER  MRN:        [id number]                      Room:        02  Gender:     Female                       Technician: [id number]  :        [id number]                   Requested By:ER TRIAGE PROTOCOL  Order #:    [id number]                    Reading MD: Amanda Salgado MD    Measurements  Intervals                                Axis  Rate:       83                           P:          73  NJ:         142                          QRS:        19  QRSD:       99                           T:          71  QT:         399  QTc:        469    Interpretive Statements  Sinus rhythm  No previous ECG available for " comparison  Electronically Signed On 02- 06:39:38 PST by Amanda Salgado MD     CBC with Differential    Collection Time: 02/21/24  6:30 AM   Result Value Ref Range    WBC 10.7 4.8 - 10.8 K/uL    RBC 4.59 4.20 - 5.40 M/uL    Hemoglobin 13.0 12.0 - 16.0 g/dL    Hematocrit 39.6 37.0 - 47.0 %    MCV 86.3 81.4 - 97.8 fL    MCH 28.3 27.0 - 33.0 pg    MCHC 32.8 32.2 - 35.5 g/dL    RDW 46.5 35.9 - 50.0 fL    Platelet Count 207 164 - 446 K/uL    MPV 10.3 9.0 - 12.9 fL    Neutrophils-Polys 64.20 44.00 - 72.00 %    Lymphocytes 24.80 22.00 - 41.00 %    Monocytes 8.00 0.00 - 13.40 %    Eosinophils 2.10 0.00 - 6.90 %    Basophils 0.60 0.00 - 1.80 %    Immature Granulocytes 0.30 0.00 - 0.90 %    Nucleated RBC 0.00 0.00 - 0.20 /100 WBC    Neutrophils (Absolute) 6.87 1.82 - 7.42 K/uL    Lymphs (Absolute) 2.65 1.00 - 4.80 K/uL    Monos (Absolute) 0.86 (H) 0.00 - 0.85 K/uL    Eos (Absolute) 0.22 0.00 - 0.51 K/uL    Baso (Absolute) 0.06 0.00 - 0.12 K/uL    Immature Granulocytes (abs) 0.03 0.00 - 0.11 K/uL    NRBC (Absolute) 0.00 K/uL   Complete Metabolic Panel (CMP)    Collection Time: 02/21/24  6:30 AM   Result Value Ref Range    Sodium 141 135 - 145 mmol/L    Potassium 4.0 3.6 - 5.5 mmol/L    Chloride 104 96 - 112 mmol/L    Co2 24 20 - 33 mmol/L    Anion Gap 13.0 7.0 - 16.0    Glucose 104 (H) 65 - 99 mg/dL    Bun 27 (H) 8 - 22 mg/dL    Creatinine 0.77 0.50 - 1.40 mg/dL    Calcium 9.6 8.5 - 10.5 mg/dL    Correct Calcium 9.5 8.5 - 10.5 mg/dL    AST(SGOT) 23 12 - 45 U/L    ALT(SGPT) 18 2 - 50 U/L    Alkaline Phosphatase 99 30 - 99 U/L    Total Bilirubin 0.2 0.1 - 1.5 mg/dL    Albumin 4.1 3.2 - 4.9 g/dL    Total Protein 7.0 6.0 - 8.2 g/dL    Globulin 2.9 1.9 - 3.5 g/dL    A-G Ratio 1.4 g/dL   Troponins NOW    Collection Time: 02/21/24  6:30 AM   Result Value Ref Range    Troponin T 16 6 - 19 ng/L   ESTIMATED GFR    Collection Time: 02/21/24  6:30 AM   Result Value Ref Range    GFR (CKD-EPI) 81 >60 mL/min/1.73 m 2   CRP HIGH  SENSITIVE (CARDIAC)    Collection Time: 02/21/24  6:30 AM   Result Value Ref Range    C Reactive Protein High Sensitive 1.6 0.0 - 3.0 mg/L   POC CoV-2, FLU A/B, RSV by PCR    Collection Time: 02/21/24  7:23 AM   Result Value Ref Range    POC Influenza A RNA, PCR Negative Negative    POC Influenza B RNA, PCR Negative Negative    POC RSV, by PCR Negative Negative    POC SARS-CoV-2, PCR NotDetected        EKG 2/21/24 0624 interpreted by me normal sinus    All pertinent features of laboratory and imaging reviewed including primary images where applicable    [facility] meghan roblero 8/2023  s/p [facility] coronary artery artery angiography via right radial arterial approach (5F system, smaller radial artery) converted to right femoral artery access due to bifurcating stenting and need for IV access (left AC infiltrated). Right femoral veinous central line access.  EF 50% with mild anterior hypokinesis. Normal LVEDP.  Right dominant cornary anatomy. Mild to moderated RCA stenosis.  Chronic total occlusion of the proximal circumflex at site of previous stent with collateralization from RCA.  Severe ostial first diagonal stenosis. s/p JAMES PCI to the ostial first diagonal.  Mild mid LAD disease at takeoff of first diagonal.  Recommend aggressive goal directed medical therapy and risk factor modification.     Principal Problem:    Chest pain (POA: Yes)  Resolved Problems:    * No resolved hospital problems. *      Assessment / Plan:  73 year old woman PMH CAD / multiple PCI, giant cell arteritis, HTN, HLD presents with days of intermittent anginal equivalent chest pain found unstable angina.    -aspirin and heparin gtt  -[facility]   -high intensity statin, BB, ace/arb as tolerated  -merge charts  -further recommendations pending results    I personally discussed her case with Dr Zhu    It is my pleasure to participate in the care of Ms. Christopher.  Please do not hesitate to contact me with questions or concerns.    Abhi Gann  MD  Cardiologist [facility]     A total of 65 minutes of time was spent on day of encounter reviewing medical record, performing history and examination, counseling, ordering medication/test/consults and documentation.

## 2024-02-21 NOTE — ED NOTES
Patient c/o increased CP, admit MD made aware new order received.      Patient medicated per admit order, see MAR>    Bedside report to BESSIE Christiansen.    Carine Christopher transported to CDU via [place] with RN, patient on monitor - NSR. All personal belongings in possession.  NAD noted.

## 2024-02-21 NOTE — H&P
Hospital Medicine History & Physical Note    Date of Service  2/21/2024    Primary Care Physician  Pcp Pt States None    Consultants  cardiology    Specialist Names: adis espinoza  Code Status  Full Code    Chief Complaint  Chief Complaint   Patient presents with    Chest Pain     x1 week. Burning pain in center of chest. +SOB.Numbness in left arm starting tonight. Hx of 4 MI's with stent placement.        History of Presenting Illness  Carine Christopher is a 73 y.o. female who presented 2/21/2024 with past ministry of coronary disease with 4 MIs in the last 4 years.  Patient has 3 cardiac stents last placed August 2023.  Patient has not been feeling well for the last 1 week.  She says that she has history of chronic bronchitis and she felt that her bronchitis was acting up and she felt bilateral chest pain and was coughing up yellowish sputum intermittently.  Patient was being followed by her primary care provider who provided the patient with albuterol inhaler and erythromycin antibiotic.  Chest pain has been going on on and off for the last 1 week however patient this morning with a sharp pain in the left chest with extreme pain and numbness started in her left arm.  Pain was very similar to her prior heart attack.  She came to the  emergency department at [facility] for further evaluation.  EKG  and initial troponin was negative .patient referred to me for the care and management.patient was having ongoing chest pain so cardiology was consulted    No fever or chills    I discussed the plan of care with patient.    Review of Systems  Review of Systems   Constitutional:  Negative for chills, fever and weight loss.   Respiratory:  Positive for cough, sputum production and shortness of breath.    Cardiovascular:  Positive for chest pain. Negative for palpitations.   Genitourinary: Negative.    Musculoskeletal: Negative.    Neurological: Negative.     Psychiatric/Behavioral: Negative.         Past Medical History   has a past medical history of Chronic bronchitis (HCC), Giant cell arteritis (HCC), History of heart artery stent, and MI (myocardial infarction) (HCC).    Surgical History   has a past surgical history that includes breast implant revision (Bilateral).     Family History    Family history reviewed with patient. There is no family history that is pertinent to the chief complaint.     Social History       Allergies  Allergies   Allergen Reactions    Atorvastatin Unspecified     .    Chloramphenicol Unspecified     .    Codeine Unspecified     .    Other Drug Diarrhea     Most antibiotics turn stool yellow    Sulfa Drugs Diarrhea     .       Medications  Prior to Admission Medications   Prescriptions Last Dose Informant Patient Reported? Taking?   Dextromethorphan HBr (VICKS DAYQUIL COUGH) 15 MG/15ML Liquid 2024 at unk Patient Yes Yes   Sig: Take  by mouth 1 time a day as needed. 1 each  Indications: Cough   Multiple Vitamins-Minerals (PRESERVISION AREDS) Tab 2024 at pm Patient Yes Yes   Sig: Take 1 Tablet by mouth every evening.   Phenylephrine-Doxylamine-DM (NYQUIL COUGH DM + CONGESTION) 5-6.25-10 MG/15ML Liquid 2024 at 0330 Patient Yes Yes   Sig: Take 1 Each by mouth every evening as needed (chronic bronchitis).   Piscew-TbXvc-GcHad-FA-CA-Omega (COMPLETE [place] DHA) 29-1-200 & 200 MG Misc 2024 at pm Patient Yes Yes   Sig: Take 2 Tablets by mouth every evening.   albuterol 108 (90 Base) MCG/ACT Aero Soln inhalation aerosol 2024 at 0330 Patient Yes Yes   Sig: Inhale 2 Puffs 3 times a day as needed for Shortness of Breath (chronic bronchitis).   aspirin (ASPIRIN EC LOW STRENGTH) 81 MG EC tablet 2024 at pm Patient Yes Yes   Sig: Take 81 mg by mouth every evening.   azelastine (ASTELIN) 137 MCG/SPRAY nasal spray <month at unk Patient Yes Yes   Sig: Administer 2 Sprays into affected nostril(S) 1 time a day as needed for  Rhinitis.   erythromycin base 250 MG Cap DR Particles 2/20/2024 at am Patient Yes Yes   Sig: Take 750 mg by mouth every day. X 7 days   ibuprofen (IBU) 800 MG Tab 2/21/2024 at 0330 Patient Yes Yes   Sig: Take 800 mg by mouth 2 times a day as needed for Mild Pain.   metoprolol tartrate (LOPRESSOR) 25 MG Tab 2/20/2024 at pm Patient Yes Yes   Sig: Take 25 mg by mouth 2 times a day.   prasugrel (EFFIENT) 10 MG Tab 2/20/2024 at pm Patient Yes Yes   Sig: Take 10 mg by mouth every evening.   rosuvastatin (CRESTOR) 10 MG Tab 2/20/2024 at pm Patient Yes Yes   Sig: Take 10 mg by mouth every evening.      Facility-Administered Medications: None       Physical Exam  Temp:  [36.2 °C (97.1 °F)-36.3 °C (97.3 °F)] 36.2 °C (97.1 °F)  Pulse:  [76-85] 82  Resp:  [18-22] 18  BP: (129-180)/(58-84) 175/74  SpO2:  [93 %-97 %] 94 %  Blood Pressure : (!) 175/74   Temperature: 36.2 °C (97.1 °F)   Pulse: 82   Respiration: 18   Pulse Oximetry: 94 %       Physical Exam  Constitutional:       General: She is in acute distress.      Appearance: She is not ill-appearing.   Eyes:      Extraocular Movements: Extraocular movements intact.      Pupils: Pupils are equal, round, and reactive to light.   Cardiovascular:      Rate and Rhythm: Normal rate and regular rhythm.      Heart sounds: No murmur heard.     No gallop.   Pulmonary:      Effort: No respiratory distress.      Breath sounds: No stridor. No wheezing or rhonchi.   Abdominal:      General: There is no distension.      Palpations: There is no mass.      Tenderness: There is no abdominal tenderness. There is no right CVA tenderness.      Hernia: No hernia is present.   Musculoskeletal:         General: No swelling or deformity. Normal range of motion.      Cervical back: Normal range of motion.      Right lower leg: No edema.   Skin:     Capillary Refill: Capillary refill takes 2 to 3 seconds.      Coloration: Skin is not jaundiced or pale.      Findings: No erythema.   Neurological:       "General: No focal deficit present.      Mental Status: She is oriented to person, place, and time.      Cranial Nerves: No cranial nerve deficit.      Motor: No weakness.   Psychiatric:         Mood and Affect: Mood normal.         Behavior: Behavior normal.         Laboratory:  Recent Labs     02/21/24  0630   WBC 10.7   RBC 4.59   HEMOGLOBIN 13.0   HEMATOCRIT 39.6   MCV 86.3   MCH 28.3   MCHC 32.8   RDW 46.5   PLATELETCT 207   MPV 10.3     Recent Labs     02/21/24  0630   SODIUM 141   POTASSIUM 4.0   CHLORIDE 104   CO2 24   GLUCOSE 104*   BUN 27*   CREATININE 0.77   CALCIUM 9.6     Recent Labs     02/21/24  0630   ALTSGPT 18   ASTSGOT 23   ALKPHOSPHAT 99   TBILIRUBIN 0.2   GLUCOSE 104*     Recent Labs     02/21/24  0942   APTT 29.8   INR 0.99     No results for input(s): \"NTPROBNP\" in the last 72 hours.      Recent Labs     02/21/24  0630 02/21/24  0845   TROPONINT 16 25*       Imaging:  DX-CHEST-PORTABLE (1 VIEW)   Final Result      No acute process.      CL-LEFT HEART CATHETERIZATION WITH POSSIBLE INTERVENTION    (Results Pending)       EKG:  I have personally reviewed the images and compared with prior images.      NSR rate 83. No st or t wave changes      Assessment/Plan:  Justification for Admission Status  I anticipate this patient will require at least two midnights for appropriate medical management, necessitating inpatient admission because especially for greater than 48 hours for for evaluation treatment of her chest pain      * Unstable angina (HCC)- (present on admission)  Assessment & Plan  Follow-up on lipid profile    Follow serial troponins    Monitor on telemetry    Nitroglycerin IV titrated for chest pain    Initiation of IV heparin    Cardiology consulted for evaluation for left heart cath    Intensive consulted with assistance and placement in the appropriate ICU      Dyslipidemia- (present on admission)  Assessment & Plan  Continue crestor    Primary hypertension- (present on " admission)  Assessment & Plan  Metoprolol 25 mg p.o. twice daily    CAD (coronary artery disease)- (present on admission)  Assessment & Plan  Continue Effient Crestor and metoprolol        VTE prophylaxis: SCDs/TEDs    Greater than 75 minutes spent prepping to see patient (e.g. review of tests) obtaining and/or reviewing separately obtained history. Performing a medically appropriate examination and/ evaluation.  Counseling and educating the patient/family/caregiver.  Ordering medications, tests, or procedures.  Referring and communicating with other health care professionals.  Documenting clinical information in EPIC.  Independently interpreting results and communicating results to patient/family/caregiver.  Care coordination.

## 2024-02-21 NOTE — PROGRESS NOTES
4 Eyes Skin Assessment Completed by Елена RN and BESSIE Patricia.    Head WDL  Ears WDL  Nose WDL  Mouth WDL  Neck WDL  Breast/Chest WDL  Shoulder Blades WDL  Spine WDL  (R) Arm/Elbow/Hand WDL  (L) Arm/Elbow/Hand Bruising  Abdomen WDL  Groin WDL  Scrotum/Coccyx/Buttocks WDL  (R) Leg Abrasion  (L) Leg WDL  (R) Heel/Foot/Toe WDL  (L) Heel/Foot/Toe WDL          Devices In Places Tele Box and Blood Pressure Cuff      Interventions In Place Pressure Redistribution Mattress    Possible Skin Injury No    Pictures Uploaded Into Epic N/A  Wound Consult Placed N/A  RN Wound Prevention Protocol Ordered No

## 2024-02-21 NOTE — ED NOTES
Break RN:    Pt uses call light to endorse severe CP to L chest and L UE.  EKG completed and results communicated to hospitalist Marko, as well as vital signs.  EKG shows NSR, no ST elevation.

## 2024-02-21 NOTE — ED NOTES
Patient c/o sudden onset L sided chest pain, continues L arm pain, denies nausea at this time.     Patient medicated with nitro per ERP order.

## 2024-02-21 NOTE — ASSESSMENT & PLAN NOTE
Severe unstable angina with severe coronary disease by heart catheterization for which Dr. Chicas cardiothoracic surgery has been consulted for coronary artery bypass graft on 2/28.  She is on an IV heparin drip. Monitor for bleeding.  Nitroglycerin paste for antianginal treatment  High intensity statin  Continuous telemetry monitoring to eval for arrhythmias.   Supplemental oxygen at 0.5 liters. Continuous pulse oxymetry.

## 2024-02-21 NOTE — ED NOTES
Bedside report received from off going RN: Erlinda, assumed care of patient.  POC discussed with patient. Call light within reach, all needs addressed at this time.       Fall risk interventions in place: Patient's personal possessions are with in their safe reach, Place socks on patient, Place fall risk sign on patient's door, and Keep floor surfaces clean and dry (all applicable per [place] Fall risk assessment)   Continuous monitoring: Cardiac Leads, Pulse Ox, or Blood Pressure  IVF/IV medications: Not Applicable   Oxygen: Room Air  Bedside sitter: Not Applicable   Isolation: Not Applicable    Patient denies CP, c/o L arm tingling.   at bedside.  No additional needs at this time. Will continue to monitor.

## 2024-02-21 NOTE — PROGRESS NOTES
Pt arrived to unit via gurney on zoll monitor. Pt oriented to room, unit, and plan of care. Pt received dose of morphine prior to transport to floor and reports chest pain has improved greatly. Tele-monitor placed. All questions answered at this time. Skin check completed, note to follow. Call light within reach, fall precautions in place, will continue to monitor.

## 2024-02-21 NOTE — ED TRIAGE NOTES
"Pt presented as a code assist for  Chief Complaint   Patient presents with    Chest Pain     x1 week. Burning pain in center of chest. +SOB.Numbness in left arm starting tonight. Hx of 4 MI's with stent placement.      Pt reports she has nitroglycerin at home to take but couldn't find it tonight so was unable to take it. Reports she is on antibiotics at the moment for her chronic bronchitis. Chest pain protocol ordered. EKG completed. PIV established and blood specimens obtained and sent. GCS=15.  BP (!) 180/84   Pulse 82   Resp (!) 22   Ht 1.651 m (5' 5\")   Wt 62.1 kg (137 lb)   SpO2 97%   BMI 22.80 kg/m²     "

## 2024-02-21 NOTE — CONSULTS
I,  Fe Chicas MD performed a substantial portion of the service face-to-face with the same patient on the same date of service. I have reviewed and agree with the care plan and complexity of problems documented by me,  and/or CARI Rubi. I was personally involved in the medical decision making, including the information below:  reviewing and interpreting the films, conducted elements of the history and physical exam, and provided the plan of care. All medical decision making was made by me.     REFERRING PHYSICIAN: Charlie Singh MD.     CONSULTING PHYSICIAN: Fe Chicas MD.    CHIEF COMPLAINT: Chest pain    HISTORY OF PRESENT ILLNESS: The patient is a 73 y.o. female with a past medical history of CAD s/p PCIx 3 08/2023 at [facility], chronic bronchitis, giant cell arteritis (courses of steroids), MI x 4 over the last 4 years who presented to the hospital with sharp/burning chest pain and left arm numbness on 2/21/24. She has been having ongoing bronchitis on oral azithromycin. She has associated weakness. She was seen by cardiology and underwent a LHC which shows in-stent restenosis and multivessel disease.  Her last dose of effient was on 2/20/24    PAST MEDICAL HISTORY:   Past Medical History:   Diagnosis Date    Chronic bronchitis (HCC)     Giant cell arteritis (HCC)     History of heart artery stent     x3 stents    MI (myocardial infarction) (HCC)     x 4       PAST SURGICAL HISTORY:   Past Surgical History:   Procedure Laterality Date    BREAST IMPLANT REVISION Bilateral     implants       FAMILY HISTORY:   Negative for early CAD; mother and father both 94 yo still in good health    SOCIAL HISTORY:   Social History     Socioeconomic History    Marital status:      Spouse name: Not on file    Number of children: Not on file    Years of education: Not on file    Highest education level: Not on file   Occupational History    Not on file   Tobacco Use    Smoking  status: Not on file    Smokeless tobacco: Not on file   Substance and Sexual Activity    Alcohol use: Not on file    Drug use: Not on file    Sexual activity: Not on file   Other Topics Concern    Not on file   Social History Narrative    Not on file     Social Determinants of Health     Financial Resource Strain: Not on file   Food Insecurity: Not on file   Transportation Needs: Not on file   Physical Activity: Not on file   Stress: Not on file   Social Connections: Not on file   Intimate Partner Violence: Not on file   Housing Stability: Not on file       ALLERGIES:   Allergies   Allergen Reactions    Atorvastatin Unspecified     .    Chloramphenicol Unspecified     .    Codeine Unspecified     .    Other Drug Diarrhea     Most antibiotics turn stool yellow    Sulfa Drugs Diarrhea     .        CURRENT MEDICATIONS:     Current Facility-Administered Medications:     morphine 4 MG/ML injection 2 mg, 2 mg, Intravenous, Q3HRS PRN, Dylan Zhu M.D., 2 mg at 02/21/24 0914    heparin infusion 25,000 units in 500 mL 0.45% NACL, 0-30 Units/kg/hr, Intravenous, Continuous, Abhi Gann M.D., Stopped at 02/21/24 1309    heparin injection 1,900 Units, 30 Units/kg, Intravenous, PRN, Abhi Gann M.D.    nitroglycerin 50 mg in D5W 250 ml infusion, 0-200 mcg/min, Intravenous, Continuous, Abhi Gann M.D., Last Rate: 6 mL/hr at 02/21/24 1304, 20 mcg/min at 02/21/24 1304    albuterol inhaler 2 Puff, 2 Puff, Inhalation, Q4HRS (RT), Dylan Zhu M.D.    guaiFENesin dextromethorphan (Robitussin DM) 100-10 MG/5ML syrup 5 mL, 5 mL, Oral, Q6HRS PRN, Dylan Zhu M.D.    aspirin EC tablet 81 mg, 81 mg, Oral, Q EVENING, Dylan Zhu M.D.    [MAR Hold] metoprolol tartrate (Lopressor) tablet 25 mg, 25 mg, Oral, BID, Dylan Zhu M.D.    prasugrel (Effient) tablet 10 mg, 10 mg, Oral, Q EVENING, Dylan Zhu M.D.    rosuvastatin (Crestor) tablet 10 mg, 10 mg, Oral, Q EVENING, Dylan Zhu M.D.     LABS  REVIEWED:  Lab Results   Component Value Date/Time    SODIUM 141 02/21/2024 06:30 AM    POTASSIUM 4.0 02/21/2024 06:30 AM    CHLORIDE 104 02/21/2024 06:30 AM    CO2 24 02/21/2024 06:30 AM    GLUCOSE 104 (H) 02/21/2024 06:30 AM    BUN 27 (H) 02/21/2024 06:30 AM    CREATININE 0.77 02/21/2024 06:30 AM      Lab Results   Component Value Date/Time    PROTHROMBTM 14.7 (H) 02/21/2024 10:39 AM    INR 1.14 (H) 02/21/2024 10:39 AM      Lab Results   Component Value Date/Time    WBC 10.7 02/21/2024 06:30 AM    RBC 4.59 02/21/2024 06:30 AM    HEMOGLOBIN 13.0 02/21/2024 06:30 AM    HEMATOCRIT 39.6 02/21/2024 06:30 AM    MCV 86.3 02/21/2024 06:30 AM    MCH 28.3 02/21/2024 06:30 AM    MCHC 32.8 02/21/2024 06:30 AM    MPV 10.3 02/21/2024 06:30 AM    NEUTSPOLYS 64.20 02/21/2024 06:30 AM    LYMPHOCYTES 24.80 02/21/2024 06:30 AM    MONOCYTES 8.00 02/21/2024 06:30 AM    EOSINOPHILS 2.10 02/21/2024 06:30 AM    BASOPHILS 0.60 02/21/2024 06:30 AM        IMAGING REVIEWED AND INTERPRETED:    ECHOCARDIOGRAM: CONCLUSIONS  Normal left ventricular systolic function. The left ventricular   ejection fraction is visually estimated to be 55%.   Normal right ventricular size and systolic function.  Mild aortic insufficiency.   Right ventricular systolic pressure is estimated to be  23 mmHg   (normal).   No prior study is available for comparison.     [place] [facility] [place].  Left main coronary artery:  Normal.  2.  Left anterior descending artery: Recently placed stent in large first diagonal branch has 95% stenosis.  After diagonal takeoff left anterior descending artery has 99% stenosis.  Distal vessel, apical LAD free of significant disease.    3.  Left circumflex coronary artery: Gives 1 large marginal branch, which has 2 layers of stents, chronically occluded, fills through left to left collaterals  4.  Right coronary artery: Diffuse 60% stenosis in midportion, posterior descending artery, posterolateral branch is free of significant  disease.  This is a right dominant system.  5.  Left ventricular end diastolic pressure:  13 mmHg.  No signficant gradient across the aortic valve.  6.  Left ventriculogram:  Ejection fraction of 60%.    REVIEW OF SYSTEMS:   Review of Systems   Constitutional:  Negative for chills, fever and weight loss.   HENT:  Positive for nosebleeds (severe nosebleeds since her 20's). Negative for ear pain and tinnitus.    Eyes:  Negative for double vision, photophobia and pain.   Respiratory:  Negative for cough, hemoptysis and shortness of breath.         Recurrent episodes of bronchitis since early life   Cardiovascular:  Negative for chest pain (central chest pressure associated with left arm pain), palpitations, orthopnea, leg swelling and PND.   Gastrointestinal:  Negative for abdominal pain, blood in stool, nausea and vomiting.   Genitourinary:  Negative for frequency, hematuria and urgency.   Musculoskeletal: Negative.    Skin:  Negative for rash.   Neurological:  Negative for dizziness, tremors, speech change, focal weakness, seizures and headaches.   Endo/Heme/Allergies:  Negative for polydipsia. Bruises/bleeds easily.   Psychiatric/Behavioral:  Negative for hallucinations and memory loss.          PHYSICAL EXAMINATION:   Physical Exam  Vitals reviewed.   Constitutional:       General: She is not in acute distress.     Appearance: Normal appearance.   HENT:      Head: Normocephalic and atraumatic.      Right Ear: External ear normal.      Left Ear: External ear normal.      Nose: Nose normal. No congestion.   Eyes:      General: No scleral icterus.     Extraocular Movements: Extraocular movements intact.      Conjunctiva/sclera: Conjunctivae normal.   Cardiovascular:      Rate and Rhythm: Normal rate and regular rhythm.   Pulmonary:      Effort: Pulmonary effort is normal. No respiratory distress.      Comments: Unlabored breathing on nasal cannula  Abdominal:      General: Abdomen is flat. There is no distension.  "  Musculoskeletal:         General: Normal range of motion.      Cervical back: Normal range of motion.   Skin:     General: Skin is warm and dry.   Neurological:      Mental Status: She is alert and oriented to person, place, and time. Mental status is at baseline.      Cranial Nerves: No cranial nerve deficit.   Psychiatric:         Mood and Affect: Mood normal.         Judgment: Judgment normal.       /59   Pulse 81   Temp 36.5 °C (97.7 °F) (Temporal)   Resp (!) 44   Ht 1.651 m (5' 5\")   Wt 63.5 kg (139 lb 15.9 oz)   SpO2 96%   BMI 23.30 kg/m²        IMPRESSION:  72 yo woman with known conditions of CAD s/p PCIx 3 08/2023 at [facility], chronic bronchitis, giant cell arteritis (courses of steroids), MI x 4 over the last 4 years now with early in-stent restenosis and additional CAD here with unstable angina.      PLAN:  I recommend CABG x3 this admission; the patient's surgery will be necessarily delayed due to Eliquis therapy and need for at least 5 days off. Given severity of her CAD and the symptoms on presentation I recommend this time is spent inpatient as per ACS protocols.    The procedure, its risks, benefits, potential complications and alternative treatments were discussed with the patient in detail including the risks should she decide not to undergo my recommended treatment. All of her questions were answered to her satisfaction and she is willing to proceed with the operation. The risks include death, stroke,  infection: to include a rare bacterial infection related to the use of the heart/lung machine, joesph-operative myocardial infarction, dysrhythmias, diaphragmatic paralysis, chest wall paresthesia, tracheostomy, kidney or other organ failure, possible return to the operating room for bleeding, bleeding requiring transfusion with its attendant risks including AIDS or hepatitis, dehiscence of surgical incisions, respiratory complications including the need for prolonged " ventilator support, Protamine or other drug reaction, peripheral neuropathy, loss of limb, and miscount of surgical items. The operative mortality risk is approximately 2%. The STS mortality risk score is 1.8% and the morbidity and mortality risk score is 6.5%. The scores were discussed with patient.    The operation,CABG x3 is scheduled for Wednesday Feb 28 at 8 AM at [facility].    Thank you for this very challenging consultation and participation in the patient’s care.  I will keep you apprised of all future developments.          Sincerely,       Fe Chicas MD.

## 2024-02-21 NOTE — PROGRESS NOTES
4 Eyes Skin Assessment Completed by BESSIE Reis and BESSIE Helm.    Head WDL  Ears WDL  Nose WDL  Mouth WDL  Neck WDL  Breast/Chest WDL  Shoulder Blades WDL  Spine WDL  (R) Arm/Elbow/Hand WDL  (L) Arm/Elbow/Hand WDL  Abdomen WDL  Groin WDL  Scrotum/Coccyx/Buttocks WDL  (R) Leg WDL  (L) Leg WDL  (R) Heel/Foot/Toe WDL  (L) Heel/Foot/Toe WDL          Devices In Places ECG, Blood Pressure Cuff, Pulse Ox, and Nasal Cannula      Interventions In Place Low Air Loss Mattress and Heels Loaded W/Pillows    Possible Skin Injury No    Pictures Uploaded Into Epic N/A  Wound Consult Placed N/A  RN Wound Prevention Protocol Ordered No

## 2024-02-21 NOTE — ED NOTES
Med rec is complete per Patient at bedside.  Family/friend/caregiver present at time of interview with permission from Patient.    Allergies reviewed.    Has patient had any outside antibiotics in the last 30 days? Y    Any Anticoagulants (rivaroxaban, apixaban, edoxaban, dabigatran, warfarin, enoxaparin) taken in the last 14 days? Y  Anticoagulant name: Prasugrel, Last dose: 02/20/24 @ pm..        Pharmacy/Pharmacies Pt utilizes : [facility] [phone]

## 2024-02-21 NOTE — CONSULTS
PULMONARY AND CRITICAL CARE MEDICINE CONSULTATION    Date of Consultation:  2024    Requesting Physician:  Dylan Zhu MD    Consulting Physician:  Navjot Wilson MD    Reason for Consultation: Unstable angina    Chief Complaint: Unstable angina    History of Present Illness:    I was kindly asked to see and evaluate Carine Christopher, a 73 y.o. female for evaluation and management of the above problem.    This charming lady has a history of CAD with prior non-STEMI and PCI with JAMES, giant cell arteritis which is currently in remission, primary hypertension and dyslipidemia.  She was admitted to CDU earlier this morning for chest pain.  She is having persistent chest discomfort consistent with unstable angina.  She requires transfer to the [facility] at this time for titration of both heparin and nitroglycerin infusions.  She is scheduled for left heart cath.    Medications Prior to Admission:    No current facility-administered medications on file prior to encounter.     Current Outpatient Medications on File Prior to Encounter   Medication Sig Dispense Refill    aspirin (ASPIRIN EC LOW STRENGTH) 81 MG EC tablet Take 81 mg by mouth every evening.      azelastine (ASTELIN) 137 MCG/SPRAY nasal spray Administer 2 Sprays into affected nostril(S) 1 time a day as needed for Rhinitis.      ibuprofen (IBU) 800 MG Tab Take 800 mg by mouth 2 times a day as needed for Mild Pain.      metoprolol tartrate (LOPRESSOR) 25 MG Tab Take 25 mg by mouth 2 times a day.      rosuvastatin (CRESTOR) 10 MG Tab Take 10 mg by mouth every evening.      prasugrel (EFFIENT) 10 MG Tab Take 10 mg by mouth every evening.      Uhligv-EbFuq-HkQdc-FA-CA-Omega (COMPLETE [place] DHA) 29-1-200 & 200 MG Misc Take 2 Tablets by mouth every evening.      Multiple Vitamins-Minerals (PRESERVISION AREDS) Tab Take 1 Tablet by mouth every evening.      erythromycin base 250 MG Cap DR Particles Take 750 mg by mouth every day. X 7 days       Dextromethorphan HBr (VICKS DAYQUIL COUGH) 15 MG/15ML Liquid Take  by mouth 1 time a day as needed. 1 each  Indications: Cough      Phenylephrine-Doxylamine-DM (NYQUIL COUGH DM + CONGESTION) 5-6.25-10 MG/15ML Liquid Take 1 Each by mouth every evening as needed (chronic bronchitis).      albuterol 108 (90 Base) MCG/ACT Aero Soln inhalation aerosol Inhale 2 Puffs 3 times a day as needed for Shortness of Breath (chronic bronchitis).         Current Medications:      Current Facility-Administered Medications:     morphine 4 MG/ML injection 2 mg, 2 mg, Intravenous, Q3HRS PRN, Dylan Zhu M.D., 2 mg at 02/21/24 0914    heparin infusion 25,000 units in 500 mL 0.45% NACL, 0-30 Units/kg/hr, Intravenous, Continuous, Abhi Gann M.D.    heparin injection 3,700 Units, 60 Units/kg, Intravenous, Once, Abhi Gann M.D.    heparin injection 1,900 Units, 30 Units/kg, Intravenous, PRN, Abhi Gann M.D.    nitroglycerin 50 mg in D5W 250 ml infusion, 0-200 mcg/min, Intravenous, Continuous, Abhi Gann M.D.    Allergies:    Atorvastatin, Chloramphenicol, Codeine, Other drug, and Sulfa drugs    Past Surgical History:    Past Surgical History:   Procedure Laterality Date    BREAST IMPLANT REVISION Bilateral     implants       Past Medical History:    Past Medical History:   Diagnosis Date    Chronic bronchitis (HCC)     Giant cell arteritis (HCC)     History of heart artery stent     x3 stents    MI (myocardial infarction) (HCC)     x 4       Social History:    Social History     Socioeconomic History    Marital status:      Spouse name: Not on file    Number of children: Not on file    Years of education: Not on file    Highest education level: Not on file   Occupational History    Not on file   Tobacco Use    Smoking status: Not on file    Smokeless tobacco: Not on file   Substance and Sexual Activity    Alcohol use: Not on file    Drug use: Not on file    Sexual activity: Not on file   Other Topics  "Concern    Not on file   Social History Narrative    Not on file     Social Determinants of Health     Financial Resource Strain: Not on file   Food Insecurity: Not on file   Transportation Needs: Not on file   Physical Activity: Not on file   Stress: Not on file   Social Connections: Not on file   Intimate Partner Violence: Not on file   Housing Stability: Not on file       Family History:    No family history on file.    Review of System:    Review of Systems   Unable to perform ROS: Acuity of condition       Physical Examination:    BP (!) 175/74   Pulse 82   Temp 36.2 °C (97.1 °F) (Temporal)   Resp 18   Ht 1.651 m (5' 5\")   Wt 63.5 kg (139 lb 15.9 oz)   SpO2 94%   BMI 23.30 kg/m²   Physical Exam  Constitutional:       Appearance: She is not diaphoretic.   HENT:      Head: Normocephalic.      Mouth/Throat:      Pharynx: Oropharynx is clear.   Eyes:      Pupils: Pupils are equal, round, and reactive to light.   Cardiovascular:      Comments: Sinus rhythm  Pulmonary:      Breath sounds: No wheezing or rales.   Abdominal:      General: There is no distension.      Tenderness: There is no abdominal tenderness.   Musculoskeletal:      Right lower leg: No edema.      Left lower leg: No edema.   Skin:     General: Skin is warm.   Neurological:      General: No focal deficit present.      Mental Status: She is oriented to person, place, and time.      Cranial Nerves: No cranial nerve deficit.         Laboratory Data:        Recent Labs     02/21/24  0630   WBC 10.7   RBC 4.59   HEMOGLOBIN 13.0   HEMATOCRIT 39.6   MCV 86.3   MCH 28.3   MCHC 32.8   RDW 46.5   PLATELETCT 207   MPV 10.3     Recent Labs     02/21/24  0630   SODIUM 141   POTASSIUM 4.0   CHLORIDE 104   CO2 24   GLUCOSE 104*   BUN 27*   CREATININE 0.77   CALCIUM 9.6                   Imaging:    I personally viewed all the available CXR and CT scan images as well as reviewed the radiology interpretation reports.    DX-CHEST-PORTABLE (1 VIEW)   Final " Result      No acute process.      CL-LEFT HEART CATHETERIZATION WITH POSSIBLE INTERVENTION    (Results Pending)       Assessment and Plan:    * Unstable angina (HCC)- (present on admission)  Assessment & Plan  I am titrating a heparin infusion based upon serial anti-Xa monitoring  I am titrating a nitroglycerin infusion for anginal chest pain  Aspirin  Left heart cath    Giant cell arteritis (HCC)  Assessment & Plan  Previously treated with corticosteroids  Now in remission    Primary hypertension  Assessment & Plan  Goal SBP less than 160    CAD (coronary artery disease)  Assessment & Plan  History of CAD with prior non-STEMI and PCI's with JAMES    Dyslipidemia  Assessment & Plan  High intensity statin        High risk of deterioration and worsening vital organ dysfunction and death without the above critical care interventions.    I have assessed and reassessed her blood pressure, hemodynamics, cardiovascular status with titration of nitroglycerin, serial determinations of her anticoagulation status with titration of heparin.  She is at increased risk for worsening cardiovascular and hematologic system dysfunction.    At this time, this lady will be transferred to the [facility].  She is going for left heart cath and may require CIC care.    The patient is critically ill.  Critical care time = 35 minutes in directly providing and coordinating critical care and extensive data review.  No time overlap and excludes procedures.    Discussed with RN    Navjot Wilson MD  Pulmonary and Critical Care Medicine

## 2024-02-22 PROBLEM — R51.9 HEADACHE: Status: ACTIVE | Noted: 2024-02-22

## 2024-02-22 LAB
ANION GAP SERPL CALC-SCNC: 12 MMOL/L (ref 7–16)
BUN SERPL-MCNC: 23 MG/DL (ref 8–22)
CALCIUM SERPL-MCNC: 8.9 MG/DL (ref 8.5–10.5)
CHLORIDE SERPL-SCNC: 101 MMOL/L (ref 96–112)
CO2 SERPL-SCNC: 23 MMOL/L (ref 20–33)
CREAT SERPL-MCNC: 0.62 MG/DL (ref 0.5–1.4)
EKG IMPRESSION: NORMAL
EXTRA TUBE BLU BLU: NORMAL
GFR SERPLBLD CREATININE-BSD FMLA CKD-EPI: 93 ML/MIN/1.73 M 2
GLUCOSE SERPL-MCNC: 119 MG/DL (ref 65–99)
POTASSIUM SERPL-SCNC: 4.3 MMOL/L (ref 3.6–5.5)
SODIUM SERPL-SCNC: 136 MMOL/L (ref 135–145)
UFH PPP CHRO-ACNC: 0.42 IU/ML
UFH PPP CHRO-ACNC: 0.52 IU/ML
UFH PPP CHRO-ACNC: 0.77 IU/ML

## 2024-02-22 PROCEDURE — A9270 NON-COVERED ITEM OR SERVICE: HCPCS | Performed by: HOSPITALIST

## 2024-02-22 PROCEDURE — 700102 HCHG RX REV CODE 250 W/ 637 OVERRIDE(OP): Performed by: HOSPITALIST

## 2024-02-22 PROCEDURE — 93010 ELECTROCARDIOGRAM REPORT: CPT | Performed by: INTERNAL MEDICINE

## 2024-02-22 PROCEDURE — 85520 HEPARIN ASSAY: CPT | Mod: 91

## 2024-02-22 PROCEDURE — 80048 BASIC METABOLIC PNL TOTAL CA: CPT

## 2024-02-22 PROCEDURE — 94640 AIRWAY INHALATION TREATMENT: CPT

## 2024-02-22 PROCEDURE — 99223 1ST HOSP IP/OBS HIGH 75: CPT | Mod: 57 | Performed by: THORACIC SURGERY (CARDIOTHORACIC VASCULAR SURGERY)

## 2024-02-22 PROCEDURE — 700111 HCHG RX REV CODE 636 W/ 250 OVERRIDE (IP): Performed by: INTERNAL MEDICINE

## 2024-02-22 PROCEDURE — 99233 SBSQ HOSP IP/OBS HIGH 50: CPT | Performed by: HOSPITALIST

## 2024-02-22 PROCEDURE — 99233 SBSQ HOSP IP/OBS HIGH 50: CPT | Performed by: INTERNAL MEDICINE

## 2024-02-22 PROCEDURE — 770000 HCHG ROOM/CARE - INTERMEDIATE ICU *

## 2024-02-22 RX ORDER — OXYCODONE HYDROCHLORIDE 5 MG/1
5-10 TABLET ORAL
Status: DISCONTINUED | OUTPATIENT
Start: 2024-02-22 | End: 2024-02-28

## 2024-02-22 RX ADMIN — HEPARIN SODIUM 12 UNITS/KG/HR: 5000 INJECTION, SOLUTION INTRAVENOUS at 23:04

## 2024-02-22 RX ADMIN — ALBUTEROL SULFATE 2 PUFF: 90 AEROSOL, METERED RESPIRATORY (INHALATION) at 23:06

## 2024-02-22 RX ADMIN — ALBUTEROL SULFATE 2 PUFF: 90 AEROSOL, METERED RESPIRATORY (INHALATION) at 20:38

## 2024-02-22 RX ADMIN — ASPIRIN 81 MG: 81 TABLET, COATED ORAL at 16:56

## 2024-02-22 RX ADMIN — OXYCODONE 5 MG: 5 TABLET ORAL at 10:45

## 2024-02-22 RX ADMIN — ROSUVASTATIN 10 MG: 10 TABLET, FILM COATED ORAL at 16:56

## 2024-02-22 RX ADMIN — OXYCODONE 10 MG: 5 TABLET ORAL at 16:56

## 2024-02-22 RX ADMIN — ALBUTEROL SULFATE 2 PUFF: 90 AEROSOL, METERED RESPIRATORY (INHALATION) at 02:36

## 2024-02-22 ASSESSMENT — COGNITIVE AND FUNCTIONAL STATUS - GENERAL
DAILY ACTIVITIY SCORE: 24
MOBILITY SCORE: 24
SUGGESTED CMS G CODE MODIFIER MOBILITY: CH
SUGGESTED CMS G CODE MODIFIER DAILY ACTIVITY: CH

## 2024-02-22 ASSESSMENT — PAIN DESCRIPTION - PAIN TYPE
TYPE: ACUTE PAIN

## 2024-02-22 ASSESSMENT — ENCOUNTER SYMPTOMS
FOCAL WEAKNESS: 0
PND: 0
ORTHOPNEA: 0
CHILLS: 0
HALLUCINATIONS: 0
VOMITING: 0
WEIGHT LOSS: 0
SHORTNESS OF BREATH: 0
BLOOD IN STOOL: 0
EYE PAIN: 0
MUSCULOSKELETAL NEGATIVE: 1
HEADACHES: 1
COUGH: 0
SPEECH CHANGE: 0
DIZZINESS: 0
HEMOPTYSIS: 0
MEMORY LOSS: 0
FEVER: 0
POLYDIPSIA: 0
NAUSEA: 0
PALPITATIONS: 0
HEADACHES: 0
DOUBLE VISION: 0
BRUISES/BLEEDS EASILY: 1
TREMORS: 0
ABDOMINAL PAIN: 0
SEIZURES: 0
PHOTOPHOBIA: 0

## 2024-02-22 ASSESSMENT — FIBROSIS 4 INDEX
FIB4 SCORE: 1.937996363252019141
FIB4 SCORE: 1.937996363252019141

## 2024-02-22 NOTE — PROGRESS NOTES
Interval: No acute events.      Medications / Drug list prior to admission:  No current facility-administered medications on file prior to encounter.     Current Outpatient Medications on File Prior to Encounter   Medication Sig Dispense Refill    aspirin (ASPIRIN EC LOW STRENGTH) 81 MG EC tablet Take 81 mg by mouth every evening.      azelastine (ASTELIN) 137 MCG/SPRAY nasal spray Administer 2 Sprays into affected nostril(S) 1 time a day as needed for Rhinitis.      ibuprofen (IBU) 800 MG Tab Take 800 mg by mouth 2 times a day as needed for Mild Pain.      metoprolol tartrate (LOPRESSOR) 25 MG Tab Take 25 mg by mouth 2 times a day.      rosuvastatin (CRESTOR) 10 MG Tab Take 10 mg by mouth every evening.      prasugrel (EFFIENT) 10 MG Tab Take 10 mg by mouth every evening.      Jglktk-YaJsm-JyVvu-FA-CA-Omega (COMPLETE [place] DHA) 29-1-200 & 200 MG Misc Take 2 Tablets by mouth every evening.      Multiple Vitamins-Minerals (PRESERVISION AREDS) Tab Take 1 Tablet by mouth every evening.      erythromycin base 250 MG Cap DR Particles Take 750 mg by mouth every day. X 7 days      Dextromethorphan HBr (VICKS DAYQUIL COUGH) 15 MG/15ML Liquid Take  by mouth 1 time a day as needed. 1 each  Indications: Cough      Phenylephrine-Doxylamine-DM (NYQUIL COUGH DM + CONGESTION) 5-6.25-10 MG/15ML Liquid Take 1 Each by mouth every evening as needed (chronic bronchitis).      albuterol 108 (90 Base) MCG/ACT Aero Soln inhalation aerosol Inhale 2 Puffs 3 times a day as needed for Shortness of Breath (chronic bronchitis).         Current list of administered Medications:    Current Facility-Administered Medications:     oxyCODONE immediate-release (Roxicodone) tablet 5-10 mg, 5-10 mg, Oral, Q3HRS PRN, Yaniv Lopez M.D., 5 mg at 24 1045    morphine 4 MG/ML injection 2 mg, 2 mg, Intravenous, Q3HRS PRN, Dylan Zhu M.D., 2 mg at 24 9364    heparin infusion 25,000 units in 500 mL 0.45% NACL, 0-30 Units/kg/hr, Intravenous,  Continuous, Abhi Gann M.D., Last Rate: 14.9 mL/hr at 02/22/24 0700, 12 Units/kg/hr at 02/22/24 0700    heparin injection 1,900 Units, 30 Units/kg, Intravenous, PRN, Abhi Gann M.D., 1,900 Units at 02/21/24 2332    nitroglycerin 50 mg in D5W 250 ml infusion, 0-200 mcg/min, Intravenous, Continuous, Abhi Gann M.D., Last Rate: 3 mL/hr at 02/22/24 0700, 10 mcg/min at 02/22/24 0700    albuterol inhaler 2 Puff, 2 Puff, Inhalation, Q4HRS (RT), Dylan Zhu M.D., 2 Puff at 02/22/24 0236    guaiFENesin dextromethorphan (Robitussin DM) 100-10 MG/5ML syrup 5 mL, 5 mL, Oral, Q6HRS PRN, Dylan Zhu M.D.    aspirin EC tablet 81 mg, 81 mg, Oral, Q EVENING, Dylan Zhu M.D., 81 mg at 02/21/24 1746    [MAR Hold] metoprolol tartrate (Lopressor) tablet 25 mg, 25 mg, Oral, BID, Dylan Zhu M.D.    rosuvastatin (Crestor) tablet 10 mg, 10 mg, Oral, Q EVENING, Dylan Zhu M.D., 10 mg at 02/21/24 1746    acetaminophen (Tylenol) tablet 650 mg, 650 mg, Oral, Q4HRS PRN, Dylan Zhu M.D.    Past Medical History:   Diagnosis Date    Chronic bronchitis (HCC)     Giant cell arteritis (HCC)     History of heart artery stent     x3 stents    MI (myocardial infarction) (HCC)     x 4       Past Surgical History:   Procedure Laterality Date    BREAST IMPLANT REVISION Bilateral     implants       No family history on file.  Patient family history was personally reviewed, no pertinent family history to current presentation    Social History     Socioeconomic History    Marital status:      Spouse name: Not on file    Number of children: Not on file    Years of education: Not on file    Highest education level: Not on file   Occupational History    Not on file   Tobacco Use    Smoking status: Not on file    Smokeless tobacco: Not on file   Substance and Sexual Activity    Alcohol use: Not on file    Drug use: Not on file    Sexual activity: Not on file   Other Topics Concern    Not on file   Social  History Narrative    Not on file     Social Determinants of Health     Financial Resource Strain: Not on file   Food Insecurity: Not on file   Transportation Needs: Not on file   Physical Activity: Not on file   Stress: Not on file   Social Connections: Not on file   Intimate Partner Violence: Not on file   Housing Stability: Not on file       ALLERGIES:  Allergies   Allergen Reactions    Atorvastatin Unspecified     .    Chloramphenicol Unspecified     .    Codeine Unspecified     .    Other Drug Diarrhea     Most antibiotics turn stool yellow    Sulfa Drugs Diarrhea     .       Review of systems:  A complete review of symptoms was reviewed with patient. This is reviewed in H&P and PMH. ALL OTHERS reviewed and negative    Physical exam:  Vitals:    02/22/24 0600 02/22/24 0700 02/22/24 0800 02/22/24 1000   BP: 110/57  109/68 119/87   Pulse: 85  88 100   Resp: 18      Temp:   36.6 °C (97.8 °F) 36.3 °C (97.3 °F)   TempSrc:   Temporal Temporal   SpO2: 92%  93% 96%   Weight:  62.5 kg (137 lb 12.6 oz)     Height:             General: No acute distress.   EYES: no jaundice  HEENT: OP clear   Neck: No bruits No JVD.   CVS: RRR. S1 + S2. No M/R/G. No edema.  Resp: CTAB. No wheezing or crackles/rhonchi.  Abdomen: Soft, NT, ND,  Skin: Grossly nothing acute no obvious rashes  Neurological: Alert, Moves all extremities, no cranial nerve defects on limited exam  Extremities: Pulse 2+ in b/l LE. No cyanosis.     Data:  Laboratory studies personally reviewed by me:  Recent Results (from the past 24 hour(s))   APTT    Collection Time: 02/21/24  4:00 PM   Result Value Ref Range    APTT 35.0 24.7 - 36.0 sec   EC-ECHOCARDIOGRAM COMPLETE W/O CONT    Collection Time: 02/21/24  4:29 PM   Result Value Ref Range    Eject.Frac. MOD BP 47.62     Eject.Frac. MOD 4C 67.25     Eject.Frac. MOD 2C 16.82    Heparin Anti-Xa    Collection Time: 02/21/24 10:49 PM   Result Value Ref Range    Heparin Xa (UFH) 0.25 IU/mL   EKG    Collection Time:  24 12:22 AM   Result Value Ref Range    Report       [facility]    Test Date:  2024  Pt Name:    ANUM RILEY          Department: [facility]  MRN:        [id number]                      Room:       [facility]  Gender:     Female                       Technician: REMI  :        [id number]                   Requested By:DANGELO DUNN  Order #:    [id number]                    Reading MD: Dominick Couch MD    Measurements  Intervals                                Axis  Rate:       83                           P:          73  RI:         144                          QRS:        18  QRSD:       96                           T:          63  QT:         376  QTc:        442    Interpretive Statements  Sinus rhythm  Compared to ECG 2024 10:05:19  No significant changes  Electronically Signed On 2024 05:08:07 PST by Dominick Couch MD     Basic Metabolic Panel (BMP) Tomorrow AM (LAB)    Collection Time: 24  5:32 AM   Result Value Ref Range    Sodium 136 135 - 145 mmol/L    Potassium 4.3 3.6 - 5.5 mmol/L    Chloride 101 96 - 112 mmol/L    Co2 23 20 - 33 mmol/L    Glucose 119 (H) 65 - 99 mg/dL    Bun 23 (H) 8 - 22 mg/dL    Creatinine 0.62 0.50 - 1.40 mg/dL    Calcium 8.9 8.5 - 10.5 mg/dL    Anion Gap 12.0 7.0 - 16.0   Heparin Xa (Unfractionated)    Collection Time: 24  5:32 AM   Result Value Ref Range    Heparin Xa (UFH) 0.77 IU/mL   ESTIMATED GFR    Collection Time: 24  5:32 AM   Result Value Ref Range    GFR (CKD-EPI) 93 >60 mL/min/1.73 m 2       EKG 24 0624 interpreted by me normal sinus    All pertinent features of laboratory and imaging reviewed including primary images where applicable    [facility] meghan roblero 2023  s/p [facility] coronary artery artery angiography via right radial arterial approach (5F system, smaller radial artery) converted to right femoral artery access due to bifurcating stenting and need for IV access (left AC infiltrated). Right femoral veinous central line  access.  EF 50% with mild anterior hypokinesis. Normal LVEDP.  Right dominant cornary anatomy. Mild to moderated RCA stenosis.  Chronic total occlusion of the proximal circumflex at site of previous stent with collateralization from RCA.  Severe ostial first diagonal stenosis. s/p JAMES PCI to the ostial first diagonal.  Mild mid LAD disease at takeoff of first diagonal.  Recommend aggressive goal directed medical therapy and risk factor modification.     [facility] 2/21/24  Final impression:  Severe multivessel CAD, recurrent in-stent restenosis  Recommendations:  Coronary artery bypass graft surgery  Findings:  1.  Left main coronary artery:  Normal.  2.  Left anterior descending artery: Recently placed stent in large first diagonal branch has 95% stenosis.  After diagonal takeoff left anterior descending artery has 99% stenosis.  Distal vessel, apical LAD free of significant disease.    3.  Left circumflex coronary artery: Gives 1 large marginal branch, which has 2 layers of stents, chronically occluded, fills through left to left collaterals  4.  Right coronary artery: Diffuse 60% stenosis in midportion, posterior descending artery, posterolateral branch is free of significant disease.  This is a right dominant system.  5.  Left ventricular end diastolic pressure:  13 mmHg.  No signficant gradient across the aortic valve.  6.  Left ventriculogram:  Ejection fraction of 60%.    Principal Problem:    Unstable angina (HCC) (POA: Yes)  Active Problems:    CAD (coronary artery disease) (POA: Yes)    Primary hypertension (POA: Yes)    Giant cell arteritis (HCC) (POA: Yes)    Dyslipidemia (POA: Yes)  Resolved Problems:    * No resolved hospital problems. *      Assessment / Plan:  73 year old woman PMH CAD / multiple PCI, giant cell arteritis, HTN, HLD presents with days of intermittent anginal equivalent chest pain found unstable angina.    -CTS  -aspirin and heparin gtt  -high intensity statin, BB, ace/arb as tolerated    I  personally discussed her case with Dr Lopez    It is my pleasure to participate in the care of Ms. Christopher.  Please do not hesitate to contact me with questions or concerns.    Abhi Gann MD  Cardiologist [facility]     A total of 60 minutes of time was spent on day of encounter reviewing medical record, performing history and examination, counseling, ordering medication/test/consults and documentation.

## 2024-02-22 NOTE — CARE PLAN
The patient is Watcher - Medium risk of patient condition declining or worsening    Shift Goals  Clinical Goals: Hemodynamic stability, chest pain free  Patient Goals: feel better  Family Goals: FELIX    Progress made toward(s) clinical / shift goals:      Problem: Pain - Standard  Goal: Alleviation of pain or a reduction in pain to the patient’s comfort goal  Outcome: Progressing   Pt medicated per MAR    Problem: Hemodynamics  Goal: Patient's hemodynamics, fluid balance and neurologic status will be stable or improve  Outcome: Progressing   Pt BP stable while on nitro gtt for chest pain     Problem: Respiratory  Goal: Patient will achieve/maintain optimum respiratory ventilation and gas exchange  Outcome: Progressing

## 2024-02-22 NOTE — PROGRESS NOTES
Late entry:     2354: pt complaining of 9/10 chest pain radiating to her back, pt HR 98, /66, morphine PRN given, pt put on 1L NC for comfort, nitro gtt restarted at 20mcg/min, MD Kapadia notified, STAT EKG ordered, MD to bedside, pt states chestpain is better with nitro and morphine, per MD aKpadia continue nitro gtt to help chest pain.     0130: pt states chest pain has gone away, nitro gtt continued, vitals stable.

## 2024-02-22 NOTE — PROGRESS NOTES
[facility] Medicine Daily Progress Note    Date of Service  2/22/2024    Chief Complaint  Carine Dyer Shelia Christopher is a 73 y.o. female admitted 2/21/2024 with chest pain.    Hospital Course  Ms. Christopher is a 73 y.o. female who presented 2/21/2024 with past ministry of coronary disease with 4 MIs in the last 4 years.  Patient has 3 cardiac stents last placed August 2023.  Patient has not been feeling well for the last 1 week.  She says that she has history of chronic bronchitis and she felt that her bronchitis was acting up and she felt bilateral chest pain and was coughing up yellowish sputum intermittently.  Patient was being followed by her primary care provider who provided the patient with albuterol inhaler and erythromycin antibiotic.  Chest pain has been going on on and off for the last 1 week however patient this morning with a sharp pain in the left chest with extreme pain and numbness started in her left arm.  Pain was very similar to her prior heart attack.  She came to the  emergency department at [facility] for further evaluation.  EKG  and initial troponin was negative .patient referred to me for the care and management.patient was having ongoing chest pain so cardiology was consulted. She underwent heart catheterization and was found to have severe multivessel disease including in-stent stenosis of the first diagonal 95% and LAD 99% stenosis. CABG was recommended. She is on an IV heparin drip as well as an IV nitroglycerin drip.      Interval Problem Update  2/22: Ms. Christopher was evaluated in the IMCU.  She is on IV heparin drip as well as IV nitroglycerin drip.  She has a significant headache and oxycodone has been ordered.  Cardiothoracic surgery will be seeing her today.  She states she has chest pain when getting up to go to the bathroom.    I have discussed this patient's plan of care and discharge plan at IDT rounds today with Case Management, Nursing, Nursing  leadership, and other members of the IDT team.    Consultants/Specialty  Cardiology     Code Status  Full Code    Disposition  The patient is not medically cleared for discharge to home or a post-acute facility.      I have placed the appropriate orders for post-discharge needs.    Review of Systems  Review of Systems   Respiratory:  Negative for shortness of breath.    Cardiovascular:  Positive for chest pain.   Neurological:  Positive for headaches.   All other systems reviewed and are negative.       Physical Exam  Temp:  [36.2 °C (97.1 °F)-37.1 °C (98.7 °F)] 36.9 °C (98.5 °F)  Pulse:  [73-98] 85  Resp:  [12-44] 18  BP: (110-175)/(54-80) 110/57  SpO2:  [89 %-100 %] 92 %    Physical Exam  Vitals and nursing note reviewed.   Cardiovascular:      Rate and Rhythm: Normal rate.   Pulmonary:      Effort: Pulmonary effort is normal.   Abdominal:      General: There is no distension.      Tenderness: There is no abdominal tenderness.   Musculoskeletal:      Right lower leg: No edema.      Left lower leg: No edema.      Comments: Right wrist bruise   Neurological:      General: No focal deficit present.      Mental Status: She is alert and oriented to person, place, and time.   Psychiatric:         Mood and Affect: Mood normal.         Behavior: Behavior normal.         Fluids    Intake/Output Summary (Last 24 hours) at 2/22/2024 0744  Last data filed at 2/22/2024 0600  Gross per 24 hour   Intake 259.69 ml   Output 1050 ml   Net -790.31 ml       Laboratory  Recent Labs     02/21/24  0630   WBC 10.7   RBC 4.59   HEMOGLOBIN 13.0   HEMATOCRIT 39.6   MCV 86.3   MCH 28.3   MCHC 32.8   RDW 46.5   PLATELETCT 207   MPV 10.3     Recent Labs     02/21/24  0630 02/22/24  0532   SODIUM 141 136   POTASSIUM 4.0 4.3   CHLORIDE 104 101   CO2 24 23   GLUCOSE 104* 119*   BUN 27* 23*   CREATININE 0.77 0.62   CALCIUM 9.6 8.9     Recent Labs     02/21/24  0942 02/21/24  1039 02/21/24  1600   APTT 29.8 >240.0* 35.0   INR 0.99 1.14*  --                 Imaging  EC-ECHOCARDIOGRAM COMPLETE W/O CONT   Final Result      DX-CHEST-PORTABLE (1 VIEW)   Final Result      No acute process.      CL-LEFT HEART CATHETERIZATION WITH POSSIBLE INTERVENTION    (Results Pending)        Assessment/Plan  * Unstable angina (HCC)- (present on admission)  Assessment & Plan  Severe unstable angina with severe coronary disease by heart catheterization for which cardiothoracic surgery has been consulted for possible coronary artery bypass graft  She is on an IV heparin drip and an IV nitroglycerin drip and still has chest pain with getting up to go to the bathroom.  High intensity statin        Headache- (present on admission)  Assessment & Plan  She takes ibuprofen 800 mg twice a day at home every day and this is not being given and likely there is a component of medication withdrawal headache in conjunction with the expected headache of a nitroglycerin drip  Oral oxycodone has been ordered    Giant cell arteritis (HCC)- (present on admission)  Assessment & Plan  History of 3 years ago she has been off steroids for 1 year    Dyslipidemia- (present on admission)  Assessment & Plan  Continue crestor    Primary hypertension- (present on admission)  Assessment & Plan  Metoprolol 25 mg p.o. twice daily    CAD (coronary artery disease)- (present on admission)  Assessment & Plan  With history of previous stenting  Echocardiogram reveals a normal ejection fraction of 55%         VTE prophylaxis:    therapeutic anticoagulation with weight-based heparin gtt, pharmacy to adjust PRN      I have performed a physical exam and reviewed and updated ROS and Plan today (2/22/2024). In review of yesterday's note (2/21/2024), there are no changes except as documented above.

## 2024-02-22 NOTE — CARE PLAN
The patient is Watcher - Medium risk of patient condition declining or worsening    Shift Goals  Clinical Goals: pt will report decreased chest pain  Patient Goals: comfort  Family Goals: not present    Progress made toward(s) clinical / shift goals:    Problem: Pain - Standard  Goal: Alleviation of pain or a reduction in pain to the patient’s comfort goal  Outcome: Progressing     Problem: Knowledge Deficit - Standard  Goal: Patient and family/care givers will demonstrate understanding of plan of care, disease process/condition, diagnostic tests and medications  Outcome: Progressing     Problem: Hemodynamics  Goal: Patient's hemodynamics, fluid balance and neurologic status will be stable or improve  Outcome: Progressing     Problem: Respiratory  Goal: Patient will achieve/maintain optimum respiratory ventilation and gas exchange  Outcome: Progressing       Patient is not progressing towards the following goals:

## 2024-02-22 NOTE — CARE PLAN
The patient is Stable - Low risk of patient condition declining or worsening    Shift Goals  Clinical Goals: Absence or decrease of chest pain  Patient Goals: Feel better    Progress made toward(s) clinical / shift goals:    Problem: Pain - Standard  Goal: Alleviation of pain or a reduction in pain to the patient’s comfort goal  Description: Target End Date:  Prior to discharge or change in level of care    Document on Vitals flowsheet    1.  Document pain using the appropriate pain scale per order or unit policy  2.  Educate and implement non-pharmacologic comfort measures (i.e. relaxation, distraction, massage, cold/heat therapy, etc.)  3.  Pain management medications as ordered  4.  Reassess pain after pain med administration per policy  5.  If opiods administered assess patient's response to pain medication is appropriate per POSS sedation scale  6.  Follow pain management plan developed in collaboration with patient and interdisciplinary team (including palliative care or pain specialists if applicable)  Outcome: Progressing     Problem: Knowledge Deficit - Standard  Goal: Patient and family/care givers will demonstrate understanding of plan of care, disease process/condition, diagnostic tests and medications  Description: Target End Date:  1-3 days or as soon as patient condition allows    Document in Patient Education    1.  Patient and family/caregiver oriented to unit, equipment, visitation policy and means for communicating concern  2.  Complete/review Learning Assessment  3.  Assess knowledge level of disease process/condition, treatment plan, diagnostic tests and medications  4.  Explain disease process/condition, treatment plan, diagnostic tests and medications  Outcome: Progressing       Patient is not progressing towards the following goals:

## 2024-02-23 ENCOUNTER — APPOINTMENT (OUTPATIENT)
Dept: RADIOLOGY | Facility: MEDICAL CENTER | Age: 74
DRG: 233 | End: 2024-02-23
Attending: INTERNAL MEDICINE
Payer: MEDICARE

## 2024-02-23 LAB
EKG IMPRESSION: NORMAL
UFH PPP CHRO-ACNC: 0.49 IU/ML
UFH PPP CHRO-ACNC: 0.5 IU/ML

## 2024-02-23 PROCEDURE — 770000 HCHG ROOM/CARE - INTERMEDIATE ICU *

## 2024-02-23 PROCEDURE — 700102 HCHG RX REV CODE 250 W/ 637 OVERRIDE(OP): Performed by: HOSPITALIST

## 2024-02-23 PROCEDURE — A9270 NON-COVERED ITEM OR SERVICE: HCPCS | Performed by: HOSPITALIST

## 2024-02-23 PROCEDURE — 700111 HCHG RX REV CODE 636 W/ 250 OVERRIDE (IP): Mod: JZ,JG | Performed by: HOSPITALIST

## 2024-02-23 PROCEDURE — 93005 ELECTROCARDIOGRAM TRACING: CPT | Performed by: HOSPITALIST

## 2024-02-23 PROCEDURE — 93010 ELECTROCARDIOGRAM REPORT: CPT | Performed by: INTERNAL MEDICINE

## 2024-02-23 PROCEDURE — 99233 SBSQ HOSP IP/OBS HIGH 50: CPT | Performed by: HOSPITALIST

## 2024-02-23 PROCEDURE — A9270 NON-COVERED ITEM OR SERVICE: HCPCS

## 2024-02-23 PROCEDURE — 700102 HCHG RX REV CODE 250 W/ 637 OVERRIDE(OP)

## 2024-02-23 PROCEDURE — 99232 SBSQ HOSP IP/OBS MODERATE 35: CPT | Performed by: INTERNAL MEDICINE

## 2024-02-23 PROCEDURE — 93005 ELECTROCARDIOGRAM TRACING: CPT

## 2024-02-23 PROCEDURE — 93931 UPPER EXTREMITY STUDY: CPT | Mod: RT

## 2024-02-23 PROCEDURE — 85520 HEPARIN ASSAY: CPT

## 2024-02-23 PROCEDURE — 700111 HCHG RX REV CODE 636 W/ 250 OVERRIDE (IP): Mod: JZ,JG

## 2024-02-23 RX ORDER — MORPHINE SULFATE 4 MG/ML
2 INJECTION INTRAVENOUS ONCE
Status: COMPLETED | OUTPATIENT
Start: 2024-02-23 | End: 2024-02-23

## 2024-02-23 RX ORDER — ONDANSETRON 2 MG/ML
4 INJECTION INTRAMUSCULAR; INTRAVENOUS EVERY 4 HOURS PRN
Status: DISCONTINUED | OUTPATIENT
Start: 2024-02-23 | End: 2024-02-28

## 2024-02-23 RX ORDER — NITROGLYCERIN 20 MG/100ML
0-200 INJECTION INTRAVENOUS CONTINUOUS
Status: DISCONTINUED | OUTPATIENT
Start: 2024-02-24 | End: 2024-02-26

## 2024-02-23 RX ORDER — ACETAMINOPHEN 325 MG/1
650 TABLET ORAL EVERY 6 HOURS
Status: DISCONTINUED | OUTPATIENT
Start: 2024-02-24 | End: 2024-02-28

## 2024-02-23 RX ORDER — ALBUTEROL SULFATE 90 UG/1
2 AEROSOL, METERED RESPIRATORY (INHALATION) EVERY 4 HOURS PRN
Status: DISCONTINUED | OUTPATIENT
Start: 2024-02-23 | End: 2024-02-28

## 2024-02-23 RX ADMIN — NITROGLYCERIN 1 INCH: 20 OINTMENT TOPICAL at 22:35

## 2024-02-23 RX ADMIN — MORPHINE SULFATE 2 MG: 4 INJECTION, SOLUTION INTRAMUSCULAR; INTRAVENOUS at 22:51

## 2024-02-23 RX ADMIN — OXYCODONE 10 MG: 5 TABLET ORAL at 11:24

## 2024-02-23 RX ADMIN — MORPHINE SULFATE 2 MG: 4 INJECTION, SOLUTION INTRAMUSCULAR; INTRAVENOUS at 20:52

## 2024-02-23 RX ADMIN — ONDANSETRON 4 MG: 2 INJECTION INTRAMUSCULAR; INTRAVENOUS at 22:45

## 2024-02-23 RX ADMIN — MORPHINE SULFATE 2 MG: 4 INJECTION, SOLUTION INTRAMUSCULAR; INTRAVENOUS at 23:33

## 2024-02-23 RX ADMIN — ROSUVASTATIN 10 MG: 10 TABLET, FILM COATED ORAL at 16:03

## 2024-02-23 RX ADMIN — MORPHINE SULFATE 2 MG: 4 INJECTION, SOLUTION INTRAMUSCULAR; INTRAVENOUS at 16:03

## 2024-02-23 RX ADMIN — OXYCODONE 10 MG: 5 TABLET ORAL at 15:11

## 2024-02-23 RX ADMIN — ASPIRIN 81 MG: 81 TABLET, COATED ORAL at 16:03

## 2024-02-23 ASSESSMENT — LIFESTYLE VARIABLES
TOTAL SCORE: 0
HAVE YOU EVER FELT YOU SHOULD CUT DOWN ON YOUR DRINKING: NO
TOTAL SCORE: 0
HAVE PEOPLE ANNOYED YOU BY CRITICIZING YOUR DRINKING: NO
ON A TYPICAL DAY WHEN YOU DRINK ALCOHOL HOW MANY DRINKS DO YOU HAVE: 0
ALCOHOL_USE: NO
CONSUMPTION TOTAL: NEGATIVE
DOES PATIENT WANT TO STOP DRINKING: NO
AVERAGE NUMBER OF DAYS PER WEEK YOU HAVE A DRINK CONTAINING ALCOHOL: 0
EVER FELT BAD OR GUILTY ABOUT YOUR DRINKING: NO
TOTAL SCORE: 0
HOW MANY TIMES IN THE PAST YEAR HAVE YOU HAD 5 OR MORE DRINKS IN A DAY: 0
EVER HAD A DRINK FIRST THING IN THE MORNING TO STEADY YOUR NERVES TO GET RID OF A HANGOVER: NO

## 2024-02-23 ASSESSMENT — PAIN DESCRIPTION - PAIN TYPE
TYPE: ACUTE PAIN

## 2024-02-23 ASSESSMENT — COGNITIVE AND FUNCTIONAL STATUS - GENERAL
SUGGESTED CMS G CODE MODIFIER MOBILITY: CH
DAILY ACTIVITIY SCORE: 24
MOBILITY SCORE: 24
SUGGESTED CMS G CODE MODIFIER DAILY ACTIVITY: CH

## 2024-02-23 ASSESSMENT — FIBROSIS 4 INDEX: FIB4 SCORE: 1.937996363252019141

## 2024-02-23 ASSESSMENT — ENCOUNTER SYMPTOMS
SHORTNESS OF BREATH: 0
HEADACHES: 0

## 2024-02-23 NOTE — PROGRESS NOTES
Interval: No acute events.      Medications / Drug list prior to admission:  No current facility-administered medications on file prior to encounter.     Current Outpatient Medications on File Prior to Encounter   Medication Sig Dispense Refill    aspirin (ASPIRIN EC LOW STRENGTH) 81 MG EC tablet Take 81 mg by mouth every evening.      azelastine (ASTELIN) 137 MCG/SPRAY nasal spray Administer 2 Sprays into affected nostril(S) 1 time a day as needed for Rhinitis.      ibuprofen (IBU) 800 MG Tab Take 800 mg by mouth 2 times a day as needed for Mild Pain.      metoprolol tartrate (LOPRESSOR) 25 MG Tab Take 25 mg by mouth 2 times a day.      rosuvastatin (CRESTOR) 10 MG Tab Take 10 mg by mouth every evening.      prasugrel (EFFIENT) 10 MG Tab Take 10 mg by mouth every evening.      Nxklpm-UsUqk-PzPil-FA-CA-Omega (COMPLETE  DHA) 29-1-200 & 200 MG Misc Take 2 Tablets by mouth every evening.      Multiple Vitamins-Minerals (PRESERVISION AREDS) Tab Take 1 Tablet by mouth every evening.      erythromycin base 250 MG Cap DR Particles Take 750 mg by mouth every day. X 7 days      Dextromethorphan HBr (VICKS DAYQUIL COUGH) 15 MG/15ML Liquid Take  by mouth 1 time a day as needed. 1 each  Indications: Cough      Phenylephrine-Doxylamine-DM (NYQUIL COUGH DM + CONGESTION) 5-6.25-10 MG/15ML Liquid Take 1 Each by mouth every evening as needed (chronic bronchitis).      albuterol 108 (90 Base) MCG/ACT Aero Soln inhalation aerosol Inhale 2 Puffs 3 times a day as needed for Shortness of Breath (chronic bronchitis).         Current list of administered Medications:    Current Facility-Administered Medications:     oxyCODONE immediate-release (Roxicodone) tablet 5-10 mg, 5-10 mg, Oral, Q3HRS PRN, Yaniv Lopez M.D., 10 mg at 24 6246    morphine 4 MG/ML injection 2 mg, 2 mg, Intravenous, Q3HRS PRN, Dylan Zhu M.D., 2 mg at 24 3341    heparin infusion 25,000 units in 500 mL 0.45% NACL, 0-30 Units/kg/hr, Intravenous,  Continuous, Abhi Gann M.D., Last Rate: 14.9 mL/hr at 02/23/24 0700, 12 Units/kg/hr at 02/23/24 0700    heparin injection 1,900 Units, 30 Units/kg, Intravenous, PRN, Abhi Gann M.D., 1,900 Units at 02/21/24 2332    albuterol inhaler 2 Puff, 2 Puff, Inhalation, Q4HRS (RT), Dylan Zhu M.D., 2 Puff at 02/22/24 2306    guaiFENesin dextromethorphan (Robitussin DM) 100-10 MG/5ML syrup 5 mL, 5 mL, Oral, Q6HRS PRN, Dylan Zhu M.D.    aspirin EC tablet 81 mg, 81 mg, Oral, Q EVENING, Dylan Zhu M.D., 81 mg at 02/22/24 1656    [MAR Hold] metoprolol tartrate (Lopressor) tablet 25 mg, 25 mg, Oral, BID, Dylan Zhu M.D.    rosuvastatin (Crestor) tablet 10 mg, 10 mg, Oral, Q EVENING, Dylan Zhu M.D., 10 mg at 02/22/24 1656    acetaminophen (Tylenol) tablet 650 mg, 650 mg, Oral, Q4HRS PRN, Dylan Zhu M.D.    Past Medical History:   Diagnosis Date    Chronic bronchitis (HCC)     Giant cell arteritis (HCC)     History of heart artery stent     x3 stents    MI (myocardial infarction) (HCC)     x 4       Past Surgical History:   Procedure Laterality Date    BREAST IMPLANT REVISION Bilateral     implants       No family history on file.  Patient family history was personally reviewed, no pertinent family history to current presentation    Social History     Socioeconomic History    Marital status:      Spouse name: Not on file    Number of children: Not on file    Years of education: Not on file    Highest education level: Not on file   Occupational History    Not on file   Tobacco Use    Smoking status: Not on file    Smokeless tobacco: Not on file   Substance and Sexual Activity    Alcohol use: Not on file    Drug use: Not on file    Sexual activity: Not on file   Other Topics Concern    Not on file   Social History Narrative    Not on file     Social Determinants of Health     Financial Resource Strain: Not on file   Food Insecurity: Not on file   Transportation Needs: Not on file    Physical Activity: Not on file   Stress: Not on file   Social Connections: Not on file   Intimate Partner Violence: Not on file   Housing Stability: Not on file       ALLERGIES:  Allergies   Allergen Reactions    Atorvastatin Unspecified     .    Chloramphenicol Unspecified     .    Codeine Unspecified     .    Other Drug Diarrhea     Most antibiotics turn stool yellow    Sulfa Drugs Diarrhea     .       Review of systems:  A complete review of symptoms was reviewed with patient. This is reviewed in H&P and PMH. ALL OTHERS reviewed and negative    Physical exam:  Vitals:    02/23/24 0300 02/23/24 0400 02/23/24 0500 02/23/24 0600   BP: 112/61 111/59 109/55 109/62   Pulse: 95 90 95 89   Resp:       Temp:  36.7 °C (98.1 °F)     TempSrc:  Temporal     SpO2: 93% 91% 92% 94%   Weight:    62.4 kg (137 lb 9.1 oz)   Height:             General: No acute distress.   EYES: no jaundice  HEENT: OP clear   Neck: No bruits No JVD.   CVS: RRR. S1 + S2. No M/R/G. No edema.  Resp: CTAB. No wheezing or crackles/rhonchi.  Abdomen: Soft, NT, ND,  Skin: Grossly nothing acute no obvious rashes  Neurological: Alert, Moves all extremities, no cranial nerve defects on limited exam  Extremities: Pulse 2+ in b/l LE. No cyanosis.     Data:  Laboratory studies personally reviewed by me:  Recent Results (from the past 24 hour(s))   Heparin Xa (Unfractionated)    Collection Time: 02/22/24 12:25 PM   Result Value Ref Range    Heparin Xa (UFH) 0.52 IU/mL   EXTRA TUBE,NANCY    Collection Time: 02/22/24  6:23 PM   Result Value Ref Range    Extra Tube, Blue SORTED    Heparin Xa (Unfractionated)    Collection Time: 02/22/24  8:30 PM   Result Value Ref Range    Heparin Xa (UFH) 0.42 IU/mL   Heparin Xa (Unfractionated)    Collection Time: 02/23/24  5:00 AM   Result Value Ref Range    Heparin Xa (UFH) 0.50 IU/mL       EKG 2/21/24 0624 interpreted by me normal sinus    All pertinent features of laboratory and imaging reviewed including primary images where  applicable    [facility] meghan roblero 8/2023  s/p [facility] coronary artery artery angiography via right radial arterial approach (5F system, smaller radial artery) converted to right femoral artery access due to bifurcating stenting and need for IV access (left AC infiltrated). Right femoral veinous central line access.  EF 50% with mild anterior hypokinesis. Normal LVEDP.  Right dominant cornary anatomy. Mild to moderated RCA stenosis.  Chronic total occlusion of the proximal circumflex at site of previous stent with collateralization from RCA.  Severe ostial first diagonal stenosis. s/p JAMES PCI to the ostial first diagonal.  Mild mid LAD disease at takeoff of first diagonal.  Recommend aggressive goal directed medical therapy and risk factor modification.     [facility] 2/21/24  Final impression:  Severe multivessel CAD, recurrent in-stent restenosis  Recommendations:  Coronary artery bypass graft surgery  Findings:  1.  Left main coronary artery:  Normal.  2.  Left anterior descending artery: Recently placed stent in large first diagonal branch has 95% stenosis.  After diagonal takeoff left anterior descending artery has 99% stenosis.  Distal vessel, apical LAD free of significant disease.    3.  Left circumflex coronary artery: Gives 1 large marginal branch, which has 2 layers of stents, chronically occluded, fills through left to left collaterals  4.  Right coronary artery: Diffuse 60% stenosis in midportion, posterior descending artery, posterolateral branch is free of significant disease.  This is a right dominant system.  5.  Left ventricular end diastolic pressure:  13 mmHg.  No signficant gradient across the aortic valve.  6.  Left ventriculogram:  Ejection fraction of 60%.    Principal Problem:    Unstable angina (HCC) (POA: Yes)  Active Problems:    CAD (coronary artery disease) (POA: Yes)    Primary hypertension (POA: Yes)    Giant cell arteritis (HCC) (POA: Yes)    Headache (POA: Yes)    Dyslipidemia (POA: Yes)  Resolved  Problems:    * No resolved hospital problems. *      Assessment / Plan:  73 year old woman PMH CAD / multiple PCI, giant cell arteritis, HTN, HLD presents with days of intermittent anginal equivalent chest pain found unstable angina s/p [facility] with multivessel CAD    -plan CABG Wednesday  -aspirin and heparin gtt  -high intensity statin, BB, ace/arb as tolerated    I personally discussed her case with Dr Lopez    It is my pleasure to participate in the care of Ms. Christopher.  Please do not hesitate to contact me with questions or concerns.    Abhi Gann MD  Cardiologist [facility]

## 2024-02-23 NOTE — PROGRESS NOTES
[facility] Medicine Daily Progress Note    Date of Service  2/23/2024    Chief Complaint  Carine Moralesbeth Shelia Christopher is a 73 y.o. female admitted 2/21/2024 with chest pain.    Hospital Course  Ms. Christopher is a 73 y.o. female who presented 2/21/2024 with past ministry of coronary disease with 4 MIs in the last 4 years.  Patient has 3 cardiac stents last placed August 2023.  Patient has not been feeling well for the last 1 week.  She says that she has history of chronic bronchitis and she felt that her bronchitis was acting up and she felt bilateral chest pain and was coughing up yellowish sputum intermittently.  Patient was being followed by her primary care provider who provided the patient with albuterol inhaler and erythromycin antibiotic.  Chest pain has been going on on and off for the last 1 week however patient this morning with a sharp pain in the left chest with extreme pain and numbness started in her left arm.  Pain was very similar to her prior heart attack.  She came to the  emergency department at [facility] for further evaluation.  EKG  and initial troponin was negative .patient referred to me for the care and management.patient was having ongoing chest pain so cardiology was consulted. She underwent heart catheterization and was found to have severe multivessel disease including in-stent stenosis of the first diagonal 95% and LAD 99% stenosis. CABG was recommended. She is on an IV heparin drip as well as an IV nitroglycerin drip.      Interval Problem Update  2/22: Ms. Christopher was evaluated in the [facility].  She is on IV heparin drip as well as IV nitroglycerin drip.  She has a significant headache and oxycodone has been ordered.  Cardiothoracic surgery will be seeing her today.  She states she has chest pain when getting up to go to the bathroom.  2/23: Ms. Christopher was seen in the [facility]. The nitroglycerin drip is off though she remains on IV heparin. Dr. Chicas  cardiothoracic surgery was consulted and recommends CABG on Feb 28th. The bruising of the right forearm is worse today. Ultrasound of the right wrist has been ordered. She is able to walk to the bathroom without chest pain.    I have discussed this patient's plan of care and discharge plan at IDT rounds today with Case Management, Nursing, Nursing leadership, and other members of the IDT team.    Consultants/Specialty  Cardiology     Code Status  Full Code    Disposition  The patient is not medically cleared for discharge to home or a post-acute facility.  Anticipate discharge to: home with close outpatient follow-up    I have placed the appropriate orders for post-discharge needs.    Review of Systems  Review of Systems   Respiratory:  Negative for shortness of breath.    Cardiovascular:  Negative for chest pain.   Musculoskeletal:         Right forearm pain/swelling/bruising   Neurological:  Negative for headaches.   All other systems reviewed and are negative.       Physical Exam  Temp:  [36.2 °C (97.1 °F)-37.1 °C (98.7 °F)] 36.7 °C (98.1 °F)  Pulse:  [] 89  BP: (100-125)/(51-87) 109/62  SpO2:  [89 %-96 %] 94 %    Physical Exam  Vitals and nursing note reviewed.   Cardiovascular:      Rate and Rhythm: Normal rate.   Pulmonary:      Effort: Pulmonary effort is normal.   Abdominal:      General: There is no distension.      Tenderness: There is no abdominal tenderness.   Musculoskeletal:      Right lower leg: No edema.      Left lower leg: No edema.      Comments: Right wrist bruise to the elbow   Neurological:      General: No focal deficit present.      Mental Status: She is alert and oriented to person, place, and time.   Psychiatric:         Mood and Affect: Mood normal.         Behavior: Behavior normal.         Fluids    Intake/Output Summary (Last 24 hours) at 2/23/2024 0721  Last data filed at 2/22/2024 1200  Gross per 24 hour   Intake --   Output 900 ml   Net -900 ml       Laboratory  Recent Labs      02/21/24  0630   WBC 10.7   RBC 4.59   HEMOGLOBIN 13.0   HEMATOCRIT 39.6   MCV 86.3   MCH 28.3   MCHC 32.8   RDW 46.5   PLATELETCT 207   MPV 10.3     Recent Labs     02/21/24  0630 02/22/24  0532   SODIUM 141 136   POTASSIUM 4.0 4.3   CHLORIDE 104 101   CO2 24 23   GLUCOSE 104* 119*   BUN 27* 23*   CREATININE 0.77 0.62   CALCIUM 9.6 8.9     Recent Labs     02/21/24  0942 02/21/24  1039 02/21/24  1600   APTT 29.8 >240.0* 35.0   INR 0.99 1.14*  --                Imaging  EC-ECHOCARDIOGRAM COMPLETE W/O CONT   Final Result      DX-CHEST-PORTABLE (1 VIEW)   Final Result      No acute process.      CL-LEFT HEART CATHETERIZATION WITH POSSIBLE INTERVENTION    (Results Pending)        Assessment/Plan  * Unstable angina (HCC)- (present on admission)  Assessment & Plan  Severe unstable angina with severe coronary disease by heart catheterization for which Dr. Chicas cardiothoracic surgery has been consulted for coronary artery bypass graft on 2/28.  She is on an IV heparin drip and the IV nitroglycerin drip has been stopped.  High intensity statin  Continuous telemetry monitoring to eval for arrhythmias.         Primary hypertension- (present on admission)  Assessment & Plan  Metoprolol 25 mg p.o. twice daily    CAD (coronary artery disease)- (present on admission)  Assessment & Plan  With history of previous stenting  Echocardiogram reveals a normal ejection fraction of 55%    Headache- (present on admission)  Assessment & Plan  Resolved with cessation of nitroglycerin    Giant cell arteritis (HCC)- (present on admission)  Assessment & Plan  History of 3 years ago she has been off steroids for 1 year    Dyslipidemia- (present on admission)  Assessment & Plan  Continue crestor         VTE prophylaxis:    therapeutic anticoagulation with weight-based heparin gtt, pharmacy to adjust PRN      I have performed a physical exam and reviewed and updated ROS and Plan today (2/23/2024). In review of yesterday's note (2/22/2024), there  are no changes except as documented above.

## 2024-02-23 NOTE — PROGRESS NOTES
4 Eyes Skin Assessment Completed by BESSIE Doty and BESSIE Donald.    Head WDL  Ears WDL  Nose WDL  Mouth WDL  Neck WDL  Breast/Chest WDL  Shoulder Blades WDL  Spine WDL  (R) Arm/Elbow/Hand Swelling R radial site with small hematoma  (L) Arm/Elbow/Hand WDL  Abdomen WDL  Groin WDL  Scrotum/Coccyx/Buttocks WDL  (R) Leg Bruising, R cath site  (L) Leg WDL  (R) Heel/Foot/Toe WDL  (L) Heel/Foot/Toe WDL          Devices In Places Tele Box, Blood Pressure Cuff, Pulse Ox, and Nasal Cannula      Interventions In Place Gray Ear Foams, Pillows, and Low Air Loss Mattress    Possible Skin Injury No    Pictures Uploaded Into Epic N/A  Wound Consult Placed N/A  RN Wound Prevention Protocol Ordered No

## 2024-02-23 NOTE — PROGRESS NOTES
4 Eyes Skin Assessment Completed by BESSIE Lantigua and RODOLFO Gold.    Head WDL  Ears WDL  Nose WDL  Mouth WDL  Neck WDL  Breast/Chest WDL  Shoulder Blades WDL  Spine WDL  (R) Arm/Elbow/Hand Old hematoma site  (L) Arm/Elbow/Hand WDL  Abdomen WDL  Groin Incision site  Scrotum/Coccyx/Buttocks WDL  (R) Leg WDL  (L) Leg WDL  (R) Heel/Foot/Toe WDL  (L) Heel/Foot/Toe WDL          Devices In Places Tele Box, Blood Pressure Cuff, and Pulse Ox      Interventions In Place Pillows    Possible Skin Injury No    Pictures Uploaded Into Epic N/A  Wound Consult Placed N/A  RN Wound Prevention Protocol Ordered No

## 2024-02-23 NOTE — CARE PLAN
The patient is Watcher - Medium risk of patient condition declining or worsening    Shift Goals  Clinical Goals: no chest pain, pain control, hemodynamic stability  Patient Goals: sleep  Family Goals: FELIX    Progress made toward(s) clinical / shift goals:        Problem: Pain - Standard  Goal: Alleviation of pain or a reduction in pain to the patient’s comfort goal  Outcome: Progressing     Problem: Knowledge Deficit - Standard  Goal: Patient and family/care givers will demonstrate understanding of plan of care, disease process/condition, diagnostic tests and medications  Outcome: Progressing     Problem: Hemodynamics  Goal: Patient's hemodynamics, fluid balance and neurologic status will be stable or improve  Outcome: Progressing     Problem: Respiratory  Goal: Patient will achieve/maintain optimum respiratory ventilation and gas exchange  Outcome: Progressing       Patient is not progressing towards the following goals:

## 2024-02-23 NOTE — PROGRESS NOTES
4 Eyes Skin Assessment Completed by BESSIE Sahu and BESSIE Hall.     Head WDL  Ears WDL  Nose WDL  Mouth WDL  Neck WDL  Breast/Chest WDL  Shoulder Blades WDL  Spine WDL  (R) Arm/Elbow/Hand Swelling R radial site with small hematoma  (L) Arm/Elbow/Hand WDL  Abdomen WDL  Groin WDL  Scrotum/Coccyx/Buttocks WDL  (R) Leg Bruising, R cath site  (L) Leg WDL  (R) Heel/Foot/Toe WDL  (L) Heel/Foot/Toe WDL              Devices In Places Tele Box, Blood Pressure Cuff, Pulse Ox, and Nasal Cannula        Interventions In Place Gray Ear Foams, Pillows, and Low Air Loss Mattress     Possible Skin Injury No     Pictures Uploaded Into Epic N/A  Wound Consult Placed N/A  RN Wound Prevention Protocol Ordered No

## 2024-02-24 LAB
EKG IMPRESSION: NORMAL
UFH PPP CHRO-ACNC: 0.27 IU/ML

## 2024-02-24 PROCEDURE — 700111 HCHG RX REV CODE 636 W/ 250 OVERRIDE (IP): Performed by: INTERNAL MEDICINE

## 2024-02-24 PROCEDURE — 700102 HCHG RX REV CODE 250 W/ 637 OVERRIDE(OP): Performed by: HOSPITALIST

## 2024-02-24 PROCEDURE — A9270 NON-COVERED ITEM OR SERVICE: HCPCS | Performed by: HOSPITALIST

## 2024-02-24 PROCEDURE — 93010 ELECTROCARDIOGRAM REPORT: CPT | Performed by: INTERNAL MEDICINE

## 2024-02-24 PROCEDURE — 99232 SBSQ HOSP IP/OBS MODERATE 35: CPT | Mod: FS | Performed by: INTERNAL MEDICINE

## 2024-02-24 PROCEDURE — 85520 HEPARIN ASSAY: CPT

## 2024-02-24 PROCEDURE — 99233 SBSQ HOSP IP/OBS HIGH 50: CPT | Performed by: HOSPITALIST

## 2024-02-24 PROCEDURE — A9270 NON-COVERED ITEM OR SERVICE: HCPCS

## 2024-02-24 PROCEDURE — 770000 HCHG ROOM/CARE - INTERMEDIATE ICU *

## 2024-02-24 PROCEDURE — 700102 HCHG RX REV CODE 250 W/ 637 OVERRIDE(OP)

## 2024-02-24 PROCEDURE — 700111 HCHG RX REV CODE 636 W/ 250 OVERRIDE (IP): Mod: JZ,JG | Performed by: INTERNAL MEDICINE

## 2024-02-24 RX ADMIN — NITROGLYCERIN 10 MCG/MIN: 20 INJECTION INTRAVENOUS at 00:16

## 2024-02-24 RX ADMIN — ROSUVASTATIN 10 MG: 10 TABLET, FILM COATED ORAL at 17:19

## 2024-02-24 RX ADMIN — HEPARIN SODIUM 12 UNITS/KG/HR: 5000 INJECTION, SOLUTION INTRAVENOUS at 08:53

## 2024-02-24 RX ADMIN — ASPIRIN 81 MG: 81 TABLET, COATED ORAL at 17:19

## 2024-02-24 RX ADMIN — HEPARIN SODIUM 1900 UNITS: 1000 INJECTION, SOLUTION INTRAVENOUS; SUBCUTANEOUS at 23:13

## 2024-02-24 RX ADMIN — OXYCODONE 10 MG: 5 TABLET ORAL at 17:19

## 2024-02-24 ASSESSMENT — PAIN DESCRIPTION - PAIN TYPE
TYPE: ACUTE PAIN

## 2024-02-24 ASSESSMENT — ENCOUNTER SYMPTOMS
WHEEZING: 0
SHORTNESS OF BREATH: 0
CHOKING: 0
COUGH: 0
APNEA: 0
CHEST TIGHTNESS: 0
HEADACHES: 1
STRIDOR: 0

## 2024-02-24 ASSESSMENT — FIBROSIS 4 INDEX: FIB4 SCORE: 1.937996363252019141

## 2024-02-24 NOTE — PROGRESS NOTES
Assumed care of pt. Bedside report received from RN. Pt was updated on plan of care. AOx4. Pt pain level 7/10, see MAR. Call light, phone and personal belongings in reach. Bed alarm on and working properly, bed in lowest position, and locked.     Bedside report received from off going RN/tech: Tangela, assumed care of patient.     Fall Risk Score: LOW RISK  Fall risk interventions in place: Place yellow fall risk ID band on patient, Provide patient/family education based on risk assessment, Educate patient/family to call staff for assistance when getting out of bed, Place fall precaution signage outside patient door, Place patient in room close to nursing station, and Utilize bed/chair fall alarm  Bed type: Regular (Dell Score less than 17 interventions in place)  Patient on cardiac monitor: Yes  IVF/IV medications: Infusion per MAR (List Med(s)) Heparin 12u/kg  Oxygen: Room Air  Bedside sitter: Not Applicable   Isolation: Not applicable

## 2024-02-24 NOTE — CARE PLAN
The patient is Stable - Low risk of patient condition declining or worsening    Shift Goals  Clinical Goals: decreased chest pain  Patient Goals: rest, pain relieif  Family Goals: not present    Progress made toward(s) clinical / shift goals:    Problem: Knowledge Deficit - Standard  Goal: Patient and family/care givers will demonstrate understanding of plan of care, disease process/condition, diagnostic tests and medications  Outcome: Progressing  Note: Patient verbalizes understanding of care plan      Problem: Hemodynamics  Goal: Patient's hemodynamics, fluid balance and neurologic status will be stable or improve  Outcome: Progressing  Note: Patient maintains stable vital signs     Problem: Respiratory  Goal: Patient will achieve/maintain optimum respiratory ventilation and gas exchange  Outcome: Progressing  Flowsheets (Taken 2/24/2024 0400)  O2 Delivery Device: Silicone Nasal Cannula  Note: Patient maintains adequate oxygenation      Problem: Risk for Bleeding  Goal: Patient will take measures to prevent bleeding and recognizes signs of bleeding that need to be reported immediately to a health care professional  Outcome: Progressing  Note: Proper precautions are taken to avoid potential bleeding due to heightened risk while on Heparin. Patient verbalizes understanding that she will not get up without help   Goal: Patient will not experience bleeding as evidenced by normal blood pressure, stable hematocrit and hemoglobin levels and desired ranges for coagulation profiles  Outcome: Progressing       Patient is not progressing towards the following goals:

## 2024-02-24 NOTE — PROGRESS NOTES
4 Eyes Skin Assessment Completed by BESSIE Dumont and BESSIE Viera.    Head WDL  Ears WDL  Nose WDL  Mouth WDL  Neck WDL  Breast/Chest WDL  Shoulder Blades WDL  Spine WDL  (R) Arm/Elbow/Hand Bruising and Discoloration Bruising and discoloration noted to right arm, spreading from puncture site to right radial for PCI. Bruising and swelling traveling from puncture site to elbow.   (L) Arm/Elbow/Hand WDL  Abdomen Scar  Groin Incision  Scrotum/Coccyx/Buttocks WDL  (R) Leg WDL  (L) Leg WDL  (R) Heel/Foot/Toe WDL  (L) Heel/Foot/Toe WDL          Devices In Places Tele Box, Blood Pressure Cuff, Pulse Ox, and Nasal Cannula      Interventions In Place Pillows    Possible Skin Injury No    Pictures Uploaded Into Epic N/A  Wound Consult Placed N/A  RN Wound Prevention Protocol Ordered No

## 2024-02-24 NOTE — PROGRESS NOTES
2210 Pt continually having severe chest pain-RN notified on call hospitalist.     2246 Pt still complaining of severe chest pain-RN notified on call hospitalist- hospitalist came to bedside and one time dose of morphine ordered.     2323 PT is still complaining of severe chest pain-RN notified on call hospitalist. New orders for pt to be transferred to [facility] 626. Rapid [facility] RN to bedside to transfer.Monitor room notified.

## 2024-02-24 NOTE — PROGRESS NOTES
NOC APRN CROSS COVER NOTE      Contacted by primary RN for patient with 10/10 chest pain with radiation to her left arm and back. EKG ordered by RN per protocol, which demonstrates     Orders placed for 1 inch nitro paste q 6 hours, 2 mg IV morphine once and 4 mg IV zofran q 4 hours PRN.     Patient seen and examined at bedside. She is feeling much more comfortable after medication administration. However, within 30 minutes patient had return of 10/10 chest pain.     I have spoken with Dr. Johnson, ICU MD, who is agreeable for transfer back to [facility] for nitro drip. I have updated cardiology, Dr. Gann, who is agrees with nitro drip and tylenol for headache. I have ordered scheduled tylenol.    Ashley Hart ACNPC-AG, NOC Hospitalist CARI

## 2024-02-24 NOTE — PROGRESS NOTES
Cardiology Follow Up Progress Note    Date of Service  2/24/2024    Attending Physician  Yaniv Lopez M.D.    Chief Complaint   Unstable angina    HPI  Carine Christopher is a 73 y.o. female admitted 2/21/2024 with chest pain found unstable angina.    PMH: CAD with multiple prior PCI's, chronic bronchitis.  Giant cell arthritis now in remission, hypertension, dyslipidemia    Interim Events  No overnight cardiac events  Telemetry-SR  SBP ~119  On and off chest pain overnight  Started on nitro drip  Currently on IV heparin drip  Plan for CABG on Wednesday 2/28  Of Effient since the 20th    Review of Systems  Review of Systems   HENT:  Negative for hearing loss.    Respiratory:  Negative for apnea, cough, choking, chest tightness, shortness of breath, wheezing and stridor.    Cardiovascular:  Positive for chest pain.       Vital signs in last 24 hours  Temp:  [36.4 °C (97.6 °F)-37.3 °C (99.2 °F)] 36.9 °C (98.5 °F)  Pulse:  [] 100  Resp:  [13-72] 72  BP: ()/(51-87) 119/73  SpO2:  [92 %-99 %] 95 %    Physical Exam  Physical Exam  Cardiovascular:      Rate and Rhythm: Normal rate and regular rhythm.      Pulses: Normal pulses.   Pulmonary:      Effort: Pulmonary effort is normal.      Comments: Chronic bronchitis, cough  Skin:     General: Skin is warm.   Neurological:      Mental Status: Mental status is at baseline.   Psychiatric:         Mood and Affect: Mood normal.         Lab Review  Lab Results   Component Value Date/Time    WBC 10.7 02/21/2024 06:30 AM    RBC 4.59 02/21/2024 06:30 AM    HEMOGLOBIN 13.0 02/21/2024 06:30 AM    HEMATOCRIT 39.6 02/21/2024 06:30 AM    MCV 86.3 02/21/2024 06:30 AM    MCH 28.3 02/21/2024 06:30 AM    MCHC 32.8 02/21/2024 06:30 AM    MPV 10.3 02/21/2024 06:30 AM      Lab Results   Component Value Date/Time    SODIUM 136 02/22/2024 05:32 AM    POTASSIUM 4.3 02/22/2024 05:32 AM    CHLORIDE 101 02/22/2024 05:32 AM    CO2 23 02/22/2024 05:32 AM    GLUCOSE 119 (H)  "02/22/2024 05:32 AM    BUN 23 (H) 02/22/2024 05:32 AM    CREATININE 0.62 02/22/2024 05:32 AM      Lab Results   Component Value Date/Time    ASTSGOT 23 02/21/2024 06:30 AM    ALTSGPT 18 02/21/2024 06:30 AM     Lab Results   Component Value Date/Time    TROPONINT 25 (H) 02/21/2024 08:45 AM       No results for input(s): \"NTPROBNP\" in the last 72 hours.    Cardiac Imaging and Procedures Review  EKG: SR, abnormal R wave progression    Echocardiogram:    2/21/2024  Normal left ventricular systolic function. The left ventricular   ejection fraction is visually estimated to be 55%.   Normal right ventricular size and systolic function.  Mild aortic insufficiency.   Right ventricular systolic pressure is estimated to be  23 mmHg   (normal).       Cardiac Catheterization:    2/21/2024  Severe multivessel CAD recurrent in-stent restenosis  Recommend artery bypass graft surgery    1.  Left main coronary artery:  Normal.  2.  Left anterior descending artery: Recently placed stent in large first diagonal branch has 95% stenosis.  After diagonal takeoff left anterior descending artery has 99% stenosis.  Distal vessel, apical LAD free of significant disease.    3.  Left circumflex coronary artery: Gives 1 large marginal branch, which has 2 layers of stents, chronically occluded, fills through left to left collaterals  4.  Right coronary artery: Diffuse 60% stenosis in midportion, posterior descending artery, posterolateral branch is free of significant disease.  This is a right dominant system.  5.  Left ventricular end diastolic pressure:  13 mmHg.  No signficant gradient across the aortic valve.  6.  Left ventriculogram:  Ejection fraction of 60%.        Imaging  Chest X-Ray:     No acute process       Assessment/Plan    # Unstable angina.  # C 2/21/2024 revealed severe multivessel CAD and recurrent in-stent restenosis.  # Plan for CABG on Wednesday.  # Hypertension.  # Giant cell arthritis, in remission has been off of " steroids for over a year.  # Dyslipidemia.    -Patient reports on & off chest pain overnight, currently on nitro drip and denies having recurrent chest discomfort.  -Continue IV nitro drip.  -Continue IV heparin drip.  -Continue aspirin 81 mg & Crestor 10 mg.  -Plan for CABG Wednesday, 2/28/2024.    Cardiology will follow along    I personally spent a total of 15 minutes which includes face-to-face time and non-face-to-face time spent on preparing to see the patient, reviewing hospital notes and tests, obtaining history from the patient, performing a medically appropriate exam, counseling and educating the patient, ordering medications/tests/procedures/referrals as clinically indicated, and documenting information in the electronic medical record.       Thank you for allowing me to participate in the care of this patient.  I will continue to follow this patient    Please contact me with any questions.    BERTRAND Sher.   Cardiologist, [facility]  [phone]

## 2024-02-24 NOTE — PROGRESS NOTES
4 Eyes Skin Assessment Completed by BESSIE Peterson and BESSIE Rodriguez.    Head Scratch under chin  Ears WDL  Nose WDL  Mouth WDL  Neck WDL  Breast/Chest WDL  Shoulder Blades WDL  Spine Redness  (R) Arm/Elbow/Hand Bruising, Swelling, and Discoloration hematoma from sheath site  (L) Arm/Elbow/Hand WDL  Abdomen WDL  Groin Bruising and Incision R fem sheath site  Scrotum/Coccyx/Buttocks WDL  (R) Leg Scab and Bruising posterior thigh and knee  (L) Leg Scab posterior thigh  (R) Heel/Foot/Toe WDL  (L) Heel/Foot/Toe WDL          Devices In Places Tele Box, Blood Pressure Cuff, Pulse Ox, and Nasal Cannula      Interventions In Place Gray Ear Foams, Pillows, and Low Air Loss Mattress    Possible Skin Injury No    Pictures Uploaded Into Epic Yes  Wound Consult Placed N/A  RN Wound Prevention Protocol Ordered Yes

## 2024-02-24 NOTE — PROGRESS NOTES
[facility] Acceptance Note    Called by CARI Livingston for transfer to [facility] for unstable angina requiring NTG drip in the setting of multivessel CAD .  Will transfer to [facility] under the care of the hospitalist.      Jerrica Johnson MD  Pulmonary and Critical Care Medicine

## 2024-02-24 NOTE — PROGRESS NOTES
[facility] Medicine Daily Progress Note    Date of Service  2/24/2024    Chief Complaint  Carine Dyer Shelia Christopher is a 73 y.o. female admitted 2/21/2024 with chest pain.    Hospital Course  Ms. Christopher is a 73 y.o. female who presented 2/21/2024 with past history of coronary disease with 4 MIs in the last 4 years.  Patient has 3 cardiac stents last placed August 2023.  Patient has not been feeling well for the last 1 week.  She says that she has history of chronic bronchitis and she felt that her bronchitis was acting up and she felt bilateral chest pain and was coughing up yellowish sputum intermittently.  Patient was being followed by her primary care provider who provided the patient with albuterol inhaler and erythromycin antibiotic.  Chest pain has been going on on and off for the last 1 week however patient this morning with a sharp pain in the left chest with extreme pain and numbness started in her left arm.  Pain was very similar to her prior heart attack.  She came to the  emergency department at [facility] for further evaluation.  EKG  and initial troponin was negative .patient referred to me for the care and management.patient was having ongoing chest pain so cardiology was consulted. She underwent heart catheterization and was found to have severe multivessel disease including in-stent stenosis of the first diagonal 95% and LAD 99% stenosis. CABG was recommended. She is on an IV heparin drip as well as an IV nitroglycerin drip.      Interval Problem Update  2/22: Ms. Christopher was evaluated in the CU.  She is on IV heparin drip as well as IV nitroglycerin drip.  She has a significant headache and oxycodone has been ordered.  Cardiothoracic surgery will be seeing her today.  She states she has chest pain when getting up to go to the bathroom.  2/23: Ms. Christopher was seen in the CU. The nitroglycerin drip is off though she remains on IV heparin. Dr. Chicas cardiothoracic  surgery was consulted and recommends CABG on Feb 28th. The bruising of the right forearm is worse today. Ultrasound of the right wrist has been ordered. She is able to walk to the bathroom without chest pain.  2/24: Ms. Christopher was evaluated in the [facility]. She developed anginal symptoms last night and the nitroglycerin was restarted and titrated. She remains on an IV heparin drip. Sinus rhythm.    I have discussed this patient's plan of care and discharge plan at IDT rounds today with Case Management, Nursing, Nursing leadership, and other members of the IDT team.    Consultants/Specialty  Cardiology     Code Status  Full Code    Disposition  The patient is not medically cleared for discharge to home or a post-acute facility.      I have placed the appropriate orders for post-discharge needs.    Review of Systems  Review of Systems   Respiratory:  Negative for shortness of breath.    Cardiovascular:  Positive for chest pain.   Musculoskeletal:         Right forearm pain/swelling/bruising   Neurological:  Positive for headaches.   All other systems reviewed and are negative.       Physical Exam  Temp:  [36.4 °C (97.6 °F)-37.3 °C (99.2 °F)] 36.4 °C (97.6 °F)  Pulse:  [] 84  Resp:  [13-53] 18  BP: (101-136)/(57-87) 116/61  SpO2:  [92 %-99 %] 94 %    Physical Exam  Vitals and nursing note reviewed.   Cardiovascular:      Rate and Rhythm: Normal rate.   Pulmonary:      Effort: Pulmonary effort is normal.   Abdominal:      General: There is no distension.      Tenderness: There is no abdominal tenderness.   Musculoskeletal:      Right lower leg: No edema.      Left lower leg: No edema.      Comments: Right wrist bruise to the elbow without tenderness   Neurological:      General: No focal deficit present.      Mental Status: She is alert and oriented to person, place, and time.   Psychiatric:         Mood and Affect: Mood normal.         Behavior: Behavior normal.         Fluids    Intake/Output Summary (Last 24  hours) at 2/24/2024 0654  Last data filed at 2/24/2024 0600  Gross per 24 hour   Intake 585.88 ml   Output 650 ml   Net -64.12 ml       Laboratory        Recent Labs     02/22/24  0532   SODIUM 136   POTASSIUM 4.3   CHLORIDE 101   CO2 23   GLUCOSE 119*   BUN 23*   CREATININE 0.62   CALCIUM 8.9     Recent Labs     02/21/24  0942 02/21/24  1039 02/21/24  1600   APTT 29.8 >240.0* 35.0   INR 0.99 1.14*  --                Imaging  US-EXTREMITY ARTERY UPPER UNILAT RIGHT   Final Result      EC-ECHOCARDIOGRAM COMPLETE W/O CONT   Final Result      DX-CHEST-PORTABLE (1 VIEW)   Final Result      No acute process.      CL-LEFT HEART CATHETERIZATION WITH POSSIBLE INTERVENTION    (Results Pending)        Assessment/Plan  * Unstable angina (HCC)- (present on admission)  Assessment & Plan  Severe unstable angina with severe coronary disease by heart catheterization for which Dr. Chicas cardiothoracic surgery has been consulted for coronary artery bypass graft on 2/28.  She is on an IV heparin drip and the IV nitroglycerin drip was restarted due to recurrent angina  High intensity statin  Continuous telemetry monitoring to eval for arrhythmias.         Primary hypertension- (present on admission)  Assessment & Plan  Metoprolol 25 mg p.o. twice daily    CAD (coronary artery disease)- (present on admission)  Assessment & Plan  With history of previous stenting  Echocardiogram reveals a normal ejection fraction of 55%    Headache- (present on admission)  Assessment & Plan  Secondary to nitroglycerin    Giant cell arteritis (HCC)- (present on admission)  Assessment & Plan  History of 3 years ago she has been off steroids for 1 year    Dyslipidemia- (present on admission)  Assessment & Plan  Continue crestor         VTE prophylaxis:    therapeutic anticoagulation with weight-based heparin gtt, pharmacy to adjust PRN      I have performed a physical exam and reviewed and updated ROS and Plan today (2/24/2024). In review of yesterday's note  (2/23/2024), there are no changes except as documented above.

## 2024-02-25 LAB
UFH PPP CHRO-ACNC: 0.46 IU/ML
UFH PPP CHRO-ACNC: 0.52 IU/ML

## 2024-02-25 PROCEDURE — 85520 HEPARIN ASSAY: CPT

## 2024-02-25 PROCEDURE — 770000 HCHG ROOM/CARE - INTERMEDIATE ICU *

## 2024-02-25 PROCEDURE — 99233 SBSQ HOSP IP/OBS HIGH 50: CPT | Performed by: HOSPITALIST

## 2024-02-25 PROCEDURE — 99232 SBSQ HOSP IP/OBS MODERATE 35: CPT | Mod: FS | Performed by: INTERNAL MEDICINE

## 2024-02-25 PROCEDURE — A9270 NON-COVERED ITEM OR SERVICE: HCPCS | Performed by: HOSPITALIST

## 2024-02-25 PROCEDURE — 700111 HCHG RX REV CODE 636 W/ 250 OVERRIDE (IP): Mod: JZ,JG | Performed by: INTERNAL MEDICINE

## 2024-02-25 PROCEDURE — 700111 HCHG RX REV CODE 636 W/ 250 OVERRIDE (IP): Performed by: INTERNAL MEDICINE

## 2024-02-25 PROCEDURE — 700102 HCHG RX REV CODE 250 W/ 637 OVERRIDE(OP): Performed by: HOSPITALIST

## 2024-02-25 RX ORDER — AMOXICILLIN 250 MG
1 CAPSULE ORAL DAILY
Status: DISCONTINUED | OUTPATIENT
Start: 2024-02-26 | End: 2024-02-28

## 2024-02-25 RX ADMIN — ASPIRIN 81 MG: 81 TABLET, COATED ORAL at 17:03

## 2024-02-25 RX ADMIN — OXYCODONE 10 MG: 5 TABLET ORAL at 03:39

## 2024-02-25 RX ADMIN — ROSUVASTATIN 10 MG: 10 TABLET, FILM COATED ORAL at 17:03

## 2024-02-25 RX ADMIN — NITROGLYCERIN 25 MCG/MIN: 20 INJECTION INTRAVENOUS at 09:40

## 2024-02-25 RX ADMIN — HEPARIN SODIUM 14 UNITS/KG/HR: 5000 INJECTION, SOLUTION INTRAVENOUS at 16:09

## 2024-02-25 RX ADMIN — OXYCODONE 10 MG: 5 TABLET ORAL at 11:01

## 2024-02-25 RX ADMIN — OXYCODONE 10 MG: 5 TABLET ORAL at 19:50

## 2024-02-25 ASSESSMENT — ENCOUNTER SYMPTOMS
WHEEZING: 0
CHEST TIGHTNESS: 0
SHORTNESS OF BREATH: 0
CHOKING: 0
COUGH: 0
APNEA: 0
STRIDOR: 0
HEADACHES: 1

## 2024-02-25 ASSESSMENT — PAIN DESCRIPTION - PAIN TYPE
TYPE: ACUTE PAIN

## 2024-02-25 ASSESSMENT — FIBROSIS 4 INDEX: FIB4 SCORE: 1.937996363252019141

## 2024-02-25 NOTE — PROGRESS NOTES
Patient was just transferred to [facility] 605 for CICU 626 via ICU bed. IPASS report received from Julia MOON RN via telephone. No complaints of chest pain, nausea, or generalized pain at this time. VSS, patient arrived on Heparin gtt infusing at 14 units/kg/hr and Nitroglycerin gtt infusing at 25 mcg/min. Patient given CHG bath with new gown, with bed placed in lowest position, and call light in reach.

## 2024-02-25 NOTE — PROGRESS NOTES
4 Eyes Skin Assessment Completed by Mahendra Sánchez, BESSIE and Cat Saravia RN.    Head WDL  Ears WDL  Nose WDL  Mouth WDL  Neck WDL  Breast/Chest WDL  Shoulder Blades WDL  Spine WDL  (R) Arm/Elbow/Hand Bruising  (L) Arm/Elbow/Hand WDL  Abdomen WDL  Groin Bruising  Scrotum/Coccyx/Buttocks WDL  (R) Leg Bruising  (L) Leg Bruising  (R) Heel/Foot/Toe WDL  (L) Heel/Foot/Toe WDL          Devices In Places Tele Box, Blood Pressure Cuff, Pulse Ox, and Nasal Cannula      Interventions In Place Gray Ear Foams and Pillows    Possible Skin Injury Yes    Pictures Uploaded Into Epic Yes  Wound Consult Placed N/A  RN Wound Prevention Protocol Ordered Yes

## 2024-02-25 NOTE — CARE PLAN
The patient is Watcher - Medium risk of patient condition declining or worsening    Shift Goals  Clinical Goals: wean nitro and manage chest pain  Patient Goals: pain prevention  Family Goals: FELIX      Problem: Pain - Standard  Goal: Alleviation of pain or a reduction in pain to the patient’s comfort goal  Outcome: Progressing     Problem: Knowledge Deficit - Standard  Goal: Patient and family/care givers will demonstrate understanding of plan of care, disease process/condition, diagnostic tests and medications  Outcome: Progressing     Problem: Respiratory  Goal: Patient will achieve/maintain optimum respiratory ventilation and gas exchange  Outcome: Progressing

## 2024-02-25 NOTE — CARE PLAN
The patient is Watcher - Medium risk of patient condition declining or worsening    Shift Goals  Clinical Goals: VSS, manage pain, monitor hemodynamics, wean o2 & ambulate/bedbath as able  Patient Goals: manage pain, get out of bed, personal hygiene  Family Goals: ramos    Progress made toward(s) clinical / shift goals:    Problem: Pain - Standard  Goal: Alleviation of pain or a reduction in pain to the patient’s comfort goal  Outcome: Progressing  Note: CP effectively managed with nitro gtt... which ultimately caused headache.  Pt resistant to using call light to notify RN of increasing pain despite this RN educating pt at every interaction to please notify RN...     Problem: Respiratory  Goal: Patient will achieve/maintain optimum respiratory ventilation and gas exchange  Outcome: Progressing  Note: Pt educated on goal of weaning off supplemental o2 and relevance of IS.     Problem: Risk for Bleeding  Goal: Patient will not experience bleeding as evidenced by normal blood pressure, stable hematocrit and hemoglobin levels and desired ranges for coagulation profiles  Outcome: Progressing  Note: Interventions in place.  1. When laboratory values are abnormal, administer blood products as prescribed 2. For bleeding linked with excessive anticoagulant use, give appropriate antidotes as prescribed 3. Monitor patient’s vital signs, especially BP and HR. Look for signs of orthostatic hypotension 4. Evaluate the patient’s use of any medications that can affect hemostasis (e.g, anticoagulants, salicylates, NSAIDs, or cancer chemotherapy) 5. Review laboratory results for coagulation status as appropriate: platelet count, prothrombin time/international normalized ratio (PT/INR), activated partial thromboplastin time (aPTT), fibrinogen, bleeding time, fibrin degradation products, vitamin K, activated coagulation time (ACT) 6. Check stool (guaiac) and urine (Hemastix) for occult blood 7. Assess skin and mucous membranes for signs  of petechiae, bruising, hematoma formation, or oozing of blood 8. Monitor hematocrit (Hct) and hemoglobin (Hgb)

## 2024-02-25 NOTE — PROGRESS NOTES
4 Eyes Skin Assessment Completed by BESSIE Govea and BESSIE Horton.    Head WDL  Ears WDL  Nose WDL  Mouth WDL  Neck WDL  Breast/Chest WDL  Shoulder Blades WDL  Spine WDL  (R) Arm/Elbow/Hand Bruising  (L) Arm/Elbow/Hand WDL  Abdomen WDL  Groin Bruising  Scrotum/Coccyx/Buttocks WDL  (R) Leg Bruising  (L) Leg Bruising  (R) Heel/Foot/Toe WDL  (L) Heel/Foot/Toe WDL                                Devices In Places ECG, Tele Box, Blood Pressure Cuff, Pulse Ox, and Nasal Cannula      Interventions In Place Gray Ear Foams, Pillows, and Low Air Loss Mattress    Possible Skin Injury Yes    Pictures Uploaded Into Epic Yes  Wound Consult Placed N/A  RN Wound Prevention Protocol Ordered Yes

## 2024-02-25 NOTE — PROGRESS NOTES
[facility] Medicine Daily Progress Note    Date of Service  2/25/2024    Chief Complaint  Carine Dyer Shelia Christopher is a 73 y.o. female admitted 2/21/2024 with chest pain.    Hospital Course  Ms. Christopher is a 73 y.o. female who presented 2/21/2024 with past history of coronary disease with 4 MIs in the last 4 years.  Patient has 3 cardiac stents last placed August 2023.  Patient has not been feeling well for the last 1 week.  She says that she has history of chronic bronchitis and she felt that her bronchitis was acting up and she felt bilateral chest pain and was coughing up yellowish sputum intermittently.  Patient was being followed by her primary care provider who provided the patient with albuterol inhaler and erythromycin antibiotic.  Chest pain has been going on on and off for the last 1 week however patient this morning with a sharp pain in the left chest with extreme pain and numbness started in her left arm.  Pain was very similar to her prior heart attack.  She came to the  emergency department at [facility] for further evaluation.  EKG  and initial troponin was negative .patient referred to me for the care and management.patient was having ongoing chest pain so cardiology was consulted. She underwent heart catheterization and was found to have severe multivessel disease including in-stent stenosis of the first diagonal 95% and LAD 99% stenosis. CABG was recommended. She is on an IV heparin drip as well as an IV nitroglycerin drip.      Interval Problem Update  2/22: Ms. Christopher was evaluated in the CU.  She is on IV heparin drip as well as IV nitroglycerin drip.  She has a significant headache and oxycodone has been ordered.  Cardiothoracic surgery will be seeing her today.  She states she has chest pain when getting up to go to the bathroom.  2/23: Ms. Christopher was seen in the CU. The nitroglycerin drip is off though she remains on IV heparin. Dr. Chicas cardiothoracic  surgery was consulted and recommends CABG on Feb 28th. The bruising of the right forearm is worse today. Ultrasound of the right wrist has been ordered. She is able to walk to the bathroom without chest pain.  2/24: Ms. Christopher was evaluated in the IMCU. She developed anginal symptoms last night and the nitroglycerin was restarted and titrated. She remains on an IV heparin drip. Sinus rhythm.  2/25: Ms. Christopher was seen in the IMCU. She is on an IV heparin drip as well as an IV nitroglycerin drip due to unstable angina. The bruising on the right arm is unchanged today. Her headache waxes and wanes. She is knitting in order to pass the day.    I have discussed this patient's plan of care and discharge plan at IDT rounds today with Case Management, Nursing, Nursing leadership, and other members of the IDT team.    Consultants/Specialty  Cardiology   CT surgery  Code Status  Full Code    Disposition  The patient is not medically cleared for discharge to home or a post-acute facility.      I have placed the appropriate orders for post-discharge needs.    Review of Systems  Review of Systems   Respiratory:  Negative for shortness of breath.    Cardiovascular:  Negative for chest pain.   Musculoskeletal:         Right forearm pain/swelling/bruising   Neurological:  Positive for headaches.   All other systems reviewed and are negative.       Physical Exam  Temp:  [36.2 °C (97.2 °F)-37.1 °C (98.8 °F)] 36.2 °C (97.2 °F)  Pulse:  [] 104  Resp:  [13-72] 15  BP: ()/(51-73) 116/63  SpO2:  [90 %-99 %] 96 %    Physical Exam  Vitals and nursing note reviewed.   Constitutional:       General: She is not in acute distress.     Appearance: She is not ill-appearing or toxic-appearing.   Cardiovascular:      Rate and Rhythm: Normal rate and regular rhythm.      Heart sounds: No murmur heard.  Pulmonary:      Effort: Pulmonary effort is normal.      Comments: 0.5 liters oxygen  Abdominal:      General: There is no  distension.      Palpations: Abdomen is soft.      Tenderness: There is no abdominal tenderness.   Musculoskeletal:      Right lower leg: No edema.      Left lower leg: No edema.      Comments: Right wrist bruise to the elbow without tenderness  Mild right groin bruising   Neurological:      General: No focal deficit present.      Mental Status: She is alert and oriented to person, place, and time.   Psychiatric:         Mood and Affect: Mood normal.         Behavior: Behavior normal.         Thought Content: Thought content normal.         Judgment: Judgment normal.      Comments: She is in good spirits         Fluids    Intake/Output Summary (Last 24 hours) at 2/25/2024 0704  Last data filed at 2/25/2024 0700  Gross per 24 hour   Intake 976.31 ml   Output 1100 ml   Net -123.69 ml       Laboratory                              Imaging  US-EXTREMITY ARTERY UPPER UNILAT RIGHT   Final Result      EC-ECHOCARDIOGRAM COMPLETE W/O CONT   Final Result      DX-CHEST-PORTABLE (1 VIEW)   Final Result      No acute process.      CL-LEFT HEART CATHETERIZATION WITH POSSIBLE INTERVENTION    (Results Pending)        Assessment/Plan  * Unstable angina (HCC)- (present on admission)  Assessment & Plan  Severe unstable angina with severe coronary disease by heart catheterization for which Dr. Chicas cardiothoracic surgery has been consulted for coronary artery bypass graft on 2/28.  She is on an IV heparin drip. Monitor for bleeding.  IV nitroglycerin drip was restarted due to recurrent angina only responsive to IV nitro. Monitor blood pressure while on the drip due to risk of hypotension.  High intensity statin  Continuous telemetry monitoring to eval for arrhythmias.   Supplemental oxygen at 0.5 liters. Continuous pulse oxymetry.        Primary hypertension- (present on admission)  Assessment & Plan  Metoprolol 25 mg p.o. twice daily as tolerated    CAD (coronary artery disease)- (present on admission)  Assessment & Plan  With history  of previous stenting  Echocardiogram reveals a normal ejection fraction of 55%    Headache- (present on admission)  Assessment & Plan  Secondary to nitroglycerin    Giant cell arteritis (HCC)- (present on admission)  Assessment & Plan  History of 3 years ago she has been off steroids for 1 year    Dyslipidemia- (present on admission)  Assessment & Plan  She is on Crestor 10 mg daily outpatient  Lipid panel ordered for the morning as there is not an LDL/HDL in our system         VTE prophylaxis:    therapeutic anticoagulation with weight-based heparin gtt, pharmacy to adjust PRN      I have performed a physical exam and reviewed and updated ROS and Plan today (2/25/2024). In review of yesterday's note (2/24/2024), there are no changes except as documented above.

## 2024-02-25 NOTE — CARE PLAN
The patient is Stable - Low risk of patient condition declining or worsening    Shift Goals  Clinical Goals: wean nitro and control chest pain  Patient Goals: pain prevention/ sleep  Family Goals: ramos    Progress made toward(s) clinical / shift goals:    Problem: Pain - Standard  Goal: Alleviation of pain or a reduction in pain to the patient’s comfort goal  Outcome: Progressing     Problem: Knowledge Deficit - Standard  Goal: Patient and family/care givers will demonstrate understanding of plan of care, disease process/condition, diagnostic tests and medications  Outcome: Progressing     Problem: Hemodynamics  Goal: Patient's hemodynamics, fluid balance and neurologic status will be stable or improve  Outcome: Progressing     Problem: Risk for Bleeding  Goal: Patient will take measures to prevent bleeding and recognizes signs of bleeding that need to be reported immediately to a health care professional  Outcome: Progressing  Goal: Patient will not experience bleeding as evidenced by normal blood pressure, stable hematocrit and hemoglobin levels and desired ranges for coagulation profiles  Outcome: Progressing       Patient is not progressing towards the following goals:

## 2024-02-25 NOTE — PROGRESS NOTES
4 Eyes Skin Assessment Completed by BESSIE Hernandez and BESSIE Dumont.    Head WDL, scratch under chin  Ears WDL  Nose WDL  Mouth WDL  Neck WDL  Breast/Chest WDL  Shoulder Blades WDL  Spine WDL  (R) Arm/Elbow/Hand bruising and old hematoma from R radial sheath   (L) Arm/Elbow/Hand WDL  Abdomen WDL  Groin Bruising from R fem sheath site  Scrotum/Coccyx/Buttocks WDL  (R) Leg Scab  (L) Leg Scab  (R) Heel/Foot/Toe WDL  (L) Heel/Foot/Toe WDL          Devices In Places ECG, Tele Box, Blood Pressure Cuff, Pulse Ox, and Nasal Cannula      Interventions In Place Gray Ear Foams, Pillows, and Low Air Loss Mattress    Possible Skin Injury No    Pictures Uploaded Into Epic N/A  Wound Consult Placed N/A  RN Wound Prevention Protocol Ordered No

## 2024-02-25 NOTE — PROGRESS NOTES
Cardiology Follow Up Progress Note    Date of Service  2/25/2024    Attending Physician  Yaniv Lopez M.D.    Chief Complaint   Unstable angina    HPI  Carine Christopher is a 73 y.o. female admitted 2/21/2024 with chest pain found unstable angina.    PMH: CAD with multiple prior PCI's, chronic bronchitis.  Giant cell arthritis now in remission, hypertension, dyslipidemia    Interim Events  No overnight cardiac events  Telemetry-SR  SBP ~113  Denies having active chest pain on nitro drip and IV heparin drip  Plan for CABG on Wednesday 2/28  Will continue to monitor.    Review of Systems  Review of Systems   HENT:  Negative for hearing loss.    Respiratory:  Negative for apnea, cough, choking, chest tightness, shortness of breath, wheezing and stridor.    Cardiovascular:  Negative for chest pain.       Vital signs in last 24 hours  Temp:  [36.2 °C (97.2 °F)-37.1 °C (98.8 °F)] 36.4 °C (97.6 °F)  Pulse:  [] 95  Resp:  [15] 15  BP: ()/(55-72) 106/59  SpO2:  [91 %-98 %] 95 %    Physical Exam  Physical Exam  Cardiovascular:      Rate and Rhythm: Normal rate and regular rhythm.      Pulses: Normal pulses.   Pulmonary:      Effort: Pulmonary effort is normal.      Comments: Chronic bronchitis, cough  Skin:     General: Skin is warm.   Neurological:      Mental Status: Mental status is at baseline.   Psychiatric:         Mood and Affect: Mood normal.         Lab Review  Lab Results   Component Value Date/Time    WBC 10.7 02/21/2024 06:30 AM    RBC 4.59 02/21/2024 06:30 AM    HEMOGLOBIN 13.0 02/21/2024 06:30 AM    HEMATOCRIT 39.6 02/21/2024 06:30 AM    MCV 86.3 02/21/2024 06:30 AM    MCH 28.3 02/21/2024 06:30 AM    MCHC 32.8 02/21/2024 06:30 AM    MPV 10.3 02/21/2024 06:30 AM      Lab Results   Component Value Date/Time    SODIUM 136 02/22/2024 05:32 AM    POTASSIUM 4.3 02/22/2024 05:32 AM    CHLORIDE 101 02/22/2024 05:32 AM    CO2 23 02/22/2024 05:32 AM    GLUCOSE 119 (H) 02/22/2024 05:32 AM     "BUN 23 (H) 02/22/2024 05:32 AM    CREATININE 0.62 02/22/2024 05:32 AM      Lab Results   Component Value Date/Time    ASTSGOT 23 02/21/2024 06:30 AM    ALTSGPT 18 02/21/2024 06:30 AM     Lab Results   Component Value Date/Time    TROPONINT 25 (H) 02/21/2024 08:45 AM       No results for input(s): \"NTPROBNP\" in the last 72 hours.    Cardiac Imaging and Procedures Review  EKG: SR, abnormal R wave progression    Echocardiogram:    2/21/2024  Normal left ventricular systolic function. The left ventricular   ejection fraction is visually estimated to be 55%.   Normal right ventricular size and systolic function.  Mild aortic insufficiency.   Right ventricular systolic pressure is estimated to be  23 mmHg   (normal).       Cardiac Catheterization:    2/21/2024  Severe multivessel CAD recurrent in-stent restenosis  Recommend artery bypass graft surgery    1.  Left main coronary artery:  Normal.  2.  Left anterior descending artery: Recently placed stent in large first diagonal branch has 95% stenosis.  After diagonal takeoff left anterior descending artery has 99% stenosis.  Distal vessel, apical LAD free of significant disease.    3.  Left circumflex coronary artery: Gives 1 large marginal branch, which has 2 layers of stents, chronically occluded, fills through left to left collaterals  4.  Right coronary artery: Diffuse 60% stenosis in midportion, posterior descending artery, posterolateral branch is free of significant disease.  This is a right dominant system.  5.  Left ventricular end diastolic pressure:  13 mmHg.  No signficant gradient across the aortic valve.  6.  Left ventriculogram:  Ejection fraction of 60%.        Imaging  Chest X-Ray:     No acute process       Assessment/Plan    # Unstable angina.  # LHC 2/21/2024 revealed severe MVCAD & recurrent in-stent restenosis.  # Plan for CABG on Wednesday secondary to Effient last dose 2/20/24.  # Hypertension.  # Giant cell arthritis, in remission has been off of " steroids for over a year.  # Dyslipidemia.    -Reports no overnight chest discomfort currently on nitro drip and IV heparin.  Hemodynamically stable.  -Will continue to monitor at this time.  -Continue IV nitro drip.  -Continue IV heparin drip.  -Continue aspirin 81 mg & Crestor 10 mg.  -Plan for CABG Wednesday, 2/28/2024.    Cardiology will follow along    I personally spent a total of 15  minutes which includes face-to-face time and non-face-to-face time spent on preparing to see the patient, reviewing hospital notes and tests, obtaining history from the patient, performing a medically appropriate exam, counseling and educating the patient, ordering medications/tests/procedures/referrals as clinically indicated, and documenting information in the electronic medical record.       Thank you for allowing me to participate in the care of this patient.  I will continue to follow this patient    Please contact me with any questions.    BERTRAND Sher.   Cardiologist, [facility]  [phone]

## 2024-02-26 LAB
CHOLEST SERPL-MCNC: 134 MG/DL (ref 100–199)
HDLC SERPL-MCNC: 72 MG/DL
LDLC SERPL CALC-MCNC: 50 MG/DL
TRIGL SERPL-MCNC: 58 MG/DL (ref 0–149)
UFH PPP CHRO-ACNC: 0.57 IU/ML

## 2024-02-26 PROCEDURE — 700102 HCHG RX REV CODE 250 W/ 637 OVERRIDE(OP): Performed by: HOSPITALIST

## 2024-02-26 PROCEDURE — 770000 HCHG ROOM/CARE - INTERMEDIATE ICU *

## 2024-02-26 PROCEDURE — 99233 SBSQ HOSP IP/OBS HIGH 50: CPT | Performed by: HOSPITALIST

## 2024-02-26 PROCEDURE — 700102 HCHG RX REV CODE 250 W/ 637 OVERRIDE(OP): Performed by: NURSE PRACTITIONER

## 2024-02-26 PROCEDURE — A9270 NON-COVERED ITEM OR SERVICE: HCPCS | Performed by: HOSPITALIST

## 2024-02-26 PROCEDURE — 80061 LIPID PANEL: CPT

## 2024-02-26 PROCEDURE — A9270 NON-COVERED ITEM OR SERVICE: HCPCS | Performed by: NURSE PRACTITIONER

## 2024-02-26 PROCEDURE — 85520 HEPARIN ASSAY: CPT

## 2024-02-26 PROCEDURE — 99232 SBSQ HOSP IP/OBS MODERATE 35: CPT | Performed by: INTERNAL MEDICINE

## 2024-02-26 PROCEDURE — 700111 HCHG RX REV CODE 636 W/ 250 OVERRIDE (IP): Mod: JZ,JG | Performed by: HOSPITALIST

## 2024-02-26 PROCEDURE — A9270 NON-COVERED ITEM OR SERVICE: HCPCS | Performed by: STUDENT IN AN ORGANIZED HEALTH CARE EDUCATION/TRAINING PROGRAM

## 2024-02-26 PROCEDURE — 700111 HCHG RX REV CODE 636 W/ 250 OVERRIDE (IP): Performed by: INTERNAL MEDICINE

## 2024-02-26 PROCEDURE — 700102 HCHG RX REV CODE 250 W/ 637 OVERRIDE(OP): Performed by: STUDENT IN AN ORGANIZED HEALTH CARE EDUCATION/TRAINING PROGRAM

## 2024-02-26 RX ORDER — NITROGLYCERIN 0.4 MG/1
0.4 TABLET SUBLINGUAL
Status: DISCONTINUED | OUTPATIENT
Start: 2024-02-26 | End: 2024-02-28

## 2024-02-26 RX ADMIN — DOCUSATE SODIUM 50 MG AND SENNOSIDES 8.6 MG 1 TABLET: 8.6; 5 TABLET, FILM COATED ORAL at 05:24

## 2024-02-26 RX ADMIN — NITROGLYCERIN 0.4 MG: 0.4 TABLET, ORALLY DISINTEGRATING SUBLINGUAL at 13:39

## 2024-02-26 RX ADMIN — NITROGLYCERIN 0.5 INCH: 20 OINTMENT TOPICAL at 11:35

## 2024-02-26 RX ADMIN — NITROGLYCERIN 0.4 MG: 0.4 TABLET, ORALLY DISINTEGRATING SUBLINGUAL at 16:04

## 2024-02-26 RX ADMIN — ASPIRIN 81 MG: 81 TABLET, COATED ORAL at 17:54

## 2024-02-26 RX ADMIN — OXYCODONE 5 MG: 5 TABLET ORAL at 16:12

## 2024-02-26 RX ADMIN — NITROGLYCERIN 0.5 INCH: 20 OINTMENT TOPICAL at 17:55

## 2024-02-26 RX ADMIN — ROSUVASTATIN 10 MG: 10 TABLET, FILM COATED ORAL at 17:54

## 2024-02-26 RX ADMIN — HEPARIN SODIUM 14 UNITS/KG/HR: 5000 INJECTION, SOLUTION INTRAVENOUS at 13:25

## 2024-02-26 ASSESSMENT — COGNITIVE AND FUNCTIONAL STATUS - GENERAL
SUGGESTED CMS G CODE MODIFIER DAILY ACTIVITY: CH
DAILY ACTIVITIY SCORE: 24
SUGGESTED CMS G CODE MODIFIER MOBILITY: CH
MOBILITY SCORE: 24

## 2024-02-26 ASSESSMENT — ENCOUNTER SYMPTOMS
HEADACHES: 1
SHORTNESS OF BREATH: 0
CONSTIPATION: 1

## 2024-02-26 ASSESSMENT — PAIN DESCRIPTION - PAIN TYPE
TYPE: ACUTE PAIN

## 2024-02-26 ASSESSMENT — FIBROSIS 4 INDEX: FIB4 SCORE: 1.937996363252019141

## 2024-02-26 NOTE — PROGRESS NOTES
Assumed care of patient from NOC RN. VSS. All drips verified. Call light in reach, bed in lowest position

## 2024-02-26 NOTE — PROGRESS NOTES
4 Eyes Skin Assessment Completed by BESSIE Sahu and BESSIE Sotomayor.    Head WDL  Ears WDL  Nose WDL  Mouth WDL  Neck WDL  Breast/Chest WDL  Shoulder Blades WDL  Spine WDL  (R) Arm/Elbow/Hand Bruising  (L) Arm/Elbow/Hand Bruising  Abdomen WDL  Groin Bruising  Scrotum/Coccyx/Buttocks WDL  (R) Leg Bruising  (L) Leg Bruising  (R) Heel/Foot/Toe WDL  (L) Heel/Foot/Toe WDL          Devices In Places Tele Box, Blood Pressure Cuff, and Pulse Ox      Interventions In Place Gray Ear Foams, Pillows, Low Air Loss Mattress, and Pressure Redistribution Mattress    Possible Skin Injury No    Pictures Uploaded Into Epic Yes  Wound Consult Placed N/A  RN Wound Prevention Protocol Ordered Yes

## 2024-02-26 NOTE — PROGRESS NOTES
[facility] Medicine Daily Progress Note    Date of Service  2/26/2024    Chief Complaint  Carine Dyer Shelia Christopher is a 73 y.o. female admitted 2/21/2024 with chest pain.    Hospital Course  Ms. Christopher is a 73 y.o. female who presented 2/21/2024 with past history of coronary disease with 4 MIs in the last 4 years.  Patient has 3 cardiac stents last placed August 2023.  Patient has not been feeling well for the last 1 week.  She says that she has history of chronic bronchitis and she felt that her bronchitis was acting up and she felt bilateral chest pain and was coughing up yellowish sputum intermittently.  Patient was being followed by her primary care provider who provided the patient with albuterol inhaler and erythromycin antibiotic.  Chest pain has been going on on and off for the last 1 week however patient this morning with a sharp pain in the left chest with extreme pain and numbness started in her left arm.  Pain was very similar to her prior heart attack.  She came to the  emergency department at [facility] for further evaluation.  EKG  and initial troponin was negative .patient referred to me for the care and management.patient was having ongoing chest pain so cardiology was consulted. She underwent heart catheterization and was found to have severe multivessel disease including in-stent stenosis of the first diagonal 95% and LAD 99% stenosis. CABG was recommended. She is on an IV heparin drip as well as an IV nitroglycerin drip.      Interval Problem Update  2/22: Ms. Christopher was evaluated in the [facility].  She is on IV heparin drip as well as IV nitroglycerin drip.  She has a significant headache and oxycodone has been ordered.  Cardiothoracic surgery will be seeing her today.  She states she has chest pain when getting up to go to the bathroom.  2/23: Ms. Christopher was seen in the [facility]. The nitroglycerin drip is off though she remains on IV heparin. Dr. Chicas cardiothoracic  "surgery was consulted and recommends CABG on Feb 28th. The bruising of the right forearm is worse today. Ultrasound of the right wrist has been ordered. She is able to walk to the bathroom without chest pain.  2/24: Ms. Christopher was evaluated in the [facility]. She developed anginal symptoms last night and the nitroglycerin was restarted and titrated. She remains on an IV heparin drip. Sinus rhythm.  2/25: Ms. Christopher was seen in the [facility]. She is on an IV heparin drip as well as an IV nitroglycerin drip due to unstable angina. The bruising on the right arm is unchanged today. Her headache waxes and wanes. She is knitting in order to pass the day.  2/26: Ms. Christopher was evaluated in the [facility]. She is on an IV heparin drip with serial anti-Xa levels. She continues to require the nitroglycerin drip due to intractable anginal symptoms and had to have the rate turned up. Lipid panel this morning was done and her LDL is impressive at only 50 on Crestor. Her blood pressure is low at 94/52 this morning. She describes a \"headache fog\".      I have discussed this patient's plan of care and discharge plan at IDT rounds today with Case Management, Nursing, Nursing leadership, and other members of the IDT team.    Consultants/Specialty  Cardiology   CT surgery  Code Status  Full Code    Disposition  The patient is not medically cleared for discharge to home or a post-acute facility.      I have placed the appropriate orders for post-discharge needs.    Review of Systems  Review of Systems   Respiratory:  Negative for shortness of breath.    Cardiovascular:  Positive for chest pain.   Gastrointestinal:  Positive for constipation.   Musculoskeletal:         Right forearm pain/swelling/bruising   Neurological:  Positive for headaches.   All other systems reviewed and are negative.       Physical Exam  Temp:  [36.4 °C (97.6 °F)-36.8 °C (98.2 °F)] 36.7 °C (98 °F)  Pulse:  [] 105  Resp:  [16-20] 18  BP: (100-125)/(56-86) " 125/62  SpO2:  [91 %-98 %] 92 %    Physical Exam  Vitals and nursing note reviewed.   Constitutional:       General: She is not in acute distress.     Appearance: She is not ill-appearing or toxic-appearing.   Cardiovascular:      Rate and Rhythm: Normal rate and regular rhythm.      Heart sounds: No murmur heard.  Pulmonary:      Effort: Pulmonary effort is normal.      Comments: 0.5 liters oxygen  Abdominal:      General: There is distension.      Palpations: Abdomen is soft.      Tenderness: There is no abdominal tenderness.   Musculoskeletal:      Cervical back: Neck supple.      Right lower leg: No edema.      Left lower leg: No edema.      Comments: Right wrist bruise to the elbow without tenderness including the hand  Mild right groin bruising   Neurological:      General: No focal deficit present.      Mental Status: She is alert and oriented to person, place, and time.   Psychiatric:         Mood and Affect: Mood normal.         Behavior: Behavior normal.         Thought Content: Thought content normal.         Judgment: Judgment normal.      Comments: She is in good spirits         Fluids    Intake/Output Summary (Last 24 hours) at 2/26/2024 0649  Last data filed at 2/25/2024 1000  Gross per 24 hour   Intake 173.21 ml   Output --   Net 173.21 ml       Laboratory                        Recent Labs     02/26/24  0335   TRIGLYCERIDE 58   HDL 72   LDL 50       Imaging  US-EXTREMITY ARTERY UPPER UNILAT RIGHT   Final Result      EC-ECHOCARDIOGRAM COMPLETE W/O CONT   Final Result      DX-CHEST-PORTABLE (1 VIEW)   Final Result      No acute process.      CL-LEFT HEART CATHETERIZATION WITH POSSIBLE INTERVENTION    (Results Pending)        Assessment/Plan  * Unstable angina (HCC)- (present on admission)  Assessment & Plan  Severe unstable angina with severe coronary disease by heart catheterization for which Dr. Chicas cardiothoracic surgery has been consulted for coronary artery bypass graft on 2/28.  She is on an  IV heparin drip. Monitor for bleeding.  IV nitroglycerin drip was restarted due to recurrent angina only responsive to IV nitro and the dose had to be increased due to chest pain. Monitor blood pressure while on the drip due to risk of hypotension.  High intensity statin  Continuous telemetry monitoring to eval for arrhythmias.   Supplemental oxygen at 0.5 liters. Continuous pulse oxymetry.        Primary hypertension- (present on admission)  Assessment & Plan  Metoprolol 25 mg p.o. twice daily as tolerated    CAD (coronary artery disease)- (present on admission)  Assessment & Plan  With history of previous stenting  Echocardiogram reveals a normal ejection fraction of 55%    Headache- (present on admission)  Assessment & Plan  Secondary to nitroglycerin    Giant cell arteritis (HCC)- (present on admission)  Assessment & Plan  History of 3 years ago she has been off steroids for 1 year    Dyslipidemia- (present on admission)  Assessment & Plan  She is on Crestor 10 mg daily outpatient  Lipid panel reveals LDL 50         VTE prophylaxis:    therapeutic anticoagulation with weight-based heparin gtt, pharmacy to adjust PRN      I have performed a physical exam and reviewed and updated ROS and Plan today (2/26/2024). In review of yesterday's note (2/25/2024), there are no changes except as documented above.

## 2024-02-26 NOTE — PROGRESS NOTES
Cardiology Follow-up Consult Note    Date of Service:    2024      Consulting Physician: Yaniv Lopez M.D.    Name:   Carine Christopher   YOB: 1950  Age:   73 y.o.  female   MRN:   [id number]      Subjective:  Doing okay, she had episode of chest pain going to the bathroom.    She is on heparin gtt and nitro gtt    All other review of systems reviewed and negative.    Past medical, surgical, social, and family history reviewed and unchanged from admission except as noted in assessment and plan.    Medications Prior to Admission   Medication Sig Dispense Refill Last Dose    aspirin (ASPIRIN EC LOW STRENGTH) 81 MG EC tablet Take 81 mg by mouth every evening.   2024 at pm    azelastine (ASTELIN) 137 MCG/SPRAY nasal spray Administer 2 Sprays into affected nostril(S) 1 time a day as needed for Rhinitis.   <month at unk    ibuprofen (IBU) 800 MG Tab Take 800 mg by mouth 2 times a day as needed for Mild Pain.   2024 at 0330    metoprolol tartrate (LOPRESSOR) 25 MG Tab Take 25 mg by mouth 2 times a day.   2024 at pm    rosuvastatin (CRESTOR) 10 MG Tab Take 10 mg by mouth every evening.   2024 at pm    prasugrel (EFFIENT) 10 MG Tab Take 10 mg by mouth every evening.   2024 at pm    Kbfwaz-RxNnm-JcFtu-FA-CA-Omega (COMPLETE [place] DHA) 29-1-200 & 200 MG Misc Take 2 Tablets by mouth every evening.   2024 at pm    Multiple Vitamins-Minerals (PRESERVISION AREDS) Tab Take 1 Tablet by mouth every evening.   2024 at pm    erythromycin base 250 MG Cap DR Particles Take 750 mg by mouth every day. X 7 days   2024 at am    Dextromethorphan HBr (VICKS DAYQUIL COUGH) 15 MG/15ML Liquid Take  by mouth 1 time a day as needed. 1 each  Indications: Cough   2024 at unk    Phenylephrine-Doxylamine-DM (NYQUIL COUGH DM + CONGESTION) 5-6.25-10 MG/15ML Liquid Take 1 Each by mouth every evening as needed (chronic bronchitis).   2024 at 0330    albuterol 108 (90  Base) MCG/ACT Aero Soln inhalation aerosol Inhale 2 Puffs 3 times a day as needed for Shortness of Breath (chronic bronchitis).   2/21/2024 at 0330     Current Facility-Administered Medications   Medication Dose Frequency Provider Last Rate Last Admin    senna-docusate (Pericolace Or Senokot S) 8.6-50 MG per tablet 1 Tablet  1 Tablet DAILY Irvin Kapadia M.D.   1 Tablet at 02/26/24 0524    albuterol inhaler 2 Puff  2 Puff Q4HRS PRN Yaniv Lopez M.D.        ondansetron (Zofran) syringe/vial injection 4 mg  4 mg Q4HRS PRN Ashley Hart, A.P.R.N.   4 mg at 02/23/24 2245    nitroglycerin 50 mg in D5W 250 ml infusion  0-200 mcg/min Continuous Jerrica Johnson M.D. 7.5 mL/hr at 02/26/24 0045 25 mcg/min at 02/26/24 0045    acetaminophen (Tylenol) tablet 650 mg  650 mg Q6HRS Ashley Hart, A.P.R.N.        oxyCODONE immediate-release (Roxicodone) tablet 5-10 mg  5-10 mg Q3HRS PRN Yaniv Lopez M.D.   10 mg at 02/25/24 1950    morphine 4 MG/ML injection 2 mg  2 mg Q3HRS PRN Yaniv Lopez M.D.   2 mg at 02/23/24 2333    heparin infusion 25,000 units in 500 mL 0.45% NACL  0-30 Units/kg/hr Continuous Abhi Gann M.D. 17.4 mL/hr at 02/25/24 1914 14 Units/kg/hr at 02/25/24 1914    heparin injection 1,900 Units  30 Units/kg PRN Abhi Gann M.D.   1,900 Units at 02/24/24 2313    guaiFENesin dextromethorphan (Robitussin DM) 100-10 MG/5ML syrup 5 mL  5 mL Q6HRS PRN Dylan Zhu M.D.        aspirin EC tablet 81 mg  81 mg Q EVENING Dylan Zhu M.D.   81 mg at 02/25/24 1703    [MAR Hold] metoprolol tartrate (Lopressor) tablet 25 mg  25 mg BID Dylan Zhu M.D.        rosuvastatin (Crestor) tablet 10 mg  10 mg Q EVENING Dylan Zhu M.D.   10 mg at 02/25/24 1703    acetaminophen (Tylenol) tablet 650 mg  650 mg Q4HRS PRN Dylan Zhu M.D.       Last reviewed on 2/21/2024  8:15 AM by Lilibeth Calero     Allergies   Allergen Reactions    Atorvastatin Unspecified     .     "Chloramphenicol Unspecified     .    Codeine Unspecified     .    Other Drug Diarrhea     Most antibiotics turn stool yellow    Sulfa Drugs Diarrhea     .         Intake/Output Summary (Last 24 hours) at 2/26/2024 0840  Last data filed at 2/25/2024 1000  Gross per 24 hour   Intake 100 ml   Output --   Net 100 ml        Physical Exam  Body mass index is 21.28 kg/m².  /58   Pulse 89   Temp 36.7 °C (98 °F) (Temporal)   Resp 18   Ht 1.651 m (5' 5\")   Wt 58 kg (127 lb 13.9 oz)   SpO2 92%   Vitals:    02/26/24 0000 02/26/24 0300 02/26/24 0400 02/26/24 0600   BP: 121/84 117/86 125/62 104/58   Pulse: (!) 109 93 (!) 105 89   Resp:       Temp:       TempSrc:       SpO2: 91% 91% 92% 92%   Weight:       Height:         Oxygen Therapy:  Pulse Oximetry: 92 %, O2 (LPM): 2, O2 Delivery Device: Silicone Nasal Cannula    Physical Exam  Constitutional:       Appearance: Normal appearance.   Neck:      Vascular: No JVD.   Cardiovascular:      Rate and Rhythm: Normal rate and regular rhythm.      Pulses: Normal pulses and intact distal pulses.      Heart sounds: S1 normal and S2 normal. No murmur heard.     No friction rub. No gallop.   Pulmonary:      Effort: Pulmonary effort is normal. No respiratory distress.      Breath sounds: Normal breath sounds. No wheezing or rales.   Musculoskeletal:      Cervical back: Neck supple.   Neurological:      Mental Status: She is alert and oriented to person, place, and time.         Labs (personally reviewed and notable for):   Lab Results   Component Value Date/Time    SODIUM 136 02/22/2024 05:32 AM    POTASSIUM 4.3 02/22/2024 05:32 AM    CHLORIDE 101 02/22/2024 05:32 AM    CO2 23 02/22/2024 05:32 AM    GLUCOSE 119 (H) 02/22/2024 05:32 AM    BUN 23 (H) 02/22/2024 05:32 AM    CREATININE 0.62 02/22/2024 05:32 AM      Lab Results   Component Value Date/Time    WBC 10.7 02/21/2024 06:30 AM    RBC 4.59 02/21/2024 06:30 AM    HEMOGLOBIN 13.0 02/21/2024 06:30 AM    HEMATOCRIT 39.6 02/21/2024 " "06:30 AM    MCV 86.3 02/21/2024 06:30 AM    MCH 28.3 02/21/2024 06:30 AM    MCHC 32.8 02/21/2024 06:30 AM    MPV 10.3 02/21/2024 06:30 AM    NEUTSPOLYS 64.20 02/21/2024 06:30 AM    LYMPHOCYTES 24.80 02/21/2024 06:30 AM    MONOCYTES 8.00 02/21/2024 06:30 AM    EOSINOPHILS 2.10 02/21/2024 06:30 AM    BASOPHILS 0.60 02/21/2024 06:30 AM      Lab Results   Component Value Date/Time    CHOLSTRLTOT 134 02/26/2024 03:35 AM    LDL 50 02/26/2024 03:35 AM    HDL 72 02/26/2024 03:35 AM    TRIGLYCERIDE 58 02/26/2024 03:35 AM       Lab Results   Component Value Date/Time    TROPONINT 25 (H) 02/21/2024 0845     No results found for: \"NTPROBNP\"    Cardiac Imaging and Procedures Review:      2/21/2024  Severe multivessel CAD recurrent in-stent restenosis  Recommend artery bypass graft surgery  1.  Left main coronary artery:  Normal.  2.  Left anterior descending artery: Recently placed stent in large first diagonal branch has 95% stenosis.  After diagonal takeoff left anterior descending artery has 99% stenosis.  Distal vessel, apical LAD free of significant disease.    3.  Left circumflex coronary artery: Gives 1 large marginal branch, which has 2 layers of stents, chronically occluded, fills through left to left collaterals  4.  Right coronary artery: Diffuse 60% stenosis in midportion, posterior descending artery, posterolateral branch is free of significant disease.  This is a right dominant system.  5.  Left ventricular end diastolic pressure:  13 mmHg.  No signficant gradient across the aortic valve.  6.  Left ventriculogram:  Ejection fraction of 60%.     Echo 2/21/24:  Normal LVEF 55%. Mild aortic regurgitation.     Telemetry Reviewed with Monitor Tech:     Radiology test Review:  US-EXTREMITY ARTERY UPPER UNILAT RIGHT   Final Result      EC-ECHOCARDIOGRAM COMPLETE W/O CONT   Final Result      DX-CHEST-PORTABLE (1 VIEW)   Final Result      No acute process.      CL-LEFT HEART CATHETERIZATION WITH POSSIBLE INTERVENTION    " (Results Pending)     Impression and Medical Decision Making:   Unstable angina with cath showing multivessel CAD.  CABG planned for 2/28/24 (Wed).  Chest pain going to bathroom today somewhat atypical.  EF normal on echo.  CABG this Wed  Okay to try walking in prep for CABG  Continue with heparin gtt  Change from nitro gtt to nitropaste  Continue with ECASA 81 mg po qd  Continue with metoprolol (decrease to 12.5 mg po bid) and atorvastatin      Thank you for allowing me to participate in the care of Ms. Christopher.  Please do not hesitate to contact me with questions or concerns.    Zaria Kraus MD  Cardiologist, [facility]    Please note that this dictation was created using voice recognition software. I have made every reasonable attempt to correct obvious errors, but it is possible there are errors of grammar and possibly content that I did not discover before finalizing the note.

## 2024-02-26 NOTE — CARE PLAN
The patient is Watcher - Medium risk of patient condition declining or worsening    Shift Goals  Clinical Goals: monitor chest pain, CABG 02/28  Patient Goals: pain control get to wednesday  Family Goals: FELIX    Progress made toward(s) clinical / shift goals:        Problem: Pain - Standard  Goal: Alleviation of pain or a reduction in pain to the patient’s comfort goal  Outcome: Progressing     Problem: Knowledge Deficit - Standard  Goal: Patient and family/care givers will demonstrate understanding of plan of care, disease process/condition, diagnostic tests and medications  Outcome: Progressing     Problem: Hemodynamics  Goal: Patient's hemodynamics, fluid balance and neurologic status will be stable or improve  Outcome: Progressing     Problem: Respiratory  Goal: Patient will achieve/maintain optimum respiratory ventilation and gas exchange  Outcome: Progressing     Problem: Risk for Bleeding  Goal: Patient will take measures to prevent bleeding and recognizes signs of bleeding that need to be reported immediately to a health care professional  Outcome: Progressing  Goal: Patient will not experience bleeding as evidenced by normal blood pressure, stable hematocrit and hemoglobin levels and desired ranges for coagulation profiles  Outcome: Progressing       Patient is not progressing towards the following goals:

## 2024-02-26 NOTE — DISCHARGE PLANNING
Case Management Discharge Planning    Admission Date: 2/21/2024  GMLOS: 2.4  ALOS: 5    6-Clicks ADL Score: 24  6-Clicks Mobility Score: 24      Anticipated Discharge Dispo: Discharge Disposition: D/T to [facility])    Action(s) Taken: Updated Provider/Nurse on Discharge Plan  Cards following, Scheduled CABG 2/28/24  Escalations Completed: None    Medically Clear: No    Next Steps: Following for post PT/OT eval referrals     Barriers to Discharge: None    Is the patient up for discharge tomorrow: No

## 2024-02-26 NOTE — PROGRESS NOTES
4 Eyes Skin Assessment Completed by BESSIE Chong and BESSIE Shelton.    Head WDL  Ears WDL  Nose WDL  Mouth WDL  Neck WDL  Breast/Chest WDL  Shoulder Blades WDL  Spine WDL  (R) Arm/Elbow/Hand Bruising and Swelling  (L) Arm/Elbow/Hand WDL  Abdomen WDL  Groin Bruising  Scrotum/Coccyx/Buttocks WDL  (R) Leg WDL  (L) Leg WDL  (R) Heel/Foot/Toe WDL  (L) Heel/Foot/Toe WDL          Devices In Places Tele Box, Blood Pressure Cuff, Pulse Ox, and Nasal Cannula      Interventions In Place Gray Ear Foams, Pillows, Q2 Turns, and Pressure Redistribution Mattress    Possible Skin Injury No    Pictures Uploaded Into Epic N/A  Wound Consult Placed N/A  RN Wound Prevention Protocol Ordered No

## 2024-02-27 ENCOUNTER — APPOINTMENT (OUTPATIENT)
Dept: RADIOLOGY | Facility: MEDICAL CENTER | Age: 74
DRG: 233 | End: 2024-02-27
Attending: NURSE PRACTITIONER
Payer: MEDICARE

## 2024-02-27 ENCOUNTER — ANESTHESIA EVENT (OUTPATIENT)
Dept: SURGERY | Facility: MEDICAL CENTER | Age: 74
DRG: 233 | End: 2024-02-27
Payer: MEDICARE

## 2024-02-27 LAB
ABO + RH BLD: NORMAL
ABO GROUP BLD: NORMAL
ALBUMIN SERPL BCP-MCNC: 4 G/DL (ref 3.2–4.9)
ALBUMIN/GLOB SERPL: 1.2 G/DL
ALP SERPL-CCNC: 89 U/L (ref 30–99)
ALT SERPL-CCNC: 22 U/L (ref 2–50)
ANION GAP SERPL CALC-SCNC: 11 MMOL/L (ref 7–16)
APTT PPP: 98.7 SEC (ref 24.7–36)
AST SERPL-CCNC: 31 U/L (ref 12–45)
BARCODED ABORH UBTYP: 6200
BARCODED ABORH UBTYP: 6200
BARCODED PRD CODE UBPRD: NORMAL
BARCODED PRD CODE UBPRD: NORMAL
BARCODED UNIT NUM UBUNT: NORMAL
BARCODED UNIT NUM UBUNT: NORMAL
BASOPHILS # BLD AUTO: 0.7 % (ref 0–1.8)
BASOPHILS # BLD: 0.06 K/UL (ref 0–0.12)
BILIRUB SERPL-MCNC: 0.3 MG/DL (ref 0.1–1.5)
BLD GP AB SCN SERPL QL: NORMAL
BUN SERPL-MCNC: 17 MG/DL (ref 8–22)
CALCIUM ALBUM COR SERPL-MCNC: 9.7 MG/DL (ref 8.5–10.5)
CALCIUM SERPL-MCNC: 9.7 MG/DL (ref 8.5–10.5)
CHLORIDE SERPL-SCNC: 98 MMOL/L (ref 96–112)
CO2 SERPL-SCNC: 27 MMOL/L (ref 20–33)
COMPONENT R 8504R: NORMAL
COMPONENT R 8504R: NORMAL
CREAT SERPL-MCNC: 0.55 MG/DL (ref 0.5–1.4)
EKG IMPRESSION: NORMAL
EOSINOPHIL # BLD AUTO: 0.27 K/UL (ref 0–0.51)
EOSINOPHIL NFR BLD: 3 % (ref 0–6.9)
ERYTHROCYTE [DISTWIDTH] IN BLOOD BY AUTOMATED COUNT: 43.3 FL (ref 35.9–50)
EST. AVERAGE GLUCOSE BLD GHB EST-MCNC: 108 MG/DL
GFR SERPLBLD CREATININE-BSD FMLA CKD-EPI: 96 ML/MIN/1.73 M 2
GLOBULIN SER CALC-MCNC: 3.3 G/DL (ref 1.9–3.5)
GLUCOSE SERPL-MCNC: 95 MG/DL (ref 65–99)
HBA1C MFR BLD: 5.4 % (ref 4–5.6)
HCT VFR BLD AUTO: 35.7 % (ref 37–47)
HGB BLD-MCNC: 11.9 G/DL (ref 12–16)
IMM GRANULOCYTES # BLD AUTO: 0.03 K/UL (ref 0–0.11)
IMM GRANULOCYTES NFR BLD AUTO: 0.3 % (ref 0–0.9)
INR PPP: 1.04 (ref 0.87–1.13)
LYMPHOCYTES # BLD AUTO: 1.91 K/UL (ref 1–4.8)
LYMPHOCYTES NFR BLD: 21 % (ref 22–41)
MCH RBC QN AUTO: 28.7 PG (ref 27–33)
MCHC RBC AUTO-ENTMCNC: 33.3 G/DL (ref 32.2–35.5)
MCV RBC AUTO: 86.2 FL (ref 81.4–97.8)
MONOCYTES # BLD AUTO: 0.76 K/UL (ref 0–0.85)
MONOCYTES NFR BLD AUTO: 8.4 % (ref 0–13.4)
NEUTROPHILS # BLD AUTO: 6.05 K/UL (ref 1.82–7.42)
NEUTROPHILS NFR BLD: 66.6 % (ref 44–72)
NRBC # BLD AUTO: 0 K/UL
NRBC BLD-RTO: 0 /100 WBC (ref 0–0.2)
PLATELET # BLD AUTO: 242 K/UL (ref 164–446)
PMV BLD AUTO: 11.2 FL (ref 9–12.9)
POTASSIUM SERPL-SCNC: 4.4 MMOL/L (ref 3.6–5.5)
PRODUCT TYPE UPROD: NORMAL
PRODUCT TYPE UPROD: NORMAL
PROT SERPL-MCNC: 7.3 G/DL (ref 6–8.2)
PROTHROMBIN TIME: 13.7 SEC (ref 12–14.6)
RBC # BLD AUTO: 4.14 M/UL (ref 4.2–5.4)
RH BLD: NORMAL
SODIUM SERPL-SCNC: 136 MMOL/L (ref 135–145)
UFH PPP CHRO-ACNC: 0.65 IU/ML
UNIT STATUS USTAT: NORMAL
UNIT STATUS USTAT: NORMAL
WBC # BLD AUTO: 9.1 K/UL (ref 4.8–10.8)

## 2024-02-27 PROCEDURE — 86850 RBC ANTIBODY SCREEN: CPT

## 2024-02-27 PROCEDURE — 93005 ELECTROCARDIOGRAM TRACING: CPT | Performed by: NURSE PRACTITIONER

## 2024-02-27 PROCEDURE — 85610 PROTHROMBIN TIME: CPT

## 2024-02-27 PROCEDURE — 83036 HEMOGLOBIN GLYCOSYLATED A1C: CPT

## 2024-02-27 PROCEDURE — 93970 EXTREMITY STUDY: CPT

## 2024-02-27 PROCEDURE — 99233 SBSQ HOSP IP/OBS HIGH 50: CPT | Performed by: HOSPITALIST

## 2024-02-27 PROCEDURE — 700111 HCHG RX REV CODE 636 W/ 250 OVERRIDE (IP): Performed by: NURSE PRACTITIONER

## 2024-02-27 PROCEDURE — A9270 NON-COVERED ITEM OR SERVICE: HCPCS | Performed by: INTERNAL MEDICINE

## 2024-02-27 PROCEDURE — 85730 THROMBOPLASTIN TIME PARTIAL: CPT

## 2024-02-27 PROCEDURE — 85025 COMPLETE CBC W/AUTO DIFF WBC: CPT

## 2024-02-27 PROCEDURE — 770022 HCHG ROOM/CARE - ICU (200)

## 2024-02-27 PROCEDURE — 99232 SBSQ HOSP IP/OBS MODERATE 35: CPT | Performed by: INTERNAL MEDICINE

## 2024-02-27 PROCEDURE — A9270 NON-COVERED ITEM OR SERVICE: HCPCS | Performed by: HOSPITALIST

## 2024-02-27 PROCEDURE — 85520 HEPARIN ASSAY: CPT

## 2024-02-27 PROCEDURE — 700102 HCHG RX REV CODE 250 W/ 637 OVERRIDE(OP): Performed by: HOSPITALIST

## 2024-02-27 PROCEDURE — 86901 BLOOD TYPING SEROLOGIC RH(D): CPT

## 2024-02-27 PROCEDURE — 30233N1 TRANSFUSION OF NONAUTOLOGOUS RED BLOOD CELLS INTO PERIPHERAL VEIN, PERCUTANEOUS APPROACH: ICD-10-PCS | Performed by: NURSE PRACTITIONER

## 2024-02-27 PROCEDURE — 700102 HCHG RX REV CODE 250 W/ 637 OVERRIDE(OP): Performed by: INTERNAL MEDICINE

## 2024-02-27 PROCEDURE — 71046 X-RAY EXAM CHEST 2 VIEWS: CPT

## 2024-02-27 PROCEDURE — A9270 NON-COVERED ITEM OR SERVICE: HCPCS | Performed by: STUDENT IN AN ORGANIZED HEALTH CARE EDUCATION/TRAINING PROGRAM

## 2024-02-27 PROCEDURE — 86900 BLOOD TYPING SEROLOGIC ABO: CPT

## 2024-02-27 PROCEDURE — 93880 EXTRACRANIAL BILAT STUDY: CPT

## 2024-02-27 PROCEDURE — 93010 ELECTROCARDIOGRAM REPORT: CPT | Performed by: INTERNAL MEDICINE

## 2024-02-27 PROCEDURE — A9270 NON-COVERED ITEM OR SERVICE: HCPCS | Performed by: NURSE PRACTITIONER

## 2024-02-27 PROCEDURE — 700102 HCHG RX REV CODE 250 W/ 637 OVERRIDE(OP): Performed by: NURSE PRACTITIONER

## 2024-02-27 PROCEDURE — 700102 HCHG RX REV CODE 250 W/ 637 OVERRIDE(OP): Performed by: STUDENT IN AN ORGANIZED HEALTH CARE EDUCATION/TRAINING PROGRAM

## 2024-02-27 PROCEDURE — 80053 COMPREHEN METABOLIC PANEL: CPT

## 2024-02-27 RX ORDER — METHADONE HYDROCHLORIDE 10 MG/ML
20 INJECTION, SOLUTION INTRAMUSCULAR; INTRAVENOUS; SUBCUTANEOUS ONCE
Status: DISCONTINUED | OUTPATIENT
Start: 2024-02-28 | End: 2024-02-28

## 2024-02-27 RX ORDER — EPINEPHRINE HCL IN 0.9 % NACL 4MG/250ML
0-.5 PLASTIC BAG, INJECTION (ML) INTRAVENOUS CONTINUOUS
Status: DISCONTINUED | OUTPATIENT
Start: 2024-02-28 | End: 2024-02-28

## 2024-02-27 RX ORDER — NOREPINEPHRINE BITARTRATE 0.03 MG/ML
0-1 INJECTION, SOLUTION INTRAVENOUS CONTINUOUS
Status: DISCONTINUED | OUTPATIENT
Start: 2024-02-28 | End: 2024-02-28

## 2024-02-27 RX ORDER — ALPRAZOLAM 0.25 MG/1
0.25 TABLET ORAL EVERY 6 HOURS PRN
Status: DISCONTINUED | OUTPATIENT
Start: 2024-02-28 | End: 2024-02-28

## 2024-02-27 RX ORDER — ACETAMINOPHEN 500 MG
1000 TABLET ORAL ONCE
Status: COMPLETED | OUTPATIENT
Start: 2024-02-28 | End: 2024-02-28

## 2024-02-27 RX ORDER — DEXMEDETOMIDINE HYDROCHLORIDE 4 UG/ML
0-1.5 INJECTION, SOLUTION INTRAVENOUS CONTINUOUS
Status: DISCONTINUED | OUTPATIENT
Start: 2024-02-28 | End: 2024-02-28

## 2024-02-27 RX ADMIN — OXYCODONE 5 MG: 5 TABLET ORAL at 13:02

## 2024-02-27 RX ADMIN — NITROGLYCERIN 0.5 INCH: 20 OINTMENT TOPICAL at 17:08

## 2024-02-27 RX ADMIN — ROSUVASTATIN 10 MG: 10 TABLET, FILM COATED ORAL at 17:08

## 2024-02-27 RX ADMIN — NITROGLYCERIN 0.5 INCH: 20 OINTMENT TOPICAL at 11:04

## 2024-02-27 RX ADMIN — METOPROLOL TARTRATE 12.5 MG: 25 TABLET, FILM COATED ORAL at 17:08

## 2024-02-27 RX ADMIN — NITROGLYCERIN 0.5 INCH: 20 OINTMENT TOPICAL at 05:08

## 2024-02-27 RX ADMIN — OXYCODONE 10 MG: 5 TABLET ORAL at 03:40

## 2024-02-27 RX ADMIN — METOPROLOL TARTRATE 12.5 MG: 25 TABLET, FILM COATED ORAL at 07:51

## 2024-02-27 RX ADMIN — HEPARIN SODIUM 14 UNITS/KG/HR: 5000 INJECTION, SOLUTION INTRAVENOUS at 18:06

## 2024-02-27 RX ADMIN — OXYCODONE 10 MG: 5 TABLET ORAL at 21:41

## 2024-02-27 RX ADMIN — NITROGLYCERIN 0.5 INCH: 20 OINTMENT TOPICAL at 01:15

## 2024-02-27 RX ADMIN — DOCUSATE SODIUM 50 MG AND SENNOSIDES 8.6 MG 1 TABLET: 8.6; 5 TABLET, FILM COATED ORAL at 05:08

## 2024-02-27 ASSESSMENT — ENCOUNTER SYMPTOMS
FOCAL WEAKNESS: 0
NERVOUS/ANXIOUS: 1
WEAKNESS: 0
DIZZINESS: 0
CHILLS: 0
EYES NEGATIVE: 1
BRUISES/BLEEDS EASILY: 0
PALPITATIONS: 0
BACK PAIN: 0
NECK PAIN: 0
CONSTIPATION: 1
NAUSEA: 0
COUGH: 0
CONSTITUTIONAL NEGATIVE: 1
VOMITING: 0
RESPIRATORY NEGATIVE: 1
MUSCULOSKELETAL NEGATIVE: 1
HEARTBURN: 0
POLYDIPSIA: 0
DEPRESSION: 0
SHORTNESS OF BREATH: 0
FEVER: 0
HEADACHES: 1

## 2024-02-27 ASSESSMENT — PAIN DESCRIPTION - PAIN TYPE
TYPE: ACUTE PAIN

## 2024-02-27 ASSESSMENT — COGNITIVE AND FUNCTIONAL STATUS - GENERAL
SUGGESTED CMS G CODE MODIFIER DAILY ACTIVITY: CH
MOBILITY SCORE: 24
DAILY ACTIVITIY SCORE: 24
SUGGESTED CMS G CODE MODIFIER MOBILITY: CH

## 2024-02-27 NOTE — CARE PLAN
The patient is Watcher - Medium risk of patient condition declining or worsening    Shift Goals  Clinical Goals: monitor hemodynamically, pain control, ambulation  Patient Goals: pain control, rest  Family Goals: FELIX    Progress made toward(s) clinical / shift goals:        Problem: Pain - Standard  Goal: Alleviation of pain or a reduction in pain to the patient’s comfort goal  Outcome: Progressing     Problem: Knowledge Deficit - Standard  Goal: Patient and family/care givers will demonstrate understanding of plan of care, disease process/condition, diagnostic tests and medications  Outcome: Progressing     Problem: Hemodynamics  Goal: Patient's hemodynamics, fluid balance and neurologic status will be stable or improve  Outcome: Progressing     Problem: Respiratory  Goal: Patient will achieve/maintain optimum respiratory ventilation and gas exchange  Outcome: Progressing     Problem: Risk for Bleeding  Goal: Patient will take measures to prevent bleeding and recognizes signs of bleeding that need to be reported immediately to a health care professional  Outcome: Progressing  Goal: Patient will not experience bleeding as evidenced by normal blood pressure, stable hematocrit and hemoglobin levels and desired ranges for coagulation profiles  Outcome: Progressing       Patient is not progressing towards the following goals:

## 2024-02-27 NOTE — PROGRESS NOTES
4 Eyes Skin Assessment Completed by BESSIE Sahu and BESSIE Dumont.    Head WDL  Ears WDL  Nose WDL  Mouth WDL  Neck WDL  Breast/Chest WDL  Shoulder Blades WDL  Spine WDL  (R) Arm/Elbow/Hand Bruising  (L) Arm/Elbow/Hand Bruising  Abdomen WDL  Groin WDL  Scrotum/Coccyx/Buttocks Redness and Blanching  (R) Leg Bruising  (L) Leg Bruising  (R) Heel/Foot/Toe WDL  (L) Heel/Foot/Toe Jaundice          Devices In Places Tele Box, Blood Pressure Cuff, Pulse Ox, and Nasal Cannula      Interventions In Place Gray Ear Foams, Pillows, and Low Air Loss Mattress    Possible Skin Injury No    Pictures Uploaded Into Epic N/A  Wound Consult Placed N/A  RN Wound Prevention Protocol Ordered No

## 2024-02-27 NOTE — PROGRESS NOTES
4 Eyes Skin Assessment Completed by Randi, BESSIE and BESSIE Mccray.    Head WDL  Ears WDL  Nose WDL  Mouth WDL  Neck WDL  Breast/Chest WDL  Shoulder Blades WDL  Spine WDL  (R) Arm/Elbow/Hand Bruising  (L) Arm/Elbow/Hand Bruising  Abdomen WDL  Groin Bruising  Scrotum/Coccyx/Buttocks WDL  (R) Leg bruising  (L) Leg Bruising  (R) Heel/Foot/Toe WDL  (L) Heel/Foot/Toe WDL          Devices In Places ECG, Tele Box, Blood Pressure Cuff, Pulse Ox, and Nasal Cannula      Interventions In Place NC W/Ear Foams, Pillows, and Low Air Loss Mattress    Possible Skin Injury No    Pictures Uploaded Into Epic N/A  Wound Consult Placed N/A  RN Wound Prevention Protocol Ordered Yes

## 2024-02-27 NOTE — PROGRESS NOTES
[facility] Medicine Daily Progress Note    Date of Service  2/27/2024    Chief Complaint  Carine Dyer Shelia Christopher is a 73 y.o. female admitted 2/21/2024 with chest pain.    Hospital Course  Ms. Christopher is a 73 y.o. female who presented 2/21/2024 with past history of coronary disease with 4 MIs in the last 4 years.  Patient has 3 cardiac stents last placed August 2023.  Patient has not been feeling well for the last 1 week.  She says that she has history of chronic bronchitis and she felt that her bronchitis was acting up and she felt bilateral chest pain and was coughing up yellowish sputum intermittently.  Patient was being followed by her primary care provider who provided the patient with albuterol inhaler and erythromycin antibiotic.  Chest pain has been going on on and off for the last 1 week however patient this morning with a sharp pain in the left chest with extreme pain and numbness started in her left arm.  Pain was very similar to her prior heart attack.  She came to the  emergency department at [facility] for further evaluation.  EKG  and initial troponin was negative .patient referred to me for the care and management.patient was having ongoing chest pain so cardiology was consulted. She underwent heart catheterization and was found to have severe multivessel disease including in-stent stenosis of the first diagonal 95% and LAD 99% stenosis. CABG was recommended. She is on an IV heparin drip as well as an IV nitroglycerin drip.      Interval Problem Update  Patient seen and examined today.  Data, Medication data reviewed.  Case discussed with nursing as available.  Plan of Care reviewed with patient and notified of changes.  2/22: Ms. Christopher was evaluated in the IMCU.  She is on IV heparin drip as well as IV nitroglycerin drip.  She has a significant headache and oxycodone has been ordered.  Cardiothoracic surgery will be seeing her today.  She states she has chest pain  "when getting up to go to the bathroom.  2/23: Ms. Christopher was seen in the IMCU. The nitroglycerin drip is off though she remains on IV heparin. Dr. Chicas cardiothoracic surgery was consulted and recommends CABG on Feb 28th. The bruising of the right forearm is worse today. Ultrasound of the right wrist has been ordered. She is able to walk to the bathroom without chest pain.  2/24: Ms. Christopher was evaluated in the IMCU. She developed anginal symptoms last night and the nitroglycerin was restarted and titrated. She remains on an IV heparin drip. Sinus rhythm.  2/25: Ms. Christopher was seen in the IMCU. She is on an IV heparin drip as well as an IV nitroglycerin drip due to unstable angina. The bruising on the right arm is unchanged today. Her headache waxes and wanes. She is knitting in order to pass the day.  2/26: Ms. Christopher was evaluated in the [facility]. She is on an IV heparin drip with serial anti-Xa levels. She continues to require the nitroglycerin drip due to intractable anginal symptoms and had to have the rate turned up. Lipid panel this morning was done and her LDL is impressive at only 50 on Crestor. Her blood pressure is low at 94/52 this morning. She describes a \"headache fog\".    2/27 the patient denies currently chest pain, no increasing dyspnea or discomfort, she did have a scare this morning because of her alarm system in her house, she is settled down now, she is afebrile, heart rate in the 80s, respiration unlabored, she maintains in the mid 90s on 2 L nasal cannula oxygen, blood pressure 1 teens to 130s  The patient is awaiting surgery, she had a bowel movement today    I have discussed this patient's plan of care and discharge plan at IDT rounds today with Case Management, Nursing, Nursing leadership, and other members of the IDT team.    Consultants/Specialty  Cardiology   CT surgery    Code Status  Full Code    Disposition  The patient is not medically cleared for discharge to home or a " post-acute facility.  Anticipate discharge to: home with close outpatient follow-up    I have placed the appropriate orders for post-discharge needs.    Review of Systems  Review of Systems   Constitutional: Negative.  Negative for chills and fever.   HENT: Negative.     Eyes: Negative.    Respiratory: Negative.  Negative for cough and shortness of breath.    Cardiovascular:  Positive for chest pain. Negative for palpitations.   Gastrointestinal:  Positive for constipation. Negative for heartburn, nausea and vomiting.   Genitourinary: Negative.  Negative for dysuria and frequency.   Musculoskeletal: Negative.  Negative for back pain and neck pain.        Right forearm pain/swelling/bruising   Skin: Negative.  Negative for itching and rash.   Neurological:  Positive for headaches. Negative for dizziness, focal weakness and weakness.   Endo/Heme/Allergies: Negative.  Negative for polydipsia. Does not bruise/bleed easily.   Psychiatric/Behavioral:  Negative for depression. The patient is nervous/anxious.    All other systems reviewed and are negative.       Physical Exam  Temp:  [36 °C (96.8 °F)-36.8 °C (98.2 °F)] 36 °C (96.8 °F)  Pulse:  [] 85  Resp:  [16-17] 17  BP: ()/(55-74) 111/58  SpO2:  [85 %-98 %] 94 %    Physical Exam  Vitals and nursing note reviewed.   Constitutional:       General: She is not in acute distress.     Appearance: She is not ill-appearing or toxic-appearing.   Cardiovascular:      Rate and Rhythm: Normal rate and regular rhythm.      Heart sounds: No murmur heard.  Pulmonary:      Effort: Pulmonary effort is normal.      Comments: 0.5 liters oxygen  Abdominal:      Palpations: Abdomen is soft.      Tenderness: There is no abdominal tenderness.   Musculoskeletal:      Cervical back: Neck supple.      Right lower leg: No edema.      Left lower leg: No edema.      Comments: Right wrist bruise to the elbow without tenderness including the hand  Mild right groin bruising   Neurological:       General: No focal deficit present.      Mental Status: She is alert and oriented to person, place, and time.   Psychiatric:         Mood and Affect: Mood normal.         Behavior: Behavior normal.         Thought Content: Thought content normal.         Judgment: Judgment normal.      Comments: Pleasant         Fluids    Intake/Output Summary (Last 24 hours) at 2/27/2024 0917  Last data filed at 2/26/2024 1600  Gross per 24 hour   Intake 361.14 ml   Output --   Net 361.14 ml       Laboratory                        Recent Labs     02/26/24  0335   TRIGLYCERIDE 58   HDL 72   LDL 50       Imaging  US-EXTREMITY ARTERY UPPER UNILAT RIGHT   Final Result      EC-ECHOCARDIOGRAM COMPLETE W/O CONT   Final Result      DX-CHEST-PORTABLE (1 VIEW)   Final Result      No acute process.      CL-LEFT HEART CATHETERIZATION WITH POSSIBLE INTERVENTION    (Results Pending)        Assessment/Plan  * Unstable angina (HCC)- (present on admission)  Assessment & Plan  Severe unstable angina with severe coronary disease by heart catheterization for which Dr. Chicas cardiothoracic surgery has been consulted for coronary artery bypass graft on 2/28.  She is on an IV heparin drip. Monitor for bleeding.  Nitroglycerin paste for antianginal treatment  High intensity statin  Continuous telemetry monitoring to eval for arrhythmias.   Supplemental oxygen at 0.5 liters. Continuous pulse oxymetry.        CAD (coronary artery disease)- (present on admission)  Assessment & Plan  With history of previous stenting  Echocardiogram reveals a normal ejection fraction of 55%    Headache- (present on admission)  Assessment & Plan  Secondary to nitroglycerin    Giant cell arteritis (HCC)- (present on admission)  Assessment & Plan  History of 3 years ago she has been off steroids for 1 year    Dyslipidemia- (present on admission)  Assessment & Plan  She is on Crestor 10 mg daily outpatient  Lipid panel reveals LDL 50    Primary hypertension- (present on  admission)  Assessment & Plan  Metoprolol 25 mg p.o. twice daily as tolerated       Plan  Continued pain control, antianginal therapy with nitroglycerin paste,  High intensity statin  Preparing for open heart surgery on 2/28  The patient overall in good spirits, no current chest pain,  Bowel regimen presurgical  See orders  Patient is has a high medical complexity, complex decision making and is at high risk for complication, morbidity, and mortality.    VTE prophylaxis:   SCDs/TEDs      I have performed a physical exam and reviewed and updated ROS and Plan today (2/27/2024). In review of yesterday's note (2/26/2024), there are no changes except as documented above.      Please note that this dictation was created using voice recognition software. I have made every reasonable attempt to correct obvious errors, but I expect that there are errors of grammar and possibly context that I did not discover before finalizing the note.

## 2024-02-27 NOTE — CARE PLAN
The patient is Stable - Low risk of patient condition declining or worsening    Shift Goals  Clinical Goals: Monitor hemodynamic stability on Nitro, any signs of bleeding.  Patient Goals: pain control get to wednesday  Family Goals: FELIX    Progress made toward(s) clinical / shift goals:    Problem: Pain - Standard  Goal: Alleviation of pain or a reduction in pain to the patient’s comfort goal  Outcome: Progressing     Problem: Knowledge Deficit - Standard  Goal: Patient and family/care givers will demonstrate understanding of plan of care, disease process/condition, diagnostic tests and medications  Outcome: Progressing     Problem: Hemodynamics  Goal: Patient's hemodynamics, fluid balance and neurologic status will be stable or improve  Outcome: Progressing     Problem: Respiratory  Goal: Patient will achieve/maintain optimum respiratory ventilation and gas exchange  Outcome: Progressing       Patient is not progressing towards the following goals:

## 2024-02-27 NOTE — PROGRESS NOTES
Cardiology Follow-up Consult Note    Date of Service:    2024      Consulting Physician: Akira Costa M.D.    Name:   Carine Christopher   YOB: 1950  Age:   73 y.o.  female   MRN:   [id number]    Subjective:  Patient without complaints, denies chest pain, no shortness of breath, did get up and walk a bit yesterday.  Still trying to have bowel movement.    All other review of systems reviewed and negative.    Past medical, surgical, social, and family history reviewed and unchanged from admission except as noted in assessment and plan.    Medications Prior to Admission   Medication Sig Dispense Refill Last Dose    aspirin (ASPIRIN EC LOW STRENGTH) 81 MG EC tablet Take 81 mg by mouth every evening.   2024 at pm    azelastine (ASTELIN) 137 MCG/SPRAY nasal spray Administer 2 Sprays into affected nostril(S) 1 time a day as needed for Rhinitis.   <month at unk    ibuprofen (IBU) 800 MG Tab Take 800 mg by mouth 2 times a day as needed for Mild Pain.   2024 at 0330    metoprolol tartrate (LOPRESSOR) 25 MG Tab Take 25 mg by mouth 2 times a day.   2024 at pm    rosuvastatin (CRESTOR) 10 MG Tab Take 10 mg by mouth every evening.   2024 at pm    prasugrel (EFFIENT) 10 MG Tab Take 10 mg by mouth every evening.   2024 at pm    Npexwi-IlTrk-NdQpt-FA-CA-Omega (COMPLETE  DHA) 29-1-200 & 200 MG Misc Take 2 Tablets by mouth every evening.   2024 at pm    Multiple Vitamins-Minerals (PRESERVISION AREDS) Tab Take 1 Tablet by mouth every evening.   2024 at pm    erythromycin base 250 MG Cap DR Particles Take 750 mg by mouth every day. X 7 days   2024 at am    Dextromethorphan HBr (VICKS DAYQUIL COUGH) 15 MG/15ML Liquid Take  by mouth 1 time a day as needed. 1 each  Indications: Cough   2024 at unk    Phenylephrine-Doxylamine-DM (NYQUIL COUGH DM + CONGESTION) 5-6.25-10 MG/15ML Liquid Take 1 Each by mouth every evening as needed (chronic bronchitis).    2/21/2024 at 0330    albuterol 108 (90 Base) MCG/ACT Aero Soln inhalation aerosol Inhale 2 Puffs 3 times a day as needed for Shortness of Breath (chronic bronchitis).   2/21/2024 at 0330     Current Facility-Administered Medications   Medication Dose Frequency Provider Last Rate Last Admin    nitroglycerin (Nitro-Bid) 2 % ointment 0.5 Inch  0.5 Inch Q6HRS Ashley Hough, A.P.R.N.   0.5 Inch at 02/27/24 0508    nitroglycerin (Nitrostat) tablet 0.4 mg  0.4 mg Q2HRS PRN Ashley Hough A.P.R.NFlavia   0.4 mg at 02/26/24 1604    senna-docusate (Pericolace Or Senokot S) 8.6-50 MG per tablet 1 Tablet  1 Tablet DAILY Irvin Kapadia M.D.   1 Tablet at 02/27/24 0508    albuterol inhaler 2 Puff  2 Puff Q4HRS PRN Yaniv Lopez M.D.        ondansetron (Zofran) syringe/vial injection 4 mg  4 mg Q4HRS PRN Ashley Hart, A.P.R.N.   4 mg at 02/23/24 2245    acetaminophen (Tylenol) tablet 650 mg  650 mg Q6HRS Ashley Hart, A.P.R.N.        oxyCODONE immediate-release (Roxicodone) tablet 5-10 mg  5-10 mg Q3HRS PRN Yaniv Lopez M.D.   10 mg at 02/27/24 0340    morphine 4 MG/ML injection 2 mg  2 mg Q3HRS PRN Yaniv Lopez M.D.   2 mg at 02/23/24 2333    heparin infusion 25,000 units in 500 mL 0.45% NACL  0-30 Units/kg/hr Continuous Abhi Gann M.D. 17.4 mL/hr at 02/27/24 0706 14 Units/kg/hr at 02/27/24 0706    heparin injection 1,900 Units  30 Units/kg PRN Abhi Gann M.D.   1,900 Units at 02/24/24 2313    guaiFENesin dextromethorphan (Robitussin DM) 100-10 MG/5ML syrup 5 mL  5 mL Q6HRS PRN Dylan Zhu M.D.        aspirin EC tablet 81 mg  81 mg Q EVENING Dylan Zhu M.D.   81 mg at 02/26/24 1754    [MAR Hold] metoprolol tartrate (Lopressor) tablet 25 mg  25 mg BID Dylan Zhu M.D.        rosuvastatin (Crestor) tablet 10 mg  10 mg Q EVENING Dylan Zhu M.D.   10 mg at 02/26/24 1754    acetaminophen (Tylenol) tablet 650 mg  650 mg Q4HRS PRN Dylan Zhu M.D.       Last reviewed on  "2/21/2024  8:15 AM by Lilibeth Calero     Allergies   Allergen Reactions    Atorvastatin Unspecified     .    Chloramphenicol Unspecified     .    Codeine Unspecified     .    Other Drug Diarrhea     Most antibiotics turn stool yellow    Sulfa Drugs Diarrhea     .         Intake/Output Summary (Last 24 hours) at 2/27/2024 0721  Last data filed at 2/26/2024 1600  Gross per 24 hour   Intake 914.59 ml   Output --   Net 914.59 ml        Physical Exam  Body mass index is 23.22 kg/m².  /62   Pulse 90   Temp 36.2 °C (97.1 °F) (Temporal)   Resp 16   Ht 1.651 m (5' 5\")   Wt 63.3 kg (139 lb 8.8 oz)   SpO2 94%   Vitals:    02/26/24 1600 02/26/24 2000 02/26/24 2200 02/27/24 0000   BP: 132/74 121/55 124/58 126/62   Pulse: 100 94 90 90   Resp:  16     Temp:  36.2 °C (97.1 °F)     TempSrc:  Temporal     SpO2: 97% 93% 94% 94%   Weight:   63.3 kg (139 lb 8.8 oz)    Height:         Oxygen Therapy:  Pulse Oximetry: 94 %, O2 (LPM): 2, O2 Delivery Device: Silicone Nasal Cannula    Physical Exam  Constitutional:       Appearance: Normal appearance.   Neck:      Vascular: No JVD.   Cardiovascular:      Rate and Rhythm: Normal rate and regular rhythm.      Pulses: Normal pulses and intact distal pulses.      Heart sounds: S1 normal and S2 normal. No murmur heard.     No friction rub. No gallop.   Pulmonary:      Effort: Pulmonary effort is normal. No respiratory distress.      Breath sounds: No wheezing or rales.   Musculoskeletal:      Cervical back: Neck supple.   Neurological:      Mental Status: She is alert and oriented to person, place, and time.       Labs (personally reviewed and notable for):   Lab Results   Component Value Date/Time    SODIUM 136 02/22/2024 05:32 AM    POTASSIUM 4.3 02/22/2024 05:32 AM    CHLORIDE 101 02/22/2024 05:32 AM    CO2 23 02/22/2024 05:32 AM    GLUCOSE 119 (H) 02/22/2024 05:32 AM    BUN 23 (H) 02/22/2024 05:32 AM    CREATININE 0.62 02/22/2024 05:32 AM      Lab Results   Component Value " "Date/Time    WBC 10.7 02/21/2024 06:30 AM    RBC 4.59 02/21/2024 06:30 AM    HEMOGLOBIN 13.0 02/21/2024 06:30 AM    HEMATOCRIT 39.6 02/21/2024 06:30 AM    MCV 86.3 02/21/2024 06:30 AM    MCH 28.3 02/21/2024 06:30 AM    MCHC 32.8 02/21/2024 06:30 AM    MPV 10.3 02/21/2024 06:30 AM    NEUTSPOLYS 64.20 02/21/2024 06:30 AM    LYMPHOCYTES 24.80 02/21/2024 06:30 AM    MONOCYTES 8.00 02/21/2024 06:30 AM    EOSINOPHILS 2.10 02/21/2024 06:30 AM    BASOPHILS 0.60 02/21/2024 06:30 AM      Lab Results   Component Value Date/Time    CHOLSTRLTOT 134 02/26/2024 03:35 AM    LDL 50 02/26/2024 03:35 AM    HDL 72 02/26/2024 03:35 AM    TRIGLYCERIDE 58 02/26/2024 03:35 AM       Lab Results   Component Value Date/Time    TROPONINT 25 (H) 02/21/2024 0845     No results found for: \"NTPROBNP\"    Telemetry Reviewed with Monitor Tech: sinus    Radiology test Review:  US-EXTREMITY ARTERY UPPER UNILAT RIGHT   Final Result      EC-ECHOCARDIOGRAM COMPLETE W/O CONT   Final Result      DX-CHEST-PORTABLE (1 VIEW)   Final Result      No acute process.      CL-LEFT HEART CATHETERIZATION WITH POSSIBLE INTERVENTION    (Results Pending)     Impression and Medical Decision Making:   Unstable angina with cath showing multvessel CAD.  CABG tomorrow.    CABG tomorrow  Can continue walking today.  Continue with ECASA 81 mg po qd, decrease metoprolol 12.5 mg po bid as it has been held last 2 days due to BP, nitropaste 0.5 inch top q6h, and atorvastatin.  Patient reports had fu with Dr. Davis that was canceled as she is in the hospital.  She is a patient of Dr. Davis's.  We will check with clinic about fu with Dr. Davis after discharge from CABG.    Thank you for allowing me to participate in the care of Ms. Christopher.  Please do not hesitate to contact me with questions or concerns.    Zaria Kraus MD  Cardiologist, [facility]    Please note that this dictation was created using voice recognition software. I have made every " reasonable attempt to correct obvious errors, but it is possible there are errors of grammar and possibly content that I did not discover before finalizing the note.

## 2024-02-27 NOTE — CARE PLAN
The patient is Watcher - Medium risk of patient condition declining or worsening    Shift Goals  Clinical Goals: monitor hemodynamic stability  Patient Goals: pain control  Family Goals: FELIX    Progress made toward(s) clinical / shift goals:    Problem: Pain - Standard  Goal: Alleviation of pain or a reduction in pain to the patient’s comfort goal  Outcome: Progressing     Problem: Knowledge Deficit - Standard  Goal: Patient and family/care givers will demonstrate understanding of plan of care, disease process/condition, diagnostic tests and medications  Outcome: Progressing     Problem: Hemodynamics  Goal: Patient's hemodynamics, fluid balance and neurologic status will be stable or improve  Outcome: Progressing       Patient is not progressing towards the following goals:

## 2024-02-28 ENCOUNTER — APPOINTMENT (OUTPATIENT)
Dept: RADIOLOGY | Facility: MEDICAL CENTER | Age: 74
DRG: 233 | End: 2024-02-28
Attending: THORACIC SURGERY (CARDIOTHORACIC VASCULAR SURGERY)
Payer: MEDICARE

## 2024-02-28 ENCOUNTER — ANESTHESIA (OUTPATIENT)
Dept: SURGERY | Facility: MEDICAL CENTER | Age: 74
DRG: 233 | End: 2024-02-28
Payer: MEDICARE

## 2024-02-28 ENCOUNTER — APPOINTMENT (OUTPATIENT)
Dept: CARDIOLOGY | Facility: MEDICAL CENTER | Age: 74
DRG: 233 | End: 2024-02-28
Attending: ANESTHESIOLOGY
Payer: MEDICARE

## 2024-02-28 ENCOUNTER — APPOINTMENT (OUTPATIENT)
Dept: CARDIOLOGY | Facility: MEDICAL CENTER | Age: 74
End: 2024-02-28
Attending: INTERNAL MEDICINE
Payer: MEDICARE

## 2024-02-28 PROBLEM — Z95.1 S/P CABG X 3: Status: ACTIVE | Noted: 2024-02-28

## 2024-02-28 LAB
ACT BLD: 142 SEC (ref 74–137)
ACT BLD: 147 SEC (ref 74–137)
ACT BLD: 504 SEC (ref 74–137)
ACT BLD: 579 SEC (ref 74–137)
ACT BLD: 709 SEC (ref 74–137)
APPEARANCE UR: CLEAR
APTT PPP: 32.4 SEC (ref 24.7–36)
ARTERIAL PATENCY WRIST A: ABNORMAL
BASE EXCESS BLDA CALC-SCNC: -1 MMOL/L (ref -4–3)
BASE EXCESS BLDA CALC-SCNC: -2 MMOL/L (ref -4–3)
BASE EXCESS BLDA CALC-SCNC: 0 MMOL/L (ref -4–3)
BASE EXCESS BLDA CALC-SCNC: 0 MMOL/L (ref -4–3)
BASE EXCESS BLDA CALC-SCNC: 1 MMOL/L (ref -4–3)
BASE EXCESS BLDA CALC-SCNC: 2 MMOL/L (ref -4–3)
BASE EXCESS BLDV CALC-SCNC: 1 MMOL/L (ref -4–3)
BILIRUB UR QL STRIP.AUTO: NEGATIVE
BODY TEMPERATURE: ABNORMAL DEGREES
CA-I BLD ISE-SCNC: 0.91 MMOL/L (ref 1.1–1.3)
CA-I BLD ISE-SCNC: 0.95 MMOL/L (ref 1.1–1.3)
CA-I BLD ISE-SCNC: 1.23 MMOL/L (ref 1.1–1.3)
CA-I BLD ISE-SCNC: 1.26 MMOL/L (ref 1.1–1.3)
CA-I BLD ISE-SCNC: 1.26 MMOL/L (ref 1.1–1.3)
CA-I BLD ISE-SCNC: 1.35 MMOL/L (ref 1.1–1.3)
CO2 BLDA-SCNC: 24 MMOL/L (ref 20–33)
CO2 BLDA-SCNC: 24 MMOL/L (ref 20–33)
CO2 BLDA-SCNC: 26 MMOL/L (ref 20–33)
CO2 BLDA-SCNC: 27 MMOL/L (ref 20–33)
CO2 BLDA-SCNC: 29 MMOL/L (ref 20–33)
CO2 BLDA-SCNC: 29 MMOL/L (ref 20–33)
CO2 BLDV-SCNC: 29 MMOL/L (ref 20–33)
COLOR UR: YELLOW
DELSYS IDSYS: ABNORMAL
EKG IMPRESSION: NORMAL
END TIDAL CARBON DIOXIDE IECO2: 30 MMHG
END TIDAL CARBON DIOXIDE IECO2: 32 MMHG
END TIDAL CARBON DIOXIDE IECO2: 37 MMHG
END TIDAL CARBON DIOXIDE IECO2: 39 MMHG
GLUCOSE BLD STRIP.AUTO-MCNC: 99 MG/DL (ref 65–99)
GLUCOSE UR STRIP.AUTO-MCNC: NEGATIVE MG/DL
HCO3 BLDA-SCNC: 23.1 MMOL/L (ref 17–25)
HCO3 BLDA-SCNC: 23.4 MMOL/L (ref 17–25)
HCO3 BLDA-SCNC: 24.3 MMOL/L (ref 17–25)
HCO3 BLDA-SCNC: 25.1 MMOL/L (ref 17–25)
HCO3 BLDA-SCNC: 25.4 MMOL/L (ref 17–25)
HCO3 BLDA-SCNC: 25.5 MMOL/L (ref 17–25)
HCO3 BLDA-SCNC: 27.3 MMOL/L (ref 17–25)
HCO3 BLDA-SCNC: 27.4 MMOL/L (ref 17–25)
HCO3 BLDV-SCNC: 27.2 MMOL/L (ref 24–28)
HCT VFR BLD AUTO: 38 % (ref 37–47)
HCT VFR BLD CALC: 21 % (ref 37–47)
HCT VFR BLD CALC: 28 % (ref 37–47)
HCT VFR BLD CALC: 30 % (ref 37–47)
HCT VFR BLD CALC: 31 % (ref 37–47)
HCT VFR BLD CALC: 34 % (ref 37–47)
HGB BLD-MCNC: 10.2 G/DL (ref 12–16)
HGB BLD-MCNC: 10.5 G/DL (ref 12–16)
HGB BLD-MCNC: 11.6 G/DL (ref 12–16)
HGB BLD-MCNC: 12.7 G/DL (ref 12–16)
HGB BLD-MCNC: 7.1 G/DL (ref 12–16)
HGB BLD-MCNC: 9.5 G/DL (ref 12–16)
HOROWITZ INDEX BLDA+IHG-RTO: 118 MM[HG]
HOROWITZ INDEX BLDA+IHG-RTO: 135 MM[HG]
HOROWITZ INDEX BLDA+IHG-RTO: 228 MM[HG]
HOROWITZ INDEX BLDA+IHG-RTO: 243 MM[HG]
INR PPP: 1.48 (ref 0.87–1.13)
KETONES UR STRIP.AUTO-MCNC: NEGATIVE MG/DL
LACTATE BLD-SCNC: 1 MMOL/L (ref 0.5–2)
LACTATE BLD-SCNC: 1.3 MMOL/L (ref 0.5–2)
LEUKOCYTE ESTERASE UR QL STRIP.AUTO: NEGATIVE
MAGNESIUM SERPL-MCNC: 3.3 MG/DL (ref 1.5–2.5)
MICRO URNS: NORMAL
MODE IMODE: ABNORMAL
NITRITE UR QL STRIP.AUTO: NEGATIVE
O2/TOTAL GAS SETTING VFR VENT: 100 %
O2/TOTAL GAS SETTING VFR VENT: 40 %
O2/TOTAL GAS SETTING VFR VENT: 40 %
O2/TOTAL GAS SETTING VFR VENT: 80 %
PCO2 BLDA: 35.2 MMHG (ref 26–37)
PCO2 BLDA: 36.9 MMHG (ref 26–37)
PCO2 BLDA: 42.5 MMHG (ref 26–37)
PCO2 BLDA: 42.7 MMHG (ref 26–37)
PCO2 BLDA: 44.9 MMHG (ref 26–37)
PCO2 BLDA: 47.9 MMHG (ref 26–37)
PCO2 BLDA: 48.6 MMHG (ref 26–37)
PCO2 BLDA: 52.7 MMHG (ref 26–37)
PCO2 BLDV: 49.4 MMHG (ref 41–51)
PCO2 TEMP ADJ BLDA: 35.1 MMHG (ref 26–37)
PCO2 TEMP ADJ BLDA: 35.5 MMHG (ref 26–37)
PCO2 TEMP ADJ BLDA: 41.2 MMHG (ref 26–37)
PCO2 TEMP ADJ BLDA: 41.8 MMHG (ref 26–37)
PCO2 TEMP ADJ BLDA: 43 MMHG (ref 26–37)
PCO2 TEMP ADJ BLDA: 43.6 MMHG (ref 26–37)
PCO2 TEMP ADJ BLDA: 47.9 MMHG (ref 26–37)
PCO2 TEMP ADJ BLDA: 48.7 MMHG (ref 26–37)
PCO2 TEMP ADJ BLDV: 43.3 MMHG (ref 41–51)
PEEP END EXPIRATORY PRESSURE IPEEP: 8 CMH20
PERCENT MINUTE VOLUME IPMV: 160
PERCENT MINUTE VOLUME IPMV: 160
PH BLDA: 7.31 [PH] (ref 7.4–7.5)
PH BLDA: 7.32 [PH] (ref 7.4–7.5)
PH BLDA: 7.33 [PH] (ref 7.4–7.5)
PH BLDA: 7.38 [PH] (ref 7.4–7.5)
PH BLDA: 7.38 [PH] (ref 7.4–7.5)
PH BLDA: 7.39 [PH] (ref 7.4–7.5)
PH BLDA: 7.4 [PH] (ref 7.4–7.5)
PH BLDA: 7.43 [PH] (ref 7.4–7.5)
PH BLDV: 7.35 [PH] (ref 7.31–7.45)
PH TEMP ADJ BLDA: 7.33 [PH] (ref 7.4–7.5)
PH TEMP ADJ BLDA: 7.34 [PH] (ref 7.4–7.5)
PH TEMP ADJ BLDA: 7.35 [PH] (ref 7.4–7.5)
PH TEMP ADJ BLDA: 7.39 [PH] (ref 7.4–7.5)
PH TEMP ADJ BLDA: 7.39 [PH] (ref 7.4–7.5)
PH TEMP ADJ BLDA: 7.41 [PH] (ref 7.4–7.5)
PH TEMP ADJ BLDA: 7.42 [PH] (ref 7.4–7.5)
PH TEMP ADJ BLDA: 7.43 [PH] (ref 7.4–7.5)
PH TEMP ADJ BLDV: 7.39 [PH] (ref 7.31–7.45)
PH UR STRIP.AUTO: 6 [PH] (ref 5–8)
PLATELET # BLD AUTO: 93 K/UL (ref 164–446)
PO2 BLDA: 135 MMHG (ref 64–87)
PO2 BLDA: 303 MMHG (ref 64–87)
PO2 BLDA: 343 MMHG (ref 64–87)
PO2 BLDA: 420 MMHG (ref 64–87)
PO2 BLDA: 424 MMHG (ref 64–87)
PO2 BLDA: 91 MMHG (ref 64–87)
PO2 BLDA: 94 MMHG (ref 64–87)
PO2 BLDA: 97 MMHG (ref 64–87)
PO2 BLDV: 56 MMHG (ref 25–40)
PO2 TEMP ADJ BLDA: 131 MMHG (ref 64–87)
PO2 TEMP ADJ BLDA: 298 MMHG (ref 64–87)
PO2 TEMP ADJ BLDA: 331 MMHG (ref 64–87)
PO2 TEMP ADJ BLDA: 409 MMHG (ref 64–87)
PO2 TEMP ADJ BLDA: 421 MMHG (ref 64–87)
PO2 TEMP ADJ BLDA: 89 MMHG (ref 64–87)
PO2 TEMP ADJ BLDA: 91 MMHG (ref 64–87)
PO2 TEMP ADJ BLDA: 97 MMHG (ref 64–87)
PO2 TEMP ADJ BLDV: 46 MMHG (ref 25–40)
POTASSIUM BLD-SCNC: 3.9 MMOL/L (ref 3.6–5.5)
POTASSIUM BLD-SCNC: 4.1 MMOL/L (ref 3.6–5.5)
POTASSIUM BLD-SCNC: 4.2 MMOL/L (ref 3.6–5.5)
POTASSIUM BLD-SCNC: 4.5 MMOL/L (ref 3.6–5.5)
POTASSIUM BLD-SCNC: 5.1 MMOL/L (ref 3.6–5.5)
POTASSIUM BLD-SCNC: 6 MMOL/L (ref 3.6–5.5)
POTASSIUM SERPL-SCNC: 4.2 MMOL/L (ref 3.6–5.5)
POTASSIUM SERPL-SCNC: 4.5 MMOL/L (ref 3.6–5.5)
PRESSURE SUPPORT SETTING VENT: 5 CM[H2O]
PRESSURE SUPPORT SETTING VENT: 5 CM[H2O]
PROT UR QL STRIP: NEGATIVE MG/DL
PROTHROMBIN TIME: 18.1 SEC (ref 12–14.6)
RBC UR QL AUTO: NEGATIVE
SAO2 % BLDA: 100 % (ref 93–99)
SAO2 % BLDA: 96 % (ref 93–99)
SAO2 % BLDA: 97 % (ref 93–99)
SAO2 % BLDA: 98 % (ref 93–99)
SAO2 % BLDA: 99 % (ref 93–99)
SAO2 % BLDV: 87 %
SODIUM BLD-SCNC: 132 MMOL/L (ref 135–145)
SODIUM BLD-SCNC: 132 MMOL/L (ref 135–145)
SODIUM BLD-SCNC: 133 MMOL/L (ref 135–145)
SODIUM BLD-SCNC: 134 MMOL/L (ref 135–145)
SODIUM BLD-SCNC: 136 MMOL/L (ref 135–145)
SODIUM BLD-SCNC: 137 MMOL/L (ref 135–145)
SP GR UR STRIP.AUTO: 1.02
SPECIMEN DRAWN FROM PATIENT: ABNORMAL
UROBILINOGEN UR STRIP.AUTO-MCNC: 1 MG/DL

## 2024-02-28 PROCEDURE — 700105 HCHG RX REV CODE 258: Performed by: THORACIC SURGERY (CARDIOTHORACIC VASCULAR SURGERY)

## 2024-02-28 PROCEDURE — 85014 HEMATOCRIT: CPT | Mod: 91

## 2024-02-28 PROCEDURE — 36430 TRANSFUSION BLD/BLD COMPNT: CPT

## 2024-02-28 PROCEDURE — 5A1223Z PERFORMANCE OF CARDIAC PACING, CONTINUOUS: ICD-10-PCS | Performed by: THORACIC SURGERY (CARDIOTHORACIC VASCULAR SURGERY)

## 2024-02-28 PROCEDURE — 93010 ELECTROCARDIOGRAM REPORT: CPT | Performed by: INTERNAL MEDICINE

## 2024-02-28 PROCEDURE — 84295 ASSAY OF SERUM SODIUM: CPT | Mod: 91

## 2024-02-28 PROCEDURE — 700102 HCHG RX REV CODE 250 W/ 637 OVERRIDE(OP): Performed by: ANESTHESIOLOGY

## 2024-02-28 PROCEDURE — 160031 HCHG SURGERY MINUTES - 1ST 30 MINS LEVEL 5: Performed by: THORACIC SURGERY (CARDIOTHORACIC VASCULAR SURGERY)

## 2024-02-28 PROCEDURE — 021109W BYPASS CORONARY ARTERY, TWO ARTERIES FROM AORTA WITH AUTOLOGOUS VENOUS TISSUE, OPEN APPROACH: ICD-10-PCS | Performed by: THORACIC SURGERY (CARDIOTHORACIC VASCULAR SURGERY)

## 2024-02-28 PROCEDURE — 700105 HCHG RX REV CODE 258: Performed by: STUDENT IN AN ORGANIZED HEALTH CARE EDUCATION/TRAINING PROGRAM

## 2024-02-28 PROCEDURE — 06BP4ZZ EXCISION OF RIGHT SAPHENOUS VEIN, PERCUTANEOUS ENDOSCOPIC APPROACH: ICD-10-PCS | Performed by: THORACIC SURGERY (CARDIOTHORACIC VASCULAR SURGERY)

## 2024-02-28 PROCEDURE — 33512 CABG VEIN THREE: CPT | Mod: AS | Performed by: NURSE PRACTITIONER

## 2024-02-28 PROCEDURE — 110454 HCHG SHELL REV 250: Performed by: THORACIC SURGERY (CARDIOTHORACIC VASCULAR SURGERY)

## 2024-02-28 PROCEDURE — 5A1221Z PERFORMANCE OF CARDIAC OUTPUT, CONTINUOUS: ICD-10-PCS | Performed by: THORACIC SURGERY (CARDIOTHORACIC VASCULAR SURGERY)

## 2024-02-28 PROCEDURE — 700102 HCHG RX REV CODE 250 W/ 637 OVERRIDE(OP): Performed by: INTERNAL MEDICINE

## 2024-02-28 PROCEDURE — 700102 HCHG RX REV CODE 250 W/ 637 OVERRIDE(OP): Performed by: STUDENT IN AN ORGANIZED HEALTH CARE EDUCATION/TRAINING PROGRAM

## 2024-02-28 PROCEDURE — 81003 URINALYSIS AUTO W/O SCOPE: CPT

## 2024-02-28 PROCEDURE — 85730 THROMBOPLASTIN TIME PARTIAL: CPT

## 2024-02-28 PROCEDURE — C1898 LEAD, PMKR, OTHER THAN TRANS: HCPCS | Performed by: THORACIC SURGERY (CARDIOTHORACIC VASCULAR SURGERY)

## 2024-02-28 PROCEDURE — 82330 ASSAY OF CALCIUM: CPT | Mod: 91

## 2024-02-28 PROCEDURE — 83735 ASSAY OF MAGNESIUM: CPT

## 2024-02-28 PROCEDURE — 93005 ELECTROCARDIOGRAM TRACING: CPT | Performed by: NURSE PRACTITIONER

## 2024-02-28 PROCEDURE — 83605 ASSAY OF LACTIC ACID: CPT | Mod: 91

## 2024-02-28 PROCEDURE — 700105 HCHG RX REV CODE 258: Performed by: ANESTHESIOLOGY

## 2024-02-28 PROCEDURE — 94002 VENT MGMT INPAT INIT DAY: CPT

## 2024-02-28 PROCEDURE — P9047 ALBUMIN (HUMAN), 25%, 50ML: HCPCS | Mod: JG

## 2024-02-28 PROCEDURE — 700101 HCHG RX REV CODE 250: Performed by: THORACIC SURGERY (CARDIOTHORACIC VASCULAR SURGERY)

## 2024-02-28 PROCEDURE — 33508 ENDOSCOPIC VEIN HARVEST: CPT | Mod: AS | Performed by: NURSE PRACTITIONER

## 2024-02-28 PROCEDURE — 700105 HCHG RX REV CODE 258

## 2024-02-28 PROCEDURE — 700102 HCHG RX REV CODE 250 W/ 637 OVERRIDE(OP): Performed by: NURSE PRACTITIONER

## 2024-02-28 PROCEDURE — 84132 ASSAY OF SERUM POTASSIUM: CPT | Mod: 91

## 2024-02-28 PROCEDURE — C1894 INTRO/SHEATH, NON-LASER: HCPCS | Performed by: THORACIC SURGERY (CARDIOTHORACIC VASCULAR SURGERY)

## 2024-02-28 PROCEDURE — 700111 HCHG RX REV CODE 636 W/ 250 OVERRIDE (IP): Mod: JZ | Performed by: NURSE PRACTITIONER

## 2024-02-28 PROCEDURE — 94003 VENT MGMT INPAT SUBQ DAY: CPT

## 2024-02-28 PROCEDURE — 93325 DOPPLER ECHO COLOR FLOW MAPG: CPT

## 2024-02-28 PROCEDURE — 160042 HCHG SURGERY MINUTES - EA ADDL 1 MIN LEVEL 5: Performed by: THORACIC SURGERY (CARDIOTHORACIC VASCULAR SURGERY)

## 2024-02-28 PROCEDURE — P9016 RBC LEUKOCYTES REDUCED: HCPCS | Mod: 91

## 2024-02-28 PROCEDURE — 99291 CRITICAL CARE FIRST HOUR: CPT | Performed by: STUDENT IN AN ORGANIZED HEALTH CARE EDUCATION/TRAINING PROGRAM

## 2024-02-28 PROCEDURE — 33512 CABG VEIN THREE: CPT | Performed by: THORACIC SURGERY (CARDIOTHORACIC VASCULAR SURGERY)

## 2024-02-28 PROCEDURE — 85347 COAGULATION TIME ACTIVATED: CPT | Mod: 91

## 2024-02-28 PROCEDURE — 85018 HEMOGLOBIN: CPT

## 2024-02-28 PROCEDURE — 700111 HCHG RX REV CODE 636 W/ 250 OVERRIDE (IP): Performed by: ANESTHESIOLOGY

## 2024-02-28 PROCEDURE — 82962 GLUCOSE BLOOD TEST: CPT | Mod: 91

## 2024-02-28 PROCEDURE — 82803 BLOOD GASES ANY COMBINATION: CPT | Mod: 91

## 2024-02-28 PROCEDURE — A9270 NON-COVERED ITEM OR SERVICE: HCPCS | Performed by: NURSE PRACTITIONER

## 2024-02-28 PROCEDURE — 160009 HCHG ANES TIME/MIN: Performed by: THORACIC SURGERY (CARDIOTHORACIC VASCULAR SURGERY)

## 2024-02-28 PROCEDURE — C1751 CATH, INF, PER/CENT/MIDLINE: HCPCS | Performed by: THORACIC SURGERY (CARDIOTHORACIC VASCULAR SURGERY)

## 2024-02-28 PROCEDURE — 700111 HCHG RX REV CODE 636 W/ 250 OVERRIDE (IP): Performed by: THORACIC SURGERY (CARDIOTHORACIC VASCULAR SURGERY)

## 2024-02-28 PROCEDURE — 503001 HCHG PERFUSION: Performed by: THORACIC SURGERY (CARDIOTHORACIC VASCULAR SURGERY)

## 2024-02-28 PROCEDURE — 160048 HCHG OR STATISTICAL LEVEL 1-5: Performed by: THORACIC SURGERY (CARDIOTHORACIC VASCULAR SURGERY)

## 2024-02-28 PROCEDURE — C1729 CATH, DRAINAGE: HCPCS | Performed by: THORACIC SURGERY (CARDIOTHORACIC VASCULAR SURGERY)

## 2024-02-28 PROCEDURE — 85049 AUTOMATED PLATELET COUNT: CPT

## 2024-02-28 PROCEDURE — 33508 ENDOSCOPIC VEIN HARVEST: CPT | Performed by: THORACIC SURGERY (CARDIOTHORACIC VASCULAR SURGERY)

## 2024-02-28 PROCEDURE — 770022 HCHG ROOM/CARE - ICU (200)

## 2024-02-28 PROCEDURE — 700111 HCHG RX REV CODE 636 W/ 250 OVERRIDE (IP)

## 2024-02-28 PROCEDURE — 85610 PROTHROMBIN TIME: CPT

## 2024-02-28 PROCEDURE — 700101 HCHG RX REV CODE 250

## 2024-02-28 PROCEDURE — C9248 INJ, CLEVIDIPINE BUTYRATE: HCPCS | Performed by: ANESTHESIOLOGY

## 2024-02-28 PROCEDURE — A9270 NON-COVERED ITEM OR SERVICE: HCPCS | Performed by: INTERNAL MEDICINE

## 2024-02-28 PROCEDURE — 110371 HCHG SHELL REV 272: Performed by: THORACIC SURGERY (CARDIOTHORACIC VASCULAR SURGERY)

## 2024-02-28 PROCEDURE — 94150 VITAL CAPACITY TEST: CPT

## 2024-02-28 PROCEDURE — 71045 X-RAY EXAM CHEST 1 VIEW: CPT

## 2024-02-28 PROCEDURE — 700101 HCHG RX REV CODE 250: Performed by: ANESTHESIOLOGY

## 2024-02-28 PROCEDURE — 86923 COMPATIBILITY TEST ELECTRIC: CPT | Mod: 91

## 2024-02-28 PROCEDURE — 06BQ4ZZ EXCISION OF LEFT SAPHENOUS VEIN, PERCUTANEOUS ENDOSCOPIC APPROACH: ICD-10-PCS | Performed by: THORACIC SURGERY (CARDIOTHORACIC VASCULAR SURGERY)

## 2024-02-28 PROCEDURE — 700105 HCHG RX REV CODE 258: Performed by: NURSE PRACTITIONER

## 2024-02-28 PROCEDURE — 94799 UNLISTED PULMONARY SVC/PX: CPT

## 2024-02-28 PROCEDURE — 0210099 BYPASS CORONARY ARTERY, ONE ARTERY FROM LEFT INTERNAL MAMMARY WITH AUTOLOGOUS VENOUS TISSUE, OPEN APPROACH: ICD-10-PCS | Performed by: THORACIC SURGERY (CARDIOTHORACIC VASCULAR SURGERY)

## 2024-02-28 DEVICE — MARKER GRAFT AC VEIN DISK SHAPED (10EA/BX): Type: IMPLANTABLE DEVICE | Site: HEART | Status: FUNCTIONAL

## 2024-02-28 RX ORDER — ALUMINA, MAGNESIA, AND SIMETHICONE 2400; 2400; 240 MG/30ML; MG/30ML; MG/30ML
30 SUSPENSION ORAL EVERY 4 HOURS PRN
Status: DISCONTINUED | OUTPATIENT
Start: 2024-02-28 | End: 2024-03-04 | Stop reason: HOSPADM

## 2024-02-28 RX ORDER — POTASSIUM CHLORIDE 7.45 MG/ML
10 INJECTION INTRAVENOUS ONCE
Status: COMPLETED | OUTPATIENT
Start: 2024-02-28 | End: 2024-02-28

## 2024-02-28 RX ORDER — ACETAMINOPHEN 325 MG/1
650 TABLET ORAL EVERY 4 HOURS PRN
Status: DISCONTINUED | OUTPATIENT
Start: 2024-02-28 | End: 2024-03-04 | Stop reason: HOSPADM

## 2024-02-28 RX ORDER — DOCUSATE SODIUM 100 MG/1
100 CAPSULE, LIQUID FILLED ORAL 2 TIMES DAILY
Status: DISCONTINUED | OUTPATIENT
Start: 2024-02-28 | End: 2024-03-04 | Stop reason: HOSPADM

## 2024-02-28 RX ORDER — PHENYLEPHRINE HYDROCHLORIDE 10 MG/ML
INJECTION, SOLUTION INTRAMUSCULAR; INTRAVENOUS; SUBCUTANEOUS PRN
Status: DISCONTINUED | OUTPATIENT
Start: 2024-02-28 | End: 2024-02-28 | Stop reason: SURG

## 2024-02-28 RX ORDER — DEXAMETHASONE SODIUM PHOSPHATE 4 MG/ML
INJECTION, SOLUTION INTRA-ARTICULAR; INTRALESIONAL; INTRAMUSCULAR; INTRAVENOUS; SOFT TISSUE PRN
Status: DISCONTINUED | OUTPATIENT
Start: 2024-02-28 | End: 2024-02-28 | Stop reason: SURG

## 2024-02-28 RX ORDER — ACETAMINOPHEN 650 MG/1
650 SUPPOSITORY RECTAL EVERY 4 HOURS PRN
Status: DISCONTINUED | OUTPATIENT
Start: 2024-02-28 | End: 2024-03-04 | Stop reason: HOSPADM

## 2024-02-28 RX ORDER — MORPHINE SULFATE 4 MG/ML
4 INJECTION INTRAVENOUS
Status: DISCONTINUED | OUTPATIENT
Start: 2024-02-28 | End: 2024-02-28

## 2024-02-28 RX ORDER — EPHEDRINE SULFATE 50 MG/ML
INJECTION, SOLUTION INTRAVENOUS PRN
Status: DISCONTINUED | OUTPATIENT
Start: 2024-02-28 | End: 2024-02-28 | Stop reason: SURG

## 2024-02-28 RX ORDER — SODIUM CHLORIDE 9 MG/ML
INJECTION, SOLUTION INTRAVENOUS CONTINUOUS
Status: DISCONTINUED | OUTPATIENT
Start: 2024-02-28 | End: 2024-02-29

## 2024-02-28 RX ORDER — VANCOMYCIN HYDROCHLORIDE 500 MG/10ML
INJECTION, POWDER, LYOPHILIZED, FOR SOLUTION INTRAVENOUS
Status: DISCONTINUED | OUTPATIENT
Start: 2024-02-28 | End: 2024-02-28

## 2024-02-28 RX ORDER — MAGNESIUM SULFATE 1 G/100ML
1 INJECTION INTRAVENOUS DAILY
Status: COMPLETED | OUTPATIENT
Start: 2024-02-28 | End: 2024-03-01

## 2024-02-28 RX ORDER — SODIUM CHLORIDE, SODIUM GLUCONATE, SODIUM ACETATE, POTASSIUM CHLORIDE AND MAGNESIUM CHLORIDE 526; 502; 368; 37; 30 MG/100ML; MG/100ML; MG/100ML; MG/100ML; MG/100ML
INJECTION, SOLUTION INTRAVENOUS
Status: DISCONTINUED | OUTPATIENT
Start: 2024-02-28 | End: 2024-02-28

## 2024-02-28 RX ORDER — BISACODYL 10 MG
10 SUPPOSITORY, RECTAL RECTAL
Status: DISCONTINUED | OUTPATIENT
Start: 2024-02-28 | End: 2024-03-04 | Stop reason: HOSPADM

## 2024-02-28 RX ORDER — INSULIN LISPRO 100 [IU]/ML
0-14 INJECTION, SOLUTION INTRAVENOUS; SUBCUTANEOUS
Status: ACTIVE | OUTPATIENT
Start: 2024-02-28 | End: 2024-02-29

## 2024-02-28 RX ORDER — MIDAZOLAM HYDROCHLORIDE 1 MG/ML
2 INJECTION INTRAMUSCULAR; INTRAVENOUS
Status: DISCONTINUED | OUTPATIENT
Start: 2024-02-28 | End: 2024-02-28

## 2024-02-28 RX ORDER — HEPARIN SODIUM,PORCINE 1000/ML
VIAL (ML) INJECTION PRN
Status: DISCONTINUED | OUTPATIENT
Start: 2024-02-28 | End: 2024-02-28

## 2024-02-28 RX ORDER — LIDOCAINE HYDROCHLORIDE 20 MG/ML
INJECTION, SOLUTION EPIDURAL; INFILTRATION; INTRACAUDAL; PERINEURAL PRN
Status: DISCONTINUED | OUTPATIENT
Start: 2024-02-28 | End: 2024-02-28 | Stop reason: SURG

## 2024-02-28 RX ORDER — PROTAMINE SULFATE 10 MG/ML
INJECTION, SOLUTION INTRAVENOUS PRN
Status: DISCONTINUED | OUTPATIENT
Start: 2024-02-28 | End: 2024-02-28 | Stop reason: SURG

## 2024-02-28 RX ORDER — ENEMA 19; 7 G/133ML; G/133ML
1 ENEMA RECTAL
Status: DISCONTINUED | OUTPATIENT
Start: 2024-02-28 | End: 2024-03-04 | Stop reason: HOSPADM

## 2024-02-28 RX ORDER — TRAMADOL HYDROCHLORIDE 50 MG/1
50 TABLET ORAL EVERY 4 HOURS PRN
Status: DISCONTINUED | OUTPATIENT
Start: 2024-02-28 | End: 2024-03-04 | Stop reason: HOSPADM

## 2024-02-28 RX ORDER — OMEPRAZOLE 20 MG/1
20 CAPSULE, DELAYED RELEASE ORAL DAILY
Status: DISCONTINUED | OUTPATIENT
Start: 2024-02-29 | End: 2024-03-04 | Stop reason: HOSPADM

## 2024-02-28 RX ORDER — OXYCODONE HYDROCHLORIDE 5 MG/1
5 TABLET ORAL
Status: DISCONTINUED | OUTPATIENT
Start: 2024-02-28 | End: 2024-03-04 | Stop reason: HOSPADM

## 2024-02-28 RX ORDER — DIPHENHYDRAMINE HCL 25 MG
25 TABLET ORAL
Status: DISCONTINUED | OUTPATIENT
Start: 2024-02-28 | End: 2024-03-04 | Stop reason: HOSPADM

## 2024-02-28 RX ORDER — PAPAVERINE HYDROCHLORIDE 30 MG/ML
INJECTION INTRAMUSCULAR; INTRAVENOUS
Status: DISCONTINUED | OUTPATIENT
Start: 2024-02-28 | End: 2024-02-28

## 2024-02-28 RX ORDER — POLYETHYLENE GLYCOL 3350 17 G/17G
1 POWDER, FOR SOLUTION ORAL 2 TIMES DAILY PRN
Status: DISCONTINUED | OUTPATIENT
Start: 2024-02-28 | End: 2024-03-04 | Stop reason: HOSPADM

## 2024-02-28 RX ORDER — ONDANSETRON 2 MG/ML
8 INJECTION INTRAMUSCULAR; INTRAVENOUS EVERY 6 HOURS PRN
Status: DISCONTINUED | OUTPATIENT
Start: 2024-02-28 | End: 2024-03-04 | Stop reason: HOSPADM

## 2024-02-28 RX ORDER — DEXTROSE MONOHYDRATE 25 G/50ML
12.5-25 INJECTION, SOLUTION INTRAVENOUS PRN
Status: DISCONTINUED | OUTPATIENT
Start: 2024-02-28 | End: 2024-02-29

## 2024-02-28 RX ORDER — CLOPIDOGREL BISULFATE 75 MG/1
75 TABLET ORAL DAILY
Status: DISCONTINUED | OUTPATIENT
Start: 2024-02-29 | End: 2024-02-29

## 2024-02-28 RX ORDER — DEXMEDETOMIDINE HYDROCHLORIDE 4 UG/ML
INJECTION, SOLUTION INTRAVENOUS
Status: DISCONTINUED | OUTPATIENT
Start: 2024-02-28 | End: 2024-02-28

## 2024-02-28 RX ORDER — ACETAMINOPHEN 500 MG
1000 TABLET ORAL EVERY 6 HOURS
Qty: 80 TABLET | Refills: 0 | Status: DISCONTINUED | OUTPATIENT
Start: 2024-02-28 | End: 2024-03-04 | Stop reason: HOSPADM

## 2024-02-28 RX ORDER — AMOXICILLIN 250 MG
1 CAPSULE ORAL NIGHTLY
Status: DISCONTINUED | OUTPATIENT
Start: 2024-02-28 | End: 2024-03-04 | Stop reason: HOSPADM

## 2024-02-28 RX ORDER — METHADONE HYDROCHLORIDE 10 MG/ML
INJECTION, SOLUTION INTRAMUSCULAR; INTRAVENOUS; SUBCUTANEOUS PRN
Status: DISCONTINUED | OUTPATIENT
Start: 2024-02-28 | End: 2024-02-28

## 2024-02-28 RX ORDER — AMOXICILLIN 250 MG
1 CAPSULE ORAL
Status: DISCONTINUED | OUTPATIENT
Start: 2024-02-28 | End: 2024-03-04 | Stop reason: HOSPADM

## 2024-02-28 RX ORDER — ACETAMINOPHEN 500 MG
1000 TABLET ORAL EVERY 6 HOURS PRN
Status: DISCONTINUED | OUTPATIENT
Start: 2024-03-09 | End: 2024-03-04 | Stop reason: HOSPADM

## 2024-02-28 RX ORDER — SODIUM CHLORIDE, SODIUM LACTATE, POTASSIUM CHLORIDE, CALCIUM CHLORIDE 600; 310; 30; 20 MG/100ML; MG/100ML; MG/100ML; MG/100ML
INJECTION, SOLUTION INTRAVENOUS
Status: DISCONTINUED | OUTPATIENT
Start: 2024-02-28 | End: 2024-02-28 | Stop reason: SURG

## 2024-02-28 RX ORDER — SODIUM CHLORIDE, SODIUM GLUCONATE, SODIUM ACETATE, POTASSIUM CHLORIDE AND MAGNESIUM CHLORIDE 526; 502; 368; 37; 30 MG/100ML; MG/100ML; MG/100ML; MG/100ML; MG/100ML
INJECTION, SOLUTION INTRAVENOUS PRN
Status: DISCONTINUED | OUTPATIENT
Start: 2024-02-28 | End: 2024-03-04 | Stop reason: HOSPADM

## 2024-02-28 RX ORDER — ROSUVASTATIN CALCIUM 10 MG/1
20 TABLET, COATED ORAL
Status: DISCONTINUED | OUTPATIENT
Start: 2024-02-28 | End: 2024-02-29

## 2024-02-28 RX ORDER — NOREPINEPHRINE BITARTRATE 0.03 MG/ML
0-1 INJECTION, SOLUTION INTRAVENOUS CONTINUOUS
Status: DISCONTINUED | OUTPATIENT
Start: 2024-02-28 | End: 2024-03-01

## 2024-02-28 RX ORDER — PROCHLORPERAZINE EDISYLATE 5 MG/ML
10 INJECTION INTRAMUSCULAR; INTRAVENOUS EVERY 6 HOURS PRN
Status: DISCONTINUED | OUTPATIENT
Start: 2024-02-28 | End: 2024-03-04 | Stop reason: HOSPADM

## 2024-02-28 RX ORDER — NITROGLYCERIN 20 MG/100ML
0-100 INJECTION INTRAVENOUS CONTINUOUS
Status: DISCONTINUED | OUTPATIENT
Start: 2024-02-28 | End: 2024-02-29

## 2024-02-28 RX ORDER — DEXMEDETOMIDINE HYDROCHLORIDE 4 UG/ML
0-1.5 INJECTION, SOLUTION INTRAVENOUS CONTINUOUS
Status: DISCONTINUED | OUTPATIENT
Start: 2024-02-28 | End: 2024-02-29

## 2024-02-28 RX ORDER — MIDAZOLAM HYDROCHLORIDE 1 MG/ML
INJECTION INTRAMUSCULAR; INTRAVENOUS PRN
Status: DISCONTINUED | OUTPATIENT
Start: 2024-02-28 | End: 2024-02-28

## 2024-02-28 RX ORDER — ENOXAPARIN SODIUM 100 MG/ML
40 INJECTION SUBCUTANEOUS DAILY
Status: DISCONTINUED | OUTPATIENT
Start: 2024-02-29 | End: 2024-03-04 | Stop reason: HOSPADM

## 2024-02-28 RX ORDER — EPINEPHRINE HCL IN 0.9 % NACL 4MG/250ML
0-.5 PLASTIC BAG, INJECTION (ML) INTRAVENOUS CONTINUOUS
Status: DISCONTINUED | OUTPATIENT
Start: 2024-02-28 | End: 2024-02-29

## 2024-02-28 RX ORDER — ASPIRIN 81 MG/1
81 TABLET ORAL DAILY
Status: DISCONTINUED | OUTPATIENT
Start: 2024-02-29 | End: 2024-03-04 | Stop reason: HOSPADM

## 2024-02-28 RX ORDER — OXYCODONE HYDROCHLORIDE 10 MG/1
10 TABLET ORAL
Status: DISCONTINUED | OUTPATIENT
Start: 2024-02-28 | End: 2024-03-04 | Stop reason: HOSPADM

## 2024-02-28 RX ORDER — ONDANSETRON 2 MG/ML
INJECTION INTRAMUSCULAR; INTRAVENOUS PRN
Status: DISCONTINUED | OUTPATIENT
Start: 2024-02-28 | End: 2024-02-28

## 2024-02-28 RX ORDER — SODIUM CHLORIDE 9 MG/ML
INJECTION, SOLUTION INTRAVENOUS
Status: DISCONTINUED | OUTPATIENT
Start: 2024-02-28 | End: 2024-02-28 | Stop reason: SURG

## 2024-02-28 RX ADMIN — Medication 1 APPLICATOR: at 20:14

## 2024-02-28 RX ADMIN — LIDOCAINE HYDROCHLORIDE 100 MG: 20 INJECTION, SOLUTION EPIDURAL; INFILTRATION; INTRACAUDAL at 08:14

## 2024-02-28 RX ADMIN — CLEVIPIDINE 500 MCG: 0.5 EMULSION INTRAVENOUS at 09:04

## 2024-02-28 RX ADMIN — ONDANSETRON 4 MG: 2 INJECTION INTRAMUSCULAR; INTRAVENOUS at 12:35

## 2024-02-28 RX ADMIN — AMINOCAPROIC ACID 6.33 G: 250 INJECTION, SOLUTION INTRAVENOUS at 08:40

## 2024-02-28 RX ADMIN — CLEVIPIDINE 500 MCG: 0.5 EMULSION INTRAVENOUS at 08:59

## 2024-02-28 RX ADMIN — EPHEDRINE SULFATE 5 MG: 50 INJECTION, SOLUTION INTRAVENOUS at 09:11

## 2024-02-28 RX ADMIN — SODIUM CHLORIDE, SODIUM GLUCONATE, SODIUM ACETATE, POTASSIUM CHLORIDE AND MAGNESIUM CHLORIDE: 526; 502; 368; 37; 30 INJECTION, SOLUTION INTRAVENOUS at 08:40

## 2024-02-28 RX ADMIN — ACETAMINOPHEN 1000 MG: 500 TABLET ORAL at 23:39

## 2024-02-28 RX ADMIN — ROSUVASTATIN 20 MG: 10 TABLET, FILM COATED ORAL at 20:13

## 2024-02-28 RX ADMIN — PHENYLEPHRINE HYDROCHLORIDE 200 MCG: 10 INJECTION INTRAVENOUS at 10:56

## 2024-02-28 RX ADMIN — OXYCODONE HYDROCHLORIDE 10 MG: 10 TABLET ORAL at 20:51

## 2024-02-28 RX ADMIN — SODIUM CHLORIDE 2 UNITS/HR: 9 INJECTION, SOLUTION INTRAVENOUS at 14:36

## 2024-02-28 RX ADMIN — EPHEDRINE SULFATE 5 MG: 50 INJECTION, SOLUTION INTRAVENOUS at 10:14

## 2024-02-28 RX ADMIN — POTASSIUM CHLORIDE 10 MEQ: 7.46 INJECTION, SOLUTION INTRAVENOUS at 14:19

## 2024-02-28 RX ADMIN — METHADONE HYDROCHLORIDE 10 MG: 10 INJECTION, SOLUTION INTRAMUSCULAR; INTRAVENOUS; SUBCUTANEOUS at 08:08

## 2024-02-28 RX ADMIN — SODIUM CHLORIDE, SODIUM GLUCONATE, SODIUM ACETATE, POTASSIUM CHLORIDE AND MAGNESIUM CHLORIDE: 526; 502; 368; 37; 30 INJECTION, SOLUTION INTRAVENOUS at 18:56

## 2024-02-28 RX ADMIN — ACETAMINOPHEN 1000 MG: 500 TABLET ORAL at 07:26

## 2024-02-28 RX ADMIN — SODIUM CHLORIDE: 9 INJECTION, SOLUTION INTRAVENOUS at 08:40

## 2024-02-28 RX ADMIN — METOPROLOL TARTRATE 12.5 MG: 25 TABLET, FILM COATED ORAL at 04:59

## 2024-02-28 RX ADMIN — CLEVIPIDINE 250 MCG: 0.5 EMULSION INTRAVENOUS at 10:22

## 2024-02-28 RX ADMIN — Medication 1 APPLICATOR: at 13:45

## 2024-02-28 RX ADMIN — AMINOCAPROIC ACID 5 G: 250 INJECTION, SOLUTION INTRAVENOUS at 09:12

## 2024-02-28 RX ADMIN — DESMOPRESSIN ACETATE 18.27 MCG: 4 INJECTION INTRAVENOUS; SUBCUTANEOUS at 12:27

## 2024-02-28 RX ADMIN — PHENYLEPHRINE HYDROCHLORIDE 200 MCG: 10 INJECTION INTRAVENOUS at 10:48

## 2024-02-28 RX ADMIN — METHADONE HYDROCHLORIDE 10 MG: 10 INJECTION, SOLUTION INTRAMUSCULAR; INTRAVENOUS; SUBCUTANEOUS at 08:59

## 2024-02-28 RX ADMIN — HEPARIN SODIUM 25000 UNITS: 1000 INJECTION, SOLUTION INTRAVENOUS; SUBCUTANEOUS at 09:50

## 2024-02-28 RX ADMIN — MORPHINE SULFATE 4 MG: 4 INJECTION, SOLUTION INTRAMUSCULAR; INTRAVENOUS at 14:16

## 2024-02-28 RX ADMIN — VANCOMYCIN HYDROCHLORIDE 1 G: 1 INJECTION, POWDER, LYOPHILIZED, FOR SOLUTION INTRAVENOUS at 19:51

## 2024-02-28 RX ADMIN — SODIUM CHLORIDE: 9 INJECTION, SOLUTION INTRAVENOUS at 13:57

## 2024-02-28 RX ADMIN — ROCURONIUM BROMIDE 100 MG: 10 INJECTION, SOLUTION INTRAVENOUS at 08:14

## 2024-02-28 RX ADMIN — ROCURONIUM BROMIDE 50 MG: 10 INJECTION, SOLUTION INTRAVENOUS at 10:22

## 2024-02-28 RX ADMIN — DEXMEDETOMIDINE HYDROCHLORIDE 0.6 MCG/KG/HR: 100 INJECTION, SOLUTION INTRAVENOUS at 12:37

## 2024-02-28 RX ADMIN — MAGNESIUM SULFATE IN DEXTROSE 1 G: 10 INJECTION, SOLUTION INTRAVENOUS at 13:45

## 2024-02-28 RX ADMIN — VANCOMYCIN HYDROCHLORIDE 1 G: 1 INJECTION, POWDER, LYOPHILIZED, FOR SOLUTION INTRAVENOUS at 08:40

## 2024-02-28 RX ADMIN — PROPOFOL 150 MG: 10 INJECTION, EMULSION INTRAVENOUS at 08:14

## 2024-02-28 RX ADMIN — SUGAMMADEX 200 MG: 100 INJECTION, SOLUTION INTRAVENOUS at 13:15

## 2024-02-28 RX ADMIN — SODIUM CHLORIDE, SODIUM GLUCONATE, SODIUM ACETATE, POTASSIUM CHLORIDE AND MAGNESIUM CHLORIDE: 526; 502; 368; 37; 30 INJECTION, SOLUTION INTRAVENOUS at 17:34

## 2024-02-28 RX ADMIN — PROTAMINE SULFATE 350 MG: 10 INJECTION, SOLUTION INTRAVENOUS at 12:25

## 2024-02-28 RX ADMIN — PHENYLEPHRINE HYDROCHLORIDE 200 MCG: 10 INJECTION INTRAVENOUS at 10:42

## 2024-02-28 RX ADMIN — DOCUSATE SODIUM 50 MG AND SENNOSIDES 8.6 MG 1 TABLET: 8.6; 5 TABLET, FILM COATED ORAL at 20:13

## 2024-02-28 RX ADMIN — PROPOFOL 50 MG: 10 INJECTION, EMULSION INTRAVENOUS at 10:30

## 2024-02-28 RX ADMIN — SODIUM CHLORIDE 2 UNITS/HR: 9 INJECTION, SOLUTION INTRAVENOUS at 11:40

## 2024-02-28 RX ADMIN — MIDAZOLAM HYDROCHLORIDE 2 MG: 1 INJECTION, SOLUTION INTRAMUSCULAR; INTRAVENOUS at 08:08

## 2024-02-28 RX ADMIN — DEXAMETHASONE SODIUM PHOSPHATE 8 MG: 4 INJECTION INTRA-ARTICULAR; INTRALESIONAL; INTRAMUSCULAR; INTRAVENOUS; SOFT TISSUE at 08:14

## 2024-02-28 RX ADMIN — HEPARIN SODIUM 10000 UNITS: 1000 INJECTION, SOLUTION INTRAVENOUS; SUBCUTANEOUS at 10:32

## 2024-02-28 RX ADMIN — SODIUM CHLORIDE, POTASSIUM CHLORIDE, SODIUM LACTATE AND CALCIUM CHLORIDE: 600; 310; 30; 20 INJECTION, SOLUTION INTRAVENOUS at 08:08

## 2024-02-28 RX ADMIN — CLEVIPIDINE 2 MG/HR: 0.5 EMULSION INTRAVENOUS at 12:33

## 2024-02-28 RX ADMIN — SODIUM CHLORIDE: 9 INJECTION, SOLUTION INTRAVENOUS at 13:44

## 2024-02-28 ASSESSMENT — COPD QUESTIONNAIRES
DO YOU EVER COUGH UP ANY MUCUS OR PHLEGM?: NO/ONLY WITH OCCASIONAL COLDS OR INFECTIONS
COPD SCREENING SCORE: 3
HAVE YOU SMOKED AT LEAST 100 CIGARETTES IN YOUR ENTIRE LIFE: NO/DON'T KNOW
DURING THE PAST 4 WEEKS HOW MUCH DID YOU FEEL SHORT OF BREATH: SOME OF THE TIME

## 2024-02-28 ASSESSMENT — PAIN DESCRIPTION - PAIN TYPE
TYPE: SURGICAL PAIN
TYPE: ACUTE PAIN

## 2024-02-28 ASSESSMENT — FIBROSIS 4 INDEX: FIB4 SCORE: 2.02

## 2024-02-28 ASSESSMENT — PULMONARY FUNCTION TESTS: FVC: 1.2

## 2024-02-28 NOTE — ANESTHESIA TIME REPORT
Anesthesia Start and Stop Event Times       Date Time Event    2/28/2024 0724 Ready for Procedure     0808 Anesthesia Start     1330 Anesthesia Stop          Responsible Staff  02/28/24      Name Role Begin End    Carlos Enrique Gamble M.D. Anesth 0808 1330          Overtime Reason:  no overtime (within assigned shift)    Comments:

## 2024-02-28 NOTE — ANESTHESIA PROCEDURE NOTES
ROHAN    Date/Time: 2/28/2024 8:35 AM    Performed by: Carlos Enrique Gamble M.D.  Authorized by: Carlos Enrique Gamble M.D.    Start Time:2/28/2024 8:35 AM  Preanesthetic Checklist: patient identified, IV checked, risks and benefits discussed, surgical consent, monitors and equipment checked, pre-op evaluation and timeout performed    Indication for ROHAN: diagnostic   Patient Location: OR  Intubated: Yes  Bite Block: Yes  Heart Visualized: Yes  Insertion: atraumatic    **See FULL ROHAN report in patient's chart via CV Syn[facility]**

## 2024-02-28 NOTE — ASSESSMENT & PLAN NOTE
Procedure: CABG x3  SVG-LAD (LIMA not suitable as graft), SVG-diag, SVG-OM  Date of surgery: 2/228  Surgeon: Dr Chicas    Extubated 2/28  Off insulin / pressors / drips  Aspirin / Plavix    OOB to chair

## 2024-02-28 NOTE — ANESTHESIA PROCEDURE NOTES
Central Venous Line    Performed by: Carlos Enrique Gamble M.D.  Authorized by: Carlos Enrique Gamble M.D.    Start Time:  2/28/2024 8:28 AM  End Time:  2/28/2024 8:30 AM  Patient Location:  OR  Indication: central venous access        provider hand hygiene performed prior to central venous catheter insertion, all 5 sterile barriers used (gloves, gown, cap, mask, large sterile drape) during central venous catheter insertion and skin prep agent completely dried prior to procedure    Patient Position:  Trendelenburg  Laterality:  Right  Site:  Internal jugular  Prep:  Chlorhexidine  Catheter Size:  8 Fr  Catheter Length (cm):  16  Number of Lumens:  Double lumen  target vein identified, needle advanced into vein and blood aspirated and guidewire advanced into vein    Seldinger Technique?: Yes    Ultrasound-Guided: ultrasound-guided  Image captured, interpreted and electronically stored.  Sterile Gel and Probe Cover Used for Ultrasound?: Yes    Intravenous Verification: verified by ultrasound, venous blood return and chest x-ray pending    all ports aspirated, all ports flushed easily, guidewire was removed intact, biopatch was applied, line was sutured in place and dressing was applied    Events: patient tolerated procedure well with no complications    PA Catheter Placed?: No

## 2024-02-28 NOTE — DISCHARGE INSTRUCTIONS
DIVISION OF CARDIAC SURGERY   DISCHARGE INSTRUCTIONS    Activity:    NO driving for 4 weeks after surgery. You may ride as a passenger.  NO lifting, pushing, or pulling more than 10 pounds for 6 weeks.  For the next 6 weeks, keep your elbows close to your body and move within a pain-free motion when lifting, pushing or pulling.  Do not stretch both arms backwards at the same time.    Walk at least 4 times per day, there is no maximum. The goal is to increase your distance over time.  Continue using incentive spirometer for 2 weeks or until your baseline volume is reached.  If you are going home on oxygen and you were not on oxygen prior to surgery, keep using until you are oxygen free.  Weigh yourself daily.  Call your Cardiologist for a weight gain of 3 or more pounds in 1 day or more than 5 pounds in 7 days.  Take all of your medications as prescribed. Do not use a pill box for the first month at home. If you have questions, please call your nurse navigator at [phone].  Continue to wear the JAMES (compression) stockings for 2-4 weeks or until all swelling is gone. You may take them off when you are in bed or when your legs are elevated.    Incision Care:    Make sure to clean your incision(s) TWICE DAILY.  Once by showering AND once using the no rinse Foam cleanser provided in the hospital.  During the shower, cleanse the incision(s) with a perfume and dye free soap (Dial, Dove, [place])  Use gentle pressure and rub up and down over incision with your hands or a washcloth. Rinse off and pat incision(s) dry with clean towel.  Keep the incision open to air. No creams or lotions on your incision(s). No baths.  If there is any increased redness or swelling, separation of the incision line, or thick drainage from any of your incisions, call the Cardiac Surgeons ([phone]).      General Instructions:    You have been referred to Cardiac Rehab.  You can start Cardiac Rehab 30 days after surgery.  If you do  not have an appointment at the time of discharge call [phone] to schedule an appointment.  Your Primary Care Doctor typically handles home oxygen. Oxygen may be stopped when your oxygen level is consistently greater than 90.  Check with your Primary Care Doctor if you are unsure.  Take all of your medications (including pain medications) as prescribed.  Taking medications other than prescribed can result in serious injury.    For Patients Discharged with Narcotic Pain Medication:     If a refill is needed, understand that only 1 refill will be provided and you must come to the Cardiac Surgeons’ office for an appointment (72 hours’ notice is required to schedule and there are no weekend appointments).  If the pain medications you are discharged on are not working, you will need to bring your remaining prescription into the office in order to receive a new prescription.  If you were taking narcotics prior to your heart surgery, the Cardiac Surgeons will provide you with one prescription and additional medications will need to be provided by your pain management doctor.  Do not drink alcohol while taking narcotics.  Lost or stolen medications will not be refilled.  If medications are stolen, report to law enforcement.    Contact Cardiac Surgery at [phone] if you have any questions.

## 2024-02-28 NOTE — CARE PLAN
The patient is Watcher - Medium risk of patient condition declining or worsening    Shift Goals  Clinical Goals: CABG, titrate o2 needs  Patient Goals: no CP, CABG education  Family Goals: ramos    Progress made toward(s) clinical / shift goals:    Problem: Pain - Standard  Goal: Alleviation of pain or a reduction in pain to the patient’s comfort goal  Outcome: Progressing     Problem: Knowledge Deficit - Standard  Goal: Patient and family/care givers will demonstrate understanding of plan of care, disease process/condition, diagnostic tests and medications  Outcome: Progressing     Problem: Hemodynamics  Goal: Patient's hemodynamics, fluid balance and neurologic status will be stable or improve  Outcome: Progressing     Problem: Respiratory  Goal: Patient will achieve/maintain optimum respiratory ventilation and gas exchange  Outcome: Progressing     Problem: Risk for Bleeding  Goal: Patient will take measures to prevent bleeding and recognizes signs of bleeding that need to be reported immediately to a health care professional  Outcome: Progressing     Problem: Fall Risk  Goal: Patient will remain free from falls  Outcome: Progressing       Patient is not progressing towards the following goals:

## 2024-02-28 NOTE — OP REPORT
OPERATIVE NOTE   Carine Christopher   02/28/24              PRE-OP DIAGNOSIS: multivessel CAD including chronic total occlusion of circumflex artery in area of previous stent, unstable angina at presentation, history of MI x3 previously with stents, HTN, HLD, giant cell arteritis, chronic bronchitis            POST-OP DIAGNOSIS: same            PROCEDURE:Coronary artery bypass grafting x3  Reversed saphenous vein graft to left anterior descending artery (LIMA left internal mammary artery not used due to sclerotic diminutive vessel consistent with subclavian stenosis/proximal LIMA stenosis)  Reversed saphenous vein graft to diagonal branch artery  Reversed saphenous vein graft to obtuse marginal artery  Endo-vein procurement           SURGEON: Dr. Fe Chicas    FIRST ASSISTANT/ VEIN PROCUREMENT: CARI Rubi            ANESTHESIA: General endotracheal, Dr. Carlos Enrique Gamble    CARDIOPULMONARY BYPASS TIME: 83 minutes    AORTIC CROSSCLAMP TIME: 70 minutes                  DRAINS: mediastinal 24F gus, 32F chest tubes x2            SPECIMENS: none     FINDINGS: LIMA left internal mammary artery not used due to sclerotic diminutive vessel consistent with subclavian stenosis/proximal LIMA stenosis, thin-walled targets, good venous conduit, LVEF improved from 48% to 65% post bypass           COMPLICATIONS: none identified            DISPOSITION: ICU            CONDITION: intubated, hemodynamically stable             Procedure details:      The patient was taken to the operating room. Standard monitoring lines and Pittman catheter were placed. General anesthesia was induced. The patient was prepped and draped in sterile fashion. An endoscopic procurement of the greater saphenous vein in the bilateral legs was carried out by CARI Rubi. Standard median sternotomy was performed. The left internal mammary artery was taken down between cautery and clips. The LIMA was noted to be sclerotic diminutive  vessel consistent with subclavian stenosis/proximal LIMA stenosis. It was examined in situ and as a potential free graft and was not of adequate quality for bypass. The patient was heparinized and the vessel divided distally. The heart was exposed and pericardial traction sutures placed. The distal aortic and triple stage right atrial venous cannulation was performed. Antegrade cardioplegia catheter was placed. The patient was placed on cardiopulmonary bypass. The aorta was cross-clamped and the heart arrested with [place] cardioplegia. Myocardial protection was maintained with topical cooling.  The first graft was the reversed saphenous vein to the obtuse marginal branch. The artery at the chosen grafting site was exposed and entered. Then the anastomosis was performed in end-to-side fashion with 7-0 prolene suture. The second graft was the reversed saphenous vein to the diagonal branch artery. The artery at the chosen grafting site was exposed and entered. Then the anastomosis was performed in end-to-side fashion with 7-0 prolene suture. The last graft was the reversed saphenous vein to the left anterior descending artery. The artery at the chosen grafting site was exposed and entered. Then the anastomosis was performed in end-to-side fashion with 7-0 prolene suture.The pedicle was secured to the epicardium with two 6-0 Prolene sutures. The proximal anastomoses were done in end-to-side fashion utilizing single clamp technique.   The heart was allowed 10 minutes to re-perfuse and ROHAN was used to verify appropriate wall motion. The patient was then weaned and  from cardiopulmonary bypass. Protamine was given to reverse the heparin. The heart was decannulated. Ventricular pacing wires were placed. The chest tubes were placed. Hemostasis was secured then the sternum was closed using stainless steel wires. The incision was closed in three layers with sutures. Sterile dressings were placed. At the end of the  operation, all sponge and needle counts were correct.

## 2024-02-28 NOTE — ANESTHESIA PROCEDURE NOTES
Airway    Date/Time: 2/28/2024 8:15 AM    Performed by: Carlos Enrique Gamble M.D.  Authorized by: Carlos Enrique Gamble M.D.    Location:  OR  Urgency:  Elective  Difficult Airway: No    Indications for Airway Management:  Anesthesia      Spontaneous Ventilation: absent    Sedation Level:  Deep  Preoxygenated: Yes    Patient Position:  Sniffing  Final Airway Type:  Endotracheal airway  Final Endotracheal Airway:  ETT  Cuffed: Yes    Technique Used for Successful ETT Placement:  Direct laryngoscopy    Insertion Site:  Oral  Blade Type:  Maria Isabel  Laryngoscope Blade/Videolaryngoscope Blade Size:  3  ETT Size (mm):  8.0  Measured from:  Teeth  ETT to Teeth (cm):  21  Placement Verified by: auscultation and capnometry    Cormack-Lehane Classification:  Grade I - full view of glottis  Number of Attempts at Approach:  1

## 2024-02-28 NOTE — INTERVAL H&P NOTE
H&P reviewed. The patient was examined and there are no changes to the H&P    Right thigh muscle cramp this morning treated with heating pad.    Carotid US:  FINDINGS   Right.    Mild plaque of the carotid bifurcation. Doppler velocities are normal    throughout the carotid arteries without evidence of hemodynamically    significant stenosis.       Left.    Flow velocities and Doppler waveforms are normal throughout the carotid    arteries without evidence of plaque.       The bilateral subclavian and vertebral artery waveforms are antegrade and    normal in character and velocity.

## 2024-02-28 NOTE — ANESTHESIA PROCEDURE NOTES
Arterial Line    Performed by: Carlos Enrique Gamble M.D.  Authorized by: Carlos Enrique Gamble M.D.    Start Time:  2/28/2024 8:18 AM  End Time:  2/28/2024 8:22 AM  Localization: ultrasound guidance  Image captured, interpreted and electronically stored.  Patient Location:  OR  Indication: continuous blood pressure monitoring and blood sampling needed        Catheter Size:  20 G  Seldinger Technique?: Yes    Laterality:  Left  Site:  Radial artery  Line Secured:  Antimicrobial disc, tape and transparent dressing  Events: patient tolerated procedure well with no complications         Peripheral IV removed  AVS printed and reviewed with pt  Belongings sent with pt  Accompanied off unit in w/c with PCA  All questions answered

## 2024-02-28 NOTE — CONSULTS
Critical Care History & Physical    Date of consult: 02/28/24    Referring Physician  Fe Chicas M.D.    Reason for Consultation  Chief Complaint   Patient presents with    Chest Pain     x1 week. Burning pain in center of chest. +SOB.Numbness in left arm starting tonight. Hx of 4 MI's with stent placement.        History of Presenting Illness  73 y.o. female with a history of CAD with PCI in 2023, chronic bronchitis and GCA who was admitted with in-stent restenosis and multivessel CAD.  On 2/285 she was taken for CABGx3 (all vein grafts as LIMA was not patent enough for use)      Code Status  Full Code    Review of Systems  Review of Systems   Unable to perform ROS: Intubated       Past Medical History   has a past medical history of Chronic bronchitis (HCC), Giant cell arteritis (HCC), History of heart artery stent, and MI (myocardial infarction) (HCC).    Surgical History   has a past surgical history that includes breast implant revision (Bilateral).    Family History  Reviewed and not pertinent    Social History   reports that she has never smoked. She has never used smokeless tobacco.    Medications  Home Medications       Reviewed by Kenisha Alves R.N. (Registered Nurse) on 02/28/24 at 0914  Med List Status: Complete     Medication Last Dose Status   acetaminophen (Tylenol) tablet 650 mg  Active   acetaminophen (Tylenol) tablet 650 mg  Active   albuterol 108 (90 Base) MCG/ACT Aero Soln inhalation aerosol 2/21/2024 Active   albuterol inhaler 2 Puff  Active   ALPRAZolam (Xanax) tablet 0.25 mg  Active   aminocaproic acid (Amicar) 5 g in  mL Infusion  Active   aminocaproic acid (Amicar) 6.33 g in  mL IV Bolus  Active   aminocaproic acid (Amicar) IVPB 6.33 g  Active   aspirin (ASPIRIN EC LOW STRENGTH) 81 MG EC tablet 2/20/2024 Active   aspirin EC tablet 81 mg  Active   azelastine (ASTELIN) 137 MCG/SPRAY nasal spray <month Active   clevidipine (Cleviprex) IV emulsion  Active   desmopressin PF  (Ddavp) 18.272 mcg in NS 50 mL ivpb  Active   dexmedetomidine (PRECEDEX) 400 mcg/100mL NS premix infusion  Active   Dextromethorphan HBr (VICKS DAYQUIL COUGH) 15 MG/15ML Liquid 2024 Active   electrolyte-A (Plasmalyte-A) infusion  Active   ePHEDrine injection  Active   EPINEPHrine (Adrenalin) infusion 4 mg/250 mL (premix)  Active   erythromycin base 250 MG Cap DR Particles 2024 Active   guaiFENesin dextromethorphan (Robitussin DM) 100-10 MG/5ML syrup 5 mL  Active   ibuprofen (IBU) 800 MG Tab 2024 Active   insulin regular (Humulin R) 100 Units in  mL Infusion  Active   lactated ringers infusion  Active   lidocaine (Xylocaine) 1 % injection 0.5 mL  Active   methadone 10 mg/mL (Dolophine) injection  Active   methadone 10 mg/mL (Dolophine) injection 20 mg  Active   metoprolol tartrate (LOPRESSOR) 25 MG Tab 2024 Active   metoprolol tartrate (Lopressor) tablet 12.5 mg  Active   morphine 4 MG/ML injection 2 mg  Active   Multiple Vitamins-Minerals (PRESERVISION AREDS) Tab 2024 Active   nitroglycerin (Nitro-Bid) 2 % ointment 0.5 Inch  Active   nitroglycerin (Nitrostat) tablet 0.4 mg  Active   norepinephrine (Levophed) 8 mg in 250 mL NS infusion (premix)  Active   NS infusion  Active   ondansetron (Zofran) syringe/vial injection 4 mg  Active   oxyCODONE immediate-release (Roxicodone) tablet 5-10 mg  Active   Phenylephrine-Doxylamine-DM (NYQUIL COUGH DM + CONGESTION) 5-6.25-10 MG/15ML Liquid 2024 Active   prasugrel (EFFIENT) 10 MG Tab 2024 Active   Sfzcjo-YyObl-UpZqz-FA-CA-Omega (COMPLETE [place] DHA) 29-1-200 & 200 MG Misc 2024 Active   rosuvastatin (CRESTOR) 10 MG Tab 2024 Active   rosuvastatin (Crestor) tablet 10 mg  Active   senna-docusate (Pericolace Or Senokot S) 8.6-50 MG per tablet 1 Tablet  Active   vancomycin (Vancocin) in 0.9% NaCl IVPB  Active   vancomycin 1000 mg in 250 mL NS IVPB Premix  Active                    Allergies  Allergies   Allergen Reactions     Atorvastatin Unspecified     .    Chloramphenicol Unspecified     .    Codeine Unspecified     .    Other Drug Diarrhea     Most antibiotics turn stool yellow    Sulfa Drugs Diarrhea     .         Vital Signs last 24 hours  Temp:  [36.1 °C (97 °F)-36.7 °C (98 °F)] 36.7 °C (98 °F)  Pulse:  [78-92] 85  Resp:  [15-29] 29  BP: (100-143)/(55-75) 118/58  SpO2:  [91 %-99 %] 98 %      Physical Exam  Physical Exam  Vitals and nursing note reviewed. Exam conducted with a chaperone present.   Constitutional:       General: She is not in acute distress.     Interventions: She is intubated.   HENT:      Head: Normocephalic.      Mouth/Throat:      Mouth: Mucous membranes are moist.   Eyes:      Extraocular Movements: Extraocular movements intact.   Cardiovascular:      Comments:   Mediastinal tubes in place, no active bleeding  Epicardial wires in place  Pulmonary:      Effort: Pulmonary effort is normal. No respiratory distress. She is intubated.   Abdominal:      General: There is no distension.      Palpations: Abdomen is soft.      Tenderness: There is no abdominal tenderness.   Musculoskeletal:         General: Normal range of motion.      Cervical back: Neck supple. No rigidity.   Skin:     General: Skin is warm and dry.      Capillary Refill: Capillary refill takes less than 2 seconds.   Neurological:      Comments: RASS -5           Fluids    Intake/Output Summary (Last 24 hours) at 2/28/2024 1343  Last data filed at 2/28/2024 1334  Gross per 24 hour   Intake 5170.09 ml   Output 2079 ml   Net 3091.09 ml         Laboratory  Recent Results (from the past 48 hour(s))   Heparin Xa (Unfractionated)    Collection Time: 02/27/24  4:23 AM   Result Value Ref Range    Heparin Xa (UFH) 0.65 IU/mL   Comp Metabolic Panel (CMP)    Collection Time: 02/27/24  1:10 PM   Result Value Ref Range    Sodium 136 135 - 145 mmol/L    Potassium 4.4 3.6 - 5.5 mmol/L    Chloride 98 96 - 112 mmol/L    Co2 27 20 - 33 mmol/L    Anion Gap 11.0 7.0 -  16.0    Glucose 95 65 - 99 mg/dL    Bun 17 8 - 22 mg/dL    Creatinine 0.55 0.50 - 1.40 mg/dL    Calcium 9.7 8.5 - 10.5 mg/dL    Correct Calcium 9.7 8.5 - 10.5 mg/dL    AST(SGOT) 31 12 - 45 U/L    ALT(SGPT) 22 2 - 50 U/L    Alkaline Phosphatase 89 30 - 99 U/L    Total Bilirubin 0.3 0.1 - 1.5 mg/dL    Albumin 4.0 3.2 - 4.9 g/dL    Total Protein 7.3 6.0 - 8.2 g/dL    Globulin 3.3 1.9 - 3.5 g/dL    A-G Ratio 1.2 g/dL   CBC with Differential    Collection Time: 02/27/24  1:10 PM   Result Value Ref Range    WBC 9.1 4.8 - 10.8 K/uL    RBC 4.14 (L) 4.20 - 5.40 M/uL    Hemoglobin 11.9 (L) 12.0 - 16.0 g/dL    Hematocrit 35.7 (L) 37.0 - 47.0 %    MCV 86.2 81.4 - 97.8 fL    MCH 28.7 27.0 - 33.0 pg    MCHC 33.3 32.2 - 35.5 g/dL    RDW 43.3 35.9 - 50.0 fL    Platelet Count 242 164 - 446 K/uL    MPV 11.2 9.0 - 12.9 fL    Neutrophils-Polys 66.60 44.00 - 72.00 %    Lymphocytes 21.00 (L) 22.00 - 41.00 %    Monocytes 8.40 0.00 - 13.40 %    Eosinophils 3.00 0.00 - 6.90 %    Basophils 0.70 0.00 - 1.80 %    Immature Granulocytes 0.30 0.00 - 0.90 %    Nucleated RBC 0.00 0.00 - 0.20 /100 WBC    Neutrophils (Absolute) 6.05 1.82 - 7.42 K/uL    Lymphs (Absolute) 1.91 1.00 - 4.80 K/uL    Monos (Absolute) 0.76 0.00 - 0.85 K/uL    Eos (Absolute) 0.27 0.00 - 0.51 K/uL    Baso (Absolute) 0.06 0.00 - 0.12 K/uL    Immature Granulocytes (abs) 0.03 0.00 - 0.11 K/uL    NRBC (Absolute) 0.00 K/uL   Hemoglobin  A1c    Collection Time: 02/27/24  1:10 PM   Result Value Ref Range    Glycohemoglobin 5.4 4.0 - 5.6 %    Est Avg Glucose 108 mg/dL   Prothrombin time (INR)    Collection Time: 02/27/24  1:10 PM   Result Value Ref Range    PT 13.7 12.0 - 14.6 sec    INR 1.04 0.87 - 1.13   APTT (PTT)    Collection Time: 02/27/24  1:10 PM   Result Value Ref Range    APTT 98.7 (H) 24.7 - 36.0 sec   ESTIMATED GFR    Collection Time: 02/27/24  1:10 PM   Result Value Ref Range    GFR (CKD-EPI) 96 >60 mL/min/1.73 m 2   ABO Rh Confirm    Collection Time: 02/27/24  1:10 PM    Result Value Ref Range    ABO Rh Confirm A POS    COD (Adult)    Collection Time: 24  2:00 PM   Result Value Ref Range    ABO Grouping Only A     Rh Grouping Only POS     Antibody Screen-Cod NEG     Component R       R99                 Red Cells, LR       [id number]   issued       24   11:21      Product Type R99     Dispense Status issued     Unit Number (Barcoded) [id number]     Product Code (Barcoded) C8488Q09     Blood Type (Barcoded) 6200     Component R       R99                 Red Cells, LR       [id number]   issued       24   11:21      Product Type R99     Dispense Status issued     Unit Number (Barcoded) [id number]     Product Code (Barcoded) W9136U89     Blood Type (Barcoded) 6200    EKG    Collection Time: 24  2:26 PM   Result Value Ref Range    Report       [facility]    Test Date:  2024  Pt Name:    ANUM RILEY          Department: [facility]  MRN:        [id number]                      Room:       [facility] [place]  Gender:     Female                       Technician: HOLLY  :        1950                   Requested By:OSVALDO AMBRIZ  Order #:    [id number]                    Reading MD: Lorenzo Soria MD    Measurements  Intervals                                Axis  Rate:       82                           P:          72  MO:         135                          QRS:        19  QRSD:       95                           T:          67  QT:         405  QTc:        473    Interpretive Statements  Sinus rhythm  Abnormal R-wave progression, early transition  Compared to ECG 2024 22:38:50  No significant changes  Electronically Signed On 2024 19:01:57 PST by Lorenzo Soria MD     Urinalysis    Collection Time: 24  2:30 AM    Specimen: Urine, Clean Catch   Result Value Ref Range    Color Yellow     Character Clear     Specific Gravity 1.018 <1.035    Ph 6.0 5.0 - 8.0    Glucose Negative Negative mg/dL    Ketones Negative Negative mg/dL    Protein  Negative Negative mg/dL    Bilirubin Negative Negative    Urobilinogen, Urine 1.0 Negative    Nitrite Negative Negative    Leukocyte Esterase Negative Negative    Occult Blood Negative Negative    Micro Urine Req see below    POCT glucose device results    Collection Time: 02/28/24  3:40 AM   Result Value Ref Range    POC Glucose, Blood 99 65 - 99 mg/dL   POCT activated clotting time device results    Collection Time: 02/28/24  8:26 AM   Result Value Ref Range    Istat Activated Clotting Time 147 (H) 74 - 137 sec   POCT arterial blood gas device results    Collection Time: 02/28/24  8:31 AM   Result Value Ref Range    Ph 7.394 (L) 7.400 - 7.500    Pco2 44.9 (H) 26.0 - 37.0 mmHg    Po2 303 (H) 64 - 87 mmHg    Tco2 29 20 - 33 mmol/L    S02 100 (H) 93 - 99 %    Hco3 27.4 (H) 17.0 - 25.0 mmol/L    BE 2 -4 - 3 mmol/L    Body Temp 36.0 C degrees    Ph Temp Pato 7.408 7.400 - 7.500    Pco2 Temp Co 43.0 (H) 26.0 - 37.0 mmHg    Po2 Temp Cor 298 (H) 64 - 87 mmHg    Specimen Arterial    POCT sodium device results    Collection Time: 02/28/24  8:31 AM   Result Value Ref Range    Istat Sodium 137 135 - 145 mmol/L   POCT potassium device results    Collection Time: 02/28/24  8:31 AM   Result Value Ref Range    Istat Potassium 3.9 3.6 - 5.5 mmol/L   POCT ionized CA device results    Collection Time: 02/28/24  8:31 AM   Result Value Ref Range    Istat Ionized Calcium 1.26 1.10 - 1.30 mmol/L   POCT hematocrit and hemoglobin device results    Collection Time: 02/28/24  8:31 AM   Result Value Ref Range    Istat Hematocrit 34 (L) 37 - 47 %    Istat Hemoglobin 11.6 (L) 12.0 - 16.0 g/dL   POCT arterial blood gas device results    Collection Time: 02/28/24  9:56 AM   Result Value Ref Range    Ph 7.322 (L) 7.400 - 7.500    Pco2 52.7 (HH) 26.0 - 37.0 mmHg    Po2 420 (H) 64 - 87 mmHg    Tco2 29 20 - 33 mmol/L    S02 100 (H) 93 - 99 %    Hco3 27.3 (H) 17.0 - 25.0 mmol/L    BE 1 -4 - 3 mmol/L    Body Temp 35.2 C degrees    Ph Temp Pato 7.347  (L) 7.400 - 7.500    Pco2 Temp Co 48.7 (H) 26.0 - 37.0 mmHg    Po2 Temp Cor 409 (H) 64 - 87 mmHg    Specimen Arterial    POCT sodium device results    Collection Time: 02/28/24  9:56 AM   Result Value Ref Range    Istat Sodium 134 (L) 135 - 145 mmol/L   POCT potassium device results    Collection Time: 02/28/24  9:56 AM   Result Value Ref Range    Istat Potassium 4.1 3.6 - 5.5 mmol/L   POCT ionized CA device results    Collection Time: 02/28/24  9:56 AM   Result Value Ref Range    Istat Ionized Calcium 1.23 1.10 - 1.30 mmol/L   POCT hematocrit and hemoglobin device results    Collection Time: 02/28/24  9:56 AM   Result Value Ref Range    Istat Hematocrit 31 (L) 37 - 47 %    Istat Hemoglobin 10.5 (L) 12.0 - 16.0 g/dL   POCT activated clotting time device results    Collection Time: 02/28/24  9:59 AM   Result Value Ref Range    Istat Activated Clotting Time 504 (H) 74 - 137 sec   POCT venous blood gas device results    Collection Time: 02/28/24 11:11 AM   Result Value Ref Range    Ph 7.349 7.310 - 7.450    Pco2 49.4 41.0 - 51.0 mmHg    Po2 56 (H) 25 - 40 mmHg    Tco2 29 20 - 33 mmol/L    SO2 87 %    Hco3 27.2 24.0 - 28.0 mmol/L    BE 1 -4 - 3 mmol/L    Body Temp 34.0 C degrees    Ph Temp Correc 7.392 7.310 - 7.450    Pco2 Temp Pato 43.3 41.0 - 51.0 mmHg    Po2 Temp Corre 46 (H) 25 - 40 mmHg    Specimen Venous    POCT sodium device results    Collection Time: 02/28/24 11:11 AM   Result Value Ref Range    Istat Sodium 132 (L) 135 - 145 mmol/L   POCT potassium device results    Collection Time: 02/28/24 11:11 AM   Result Value Ref Range    Istat Potassium 5.1 3.6 - 5.5 mmol/L   POCT ionized CA device results    Collection Time: 02/28/24 11:11 AM   Result Value Ref Range    Istat Ionized Calcium 0.91 (L) 1.10 - 1.30 mmol/L   POCT hematocrit and hemoglobin device results    Collection Time: 02/28/24 11:11 AM   Result Value Ref Range    Istat Hematocrit 21 (L) 37 - 47 %    Istat Hemoglobin 7.1 (L) 12.0 - 16.0 g/dL   POCT  pt c/o head spinning since Wednesday, pt has hx of vertigo 2 years ago. denies cp. no neuro deficits activated clotting time device results    Collection Time: 02/28/24 11:22 AM   Result Value Ref Range    Istat Activated Clotting Time 709 (H) 74 - 137 sec   POCT arterial blood gas device results    Collection Time: 02/28/24 11:41 AM   Result Value Ref Range    Ph 7.307 (L) 7.400 - 7.500    Pco2 48.6 (H) 26.0 - 37.0 mmHg    Po2 343 (H) 64 - 87 mmHg    Tco2 26 20 - 33 mmol/L    S02 100 (H) 93 - 99 %    Hco3 24.3 17.0 - 25.0 mmol/L    BE -2 -4 - 3 mmol/L    Body Temp 34.5 C degrees    Ph Temp Pato 7.342 (L) 7.400 - 7.500    Pco2 Temp Co 43.6 (H) 26.0 - 37.0 mmHg    Po2 Temp Cor 331 (H) 64 - 87 mmHg    Specimen Arterial    POCT sodium device results    Collection Time: 02/28/24 11:41 AM   Result Value Ref Range    Istat Sodium 132 (L) 135 - 145 mmol/L   POCT potassium device results    Collection Time: 02/28/24 11:41 AM   Result Value Ref Range    Istat Potassium 6.0 (H) 3.6 - 5.5 mmol/L   POCT ionized CA device results    Collection Time: 02/28/24 11:41 AM   Result Value Ref Range    Istat Ionized Calcium 0.95 (L) 1.10 - 1.30 mmol/L   POCT hematocrit and hemoglobin device results    Collection Time: 02/28/24 11:41 AM   Result Value Ref Range    Istat Hematocrit 28 (L) 37 - 47 %    Istat Hemoglobin 9.5 (L) 12.0 - 16.0 g/dL   POCT activated clotting time device results    Collection Time: 02/28/24 11:44 AM   Result Value Ref Range    Istat Activated Clotting Time 579 (H) 74 - 137 sec   POCT arterial blood gas device results    Collection Time: 02/28/24 12:35 PM   Result Value Ref Range    Ph 7.384 (L) 7.400 - 7.500    Pco2 42.7 (H) 26.0 - 37.0 mmHg    Po2 424 (H) 64 - 87 mmHg    Tco2 27 20 - 33 mmol/L    S02 100 (H) 93 - 99 %    Hco3 25.5 (H) 17.0 - 25.0 mmol/L    BE 0 -4 - 3 mmol/L    Body Temp 36.5 C degrees    Ph Temp Pato 7.391 (L) 7.400 - 7.500    Pco2 Temp Co 41.8 (H) 26.0 - 37.0 mmHg    Po2 Temp Cor 421 (H) 64 - 87 mmHg    Specimen Arterial    POCT sodium device results    Collection Time: 02/28/24 12:35 PM    Result Value Ref Range    Istat Sodium 133 (L) 135 - 145 mmol/L   POCT potassium device results    Collection Time: 24 12:35 PM   Result Value Ref Range    Istat Potassium 4.5 3.6 - 5.5 mmol/L   POCT ionized CA device results    Collection Time: 24 12:35 PM   Result Value Ref Range    Istat Ionized Calcium 1.35 (H) 1.10 - 1.30 mmol/L   POCT hematocrit and hemoglobin device results    Collection Time: 24 12:35 PM   Result Value Ref Range    Istat Hematocrit 30 (L) 37 - 47 %    Istat Hemoglobin 10.2 (L) 12.0 - 16.0 g/dL   POCT activated clotting time device results    Collection Time: 24 12:38 PM   Result Value Ref Range    Istat Activated Clotting Time 142 (H) 74 - 137 sec   EKG on arrival to CSU    Collection Time: 24  1:36 PM   Result Value Ref Range    Report       [facility]    Test Date:  2024  Pt Name:    ANUM RILEY          Department: 161  MRN:        [id number]                      Room:       [facility]  Gender:     Female                       Technician: KATLIN  :        1950                   Requested By:OSVALDO AMBRIZ  Order #:    [id number]                    Reading MD:    Measurements  Intervals                                Axis  Rate:       89                           P:          83  MD:         160                          QRS:        29  QRSD:       100                          T:          54  QT:         410  QTc:        499    Interpretive Statements  Sinus rhythm  Borderline low voltage, extremity leads  Abnormal R-wave progression, early transition  Borderline prolonged QT interval  Compared to ECG 2024 14:26:13  No significant changes           Imaging  US-VEIN MAPPING LOWER EXTREMITY BILAT   Final Result      US-CAROTID DOPPLER BILAT   Final Result      DX-CHEST-2 VIEWS   Final Result      No acute cardiopulmonary abnormality.      US-EXTREMITY ARTERY UPPER UNILAT RIGHT   Final Result      EC-ECHOCARDIOGRAM COMPLETE W/O CONT   Final  Result      DX-CHEST-PORTABLE (1 VIEW)   Final Result      No acute process.      CL-LEFT HEART CATHETERIZATION WITH POSSIBLE INTERVENTION    (Results Pending)         Assessment/Plan  * S/P CABG x 3  Assessment & Plan  Procedure: CABG x3  SVG-LAD (LIMA not suitable as graft), SVG-diag, SVG-OM  Date of surgery: 2/228  Surgeon: Dr Chicas    Monitor hemodynamics and titrate pressor and inotropic support.   Follow chest tube output and correct coagulopathy.     Monitor for post cardiac surgery complication such as myocardial dysfunction, bleeding, tamponade, graft dysfunction, arrhythmia, respiratory failure, neurologic, renal and infectious complication.      Monitor and treat stress hyperglycemia.     Extubate as soon as clinically possible     Giant cell arteritis (HCC)- (present on admission)  Assessment & Plan  Previously treated with corticosteroids  Now in remission    Unstable angina (HCC)- (present on admission)  Assessment & Plan  No s/p CABG as above    Dyslipidemia- (present on admission)  Assessment & Plan  High intensity statin    Primary hypertension- (present on admission)  Assessment & Plan  Restart home meds as able    CAD (coronary artery disease)- (present on admission)  Assessment & Plan  History of CAD with prior non-STEMI and PCI's with JAMES        VTE:  Per cardiac surgery  Ulcer: PPI  Lines: Central Line  Ongoing indication addressed, Arterial Line  Ongoing indication addressed and Pittman Catheter  Ongoing indication addressed      I have performed a physical exam and reviewed and updated ROS and Plan today (2/28/2024). In review of yesterday's note (2/27/2024), there are no changes except as documented above.     Discussed patient condition and risk of morbidity and/or mortality with Family, RN, RT, Pharmacy, , Charge nurse / hot rounds, Patient and CVS      The patient remains critically ill.  Critical care time = 47 minutes in directly providing and coordinating critical care and  extensive data review.  No time overlap and excludes procedures.

## 2024-02-28 NOTE — PROGRESS NOTES
4 Eyes Skin Assessment Completed by BESSIE Waite and BESSIE Aguayo.    Head WDL  Ears WDL  Nose WDL  Mouth WDL  Neck WDL  Breast/Chest WDL  Shoulder Blades WDL  Spine WDL  (R) Arm/Elbow/Hand diffuse Bruising and Swelling d/t hematoma s/p HC  (L) Arm/Elbow/Hand Abrasion  Abdomen WDL  Groin right-sided Bruising s/p HC  Scrotum/Coccyx/Buttocks Redness and Blanching  (R) Leg WDL  (L) Leg Scab  (R) Heel/Foot/Toe WDL  (L) Heel/Foot/Toe WDL          Devices In Places Tele Box, Blood Pressure Cuff, Pulse Ox, and Nasal Cannula      Interventions In Place NC W/Ear Foams, Pillows, and Low Air Loss Mattress    Possible Skin Injury No    Pictures Uploaded Into Epic N/A  Wound Consult Placed N/A  RN Wound Prevention Protocol Ordered Yes

## 2024-02-28 NOTE — ANESTHESIA PREPROCEDURE EVALUATION
Case: [id number] Anesthesia Start Date/Time: 02/28/24 0808    Procedures:       CABG, WITH ENDOSCOPIC VEIN PROCUREMENT - X3      ECHOCARDIOGRAM, TRANSESOPHAGEAL, INTRAOPERATIVE    Anesthesia type: general    Location: [place] [address] / SURGERY [facility]    Surgeons: Fe Chicas M.D.          NSTEMI-recent  Chronic Anticoagulation  [place]  Relevant Problems   CARDIAC   (positive) CAD (coronary artery disease)   (positive) Primary hypertension   (positive) Unstable angina (HCC)      Other   (positive) Dyslipidemia       Physical Exam    Airway   Mallampati: II  TM distance: >3 FB  Neck ROM: full       Cardiovascular - normal exam  Rhythm: regular  Rate: normal  (-) murmur     Dental - normal exam           Pulmonary - normal exam  Breath sounds clear to auscultation     Abdominal    Neurological - normal exam                   Anesthesia Plan    ASA 4   ASA physical status 4 criteria: CAD/stents - recent (< 3 months)    Plan - general       Airway plan will be ETT  ROHAN Planned        Induction: intravenous    Postoperative Plan: Postoperative administration of opioids is intended.    Pertinent diagnostic labs and testing reviewed    Informed Consent:    Anesthetic plan and risks discussed with patient.    Use of blood products discussed with: patient whom.

## 2024-02-29 ENCOUNTER — APPOINTMENT (OUTPATIENT)
Dept: RADIOLOGY | Facility: MEDICAL CENTER | Age: 74
DRG: 233 | End: 2024-02-29
Attending: THORACIC SURGERY (CARDIOTHORACIC VASCULAR SURGERY)
Payer: MEDICARE

## 2024-02-29 ENCOUNTER — APPOINTMENT (OUTPATIENT)
Dept: RADIOLOGY | Facility: MEDICAL CENTER | Age: 74
DRG: 233 | End: 2024-02-29
Attending: NURSE PRACTITIONER
Payer: MEDICARE

## 2024-02-29 LAB
ANION GAP SERPL CALC-SCNC: 11 MMOL/L (ref 7–16)
BUN SERPL-MCNC: 24 MG/DL (ref 8–22)
CALCIUM SERPL-MCNC: 8.1 MG/DL (ref 8.5–10.5)
CHLORIDE SERPL-SCNC: 103 MMOL/L (ref 96–112)
CO2 SERPL-SCNC: 22 MMOL/L (ref 20–33)
CREAT SERPL-MCNC: 0.57 MG/DL (ref 0.5–1.4)
EKG IMPRESSION: NORMAL
ERYTHROCYTE [DISTWIDTH] IN BLOOD BY AUTOMATED COUNT: 45.8 FL (ref 35.9–50)
GFR SERPLBLD CREATININE-BSD FMLA CKD-EPI: 95 ML/MIN/1.73 M 2
GLUCOSE BLD STRIP.AUTO-MCNC: 106 MG/DL (ref 65–99)
GLUCOSE BLD STRIP.AUTO-MCNC: 107 MG/DL (ref 65–99)
GLUCOSE BLD STRIP.AUTO-MCNC: 112 MG/DL (ref 65–99)
GLUCOSE BLD STRIP.AUTO-MCNC: 117 MG/DL (ref 65–99)
GLUCOSE BLD STRIP.AUTO-MCNC: 117 MG/DL (ref 65–99)
GLUCOSE BLD STRIP.AUTO-MCNC: 118 MG/DL (ref 65–99)
GLUCOSE BLD STRIP.AUTO-MCNC: 119 MG/DL (ref 65–99)
GLUCOSE BLD STRIP.AUTO-MCNC: 121 MG/DL (ref 65–99)
GLUCOSE BLD STRIP.AUTO-MCNC: 123 MG/DL (ref 65–99)
GLUCOSE BLD STRIP.AUTO-MCNC: 123 MG/DL (ref 65–99)
GLUCOSE BLD STRIP.AUTO-MCNC: 126 MG/DL (ref 65–99)
GLUCOSE BLD STRIP.AUTO-MCNC: 143 MG/DL (ref 65–99)
GLUCOSE BLD STRIP.AUTO-MCNC: 148 MG/DL (ref 65–99)
GLUCOSE BLD STRIP.AUTO-MCNC: 162 MG/DL (ref 65–99)
GLUCOSE BLD STRIP.AUTO-MCNC: 163 MG/DL (ref 65–99)
GLUCOSE BLD STRIP.AUTO-MCNC: 166 MG/DL (ref 65–99)
GLUCOSE BLD STRIP.AUTO-MCNC: 191 MG/DL (ref 65–99)
GLUCOSE BLD STRIP.AUTO-MCNC: 199 MG/DL (ref 65–99)
GLUCOSE SERPL-MCNC: 114 MG/DL (ref 65–99)
HCT VFR BLD AUTO: 30.4 % (ref 37–47)
HGB BLD-MCNC: 10.2 G/DL (ref 12–16)
MCH RBC QN AUTO: 28.5 PG (ref 27–33)
MCHC RBC AUTO-ENTMCNC: 33.6 G/DL (ref 32.2–35.5)
MCV RBC AUTO: 84.9 FL (ref 81.4–97.8)
PLATELET # BLD AUTO: 127 K/UL (ref 164–446)
PMV BLD AUTO: 11 FL (ref 9–12.9)
POTASSIUM SERPL-SCNC: 4.5 MMOL/L (ref 3.6–5.5)
POTASSIUM SERPL-SCNC: 4.6 MMOL/L (ref 3.6–5.5)
RBC # BLD AUTO: 3.58 M/UL (ref 4.2–5.4)
SODIUM SERPL-SCNC: 136 MMOL/L (ref 135–145)
WBC # BLD AUTO: 15.2 K/UL (ref 4.8–10.8)

## 2024-02-29 PROCEDURE — A9270 NON-COVERED ITEM OR SERVICE: HCPCS | Performed by: NURSE PRACTITIONER

## 2024-02-29 PROCEDURE — 80048 BASIC METABOLIC PNL TOTAL CA: CPT

## 2024-02-29 PROCEDURE — 700102 HCHG RX REV CODE 250 W/ 637 OVERRIDE(OP): Performed by: NURSE PRACTITIONER

## 2024-02-29 PROCEDURE — 85027 COMPLETE CBC AUTOMATED: CPT

## 2024-02-29 PROCEDURE — 700111 HCHG RX REV CODE 636 W/ 250 OVERRIDE (IP): Mod: JZ | Performed by: NURSE PRACTITIONER

## 2024-02-29 PROCEDURE — 99024 POSTOP FOLLOW-UP VISIT: CPT | Performed by: NURSE PRACTITIONER

## 2024-02-29 PROCEDURE — 99233 SBSQ HOSP IP/OBS HIGH 50: CPT | Performed by: STUDENT IN AN ORGANIZED HEALTH CARE EDUCATION/TRAINING PROGRAM

## 2024-02-29 PROCEDURE — 700105 HCHG RX REV CODE 258: Performed by: NURSE PRACTITIONER

## 2024-02-29 PROCEDURE — 93010 ELECTROCARDIOGRAM REPORT: CPT | Performed by: INTERNAL MEDICINE

## 2024-02-29 PROCEDURE — 71045 X-RAY EXAM CHEST 1 VIEW: CPT

## 2024-02-29 PROCEDURE — 770022 HCHG ROOM/CARE - ICU (200)

## 2024-02-29 PROCEDURE — 93005 ELECTROCARDIOGRAM TRACING: CPT | Performed by: NURSE PRACTITIONER

## 2024-02-29 PROCEDURE — 93005 ELECTROCARDIOGRAM TRACING: CPT | Performed by: THORACIC SURGERY (CARDIOTHORACIC VASCULAR SURGERY)

## 2024-02-29 PROCEDURE — 94669 MECHANICAL CHEST WALL OSCILL: CPT

## 2024-02-29 PROCEDURE — P9045 ALBUMIN (HUMAN), 5%, 250 ML: HCPCS | Mod: JZ,JG | Performed by: NURSE PRACTITIONER

## 2024-02-29 PROCEDURE — 700111 HCHG RX REV CODE 636 W/ 250 OVERRIDE (IP): Mod: JZ,JG | Performed by: NURSE PRACTITIONER

## 2024-02-29 PROCEDURE — 82962 GLUCOSE BLOOD TEST: CPT | Mod: 91

## 2024-02-29 RX ORDER — CLOPIDOGREL BISULFATE 75 MG/1
75 TABLET ORAL DAILY
Status: DISCONTINUED | OUTPATIENT
Start: 2024-03-01 | End: 2024-03-04 | Stop reason: HOSPADM

## 2024-02-29 RX ORDER — ROSUVASTATIN CALCIUM 10 MG/1
10 TABLET, COATED ORAL
Status: DISCONTINUED | OUTPATIENT
Start: 2024-02-29 | End: 2024-03-04 | Stop reason: HOSPADM

## 2024-02-29 RX ORDER — ALBUMIN, HUMAN INJ 5% 5 %
25 SOLUTION INTRAVENOUS ONCE
Status: COMPLETED | OUTPATIENT
Start: 2024-02-29 | End: 2024-02-29

## 2024-02-29 RX ORDER — DEXTROSE MONOHYDRATE 25 G/50ML
25 INJECTION, SOLUTION INTRAVENOUS
Status: DISCONTINUED | OUTPATIENT
Start: 2024-02-29 | End: 2024-03-02

## 2024-02-29 RX ORDER — PRASUGREL 10 MG/1
10 TABLET, FILM COATED ORAL DAILY
Status: DISCONTINUED | OUTPATIENT
Start: 2024-03-01 | End: 2024-02-29

## 2024-02-29 RX ORDER — SODIUM CHLORIDE, SODIUM LACTATE, POTASSIUM CHLORIDE, CALCIUM CHLORIDE 600; 310; 30; 20 MG/100ML; MG/100ML; MG/100ML; MG/100ML
1000 INJECTION, SOLUTION INTRAVENOUS ONCE
Status: COMPLETED | OUTPATIENT
Start: 2024-03-01 | End: 2024-03-01

## 2024-02-29 RX ADMIN — TRAMADOL HYDROCHLORIDE 50 MG: 50 TABLET ORAL at 00:42

## 2024-02-29 RX ADMIN — Medication 1 APPLICATOR: at 20:53

## 2024-02-29 RX ADMIN — OXYCODONE HYDROCHLORIDE 10 MG: 10 TABLET ORAL at 12:09

## 2024-02-29 RX ADMIN — CLOPIDOGREL BISULFATE 75 MG: 75 TABLET ORAL at 05:59

## 2024-02-29 RX ADMIN — ASPIRIN 81 MG: 81 TABLET, COATED ORAL at 05:58

## 2024-02-29 RX ADMIN — DOCUSATE SODIUM 100 MG: 100 CAPSULE, LIQUID FILLED ORAL at 05:59

## 2024-02-29 RX ADMIN — ACETAMINOPHEN 1000 MG: 500 TABLET ORAL at 05:58

## 2024-02-29 RX ADMIN — Medication 1 APPLICATOR: at 09:00

## 2024-02-29 RX ADMIN — DOCUSATE SODIUM 100 MG: 100 CAPSULE, LIQUID FILLED ORAL at 18:36

## 2024-02-29 RX ADMIN — OXYCODONE HYDROCHLORIDE 10 MG: 10 TABLET ORAL at 21:37

## 2024-02-29 RX ADMIN — ALBUMIN (HUMAN) 25 G: 12.5 SOLUTION INTRAVENOUS at 00:49

## 2024-02-29 RX ADMIN — ACETAMINOPHEN 1000 MG: 500 TABLET ORAL at 12:08

## 2024-02-29 RX ADMIN — ENOXAPARIN SODIUM 40 MG: 100 INJECTION SUBCUTANEOUS at 18:00

## 2024-02-29 RX ADMIN — SODIUM CHLORIDE, POTASSIUM CHLORIDE, SODIUM LACTATE AND CALCIUM CHLORIDE 1000 ML: 600; 310; 30; 20 INJECTION, SOLUTION INTRAVENOUS at 23:43

## 2024-02-29 RX ADMIN — DOCUSATE SODIUM 50 MG AND SENNOSIDES 8.6 MG 1 TABLET: 8.6; 5 TABLET, FILM COATED ORAL at 20:53

## 2024-02-29 RX ADMIN — ACETAMINOPHEN 1000 MG: 500 TABLET ORAL at 23:42

## 2024-02-29 RX ADMIN — OXYCODONE HYDROCHLORIDE 10 MG: 10 TABLET ORAL at 18:36

## 2024-02-29 RX ADMIN — ACETAMINOPHEN 1000 MG: 500 TABLET ORAL at 18:35

## 2024-02-29 RX ADMIN — MAGNESIUM SULFATE IN DEXTROSE 1 G: 10 INJECTION, SOLUTION INTRAVENOUS at 06:08

## 2024-02-29 RX ADMIN — ROSUVASTATIN 10 MG: 10 TABLET, FILM COATED ORAL at 20:53

## 2024-02-29 RX ADMIN — METOPROLOL TARTRATE 12.5 MG: 25 TABLET, FILM COATED ORAL at 18:36

## 2024-02-29 RX ADMIN — OMEPRAZOLE 20 MG: 20 CAPSULE, DELAYED RELEASE ORAL at 05:58

## 2024-02-29 ASSESSMENT — ENCOUNTER SYMPTOMS
ABDOMINAL PAIN: 0
SPUTUM PRODUCTION: 0
SHORTNESS OF BREATH: 0
HEADACHES: 0
VOMITING: 0
MUSCULOSKELETAL NEGATIVE: 1
SORE THROAT: 0
CHILLS: 0
FOCAL WEAKNESS: 0
FEVER: 0
NAUSEA: 0
PALPITATIONS: 0
EYES NEGATIVE: 1

## 2024-02-29 ASSESSMENT — PAIN DESCRIPTION - PAIN TYPE
TYPE: ACUTE PAIN
TYPE: ACUTE PAIN;SURGICAL PAIN
TYPE: ACUTE PAIN
TYPE: ACUTE PAIN;SURGICAL PAIN
TYPE: ACUTE PAIN

## 2024-02-29 ASSESSMENT — FIBROSIS 4 INDEX: FIB4 SCORE: 3.85

## 2024-02-29 NOTE — PROGRESS NOTES
Pt eligible for extubation per protocol, parameters as follows:    RR 16   [id number]  NIF -40  Pinsp 7  PEEP 8  FiO2 40  PaCO2 35.2  PH 7.43  SpO2 95  RSBI <100  Hemodynamically stable, less than 2 pressors.    Pt extubated to 6 L NC by RT Kristen. No stridor noted. Tolerated well.    Total time intubated 2086-8364. 5hr 17min

## 2024-02-29 NOTE — ANESTHESIA POSTPROCEDURE EVALUATION
Patient: Carine Christopher    Procedure Summary       Date: 02/28/24 Room / Location: [facility] OR 03 / SURGERY [facility]    Anesthesia Start: 0808 Anesthesia Stop: 1330    Procedures:       CABG, WITH ENDOSCOPIC VEIN PROCUREMENT - X3 (Chest)      ECHOCARDIOGRAM, TRANSESOPHAGEAL, INTRAOPERATIVE (Esophagus) Diagnosis: (coronary artery disease)    Surgeons: Fe Chicas M.D. Responsible Provider: Carlos Enrique Gamble M.D.    Anesthesia Type: general ASA Status: 4            Final Anesthesia Type: general  Last vitals  BP   Blood Pressure : 121/62, Arterial BP: 111/52    Temp   37.5 °C (99.5 °F)    Pulse   (!) 102   Resp   20    SpO2   95 %      Anesthesia Post Evaluation    Patient location during evaluation: ICU  Level of consciousness: awake and alert    Airway patency: patent  Anesthetic complications: no  Cardiovascular status: hemodynamically stable  Respiratory status: acceptable  Hydration status: euvolemic    PONV: none          No notable events documented.     Nurse Pain Score: 2 (NPRS)

## 2024-02-29 NOTE — PROGRESS NOTES
4 Eyes Skin Assessment Completed by BESSIE wei and BESSIE Patel.    Head WDL  Ears WDL  Nose WDL  Mouth WDL  Neck WDL  Breast/Chest WDL  Shoulder Blades WDL  Spine WDL  (R) Arm/Elbow/Hand Bruising  (L) Arm/Elbow/Hand WDL  Abdomen WDL  Groin Bruising  Scrotum/Coccyx/Buttocks WDL  (R) Leg WDL  (L) Leg WDL  (R) Heel/Foot/Toe WDL  (L) Heel/Foot/Toe WDL          Devices In Places ECG, Blood Pressure Cuff, Pulse Ox, Pittman, Arterial Line, SCD's, ET Tube, and Central Line      Interventions In Place Pillows, Q2 Turns, and Low Air Loss Mattress    Possible Skin Injury No    Pictures Uploaded Into Epic N/A  Wound Consult Placed N/A  RN Wound Prevention Protocol Ordered No

## 2024-02-29 NOTE — CARE PLAN
The patient is Watcher - Medium risk of patient condition declining or worsening    Shift Goals  Clinical Goals: CABG, wean ventilator  Patient Goals: update SO, education  Family Goals: FELIX    Progress made toward(s) clinical / shift goals:    Problem: Pain - Standard  Goal: Alleviation of pain or a reduction in pain to the patient’s comfort goal  2/28/2024 1649 by Marquise Murillo R.N.  Outcome: Progressing  2/28/2024 1648 by Marquise Murillo R.N.  Outcome: Progressing     Problem: Knowledge Deficit - Standard  Goal: Patient and family/care givers will demonstrate understanding of plan of care, disease process/condition, diagnostic tests and medications  2/28/2024 1649 by JESSICA López.N.  Outcome: Progressing  2/28/2024 1648 by Marquise Murillo R.N.  Outcome: Progressing     Problem: Hemodynamics  Goal: Patient's hemodynamics, fluid balance and neurologic status will be stable or improve  2/28/2024 1649 by Marquise Murillo R.N.  Outcome: Progressing  2/28/2024 1648 by Marquise Murillo R.N.  Outcome: Progressing     Problem: Day of surgery post CABG/Heart valve replacement  Goal: Stabilization in immediate post op period  Outcome: Progressing  Intervention: VS q 15 min x 4 hours, then q 1 hour. Include temperature immediately upon arrival. Check CO/CI q 2-4 hours and PRN  Note: Vitals taken Q 15 while on vasoactive medications. Bear hugger initiated for temp of 36 C  Intervention: If radial artery used, elevate arm, no BP checks or needle sticks from affected arm, monitor ulnar pulse and capillary refill  Note: NA  Intervention: First post op hour labs and EKG per order  Note: Labs sent and EKG completed when arrived to floor  Intervention: Serum K q 6 hours x 24 hours.  ABG and CBC prn.  Note: Pottassium checked Q 6, ABGs checked to extubate, no need for additional CBC at this moment  Intervention: Initiate post cardiac insulin infusion protocol orders for FSBS greater than 140 and check frequency per  protocol  Note: Initiate for BG greater than 180  Intervention: FSBS frequency as per Cardiac Surgery Insulin Drip Protocol  Note: FSBS checked per CTS protocol  Intervention: For patients on Beta Blockers: verify dose given prior to surgery or within 6 hours after arrival to the unit  Note: NA  Intervention: Chest tube to 20 cm suction, record CT drainage with VS, and check for air leak  Note: Chest tubes set to suction, no air leaks noted  Mediastenal tubes output: 19  Tam tube output:94  Intervention: For CT drainage >300 mL in first hour post op and/or 150 mL in subsequent hours: Stat platelets, PT, INR, TEG, iSTAT, and H&H per order  Note: Labs sent per orders, no update required for patient's CT drainage  Intervention: Titrate and wean off vasoactive drips per patient's condition and per MD order while maintaining SBP  mmHg per MD order  Note: Patients vasoactive medications titrated for SBP   Intervention: VAP protocol in place  Note: VAP protocol completed  Intervention: Wean from Vent per protocol (see protocol), extubation goal within 6 hours post op  Note: Ventilator weaned with the protocol, out of room time 1320  Extubated @ 1837  Intervention: IS q 1 hour while awake post extubation  Note: Not yet applicable, Patient still intubated.  Intervention: Bedrest until extubated and groin lines out  Note: Patient still intubated, no groin lines present  Intervention: Dangle within 4 hours post extubation  Note: To be completed when patient is extubated  Intervention: Up in chair 4 hours, day of extubation  Note: To be completed when patient is extubated  Intervention: Maintain all original surgical dressings per provider orders and specifications  Note: All surgical dressings maintain and to be removed on post op day 1   Intervention: Clear liquids post extubation, order carbohydrate free (post cardiac surgery) diet, advance as tolerated  Note: Not yet applicable, patient still  intubated  Intervention: Discontinue [place] nick and arterial line 12-18 hours post op if hemodynamically stable and off vasoactive drips  Note: Patient still present on vasoactive medications, no swan present, Left radial A-line still in place  Intervention: A-Fib and DVT prophylaxis per MD order or contraindications documented (refer to DVT/VTE problem on Care Plan)  Note: Not applicable  Intervention: Amiodarone protocol per MD order  Note: Not applicable       Patient is not progressing towards the following goals:

## 2024-02-29 NOTE — RESPIRATORY CARE
Extubation    Cuff leak noted yes  Stridor present no      FiO2%: 40 % (02/28/24 1837)  O2 (LPM): 6 (02/28/24 1900)     Patient toleration well   RCP Complete? yes  Events/Summary/Plan: pt extubated placed on 4LPM NC (02/28/24 1838)

## 2024-02-29 NOTE — CARE PLAN
The patient is Watcher - Medium risk of patient condition declining or worsening    Shift Goals  Clinical Goals: Wean Pressors, mobilize, encourage IS  Patient Goals: water  Family Goals: FELIX    Progress made toward(s) clinical / shift goals:  Occasionally able to titrate down on vasopressors-have had to increase following pain medication administration/when sleeping, good UOP >40ml/hr, able to stand well- hope to transfer to chair in AM, extubated just before shift change-FiO2 now 3L NC. Aox4.   Problem: Pain - Standard  Goal: Alleviation of pain or a reduction in pain to the patient’s comfort goal  Outcome: Progressing     Problem: Day of surgery post CABG/Heart valve replacement  Goal: Stabilization in immediate post op period  Outcome: Progressing  Intervention: VS q 15 min x 4 hours, then q 1 hour. Include temperature immediately upon arrival. Check CO/CI q 2-4 hours and PRN  Note: Q15 While on Vasoactive medications  Intervention: If radial artery used, elevate arm, no BP checks or needle sticks from affected arm, monitor ulnar pulse and capillary refill  Note: Left Radial Art line, cuff on opposite arm, brisk cap refill  Intervention: First post op hour labs and EKG per order  Note: Performed on day shift  Intervention: Serum K q 6 hours x 24 hours.  ABG and CBC prn.  Note: Q6 K scale, cbc with AM labs  Intervention: Initiate post cardiac insulin infusion protocol orders for FSBS greater than 140 and check frequency per protocol  Note: Initiated  Intervention: FSBS frequency as per Cardiac Surgery Insulin Drip Protocol  Note: Q1-2 hours, drip titrated off as of time of this note  Intervention: For patients on Beta Blockers: verify dose given prior to surgery or within 6 hours after arrival to the unit  Note: Given pre op, not given immediately post op as remains on norepinephrine  Intervention: Chest tube to 20 cm suction, record CT drainage with VS, and check for air leak  Note: CT to -20cm suction. Drainage  recorded hourly over night. No Air leak  Intervention: For CT drainage >300 mL in first hour post op and/or 150 mL in subsequent hours: Stat platelets, PT, INR, TEG, iSTAT, and H&H per order  Note: N/A  Intervention: Titrate and wean off vasoactive drips per patient's condition and per MD order while maintaining SBP  mmHg per MD order  Note: Titrated when able, as of time of this note remains on low dose norepi (0.03)  Intervention: VAP protocol in place  Note: Extubated, HOB > 30 degrees, IS when awake  Intervention: Wean from Vent per protocol (see protocol), extubation goal within 6 hours post op  Note: Goal Met- extubated just prior to start of NOC shift  Intervention: IS q 1 hour while awake post extubation  Note: Best IS for NOC shift is 750ml   Intervention: Bedrest until extubated and groin lines out  Note: Mobilized to EOB + standing  Intervention: Dangle within 4 hours post extubation  Note: Performed  Intervention: Up in chair 4 hours, day of extubation  Note: To complete with AM mobility  Intervention: Maintain all original surgical dressings per provider orders and specifications  Note: To be maintained 24 hours  Intervention: Clear liquids post extubation, order carbohydrate free (post cardiac surgery) diet, advance as tolerated  Note: Passed dysphagia screening- clear liquid carb control diet ordered, patient states will not drink any sugar free sodas.   Intervention: Discontinue [place] nick and arterial line 12-18 hours post op if hemodynamically stable and off vasoactive drips  Note: No swan in place, art line maintained thus far as patient remains on norepinephrine for hemodynamic support  Intervention: A-Fib and DVT prophylaxis per MD order or contraindications documented (refer to DVT/VTE problem on Care Plan)  Note: SCDs in place (which patient enjoys)  Intervention: Amiodarone protocol per MD order  Note: N/A as patient is in a normal sinus rhythm at this point in time.      Problem:  Respiratory  Goal: Patient will achieve/maintain optimum respiratory ventilation and gas exchange  Outcome: Progressing     Problem: Hemodynamics  Goal: Patient's hemodynamics, fluid balance and neurologic status will be stable or improve  Outcome: Progressing     Problem: Knowledge Deficit - Standard  Goal: Patient and family/care givers will demonstrate understanding of plan of care, disease process/condition, diagnostic tests and medications  Outcome: Progressing     Patient is not progressing towards the following goals: Low IS (improved effort), pressor still infusing, poor sleep thus far given frequent interruptions.

## 2024-02-29 NOTE — PROGRESS NOTES
Updated CARI Chavez regarding patient being tachycardic up to 107 with 42 out of chest tubes over last three hours. Low dose Levophed and great UOP.     No new interventions at this moment.

## 2024-02-29 NOTE — CARE PLAN
Problem: Ventilation  Goal: Ability to achieve and maintain unassisted ventilation or tolerate decreased levels of ventilator support  Description: Target End Date:  4 days     Document on Vent flowsheet    1.  Support and monitor invasive and noninvasive mechanical ventilation  2.  Monitor ventilator weaning response  3.  Perform ventilator associated pneumonia prevention interventions  4.  Manage ventilation therapy by monitoring diagnostic test results  Outcome: Progressing  Note:   Ventilator Daily Summary    Vent Day # 1    Airway: 8.0@21    Ventilator settings: Spont PS5/+8/40%  Rest Settings: ASV 80/+8/40%    Weaning trials: Pt placed on SPONT at 1646    Respiratory Procedures: Intubation    Plan: Continue current ventilator settings and wean mechanical ventilation as tolerated per physician orders.

## 2024-02-29 NOTE — PROGRESS NOTES
Pt arrived s/p CABG x 3. Report received from [facility] anesthesiologist. Pacer tested rate 60 Ma 10 Mv 2. Chest tubes placed to suction. NO Air leak noted. Chest x ray reviewed by intensivist. Marcela gamez applied for temp 36 C. Labs sent.

## 2024-02-29 NOTE — PROGRESS NOTES
Critical Care Progress Note    Date of admission  2/21/2024    Chief Complaint  73 y.o. female with a history of CAD with PCI in 2023, chronic bronchitis and GCA who was admitted with in-stent restenosis and multivessel CAD.  On 2/285 she was taken for CABGx3 (all vein grafts as LIMA was not patent enough for use)     Hospital Course  2/28 - CABG x3, admitted to ICU, extubated in evening    2/29 - Resume dapt, OOB to chair, off norepi.    Interval Problem Update  Reviewed last 24 hour events:  Tmax: Afebrile  Diet: Advance per CTS  Vasopressors: None    Infusions: None     Antibx: None    Intake / Output  Urine Output past 24 hours: 2000mL  Net Admission: +4 L    Lab Trends  WBC 9 --> 15   Hgb 12 --> 10  Platelets 93 --> 127    BMP WNL    Review of Systems  Review of Systems   Constitutional:  Negative for chills, fever and malaise/fatigue.   HENT:  Negative for congestion and sore throat.    Eyes: Negative.    Respiratory:  Negative for sputum production and shortness of breath.    Cardiovascular:  Negative for chest pain and palpitations.   Gastrointestinal:  Negative for abdominal pain, nausea and vomiting.   Genitourinary: Negative.    Musculoskeletal: Negative.    Skin: Negative.    Neurological:  Negative for focal weakness and headaches.   All other systems reviewed and are negative.       Vital Signs for last 24 hours   Temp:  [36.9 °C (98.4 °F)] 36.9 °C (98.4 °F)  Pulse:  [] 105  Resp:  [11-42] 26  BP: ()/(51-73) 99/56  SpO2:  [82 %-99 %] 93 %    Hemodynamic parameters for last 24 hours  CVP:  [2 MM HG-10 MM HG] 5 MM HG    Respiratory Information for the last 24 hours  Vent Mode: Spont  PEEP/CPAP: 8  P Support: 5  MAP: 10  Control VTE (exp VT): 294    Physical Exam   Physical Exam  Vitals and nursing note reviewed. Exam conducted with a chaperone present.   Constitutional:       General: She is awake. She is not in acute distress.  HENT:      Head: Normocephalic.      Mouth/Throat:      Mouth:  Mucous membranes are moist.   Eyes:      Extraocular Movements: Extraocular movements intact.   Cardiovascular:      Rate and Rhythm: Normal rate and regular rhythm.      Pulses: Normal pulses.      Comments: Mediastinal tubes in place, trace drainage  Pulmonary:      Effort: Pulmonary effort is normal. No respiratory distress.   Abdominal:      General: There is no distension.      Palpations: Abdomen is soft.      Tenderness: There is no abdominal tenderness.   Musculoskeletal:         General: Normal range of motion.      Cervical back: Normal range of motion and neck supple.   Skin:     General: Skin is warm.      Capillary Refill: Capillary refill takes less than 2 seconds.   Neurological:      General: No focal deficit present.      Mental Status: She is alert.         Medications  Current Facility-Administered Medications   Medication Dose Route Frequency Provider Last Rate Last Admin    insulin regular (HumuLIN R,NovoLIN R) injection  2-9 Units Subcutaneous 4X/DAY ACHANA MARIA Chicas M.D.        And    dextrose 50% (D50W) injection 25 g  25 g Intravenous Q15 MIN PRN Fe Chicas M.D.        [START ON 3/1/2024] prasugrel (Effient) tablet 10 mg  10 mg Oral DAILY LENARD MalloyP.NFlvaia        rosuvastatin (Crestor) tablet 10 mg  10 mg Oral QHS LENARD MalloyPJASBIR        Respiratory Therapy Consult   Nebulization Continuous RT LENARD CalderonP.RJASBIR        electrolyte-A (Plasmalyte-A) infusion   Intravenous PRN RAYSHAWN Calderon.P.R.N.   Stopped at 02/29/24 0000    Nozin nasal  swab  1 Applicator Each Nostril BID LENARD CalderonP.RFlaviaN.   1 Applicator at 02/28/24 2014    calcium CHLORIDE 10 % 1,000 mg in dextrose 5% 100 mL IVPB  1,000 mg Intravenous Once PRN RAYSHAWN Calderon.P.R.NFlavia        magnesium sulfate in D5W IVPB premix 1 g  1 g Intravenous DAILY RAYSHAWN Calderon.P.R.N.   Stopped at 02/29/24 0708    K+ Scale: Goal of 4.5  1 Each Intravenous Q6HRS LENARD CalderonP.R.N.   1 Each at  02/28/24 1330    metoprolol tartrate (Lopressor) tablet 12.5 mg  12.5 mg Oral BID Dariusz Rosenberg, A.P.R.N.        Followed by    [START ON 3/1/2024] metoprolol tartrate (Lopressor) tablet 25 mg  25 mg Oral BID Dariusz Rosenberg, A.P.R.N.        aspirin EC tablet 81 mg  81 mg Oral DAILY Dariusz Rosenberg, A.P.R.N.   81 mg at 02/29/24 0558    acetaminophen (Tylenol) tablet 1,000 mg  1,000 mg Oral Q6HRS Dariusz Rosenberg, A.P.R.N.   1,000 mg at 02/29/24 0558    Followed by    [START ON 3/9/2024] acetaminophen (Tylenol) tablet 1,000 mg  1,000 mg Oral Q6HRS PRN Dariusz Rosenberg, A.P.R.N.        oxyCODONE immediate-release (Roxicodone) tablet 5 mg  5 mg Oral Q3HRS PRN Dariusz Rosenberg, A.P.R.N.        Or    oxyCODONE immediate release (Roxicodone) tablet 10 mg  10 mg Oral Q3HRS PRN Dariusz Rosenberg, A.P.R.N.   10 mg at 02/28/24 2051    Or    fentaNYL (Sublimaze) injection 50 mcg  50 mcg Intravenous Q3HRS PRN Dariusz Rosenberg, A.P.R.N.        traMADol (Ultram) 50 MG tablet 50 mg  50 mg Oral Q4HRS PRN Dariusz Rosenberg, A.P.R.N.   50 mg at 02/29/24 0042    ondansetron (Zofran) syringe/vial injection 8 mg  8 mg Intravenous Q6HRS PRN Dariusz Rosenberg, A.P.R.N.        Or    prochlorperazine (Compazine) injection 10 mg  10 mg Intravenous Q6HRS PRN Dariusz Rosenberg, A.P.R.N.        acetaminophen (Tylenol) tablet 650 mg  650 mg Oral Q4HRS PRN Dariusz Rosenberg, A.P.R.N.        Or    acetaminophen (Tylenol) suppository 650 mg  650 mg Rectal Q4HRS PRN Dariusz Rosenberg, A.P.R.N.        docusate sodium (Colace) capsule 100 mg  100 mg Oral BID Dariusz Rosenberg, A.P.R.N.   100 mg at 02/29/24 0559    senna-docusate (Pericolace Or Senokot S) 8.6-50 MG per tablet 1 Tablet  1 Tablet Oral Nightly Dariusz Rosenberg A.P.R.N.   1 Tablet at 02/28/24 2013    senna-docusate (Pericolace Or Senokot S) 8.6-50 MG per tablet 1 Tablet  1 Tablet Oral Q24HRS PRN Dariusz Rosenberg, A.P.R.N.        polyethylene glycol/lytes (Miralax) Packet 1 Packet  1 Packet Oral BID PRN RAYSHAWN Calderon.P.R.N.         magnesium hydroxide (Milk Of Magnesia) suspension 30 mL  30 mL Oral QDAY PRN Dariusz Rosenberg, A.P.R.N.        bisacodyl (Dulcolax) suppository 10 mg  10 mg Rectal Q24HRS PRN Dariusz Rosenberg, A.P.R.N.        sodium phosphate (Fleet) enema 133 mL  1 Each Rectal Once PRN Dariusz Rosenberg, A.P.R.N.        omeprazole (PriLOSEC) capsule 20 mg  20 mg Oral DAILY Dariusz Rosenberg A.P.R.N.   20 mg at 02/29/24 0558    mag hydrox-al hydrox-simeth (Maalox Plus Es Or Mylanta Ds) suspension 30 mL  30 mL Oral Q4HRS PRN Dariusz Rosenberg, A.P.R.N.        diphenhydrAMINE (Benadryl) tablet/capsule 25 mg  25 mg Oral HS PRN - MR X 1 Dariusz Rosenberg A.P.R.N.        enoxaparin (Lovenox) inj 40 mg  40 mg Subcutaneous DAILY AT 1800 Dariusz Rosenberg A.P.R.PETER.        norepinephrine (Levophed) 8 mg in 250 mL NS infusion (premix)  0-1 mcg/kg/min (Ideal) Intravenous Continuous Aram Martinez M.D. 4.3 mL/hr at 02/29/24 0736 0.04 mcg/kg/min at 02/29/24 0736    insulin lispro (HumaLOG,AdmeLOG) injection  0-14 Units Subcutaneous TID AC Aram Martinez M.D.        insulin regular (Humulin R) 100 Units in  mL Infusion  0-29 Units/hr Intravenous Continuous Aram Martinez M.D.   Stopped. (Insulin or Heparin) at 02/29/24 0131       Fluids    Intake/Output Summary (Last 24 hours) at 2/29/2024 1007  Last data filed at 2/29/2024 0800  Gross per 24 hour   Intake 7545.95 ml   Output 3291 ml   Net 4254.95 ml       Laboratory  Recent Labs     02/28/24  1430 02/28/24  1713 02/28/24  1831   ISTATAPH 7.404 7.327* 7.430   ISTATAPCO2 36.9 47.9* 35.2   ISTATAPO2 94* 91* 97*   ISTATATCO2 24 27 24   EKYJQMH1KZK 97 96 98   ISTATARTHCO3 23.1 25.1* 23.4   ISTATARTBE -1 -1 -1   ISTATTEMP 36.1 C 37.0 C 36.9 C   ISTATFIO2 80 40 40   ISTATSPEC Arterial Arterial Arterial   ISTATAPHTC 7.417 7.327* 7.432   URZGVJDI2DH 89* 91* 97*         Recent Labs     02/27/24  1310 02/28/24  1335 02/28/24  1710 02/28/24  2330 02/29/24  0550   SODIUM 136  --   --   --  136   POTASSIUM 4.4 4.2 4.5 4.5 4.6    CHLORIDE 98  --   --   --  103   CO2 27  --   --   --  22   BUN 17  --   --   --  24*   CREATININE 0.55  --   --   --  0.57   MAGNESIUM  --  3.3*  --   --   --    CALCIUM 9.7  --   --   --  8.1*     Recent Labs     02/27/24  1310 02/29/24  0550   ALTSGPT 22  --    ASTSGOT 31  --    ALKPHOSPHAT 89  --    TBILIRUBIN 0.3  --    GLUCOSE 95 114*     Recent Labs     02/27/24  1310 02/29/24  0340   WBC 9.1 15.2*   NEUTSPOLYS 66.60  --    LYMPHOCYTES 21.00*  --    MONOCYTES 8.40  --    EOSINOPHILS 3.00  --    BASOPHILS 0.70  --    ASTSGOT 31  --    ALTSGPT 22  --    ALKPHOSPHAT 89  --    TBILIRUBIN 0.3  --      Recent Labs     02/27/24  1310 02/28/24  1335 02/29/24  0340   RBC 4.14*  --  3.58*   HEMOGLOBIN 11.9* 12.7 10.2*   HEMATOCRIT 35.7* 38.0 30.4*   PLATELETCT 242 93* 127*   PROTHROMBTM 13.7 18.1*  --    APTT 98.7* 32.4  --    INR 1.04 1.48*  --        Imaging  X-Ray:  I have personally reviewed the images and compared with prior images. and My impression is: No acute process, lines and tubes in appropriate position    Assessment/Plan  * S/P CABG x 3  Assessment & Plan  Procedure: CABG x3  SVG-LAD (LIMA not suitable as graft), SVG-diag, SVG-OM  Date of surgery: 2/228  Surgeon: Dr Chicas    Extubated 2/28  Off insulin / pressors / drips  Aspirin / Prasugrel    OOB to chair    Giant cell arteritis (HCC)- (present on admission)  Assessment & Plan  Previously treated with corticosteroids  Now in remission    Unstable angina (HCC)- (present on admission)  Assessment & Plan  No s/p CABG as above    Dyslipidemia- (present on admission)  Assessment & Plan  High intensity statin    Primary hypertension- (present on admission)  Assessment & Plan  Restart home meds as able    CAD (coronary artery disease)- (present on admission)  Assessment & Plan  History of CAD with prior non-STEMI and PCI's with [facility]         VTE:  Per cardiac surgery  Ulcer: PPI  Lines: Central Line  Ongoing indication addressed, Arterial Line  Ongoing  indication addressed and [place] Catheter  Ongoing indication addressed      I have performed a physical exam and reviewed and updated ROS and Plan today (2/29/2024). In review of yesterday's note (2/28/2024), there are no changes except as documented above.     Discussed patient condition and risk of morbidity and/or mortality with Family, RN, RT, Pharmacy, , Charge nurse / hot rounds, Patient and [facility]      The patient remains critically ill.  Critical care time = 35 minutes in directly providing and coordinating critical care and extensive data review.  No time overlap and excludes procedures.

## 2024-02-29 NOTE — PROGRESS NOTES
Monitor Summary    Sinus Rhythm - Sinus Tachycardia   Rare PACs  V-wires, not actively pacing 60/10/2  13/08/45

## 2024-02-29 NOTE — PROGRESS NOTES
Cardiovascular Surgery Progress Note    Name: Carine Christopher  MRN: [id number]  : 1950  Admit Date: 2024  6:21 AM  1 Day Post-Op     Procedure:  Procedure(s) and Anesthesia Type:     * CABG, WITH ENDOSCOPIC VEIN PROCUREMENT - X3 - General     * ECHOCARDIOGRAM, TRANSESOPHAGEAL, INTRAOPERATIVE - General    Vitals:  Vitals:    24 0730 24 0745 24 0800 24 0815   BP: 102/56 104/56 108/55 99/56   Pulse: (!) 110 (!) 106 (!) 113 (!) 105   Resp: (!) 22 13 (!) 21 (!) 26   Temp:       TempSrc:       SpO2:       Weight:       Height:          Temp (24hrs), Av.9 °C (98.4 °F), Min:36.9 °C (98.4 °F), Max:36.9 °C (98.4 °F)      Respiratory:  Vent Mode: Spont, PEEP/CPAP: 8, PIP: 15, MAP: 10 Respiration: (!) 26, Pulse Oximetry: 93 %       Fluids:    Intake/Output Summary (Last 24 hours) at 2024 0938  Last data filed at 2024 0800  Gross per 24 hour   Intake 7545.95 ml   Output 3291 ml   Net 4254.95 ml     Admit weight: Weight: 62.1 kg (137 lb)  Current weight: Weight: 67 kg (147 lb 11.3 oz) (24 0600)    Labs:  Recent Labs     24  1310 24  1335 24  0340   WBC 9.1  --  15.2*   RBC 4.14*  --  3.58*   HEMOGLOBIN 11.9* 12.7 10.2*   HEMATOCRIT 35.7* 38.0 30.4*   MCV 86.2  --  84.9   MCH 28.7  --  28.5   MCHC 33.3  --  33.6   RDW 43.3  --  45.8   PLATELETCT 242 93* 127*   MPV 11.2  --  11.0     Recent Labs     24  1310 24  1335 24  1710 24  2330 24  0550   SODIUM 136  --   --   --  136   POTASSIUM 4.4   < > 4.5 4.5 4.6   CHLORIDE 98  --   --   --  103   CO2 27  --   --   --  22   GLUCOSE 95  --   --   --  114*   BUN 17  --   --   --  24*   CREATININE 0.55  --   --   --  0.57   CALCIUM 9.7  --   --   --  8.1*    < > = values in this interval not displayed.     Recent Labs     24  1310 24  1335   APTT 98.7* 32.4   INR 1.04 1.48*       HgbA1c:  5.4    Diabetic Educator Consult:  no    Medications:  Scheduled  Medications   Medication Dose Frequency    insulin regular  2-9 Units 4X/DAY ACHS    Nozin nasal  swab  1 Applicator BID    magnesium sulfate  1 g DAILY    K+ Scale: Goal of 4.5  1 Each Q6HRS    metoprolol tartrate  12.5 mg BID    Followed by    [START ON 3/1/2024] metoprolol tartrate  25 mg BID    aspirin  81 mg DAILY    clopidogrel  75 mg DAILY    acetaminophen  1,000 mg Q6HRS    docusate sodium  100 mg BID    senna-docusate  1 Tablet Nightly    omeprazole  20 mg DAILY    enoxaparin (LOVENOX) injection  40 mg DAILY AT 1800    rosuvastatin  20 mg QHS    insulin lispro  0-14 Units TID AC        Exam:   Physical Exam  Constitutional:       General: She is not in acute distress.  HENT:      Mouth/Throat:      Mouth: Mucous membranes are moist.   Eyes:      Pupils: Pupils are equal, round, and reactive to light.   Cardiovascular:      Rate and Rhythm: Normal rate and regular rhythm.      Pulses: Normal pulses.      Heart sounds: Normal heart sounds.   Pulmonary:      Effort: No respiratory distress.      Breath sounds: Examination of the right-lower field reveals decreased breath sounds. Examination of the left-lower field reveals decreased breath sounds. Decreased breath sounds present.   Abdominal:      Palpations: Abdomen is soft.   Musculoskeletal:      Right lower leg: Edema present.      Left lower leg: Edema present.   Skin:     General: Skin is warm and dry.      Comments: Right arm ecchymosis from cath    Sternum - prevena    Bilateral SVG sites - ace wrap   Neurological:      General: No focal deficit present.      Mental Status: She is alert and oriented to person, place, and time.         Cardiac Medications:    ASA - Yes    Plavix - Yes    Post-operative Beta Blockers - Yes    Ace/ARB- No; contraindicated because of Normal EF    Statin - Yes    Aldactone- No; contraindicated because of Normal EF    SGLT2i-  No contraindicated because of No; contraindicated because of Normal EF    Ejection  Fraction:  55%    Telemetry:   2/29 SR/ST    Assessment/Plan:  POD 1 HDS- just off levo, SR, keep mediastinal tubes  Keep mcclain for strict I&O, A&O x 3, pain controlled, incisions- covered, amb/enc IS    Disposition:  TBD

## 2024-02-29 NOTE — HOSPITAL COURSE
2/28 - CABG x3, admitted to ICU, extubated in evening    2/29 - Resume dapt, OOB to chair, off norepi.    3/1 - Doing well, CCM to sign off

## 2024-02-29 NOTE — PROGRESS NOTES
4 Eyes Skin Assessment Completed by Cecil RN and BESSIE Aguayo.    Head WDL  Ears WDL  Nose WDL  Mouth WDL  Neck WDL  Breast/Chest Scar and Incision Midline chest incision with privena, chest tubes x3, scars on breasts  Shoulder Blades WDL  Spine Redness and Blanching  (R) Arm/Elbow/Hand Bruising and Edema Elbow to thumb bruising  (L) Arm/Elbow/Hand Blanching blanching elbow  Abdomen WDL  Groin Bruising right groin site  Scrotum/Coccyx/Buttocks WDL  (R) Leg FELIX beneath ACE Wrap   (L) Leg FELIX beneath ACE wrap- bilateral rSVG harvest sites  (R) Heel/Foot/Toe Blanching and Boggy  (L) Heel/Foot/Toe Blanching and Boggy          Devices In Places ECG, Blood Pressure Cuff, Pulse Ox, Pittman, Arterial Line, and SCD's      Interventions In Place NC W/Ear Foams, Waffle Overlay, Pillows, ZFlo Pillow, and Heels Loaded W/Pillows    Possible Skin Injury No    Pictures Uploaded Into Epic N/A  Wound Consult Placed N/A  RN Wound Prevention Protocol Ordered No

## 2024-03-01 LAB
ANION GAP SERPL CALC-SCNC: 10 MMOL/L (ref 7–16)
BUN SERPL-MCNC: 29 MG/DL (ref 8–22)
CALCIUM SERPL-MCNC: 8.3 MG/DL (ref 8.5–10.5)
CHLORIDE SERPL-SCNC: 99 MMOL/L (ref 96–112)
CO2 SERPL-SCNC: 24 MMOL/L (ref 20–33)
CREAT SERPL-MCNC: 0.55 MG/DL (ref 0.5–1.4)
EKG IMPRESSION: NORMAL
EKG IMPRESSION: NORMAL
ERYTHROCYTE [DISTWIDTH] IN BLOOD BY AUTOMATED COUNT: 47.6 FL (ref 35.9–50)
GFR SERPLBLD CREATININE-BSD FMLA CKD-EPI: 96 ML/MIN/1.73 M 2
GLUCOSE BLD STRIP.AUTO-MCNC: 100 MG/DL (ref 65–99)
GLUCOSE BLD STRIP.AUTO-MCNC: 117 MG/DL (ref 65–99)
GLUCOSE BLD STRIP.AUTO-MCNC: 123 MG/DL (ref 65–99)
GLUCOSE BLD STRIP.AUTO-MCNC: 130 MG/DL (ref 65–99)
GLUCOSE SERPL-MCNC: 104 MG/DL (ref 65–99)
HCT VFR BLD AUTO: 29.6 % (ref 37–47)
HGB BLD-MCNC: 10 G/DL (ref 12–16)
MAGNESIUM SERPL-MCNC: 2 MG/DL (ref 1.5–2.5)
MCH RBC QN AUTO: 29.2 PG (ref 27–33)
MCHC RBC AUTO-ENTMCNC: 33.8 G/DL (ref 32.2–35.5)
MCV RBC AUTO: 86.5 FL (ref 81.4–97.8)
PLATELET # BLD AUTO: 134 K/UL (ref 164–446)
PMV BLD AUTO: 10.8 FL (ref 9–12.9)
POTASSIUM SERPL-SCNC: 4.4 MMOL/L (ref 3.6–5.5)
RBC # BLD AUTO: 3.42 M/UL (ref 4.2–5.4)
SODIUM SERPL-SCNC: 133 MMOL/L (ref 135–145)
WBC # BLD AUTO: 14.9 K/UL (ref 4.8–10.8)

## 2024-03-01 PROCEDURE — A9270 NON-COVERED ITEM OR SERVICE: HCPCS | Performed by: NURSE PRACTITIONER

## 2024-03-01 PROCEDURE — 700111 HCHG RX REV CODE 636 W/ 250 OVERRIDE (IP): Mod: JZ | Performed by: NURSE PRACTITIONER

## 2024-03-01 PROCEDURE — 93010 ELECTROCARDIOGRAM REPORT: CPT | Performed by: INTERNAL MEDICINE

## 2024-03-01 PROCEDURE — 80048 BASIC METABOLIC PNL TOTAL CA: CPT

## 2024-03-01 PROCEDURE — 99233 SBSQ HOSP IP/OBS HIGH 50: CPT | Performed by: STUDENT IN AN ORGANIZED HEALTH CARE EDUCATION/TRAINING PROGRAM

## 2024-03-01 PROCEDURE — 85027 COMPLETE CBC AUTOMATED: CPT

## 2024-03-01 PROCEDURE — 700102 HCHG RX REV CODE 250 W/ 637 OVERRIDE(OP): Performed by: NURSE PRACTITIONER

## 2024-03-01 PROCEDURE — 770022 HCHG ROOM/CARE - ICU (200)

## 2024-03-01 PROCEDURE — 93005 ELECTROCARDIOGRAM TRACING: CPT | Performed by: NURSE PRACTITIONER

## 2024-03-01 PROCEDURE — 82962 GLUCOSE BLOOD TEST: CPT

## 2024-03-01 PROCEDURE — 93010 ELECTROCARDIOGRAM REPORT: CPT | Mod: 76 | Performed by: INTERNAL MEDICINE

## 2024-03-01 PROCEDURE — 700102 HCHG RX REV CODE 250 W/ 637 OVERRIDE(OP): Performed by: STUDENT IN AN ORGANIZED HEALTH CARE EDUCATION/TRAINING PROGRAM

## 2024-03-01 PROCEDURE — 83735 ASSAY OF MAGNESIUM: CPT

## 2024-03-01 PROCEDURE — A9270 NON-COVERED ITEM OR SERVICE: HCPCS | Performed by: STUDENT IN AN ORGANIZED HEALTH CARE EDUCATION/TRAINING PROGRAM

## 2024-03-01 PROCEDURE — 94669 MECHANICAL CHEST WALL OSCILL: CPT

## 2024-03-01 RX ORDER — AMIODARONE HYDROCHLORIDE 200 MG/1
400 TABLET ORAL 2 TIMES DAILY
Status: DISCONTINUED | OUTPATIENT
Start: 2024-03-01 | End: 2024-03-03

## 2024-03-01 RX ADMIN — MAGNESIUM SULFATE IN DEXTROSE 1 G: 10 INJECTION, SOLUTION INTRAVENOUS at 06:02

## 2024-03-01 RX ADMIN — AMIODARONE HYDROCHLORIDE 400 MG: 200 TABLET ORAL at 17:19

## 2024-03-01 RX ADMIN — ACETAMINOPHEN 1000 MG: 500 TABLET ORAL at 05:30

## 2024-03-01 RX ADMIN — ASPIRIN 81 MG: 81 TABLET, COATED ORAL at 05:31

## 2024-03-01 RX ADMIN — ACETAMINOPHEN 1000 MG: 500 TABLET ORAL at 13:18

## 2024-03-01 RX ADMIN — ENOXAPARIN SODIUM 40 MG: 100 INJECTION SUBCUTANEOUS at 17:19

## 2024-03-01 RX ADMIN — OXYCODONE 5 MG: 5 TABLET ORAL at 20:36

## 2024-03-01 RX ADMIN — DOCUSATE SODIUM 50 MG AND SENNOSIDES 8.6 MG 1 TABLET: 8.6; 5 TABLET, FILM COATED ORAL at 20:31

## 2024-03-01 RX ADMIN — DOCUSATE SODIUM 100 MG: 100 CAPSULE, LIQUID FILLED ORAL at 17:19

## 2024-03-01 RX ADMIN — CLOPIDOGREL BISULFATE 75 MG: 75 TABLET ORAL at 05:31

## 2024-03-01 RX ADMIN — DOCUSATE SODIUM 100 MG: 100 CAPSULE, LIQUID FILLED ORAL at 05:31

## 2024-03-01 RX ADMIN — ROSUVASTATIN 10 MG: 10 TABLET, FILM COATED ORAL at 20:31

## 2024-03-01 RX ADMIN — METOPROLOL TARTRATE 25 MG: 25 TABLET, FILM COATED ORAL at 18:17

## 2024-03-01 RX ADMIN — AMIODARONE HYDROCHLORIDE 0.5 MG/MIN: 1.8 INJECTION, SOLUTION INTRAVENOUS at 17:12

## 2024-03-01 RX ADMIN — OMEPRAZOLE 20 MG: 20 CAPSULE, DELAYED RELEASE ORAL at 05:31

## 2024-03-01 RX ADMIN — TRAMADOL HYDROCHLORIDE 50 MG: 50 TABLET ORAL at 08:27

## 2024-03-01 RX ADMIN — Medication 1 APPLICATOR: at 20:31

## 2024-03-01 RX ADMIN — AMIODARONE HYDROCHLORIDE 1 MG/MIN: 1.8 INJECTION, SOLUTION INTRAVENOUS at 11:08

## 2024-03-01 RX ADMIN — OXYCODONE HYDROCHLORIDE 10 MG: 10 TABLET ORAL at 01:46

## 2024-03-01 RX ADMIN — METOPROLOL TARTRATE 25 MG: 25 TABLET, FILM COATED ORAL at 08:16

## 2024-03-01 RX ADMIN — Medication 1 APPLICATOR: at 10:09

## 2024-03-01 RX ADMIN — OXYCODONE HYDROCHLORIDE 10 MG: 10 TABLET ORAL at 13:16

## 2024-03-01 RX ADMIN — POLYETHYLENE GLYCOL 3350 1 PACKET: 17 POWDER, FOR SOLUTION ORAL at 05:31

## 2024-03-01 ASSESSMENT — PAIN DESCRIPTION - PAIN TYPE
TYPE: ACUTE PAIN;SURGICAL PAIN
TYPE: ACUTE PAIN;SURGICAL PAIN
TYPE: ACUTE PAIN
TYPE: ACUTE PAIN;SURGICAL PAIN
TYPE: ACUTE PAIN
TYPE: ACUTE PAIN;SURGICAL PAIN
TYPE: ACUTE PAIN
TYPE: ACUTE PAIN;SURGICAL PAIN
TYPE: ACUTE PAIN

## 2024-03-01 ASSESSMENT — ENCOUNTER SYMPTOMS
HEADACHES: 0
NAUSEA: 0
MUSCULOSKELETAL NEGATIVE: 1
SHORTNESS OF BREATH: 0
EYES NEGATIVE: 1
CHILLS: 0
ABDOMINAL PAIN: 0
SPUTUM PRODUCTION: 0
SORE THROAT: 0
FOCAL WEAKNESS: 0
FEVER: 0
PALPITATIONS: 0
VOMITING: 0

## 2024-03-01 ASSESSMENT — FIBROSIS 4 INDEX: FIB4 SCORE: 3.65

## 2024-03-01 NOTE — CARE PLAN
Problem: Hyperinflation  Goal: Prevent or improve atelectasis  Description: Target End Date:  3 to 4 days    1. Instruct incentive spirometry usage  2.  Perform hyperinflation therapy as indicated  Outcome: Progressing      Respiratory Update    Treatment modality: PEP  Frequency: QID      Pt tolerating current treatments well with no adverse reactions.

## 2024-03-01 NOTE — PROGRESS NOTES
Patient has met the following  - patient has been out of bed, dangled or turned from side to side    - Chest tube drainage should be <30cc/hour unless otherwise approved by physician.   - Place chest drainage unit to water-seal:  absence of fluctuations in water seal-chamber or presence of air leaks.       Verified order with MD/APRN. Explained the procedure to the patient. Patient in semi fowlers position. CT clamped, sutures removed, CT removed upon expiration. Dressing applied, no complaint of pain or excessive drainage. Primary RN to order PRN chest x-ray if patient develops dyspnea or hypoxia.

## 2024-03-01 NOTE — CARE PLAN
The patient is Watcher - Medium risk of patient condition declining or worsening    Shift Goals  Clinical Goals: Care plan, maintain hemodynamic stability  Patient Goals: Rest, ambulate  Family Goals: FELIX    Progress made toward(s) clinical / shift goals:    Problem: Post op day 2 CABG/Heart Valve Replacement  Goal: Optimal care of the post op CABG/heart valve replacement post op day 2  Outcome: Progressing  Intervention: FSBS: when 2 consecutive BS < 130 after post op day 2, discontinue FSBS unless patient is insulin dependent diabetic  Note: Possibly discontinued today  Intervention: Daily weights in the morning  Note: Completed  Intervention: Up in chair for all meals  Note: Completed  Intervention: Ambulate 4 times daily, increasing the distance each time  Note: Ambulate x1 this AM  Intervention: Stand at sink and wash up with assistance.  Clean incisions twice daily with soap and water.  Note: Prevena in place  Intervention: IS q 1 hour while awake and record best IS volume  Note: Completed, BEST 750  Intervention: Consider pacer wire removal by MD  Note: Possible removal today  Intervention: Consider removal of mcclain and chest tube if not already done  Note: Possible removal today       Patient is not progressing towards the following goals:

## 2024-03-01 NOTE — THERAPY
Physical Therapy Contact Note    PT consult received and acknowledged. Discussed patient with RN who asked to defer PT until HR better controlled. Will re attempt as able and appropriate.     Laura Love, PT, DPT  [phone]

## 2024-03-01 NOTE — PROGRESS NOTES
At approx. 2130 after ambulating pt became tachycardic 130-140s, oxycodone given for possible pain response, pt denied pain after intervention, EKG ordered showing Sinus Tachycardia 138, chest x ray ordered and waiting to be read. Pt denies SOB or new symptoms, minimal chest tube output. Isabel Chavez notified, received VO for 500ml LR bolus.

## 2024-03-01 NOTE — PROGRESS NOTES
Critical Care Progress Note    Date of admission  2/21/2024    Chief Complaint  73 y.o. female with a history of CAD with PCI in 2023, chronic bronchitis and GCA who was admitted with in-stent restenosis and multivessel CAD.  On 2/285 she was taken for CABGx3 (all vein grafts as LIMA was not patent enough for use)     Hospital Course  2/28 - CABG x3, admitted to ICU, extubated in evening    2/29 - Resume dapt, OOB to chair, off norepi.    3/1 - Doing well, CCM to sign off    Interval Problem Update  Reviewed last 24 hour events:  Tmax: Afebrile  Diet: Advance per CTS  Vasopressors: None    Infusions: None     Antibx: None    Intake / Output  Urine Output past 24 hours: 755mL  Net Admission: +4 L    Lab Trends  WBC 9 --> 15  --> 15  Hgb 12 --> 10 --> 10  Platelets 93 --> 127 --> 134    BMP WNL    Review of Systems  Review of Systems   Constitutional:  Negative for chills, fever and malaise/fatigue.   HENT:  Negative for congestion and sore throat.    Eyes: Negative.    Respiratory:  Negative for sputum production and shortness of breath.    Cardiovascular:  Negative for chest pain and palpitations.   Gastrointestinal:  Negative for abdominal pain, nausea and vomiting.   Genitourinary: Negative.    Musculoskeletal: Negative.    Skin: Negative.    Neurological:  Negative for focal weakness and headaches.   All other systems reviewed and are negative.       Vital Signs for last 24 hours   Temp:  [37.1 °C (98.8 °F)-37.8 °C (100 °F)] 37.8 °C (100 °F)  Pulse:  [] 144  Resp:  [13-37] 20  BP: ()/(50-71) 115/61  SpO2:  [92 %-97 %] 94 %    Hemodynamic parameters for last 24 hours  CVP:  [4 MM HG-26 MM HG] 26 MM HG    Respiratory Information for the last 24 hours       Physical Exam   Physical Exam  Vitals and nursing note reviewed. Exam conducted with a chaperone present.   Constitutional:       General: She is awake. She is not in acute distress.  HENT:      Head: Normocephalic.      Mouth/Throat:      Mouth:  Mucous membranes are moist.   Eyes:      Extraocular Movements: Extraocular movements intact.   Cardiovascular:      Rate and Rhythm: Normal rate and regular rhythm.      Pulses: Normal pulses.   Pulmonary:      Effort: Pulmonary effort is normal. No respiratory distress.   Abdominal:      General: There is no distension.      Palpations: Abdomen is soft.      Tenderness: There is no abdominal tenderness.   Musculoskeletal:         General: Normal range of motion.      Cervical back: Normal range of motion and neck supple.   Skin:     General: Skin is warm.      Capillary Refill: Capillary refill takes less than 2 seconds.   Neurological:      General: No focal deficit present.      Mental Status: She is alert.         Medications  Current Facility-Administered Medications   Medication Dose Route Frequency Provider Last Rate Last Admin    insulin regular (HumuLIN R,NovoLIN R) injection  2-9 Units Subcutaneous 4X/DAY ZEYNEP Chicas M.D.        And    dextrose 50% (D50W) injection 25 g  25 g Intravenous Q15 MIN PRN Fe Chicas M.D.        rosuvastatin (Crestor) tablet 10 mg  10 mg Oral QHS Maxine Cope, A.P.N.   10 mg at 02/29/24 2053    clopidogrel (Plavix) tablet 75 mg  75 mg Oral DAILY Aram Martinez M.D.   75 mg at 03/01/24 0531    Respiratory Therapy Consult   Nebulization Continuous RT Dariusz Rosenberg A.P.R.N.        electrolyte-A (Plasmalyte-A) infusion   Intravenous PRN RAYSHAWN Calderon.P.R.N.   Stopped at 02/29/24 0000    Nozin nasal  swab  1 Applicator Each Nostril BID RAYSHAWN Calderon.P.R.N.   1 Applicator at 02/29/24 2053    metoprolol tartrate (Lopressor) tablet 25 mg  25 mg Oral BID Dariusz Rosenberg A.P.R.N.        aspirin EC tablet 81 mg  81 mg Oral DAILY Dariusz Rosenberg A.P.R.N.   81 mg at 03/01/24 0531    acetaminophen (Tylenol) tablet 1,000 mg  1,000 mg Oral Q6HRS Dariusz Rosenberg A.P.R.N.   1,000 mg at 03/01/24 0530    Followed by    [START ON 3/9/2024] acetaminophen (Tylenol)  tablet 1,000 mg  1,000 mg Oral Q6HRS PRN Dariusz Rosenberg, A.P.R.N.        oxyCODONE immediate-release (Roxicodone) tablet 5 mg  5 mg Oral Q3HRS PRN Dariusz Rosenberg, A.P.R.N.        Or    oxyCODONE immediate release (Roxicodone) tablet 10 mg  10 mg Oral Q3HRS PRN Dariusz Rosenberg, A.P.R.N.   10 mg at 03/01/24 0146    Or    fentaNYL (Sublimaze) injection 50 mcg  50 mcg Intravenous Q3HRS PRN Dariusz Rosenberg, A.P.R.N.        traMADol (Ultram) 50 MG tablet 50 mg  50 mg Oral Q4HRS PRN Dariusz Rosenberg, A.P.R.N.   50 mg at 02/29/24 0042    ondansetron (Zofran) syringe/vial injection 8 mg  8 mg Intravenous Q6HRS PRN Dariusz Rosenberg, A.P.R.N.        Or    prochlorperazine (Compazine) injection 10 mg  10 mg Intravenous Q6HRS PRN Dariusz Rosenberg, A.P.R.N.        acetaminophen (Tylenol) tablet 650 mg  650 mg Oral Q4HRS PRN Dariusz Rosenberg, A.P.R.N.        Or    acetaminophen (Tylenol) suppository 650 mg  650 mg Rectal Q4HRS PRN Dariusz Rosenberg, A.P.R.N.        docusate sodium (Colace) capsule 100 mg  100 mg Oral BID Dariusz Rosenberg, A.P.R.N.   100 mg at 03/01/24 0531    senna-docusate (Pericolace Or Senokot S) 8.6-50 MG per tablet 1 Tablet  1 Tablet Oral Nightly Dariusz Rosenberg, A.P.R.N.   1 Tablet at 02/29/24 2053    senna-docusate (Pericolace Or Senokot S) 8.6-50 MG per tablet 1 Tablet  1 Tablet Oral Q24HRS PRN Dariusz Rosenberg, A.P.R.N.        polyethylene glycol/lytes (Miralax) Packet 1 Packet  1 Packet Oral BID PRN Dariuzs Rosenberg, A.P.R.N.   1 Packet at 03/01/24 0531    magnesium hydroxide (Milk Of Magnesia) suspension 30 mL  30 mL Oral QDAY PRN Dariusz Rosenberg, A.P.R.N.        bisacodyl (Dulcolax) suppository 10 mg  10 mg Rectal Q24HRS PRN Dariusz Rosenberg, A.P.R.N.        sodium phosphate (Fleet) enema 133 mL  1 Each Rectal Once PRN Dariusz Rosenberg, A.P.R.N.        omeprazole (PriLOSEC) capsule 20 mg  20 mg Oral DAILY Dariusz Rosenberg, A.P.R.N.   20 mg at 03/01/24 0531    mag hydrox-al hydrox-simeth (Maalox Plus Es Or Mylanta Ds) suspension 30 mL  30 mL Oral  Q4HRS PRN LENARD CalderonP.RJASBIR        diphenhydrAMINE (Benadryl) tablet/capsule 25 mg  25 mg Oral HS PRN - MR X 1 LENARD CalderonP.RJASBIR        enoxaparin (Lovenox) inj 40 mg  40 mg Subcutaneous DAILY AT 1800 LENARD CalderonP.RJASBIR   40 mg at 02/29/24 1800    norepinephrine (Levophed) 8 mg in 250 mL NS infusion (premix)  0-1 mcg/kg/min (Ideal) Intravenous Continuous Aram Martinez M.D. 4.3 mL/hr at 02/29/24 0736 0.04 mcg/kg/min at 02/29/24 0736       Fluids    Intake/Output Summary (Last 24 hours) at 3/1/2024 0714  Last data filed at 3/1/2024 0600  Gross per 24 hour   Intake 909.98 ml   Output 955 ml   Net -45.02 ml       Laboratory  Recent Labs     02/28/24  1430 02/28/24  1713 02/28/24  1831   ISTATAPH 7.404 7.327* 7.430   ISTATAPCO2 36.9 47.9* 35.2   ISTATAPO2 94* 91* 97*   ISTATATCO2 24 27 24   MSQZENR7XGB 97 96 98   ISTATARTHCO3 23.1 25.1* 23.4   ISTATARTBE -1 -1 -1   ISTATTEMP 36.1 C 37.0 C 36.9 C   ISTATFIO2 80 40 40   ISTATSPEC Arterial Arterial Arterial   ISTATAPHTC 7.417 7.327* 7.432   [id number] 89* 91* 97*         Recent Labs     02/27/24  1310 02/28/24  1335 02/28/24  1710 02/28/24  2330 02/29/24  0550 03/01/24  0105   SODIUM 136  --   --   --  136 133*   POTASSIUM 4.4 4.2   < > 4.5 4.6 4.4   CHLORIDE 98  --   --   --  103 99   CO2 27  --   --   --  22 24   BUN 17  --   --   --  24* 29*   CREATININE 0.55  --   --   --  0.57 0.55   MAGNESIUM  --  3.3*  --   --   --  2.0   CALCIUM 9.7  --   --   --  8.1* 8.3*    < > = values in this interval not displayed.     Recent Labs     02/27/24  1310 02/29/24  0550 03/01/24  0105   ALTSGPT 22  --   --    ASTSGOT 31  --   --    ALKPHOSPHAT 89  --   --    TBILIRUBIN 0.3  --   --    GLUCOSE 95 114* 104*     Recent Labs     02/27/24  1310 02/29/24  0340 03/01/24 0105   WBC 9.1 15.2* 14.9*   NEUTSPOLYS 66.60  --   --    LYMPHOCYTES 21.00*  --   --    MONOCYTES 8.40  --   --    EOSINOPHILS 3.00  --   --    BASOPHILS 0.70  --   --    ASTSGOT 31  --   --    ALTSGPT  22  --   --    ALKPHOSPHAT 89  --   --    TBILIRUBIN 0.3  --   --      Recent Labs     02/27/24  1310 02/28/24  1335 02/29/24  0340 03/01/24  0105   RBC 4.14*  --  3.58* 3.42*   HEMOGLOBIN 11.9* 12.7 10.2* 10.0*   HEMATOCRIT 35.7* 38.0 30.4* 29.6*   PLATELETCT 242 93* 127* 134*   PROTHROMBTM 13.7 18.1*  --   --    APTT 98.7* 32.4  --   --    INR 1.04 1.48*  --   --        Imaging  X-Ray:  I have personally reviewed the images and compared with prior images. and My impression is: No acute process, lines and tubes in appropriate position    Assessment/Plan  * S/P CABG x 3  Assessment & Plan  Procedure: CABG x3  SVG-LAD (LIMA not suitable as graft), SVG-diag, SVG-OM  Date of surgery: 2/228  Surgeon: Dr Chicas    Extubated 2/28  Off insulin / pressors / drips  Aspirin / Plavix    OOB to chair    Giant cell arteritis (HCC)- (present on admission)  Assessment & Plan  Previously treated with corticosteroids  Now in remission    Unstable angina (HCC)- (present on admission)  Assessment & Plan  No s/p CABG as above    Dyslipidemia- (present on admission)  Assessment & Plan  High intensity statin    Primary hypertension- (present on admission)  Assessment & Plan  Restart home meds as able    CAD (coronary artery disease)- (present on admission)  Assessment & Plan  History of CAD with prior non-STEMI and PCI's with [facility]         VTE:  Per cardiac surgery  Ulcer: PPI  Lines: Central Line  Ongoing indication addressed, Arterial Line  Ongoing indication addressed and Pittman Catheter  Ongoing indication addressed      I have performed a physical exam and reviewed and updated ROS and Plan today (3/1/2024). In review of yesterday's note (2/29/2024), there are no changes except as documented above.     Discussed patient condition and risk of morbidity and/or mortality with Family, RN, RT, Pharmacy, , Charge nurse / hot rounds, Patient and [facility]      CCM will sign off

## 2024-03-01 NOTE — PROGRESS NOTES
CARI Hall notified of pt still sustaining -140s, repeat EKG ordered showing sinus tachycardia, pt remains asymptomatic.

## 2024-03-01 NOTE — PROGRESS NOTES
4 Eyes Skin Assessment Completed by BESSIE Menon and BESSIE Wright.    Head WDL  Ears Redness and Blanching  Nose WDL  Mouth WDL  Neck Redness and Blanching  Breast/Chest Incision  Shoulder Blades WDL  Spine WDL  (R) Arm/Elbow/Hand Redness, Blanching, and Bruising  (L) Arm/Elbow/Hand Redness, Blanching, and Bruising  Abdomen Incision Mediastinal tubes  Groin WDL  Scrotum/Coccyx/Buttocks Redness and Blanching  (R) Leg Redness, Blanching, and Incision  (L) Leg Redness, Blanching, and Incision  (R) Heel/Foot/Toe Redness and Blanching  (L) Heel/Foot/Toe Redness and Blanching          Devices In Places Tele Box, Blood Pressure Cuff, Pulse Ox, Pittman, SCD's, and Central Line      Interventions In Place Gray Ear Foams, Pillows, Low Air Loss Mattress, Heels Loaded W/Pillows, and Pressure Redistribution Mattress    Possible Skin Injury No    Pictures Uploaded Into Epic N/A  Wound Consult Placed N/A  RN Wound Prevention Protocol Ordered No

## 2024-03-01 NOTE — CARE PLAN
Problem: Pain - Standard  Goal: Alleviation of pain or a reduction in pain to the patient’s comfort goal  Outcome: Progressing  Note: Mostly controled with nonpharmacologic measures. Ordered PRNs helped when administered      Problem: Knowledge Deficit - Standard  Goal: Patient and family/care givers will demonstrate understanding of plan of care, disease process/condition, diagnostic tests and medications  Outcome: Progressing     Problem: Post Op Day 1 CABG/Heart Valve Replacement  Goal: Optimal care of the post op CABG/heart valve replacement Post Op Day 1  Outcome: Progressing  Intervention: Ambulate in am if stable. First ambulation 25 feet. Repeat x 3 as tolerated  Note: Ambulated x2 during shift, 10 ft and then 25 ft   Intervention: Discontinue mcclain catheter unless documented reason for continuation  Note: Mcclain to remain in place per LIBBY   Intervention: Assess surgical dressing and check provider orders for potential removal  Note: CT and prevena to remain in place for one more day   Intervention: [facility] trained RN to remove chest tubes if ordered by provider  Note: To remain in place one more day per LIBBY   Intervention: Transfer to [facility] status, begin VS q 4 hours  Note: Pt to remain ICU status    The patient is Stable - Low risk of patient condition declining or worsening    Shift Goals  Clinical Goals: Wean Pressors, mobilize, encourage IS  Patient Goals: water  Family Goals: FELIX    Progress made toward(s) clinical / shift goals:     Patient is not progressing towards the following goals:

## 2024-03-01 NOTE — PROGRESS NOTES
4 Eyes Skin Assessment Completed by Theodore RN and , RN.    Head WDL  Ears Redness and Blanching  Nose WDL  Mouth WDL  Neck WDL  Breast/Chest Incision, midsternal incision covered with prevena dressing   Shoulder Blades WDL  Spine WDL  (R) Arm/Elbow/Hand Redness, Blanching, Bruising, and Swelling  (L) Arm/Elbow/Hand Redness and Blanching  Abdomen Incision, Chest tubes sites   Groin WDL, bruising in right groin from [facility] fem site access    Scrotum/Coccyx/Buttocks WDL  (R) Leg Incision  (L) Leg Incision  (R) Heel/Foot/Toe Redness and Blanching  (L) Heel/Foot/Toe Redness and Blanching          Devices In Places Tele Box, Blood Pressure Cuff, Pulse Ox, Pittman, and Central Line      Interventions In Place Gray Ear Foams, Sacral Mepilex, Chair Waffle, Waffle Overlay, Low Air Loss Mattress, ZFlo Pillow, and Heels Loaded W/Pillows    Possible Skin Injury No    Pictures Uploaded Into Epic N/A  Wound Consult Placed N/A  RN Wound Prevention Protocol Ordered Yes

## 2024-03-01 NOTE — PROGRESS NOTES
Cardiovascular Surgery Progress Note    Name: Carine Christopher  MRN: [id number]  : 1950  Admit Date: 2024  6:21 AM  2 Days Post-Op     Procedure:  Procedure(s) and Anesthesia Type:     * CABG, WITH ENDOSCOPIC VEIN PROCUREMENT - X3 - General     * ECHOCARDIOGRAM, TRANSESOPHAGEAL, INTRAOPERATIVE - General    Vitals:  Vitals:    24 0734 24 0816 24 0827 24 0900   BP: 100/57 97/61 97/61 101/62   Pulse: (!) 140 (!) 144 (!) 145 (!) 141   Resp:       Temp: 37.4 °C (99.3 °F)      TempSrc: Bladder      SpO2:       Weight:       Height:          Temp (24hrs), Av.6 °C (99.6 °F), Min:37.4 °C (99.3 °F), Max:37.8 °C (100 °F)      Respiratory:    Respiration: 20, Pulse Oximetry: 94 %       Fluids:    Intake/Output Summary (Last 24 hours) at 3/1/2024 1008  Last data filed at 3/1/2024 0900  Gross per 24 hour   Intake 899.98 ml   Output 1061 ml   Net -161.02 ml     Admit weight: Weight: 62.1 kg (137 lb)  Current weight: Weight: 66.7 kg (147 lb 0.8 oz) (24 0600)    Labs:  Recent Labs     24  1310 24  1335 24  0340 24  0105   WBC 9.1  --  15.2* 14.9*   RBC 4.14*  --  3.58* 3.42*   HEMOGLOBIN 11.9* 12.7 10.2* 10.0*   HEMATOCRIT 35.7* 38.0 30.4* 29.6*   MCV 86.2  --  84.9 86.5   MCH 28.7  --  28.5 29.2   MCHC 33.3  --  33.6 33.8   RDW 43.3  --  45.8 47.6   PLATELETCT 242 93* 127* 134*   MPV 11.2  --  11.0 10.8     Recent Labs     24  1310 24  1335 24  2330 24  0550 24  0105   SODIUM 136  --   --  136 133*   POTASSIUM 4.4   < > 4.5 4.6 4.4   CHLORIDE 98  --   --  103 99   CO2 27  --   --  22 24   GLUCOSE 95  --   --  114* 104*   BUN 17  --   --  24* 29*   CREATININE 0.55  --   --  0.57 0.55   CALCIUM 9.7  --   --  8.1* 8.3*    < > = values in this interval not displayed.     Recent Labs     24  1310 24  1335   APTT 98.7* 32.4   INR 1.04 1.48*       HgbA1c:  5.4    Diabetic Educator  Consult:  no    Medications:  Scheduled Medications   Medication Dose Frequency    insulin regular  2-9 Units 4X/DAY ACHS    rosuvastatin  10 mg QHS    clopidogrel  75 mg DAILY    Nozin nasal  swab  1 Applicator BID    metoprolol tartrate  25 mg BID    aspirin  81 mg DAILY    acetaminophen  1,000 mg Q6HRS    docusate sodium  100 mg BID    senna-docusate  1 Tablet Nightly    omeprazole  20 mg DAILY    enoxaparin (LOVENOX) injection  40 mg DAILY AT 1800        Exam:   Physical Exam  Constitutional:       General: She is not in acute distress.  HENT:      Mouth/Throat:      Mouth: Mucous membranes are moist.   Eyes:      Pupils: Pupils are equal, round, and reactive to light.   Cardiovascular:      Rate and Rhythm: Normal rate and regular rhythm.      Pulses: Normal pulses.      Heart sounds: Normal heart sounds.   Pulmonary:      Effort: No respiratory distress.      Breath sounds: Examination of the right-lower field reveals decreased breath sounds. Examination of the left-lower field reveals decreased breath sounds. Decreased breath sounds present.   Abdominal:      Palpations: Abdomen is soft.   Musculoskeletal:      Right lower leg: Edema present.      Left lower leg: Edema present.   Skin:     General: Skin is warm and dry.      Comments: Right arm ecchymosis from cath    Sternum - prevena    Bilateral SVG sites - ace wrap   Neurological:      General: No focal deficit present.      Mental Status: She is alert and oriented to person, place, and time.         Cardiac Medications:    ASA - Yes    Plavix - Yes    Post-operative Beta Blockers - Yes    Ace/ARB- No; contraindicated because of Normal EF    Statin - Yes    Aldactone- No; contraindicated because of Normal EF    SGLT2i-  No contraindicated because of No; contraindicated because of Normal EF    Ejection Fraction:  55%    Telemetry:   2/29 SR/ST  3/1 ST/Aflutter 140s    Assessment/Plan:  POD 1 HDS- just off levo, SR, keep mediastinal tubes  Keep  johny for strict I&O, A&O x 3, pain controlled, incisions- covered, amb/enc IS    POD 2 ST vs aflutter 140s.  Hypotension, asymptomatic.  Neuro intact.  Wounds CDI.  Cr 0.55 Hgb 10.0.  Mag repleted this AM.  Bolused overnight not fluid responsive.  Plan: Amio gtt.  DC mediastinal tubes.  Keep pacer wires 1 more day.  Keep CIC one more day.      Disposition:  TBD

## 2024-03-02 LAB
ANION GAP SERPL CALC-SCNC: 12 MMOL/L (ref 7–16)
BUN SERPL-MCNC: 19 MG/DL (ref 8–22)
CALCIUM SERPL-MCNC: 8.2 MG/DL (ref 8.5–10.5)
CHLORIDE SERPL-SCNC: 98 MMOL/L (ref 96–112)
CO2 SERPL-SCNC: 24 MMOL/L (ref 20–33)
CREAT SERPL-MCNC: 0.44 MG/DL (ref 0.5–1.4)
ERYTHROCYTE [DISTWIDTH] IN BLOOD BY AUTOMATED COUNT: 45.6 FL (ref 35.9–50)
GFR SERPLBLD CREATININE-BSD FMLA CKD-EPI: 101 ML/MIN/1.73 M 2
GLUCOSE BLD STRIP.AUTO-MCNC: 120 MG/DL (ref 65–99)
GLUCOSE SERPL-MCNC: 108 MG/DL (ref 65–99)
HCT VFR BLD AUTO: 28.7 % (ref 37–47)
HGB BLD-MCNC: 9.6 G/DL (ref 12–16)
MCH RBC QN AUTO: 28.9 PG (ref 27–33)
MCHC RBC AUTO-ENTMCNC: 33.4 G/DL (ref 32.2–35.5)
MCV RBC AUTO: 86.4 FL (ref 81.4–97.8)
PLATELET # BLD AUTO: 128 K/UL (ref 164–446)
PMV BLD AUTO: 11.3 FL (ref 9–12.9)
POTASSIUM SERPL-SCNC: 4.1 MMOL/L (ref 3.6–5.5)
RBC # BLD AUTO: 3.32 M/UL (ref 4.2–5.4)
SODIUM SERPL-SCNC: 134 MMOL/L (ref 135–145)
WBC # BLD AUTO: 11.8 K/UL (ref 4.8–10.8)

## 2024-03-02 PROCEDURE — A9270 NON-COVERED ITEM OR SERVICE: HCPCS | Performed by: STUDENT IN AN ORGANIZED HEALTH CARE EDUCATION/TRAINING PROGRAM

## 2024-03-02 PROCEDURE — 94669 MECHANICAL CHEST WALL OSCILL: CPT

## 2024-03-02 PROCEDURE — 85027 COMPLETE CBC AUTOMATED: CPT

## 2024-03-02 PROCEDURE — 97162 PT EVAL MOD COMPLEX 30 MIN: CPT

## 2024-03-02 PROCEDURE — 700102 HCHG RX REV CODE 250 W/ 637 OVERRIDE(OP): Performed by: NURSE PRACTITIONER

## 2024-03-02 PROCEDURE — 99024 POSTOP FOLLOW-UP VISIT: CPT | Performed by: NURSE PRACTITIONER

## 2024-03-02 PROCEDURE — A9270 NON-COVERED ITEM OR SERVICE: HCPCS | Performed by: NURSE PRACTITIONER

## 2024-03-02 PROCEDURE — 97535 SELF CARE MNGMENT TRAINING: CPT

## 2024-03-02 PROCEDURE — 770020 HCHG ROOM/CARE - TELE (206)

## 2024-03-02 PROCEDURE — 700102 HCHG RX REV CODE 250 W/ 637 OVERRIDE(OP): Performed by: STUDENT IN AN ORGANIZED HEALTH CARE EDUCATION/TRAINING PROGRAM

## 2024-03-02 PROCEDURE — 97166 OT EVAL MOD COMPLEX 45 MIN: CPT

## 2024-03-02 PROCEDURE — 80048 BASIC METABOLIC PNL TOTAL CA: CPT

## 2024-03-02 PROCEDURE — 82962 GLUCOSE BLOOD TEST: CPT

## 2024-03-02 PROCEDURE — 700111 HCHG RX REV CODE 636 W/ 250 OVERRIDE (IP): Performed by: NURSE PRACTITIONER

## 2024-03-02 PROCEDURE — 700111 HCHG RX REV CODE 636 W/ 250 OVERRIDE (IP): Mod: JZ | Performed by: NURSE PRACTITIONER

## 2024-03-02 RX ORDER — POTASSIUM CHLORIDE 20 MEQ/1
20 TABLET, EXTENDED RELEASE ORAL 2 TIMES DAILY
Status: DISCONTINUED | OUTPATIENT
Start: 2024-03-02 | End: 2024-03-02

## 2024-03-02 RX ORDER — POTASSIUM CHLORIDE 20 MEQ/1
10 TABLET, EXTENDED RELEASE ORAL 2 TIMES DAILY
Status: DISCONTINUED | OUTPATIENT
Start: 2024-03-02 | End: 2024-03-03

## 2024-03-02 RX ORDER — FUROSEMIDE 10 MG/ML
20 INJECTION INTRAMUSCULAR; INTRAVENOUS 2 TIMES DAILY
Status: DISCONTINUED | OUTPATIENT
Start: 2024-03-02 | End: 2024-03-03

## 2024-03-02 RX ORDER — FUROSEMIDE 10 MG/ML
40 INJECTION INTRAMUSCULAR; INTRAVENOUS 2 TIMES DAILY
Status: DISCONTINUED | OUTPATIENT
Start: 2024-03-02 | End: 2024-03-02

## 2024-03-02 RX ADMIN — OXYCODONE HYDROCHLORIDE 10 MG: 10 TABLET ORAL at 17:40

## 2024-03-02 RX ADMIN — CLOPIDOGREL BISULFATE 75 MG: 75 TABLET ORAL at 06:01

## 2024-03-02 RX ADMIN — FUROSEMIDE 20 MG: 10 INJECTION INTRAMUSCULAR; INTRAVENOUS at 17:25

## 2024-03-02 RX ADMIN — Medication 1 APPLICATOR: at 10:00

## 2024-03-02 RX ADMIN — OXYCODONE 5 MG: 5 TABLET ORAL at 09:50

## 2024-03-02 RX ADMIN — ROSUVASTATIN 10 MG: 10 TABLET, FILM COATED ORAL at 20:53

## 2024-03-02 RX ADMIN — DOCUSATE SODIUM 100 MG: 100 CAPSULE, LIQUID FILLED ORAL at 06:00

## 2024-03-02 RX ADMIN — OXYCODONE 5 MG: 5 TABLET ORAL at 14:09

## 2024-03-02 RX ADMIN — FUROSEMIDE 20 MG: 10 INJECTION INTRAMUSCULAR; INTRAVENOUS at 12:06

## 2024-03-02 RX ADMIN — POTASSIUM CHLORIDE 10 MEQ: 1500 TABLET, EXTENDED RELEASE ORAL at 17:24

## 2024-03-02 RX ADMIN — AMIODARONE HYDROCHLORIDE 400 MG: 200 TABLET ORAL at 17:25

## 2024-03-02 RX ADMIN — DOCUSATE SODIUM 50 MG AND SENNOSIDES 8.6 MG 1 TABLET: 8.6; 5 TABLET, FILM COATED ORAL at 20:53

## 2024-03-02 RX ADMIN — ASPIRIN 81 MG: 81 TABLET, COATED ORAL at 06:01

## 2024-03-02 RX ADMIN — OMEPRAZOLE 20 MG: 20 CAPSULE, DELAYED RELEASE ORAL at 06:00

## 2024-03-02 RX ADMIN — POTASSIUM CHLORIDE 10 MEQ: 1500 TABLET, EXTENDED RELEASE ORAL at 12:06

## 2024-03-02 RX ADMIN — Medication 1 APPLICATOR: at 20:53

## 2024-03-02 RX ADMIN — ENOXAPARIN SODIUM 40 MG: 100 INJECTION SUBCUTANEOUS at 17:25

## 2024-03-02 RX ADMIN — ACETAMINOPHEN 1000 MG: 500 TABLET ORAL at 06:01

## 2024-03-02 RX ADMIN — AMIODARONE HYDROCHLORIDE 0.5 MG/MIN: 1.8 INJECTION, SOLUTION INTRAVENOUS at 04:53

## 2024-03-02 RX ADMIN — AMIODARONE HYDROCHLORIDE 400 MG: 200 TABLET ORAL at 06:01

## 2024-03-02 RX ADMIN — OXYCODONE 5 MG: 5 TABLET ORAL at 02:37

## 2024-03-02 RX ADMIN — DOCUSATE SODIUM 100 MG: 100 CAPSULE, LIQUID FILLED ORAL at 17:25

## 2024-03-02 RX ADMIN — METOPROLOL TARTRATE 12.5 MG: 25 TABLET, FILM COATED ORAL at 17:24

## 2024-03-02 ASSESSMENT — COGNITIVE AND FUNCTIONAL STATUS - GENERAL
TURNING FROM BACK TO SIDE WHILE IN FLAT BAD: A LOT
HELP NEEDED FOR BATHING: A LITTLE
CLIMB 3 TO 5 STEPS WITH RAILING: A LOT
SUGGESTED CMS G CODE MODIFIER MOBILITY: CK
SUGGESTED CMS G CODE MODIFIER DAILY ACTIVITY: CK
DAILY ACTIVITIY SCORE: 18
WALKING IN HOSPITAL ROOM: A LITTLE
DRESSING REGULAR LOWER BODY CLOTHING: A LOT
MOVING TO AND FROM BED TO CHAIR: A LITTLE
DRESSING REGULAR UPPER BODY CLOTHING: A LITTLE
TOILETING: A LITTLE
MOVING FROM LYING ON BACK TO SITTING ON SIDE OF FLAT BED: A LOT
MOBILITY SCORE: 15
STANDING UP FROM CHAIR USING ARMS: A LITTLE
PERSONAL GROOMING: A LITTLE

## 2024-03-02 ASSESSMENT — GAIT ASSESSMENTS
DISTANCE (FEET): 100
ASSISTIVE DEVICE: FRONT WHEEL WALKER
GAIT LEVEL OF ASSIST: STANDBY ASSIST

## 2024-03-02 ASSESSMENT — PAIN DESCRIPTION - PAIN TYPE
TYPE: ACUTE PAIN
TYPE: ACUTE PAIN
TYPE: ACUTE PAIN;SURGICAL PAIN
TYPE: ACUTE PAIN
TYPE: ACUTE PAIN
TYPE: ACUTE PAIN;SURGICAL PAIN

## 2024-03-02 ASSESSMENT — ACTIVITIES OF DAILY LIVING (ADL): TOILETING: INDEPENDENT

## 2024-03-02 ASSESSMENT — FIBROSIS 4 INDEX: FIB4 SCORE: 3.82

## 2024-03-02 NOTE — PROGRESS NOTES
4 Eyes Skin Assessment Completed by BESSIE Bolaños and BESSIE Elmore.    Head WDL  Ears Redness and Blanching  Nose WDL  Mouth WDL  Neck WDL  Breast/Chest Incision  Shoulder Blades WDL  Spine WDL  (R) Arm/Elbow/Hand Bruising and Swelling  (L) Arm/Elbow/Hand Blanching  Abdomen Incision  Groin Bruising  Scrotum/Coccyx/Buttocks WDL  (R) Leg Incision  (L) Leg Incision  (R) Heel/Foot/Toe Redness and Blanching  (L) Heel/Foot/Toe Redness and Blanching      -Midsternal incision covered with prevena wound vac, chest tube sites covered with gauze, right arm bruising and swelling from previous LHC, left leg incision from CABG procedure. Skin is intact, no other areas of concern.    Devices In Places Tele Box, Blood Pressure Cuff, Pulse Ox, Nasal Cannula, and JAMES's      Interventions In Place Gray Ear Foams and Pressure Redistribution Mattress    Possible Skin Injury No    Pictures Uploaded Into Epic N/A  Wound Consult Placed N/A  RN Wound Prevention Protocol Ordered No

## 2024-03-02 NOTE — THERAPY
"Physical Therapy   Initial Evaluation     Patient Name: Carine Christopher  Age:  73 y.o., Sex:  female  Medical Record #: [id number]  Today's Date: 3/2/2024     Precautions  Precautions: Cardiac Precautions (See Comments);Sternal Precautions (See Comments)  Comments: s/p 3vCABG, EF 55%    Assessment  Patient is 73 y.o. female that is s/p 3vCABG with Dr. Chicas 2/28. EF 55%. PMHx significant for multiple prior MI, CAD with PCI in 2023, HTN, DLD, giant cell arteritis, chronic bronchitis. She presented to PT with decreased activity tolerance but performed functional mobility as detailed below without significant physical assist. She demonstrated positive talk test with ambulation in linder and required cueing for rest breaks.    Additional time required for patient education to review \"Recovering from Heart Surgery\" handout, initiated education regarding:    physiology of cardiovascular system and hemodynamic response;  normal expectations after OHS;  sternal precautions;  home exercise program and appropriate activity progression;  self monitoring via RPE scale/talk test/HR;  activity schedule following OHS;  discharge concerns decision tree;  benefits of outpatient [facility];    patient was receptive to education and demonstrated understanding but would likely benefit from reinforcement. Patient demonstrated adequate insight to progress home program independently. She would likely benefit from outpatient cardiac rehab but reported she does not plan to attend.     Will follow.       Plan    Physical Therapy Initial Treatment Plan   Treatment Plan : Bed Mobility, Equipment, Gait Training, Manual Therapy, Neuro Re-Education / Balance, Self Care / Home Evaluation, Stair Training, Therapeutic Activities, Therapeutic Exercise  Treatment Frequency: 4 Times per Week  Duration: Until Therapy Goals Met    DC Equipment Recommendations: Unable to determine at this time (at current level recommend " "FWW)  Discharge Recommendations: Recommend home health for continued physical therapy services (patient reported she will not attend outpatient cardiac rehab due to concerns regarding driving; would likely benefit)       Subjective    RN cleared patient for therapy, received in recliner, agreeable     Objective       03/02/24 1045   Charge Group   PT Evaluation PT Evaluation Mod   PT Self Care / Home Evaluation (Units) 2   Total Time Spent   PT Total Time Yes   PT Evaluation Time Spent (Mins) 30   PT Self Care/Home Evaluation Time Spent (Mins) 25   PT Total Time Spent (Calculated) 55   Initial Contact Note    Initial Contact Note Order Received and Verified, Physical Therapy Evaluation in Progress with Full Report to Follow.   Precautions   Precautions Cardiac Precautions (See Comments);Sternal Precautions (See Comments)   Comments s/p 3vCABG, EF 55%   Vitals   Vitals Comments BP/HR respectively: 124/56, 87 sitting, 125/58, 98 standing; 122/64, 106, standing post activity   Pain 0 - 10 Group   Therapist Pain Assessment   (no pain complaint during session)   Prior Living Situation   Prior Services None   Housing / Facility   (tri level home)   Steps Into Home 1   Steps In Home   (7 steps between levels)   Lives with - Patient's Self Care Capacity Significant Other   Comments Patient reported \"Vincent\" able to assist as needed but he recently suffered a broken rib   Prior Level of Functional Mobility   Bed Mobility Independent   Transfer Status Independent   Ambulation Independent   Ambulation Distance community   Assistive Devices Used None   Stairs Independent   Comments Patient reported her parents replaced their roof at 88 YO 6 years ago and that she \"has to keep up with them\"   Cognition    Cognition / Consciousness WDL   Level of Consciousness Alert   Comments pleasant, cooperative, receptive to education   Active ROM Upper Body   Comments patient moved BUE functionally during session   Active ROM Lower Body  "   Comments WFL for mobility   Strength Lower Body   Comments WFL for mobility   Coordination Lower Body    Coordination Lower Body  WDL   Balance Assessment   Sitting Balance (Static) Fair +   Sitting Balance (Dynamic) Fair +   Standing Balance (Static) Fair +   Standing Balance (Dynamic) Fair   Weight Shift Sitting Good   Weight Shift Standing Good   Comments no LOB. used FWW in standing but did not appear reliant   Bed Mobility    Comments in recliner pre and post; anxious about bed mobility at home, reported bed surface elevated   Gait Analysis   Gait Level Of Assist Standby Assist   Assistive Device Front Wheel Walker   Distance (Feet) 100   # of Times Distance was Traveled 1   # of Stairs Climbed 0   Weight Bearing Status no restrictions   Vision Deficits Impacting Mobility NT   Comments required cueing for standing rest breaks with positive talk test; reported she ambulated entire unit with RN staff. Reinforced importance of appropriate pacing   Functional Mobility   Sit to Stand Minimal Assist   Bed, Chair, Wheelchair Transfer Standby Assist   Transfer Method Stand Step   ICU Target Mobility Level   ICU Mobility - Targeted Level Level 4   6 Clicks Assessment - How much HELP from from another person do you currently need... (If the patient hasn't done an activity recently, how much help from another person do you think he/she would need if he/she tried?)   Turning from your back to your side while in a flat bed without using bedrails? 2   Moving from lying on your back to sitting on the side of a flat bed without using bedrails? 2   Moving to and from a bed to a chair (including a wheelchair)? 3   Standing up from a chair using your arms (e.g., wheelchair, or bedside chair)? 3   Walking in hospital room? 3   Climbing 3-5 steps with a railing? 2   6 clicks Mobility Score 15   Patient / Family Goals    Patient / Family Goal #1 go home   Short Term Goals    Short Term Goal # 1 Patient will move supine <> sitting  EOB from flat bed without bed rail with supervision within 6tx in order to get in/out of bed   Short Term Goal # 2 Patient will move sitting <> standing with LRAD and supervision within 6tx in order to initiate transfers and gait   Short Term Goal # 3 Patient will ambulate 150ft with LRAD and supervision within 6tx in order to access environment   Short Term Goal # 4 Patient will ascend/descend 7 steps with unilateral UE support and supervision within 6tx in order to access all levels of home   Short Term Goal # 5 Patient will verbalize cardiac rehab education including sternal precautions, talk test, activity monitoring and modification strategies within 6tx in order to demonstrate understanding   Education Group   Education Provided Role of Physical Therapist;Cardiac Precautions;Sternal Precautions   Cardiac Precautions Patient Response Patient;Acceptance;Explanation;Demonstration;Handout;Verbal Demonstration;Reinforcement Needed   Sternal Precautions Patient Response Patient;Acceptance;Explanation;Demonstration;Handout;Verbal Demonstration;Action Demonstration   Role of Physical Therapist Patient Response Patient;Acceptance;Explanation;Verbal Demonstration   Physical Therapy Initial Treatment Plan    Treatment Plan  Bed Mobility;Equipment;Gait Training;Manual Therapy;Neuro Re-Education / Balance;Self Care / Home Evaluation;Stair Training;Therapeutic Activities;Therapeutic Exercise   Treatment Frequency 4 Times per Week   Duration Until Therapy Goals Met   Problem List    Problems Impaired Bed Mobility;Impaired Ambulation;Impaired Transfers;Decreased Activity Tolerance;Limited Knowledge of Post-Op Precautions   Anticipated Discharge Equipment and Recommendations   DC Equipment Recommendations Unable to determine at this time  (at current level recommend FWW)   Discharge Recommendations Recommend home health for continued physical therapy services  (patient reported she will not attend outpatient cardiac rehab due  to concerns regarding driving; would likely benefit)   Interdisciplinary Plan of Care Collaboration   IDT Collaboration with  Nursing;Occupational Therapist   Patient Position at End of Therapy Seated;Call Light within Reach;Tray Table within Reach;Phone within Reach   Collaboration Comments RN aware of visit, response   Session Information   Date / Session Number  3/2-1 (1/4, 3/8)

## 2024-03-02 NOTE — THERAPY
Occupational Therapy   Initial Evaluation     Patient Name: Carine Christopher  Age:  73 y.o., Sex:  female  Medical Record #: [id number]  Today's Date: 3/2/2024     Precautions  Precautions: (P) Cardiac Precautions (See Comments), Sternal Precautions (See Comments)    Assessment  Patient is a 73 y.o. female POD #3 CABG x3. Additional factors influencing patient status / progress: PMHx includes multivessel CAD including chronic total occlusion of circumflex artery in area of previous stent, unstable angina at presentation, history of MI x3 previously with stents, HTN, HLD, giant cell arteritis, chronic bronchitis. During OT eval, pt was receptive to education and training on sternal precautions within the context of ADLs and toilet txfers. Education on standard shower chair vs tub transfer bench. Pt demo'd UBD, toilet txfer, and standing at sink for g/h with overall SBA. Pt has difficulty reaching LLE for LBD due to edema and pain from vein grafts. Would benefit from additional training on compensatory strategies for LBD if pt still has difficulty reaching LLE in seated figure-four. Reports her SO can assist as needed at d/c. Anticipate pt will have no further OT needs at discharge.     Plan    Occupational Therapy Initial Treatment Plan   Treatment Interventions: (P) Self Care / Activities of Daily Living, Adaptive Equipment, Therapeutic Exercises, Therapeutic Activity  Treatment Frequency: (P) 4 Times per Week  Duration: (P) Until Therapy Goals Met    DC Equipment Recommendations: (P) Tub / Shower Seat  Discharge Recommendations: (P) Anticipate that the patient will have no further occupational therapy needs after discharge from the hospital     Objective     03/02/24 1017   Initial Contact Note    Initial Contact Note Order Received and Verified, Occupational Therapy Evaluation in Progress with Full Report to Follow.   Prior Living Situation   Prior Services Home-Independent   Housing / Facility    (tri-level home)   Steps Into Home 1  (through garage)   Steps In Home   (7 steps to acces upper and lower levels)   Bathroom Set up Bathtub / Shower Combination   Equipment Owned Hand Held Shower   Lives with - Patient's Self Care Capacity Significant Other   Comments Pt reports her significant other was recently injured but states he can assist as needed   Prior Level of ADL Function   Self Feeding Independent   Grooming / Hygiene Independent   Bathing Independent   Dressing Independent   Toileting Independent   Prior Level of IADL Function   Medication Management Independent   Laundry Independent   Kitchen Mobility Independent   Finances Independent   Home Management Independent   Shopping Independent   Prior Level Of Mobility Independent Without Device in Community   Driving / Transportation Driving Independent   Occupation (Pre-Hospital Vocational) Retired Due To Age   Precautions   Precautions Cardiac Precautions (See Comments);Sternal Precautions (See Comments)   Vitals   Vitals Comments Vitals WNL   Pain   Intervention Declines   Non Verbal Descriptors   Non Verbal Scale  Calm   Cognition    Level of Consciousness Alert   Comments Pleasant and cooperative   Active ROM Upper Body   Active ROM Upper Body  WDL   Comments WFL within sternal precautions   Strength Upper Body   Upper Body Strength  WDL   Sensation Upper Body   Upper Extremity Sensation  Not Tested   Upper Body Muscle Tone   Upper Body Muscle Tone  WDL   Neurological Concerns   Neurological Concerns No   Coordination Upper Body   Coordination WDL   Balance Assessment   Sitting Balance (Static) Fair +   Sitting Balance (Dynamic) Fair +   Standing Balance (Static) Fair   Standing Balance (Dynamic) Fair -   Weight Shift Sitting Good   Weight Shift Standing Good   Comments FWW   Bed Mobility    Scooting Standby Assist   Comments up pre and post   ADL Assessment   Grooming Supervision;Standing   Upper Body Dressing Supervision  (gown)   Lower Body  Dressing Moderate Assist  (socks, only able to reach RLE in tailor sit)   Toileting   (declined need, education on performing hygiene with sternal precautions)   How much help from another person does the patient currently need...   Putting on and taking off regular lower body clothing? 2   Bathing (including washing, rinsing, and drying)? 3   Toileting, which includes using a toilet, bedpan, or urinal? 3   Putting on and taking off regular upper body clothing? 3   Taking care of personal grooming such as brushing teeth? 3   Eating meals? 4   6 Clicks Daily Activity Score 18   Functional Mobility   Sit to Stand Standby Assist   Toilet Transfers Standby Assist   Transfer Method Stand Step   Mobility chair->bathroom with FWW and SBA   Activity Tolerance   Sitting in Chair in chair on arrival   Standing 5+ min   Patient / Family Goals   Patient / Family Goal #1 To go home   Short Term Goals   Short Term Goal # 1 Pt will don underwear/pants with SPV   Short Term Goal # 2 Pt will stand at sink to perform g/h routine with no c/o fatigue   Short Term Goal # 3 Pt will perform bathroom transfers with SPV   Education Group   Education Provided Sternal Precautions;Transfers;Activities of Daily Living;Adaptive Equipment   Role of Occupational Therapist Patient Response Patient;Acceptance;Explanation;Verbal Demonstration   Sternal Precautions Patient Response Patient;Acceptance;Explanation;Verbal Demonstration;Action Demonstration;Reinforcement Needed   Transfers Patient Response Patient;Acceptance;Explanation;Verbal Demonstration;Action Demonstration;Reinforcement Needed   ADL Patient Response Patient;Acceptance;Explanation;Verbal Demonstration;Action Demonstration;Reinforcement Needed   Adaptive Equipment Patient Response Patient;Acceptance;Explanation;Verbal Demonstration  (recommendation for shower chair)   Occupational Therapy Initial Treatment Plan    Treatment Interventions Self Care / Activities of Daily Living;Adaptive  Equipment;Therapeutic Exercises;Therapeutic Activity   Treatment Frequency 4 Times per Week   Duration Until Therapy Goals Met   Problem List   Problem List Decreased Active Daily Living Skills;Decreased Homemaking Skills;Decreased Functional Mobility;Decreased Activity Tolerance;Limited Knowledge of Post Op Precautions   Anticipated Discharge Equipment and Recommendations   DC Equipment Recommendations Tub / Shower Seat   Discharge Recommendations Anticipate that the patient will have no further occupational therapy needs after discharge from the hospital   Interdisciplinary Plan of Care Collaboration   IDT Collaboration with  Nursing;Physical Therapist   Patient Position at End of Therapy Other (Comments)   Collaboration Comments RN aware; handoff to PT   Session Information   Date / Session Number  3/2 #1 (1/4, 3/8)

## 2024-03-02 NOTE — PROGRESS NOTES
Cardiovascular Surgery Progress Note    Name: Carine Christopher  MRN: [id number]  : 1950  Admit Date: 2024  6:21 AM  3 Days Post-Op     Procedure:  Procedure(s) and Anesthesia Type:     * CABG, WITH ENDOSCOPIC VEIN PROCUREMENT - X3 - General     * ECHOCARDIOGRAM, TRANSESOPHAGEAL, INTRAOPERATIVE - General    Vitals:  Vitals:    24 0500 24 0600 24 0738 24 0800   BP: 108/54 121/60 95/54 105/55   Pulse: 92 100 94 89   Resp:   18    Temp:       TempSrc:  Bladder  Bladder   SpO2: 93% 92% 97% 97%   Weight:  66.3 kg (146 lb 2.6 oz)     Height:          Temp (24hrs), Av.8 °C (100 °F), Min:37.8 °C (100 °F), Max:37.8 °C (100 °F)      Respiratory:    Respiration: 18, Pulse Oximetry: 97 %       Fluids:    Intake/Output Summary (Last 24 hours) at 3/2/2024 0913  Last data filed at 3/2/2024 0600  Gross per 24 hour   Intake 439.8 ml   Output 1100 ml   Net -660.2 ml     Admit weight: Weight: 62.1 kg (137 lb)  Current weight: Weight: 66.3 kg (146 lb 2.6 oz) (24 0600)    Labs:  Recent Labs     24  0340 24  0105 24  0030   WBC 15.2* 14.9* 11.8*   RBC 3.58* 3.42* 3.32*   HEMOGLOBIN 10.2* 10.0* 9.6*   HEMATOCRIT 30.4* 29.6* 28.7*   MCV 84.9 86.5 86.4   MCH 28.5 29.2 28.9   MCHC 33.6 33.8 33.4   RDW 45.8 47.6 45.6   PLATELETCT 127* 134* 128*   MPV 11.0 10.8 11.3     Recent Labs     24  0550 24  0105 24  0030   SODIUM 136 133* 134*   POTASSIUM 4.6 4.4 4.1   CHLORIDE 103 99 98   CO2 22 24 24   GLUCOSE 114* 104* 108*   BUN 24* 29* 19   CREATININE 0.57 0.55 0.44*   CALCIUM 8.1* 8.3* 8.2*     Recent Labs     24  1335   APTT 32.4   INR 1.48*       HgbA1c:  5.4    Diabetic Educator Consult:  no    Medications:  Scheduled Medications   Medication Dose Frequency    metoprolol tartrate  12.5 mg BID    furosemide  20 mg BID    potassium chloride SA  10 mEq BID    amiodarone  400 mg BID    insulin regular  2-9 Units 4X/DAY ACHS    rosuvastatin  10 mg  QHS    clopidogrel  75 mg DAILY    Nozin nasal  swab  1 Applicator BID    aspirin  81 mg DAILY    acetaminophen  1,000 mg Q6HRS    docusate sodium  100 mg BID    senna-docusate  1 Tablet Nightly    omeprazole  20 mg DAILY    enoxaparin (LOVENOX) injection  40 mg DAILY AT 1800        Exam:   Physical Exam  Constitutional:       General: She is not in acute distress.  HENT:      Mouth/Throat:      Mouth: Mucous membranes are moist.   Eyes:      Pupils: Pupils are equal, round, and reactive to light.   Cardiovascular:      Rate and Rhythm: Normal rate and regular rhythm.      Pulses: Normal pulses.      Heart sounds: Normal heart sounds.   Pulmonary:      Effort: No respiratory distress.      Breath sounds: Examination of the right-lower field reveals decreased breath sounds. Examination of the left-lower field reveals decreased breath sounds. Decreased breath sounds present.   Abdominal:      Palpations: Abdomen is soft.   Musculoskeletal:      Right lower leg: Edema present.      Left lower leg: Edema present.   Skin:     General: Skin is warm and dry.      Comments: Right arm ecchymosis from cath    Sternum - prevena    Bilateral SVG sites - cdi   Neurological:      General: No focal deficit present.      Mental Status: She is alert and oriented to person, place, and time.         Cardiac Medications:    ASA - Yes    Plavix - Yes    Post-operative Beta Blockers - Yes    Ace/ARB- No; contraindicated because of Normal EF    Statin - Yes    Aldactone- No; contraindicated because of Normal EF    SGLT2i-  No contraindicated because of No; contraindicated because of Normal EF    Ejection Fraction:  55%    Telemetry:   2/29 SR/ST  3/1 ST/Aflutter 140s  3/2 SR 80's    Assessment/Plan:  POD 1 HDS- just off levo, SR, keep mediastinal tubes  Keep mcclain for strict I&O, A&O x 3, pain controlled, incisions- covered, amb/enc IS    POD 2 ST vs aflutter 140s.  Hypotension, asymptomatic.  Neuro intact.  Wounds CDI.  Cr 0.55  Hgb 10.0.  Mag repleted this AM.  Bolused overnight not fluid responsive.  Plan: Amio gtt.  DC mediastinal tubes.  Keep pacer wires 1 more day.  Keep CIC one more day.      POD 3 HDS, SR- on amio gtt- d/c cont PO, d/c pacer wires,  gently diurese, neuro intact, pain- controlled, transfer to tele    Disposition:  TBD

## 2024-03-02 NOTE — PROGRESS NOTES
4 Eyes Skin Assessment Completed by BESSIE Levine and BESSIE Wright.    Head WDL  Ears Redness and Blanching  Nose WDL  Mouth WDL  Neck Redness and Blanching, skin tear below right ear  Breast/Chest Incision  Shoulder Blades WDL  Spine WDL  (R) Arm/Elbow/Hand Redness, Blanching, and Bruising  (L) Arm/Elbow/Hand Redness, Blanching, and Bruising  Abdomen Incision (chest tube sites)  Groin WDL  Scrotum/Coccyx/Buttocks Redness and Blanching  (R) Leg Redness, Blanching, and Incision  (L) Leg Redness, Blanching, and Incision  (R) Heel/Foot/Toe Redness and Blanching  (L) Heel/Foot/Toe Redness and Blanching          Devices In Places ECG, Tele Box, Blood Pressure Cuff, Pulse Ox, [place]man, SCD's, and Central Line      Interventions In Place Gray Ear Foams, Heel Mepilex, Pillows, Low Air Loss Mattress, and Heels Loaded W/Pillows    Possible Skin Injury Yes    Pictures Uploaded Into Epic Yes  Wound Consult Placed Yes  RN Wound Prevention Protocol Ordered No

## 2024-03-02 NOTE — PROGRESS NOTES
Patient up to unit from [facility]. Patient is A&Ox4, VSS, no signs of distress. Tele monitor placed and verified by monitor room. All questions and concerns answered, call light in reach, plan of care ongoing. Agree with previous RN charting.

## 2024-03-02 NOTE — CARE PLAN
Problem: Pain - Standard  Goal: Alleviation of pain or a reduction in pain to the patient’s comfort goal  Outcome: Progressing  Note: Pt reporting much better pain control      Problem: Hemodynamics  Goal: Patient's hemodynamics, fluid balance and neurologic status will be stable or improve  Outcome: Progressing  Note: Amio gtt improved HR      Problem: Post op day 2 CABG/Heart Valve Replacement  Goal: Optimal care of the post op CABG/heart valve replacement post op day 2  Outcome: Progressing  Intervention: IS q 1 hour while awake and record best IS volume  Note: Needs reminders but performs correctly   Intervention: Consider pacer wire removal by MD  Note: To remain in place per LIBBY   Intervention: Consider removal of mcclain and chest tube if not already done  Note: Chest tubes out today, mcclain to remain in place per LIBBY    The patient is Stable - Low risk of patient condition declining or worsening    Shift Goals  Clinical Goals: Care plan, maintain hemodynamic stability  Patient Goals: Rest, ambulate  Family Goals: FELIX    Progress made toward(s) clinical / shift goals:     Patient is not progressing towards the following goals:

## 2024-03-02 NOTE — CARE PLAN
Problem: Hyperinflation  Goal: Prevent or improve atelectasis  Description: Target End Date:  3 to 4 days    1. Instruct incentive spirometry usage  2.  Perform hyperinflation therapy as indicated  Outcome: Progressing      Respiratory Update    Treatment modality: PEP  Frequency: QID  IS Value 500    Pt tolerating current treatments well with no adverse reactions.

## 2024-03-02 NOTE — CARE PLAN
The patient is Watcher - Medium risk of patient condition declining or worsening    Shift Goals  Clinical Goals: pain control, mobility  Patient Goals: mobility, pain control  Family Goals: FELIX    Progress made toward(s) clinical / shift goals:  mobilizes, receives medication for pain control.       Problem: Pain - Standard  Goal: Alleviation of pain or a reduction in pain to the patient’s comfort goal  Outcome: Progressing  Note: Receives pain medications, see MAR     Problem: Knowledge Deficit - Standard  Goal: Patient and family/care givers will demonstrate understanding of plan of care, disease process/condition, diagnostic tests and medications  Outcome: Progressing  Note: Discussed plan of care for today w/ pt this am, she is A+Ox4, she verbalizes understanding of her plan of care, no questions. Emotional support given. Reinforcing education as necessary.

## 2024-03-02 NOTE — PROGRESS NOTES
Pt to TX to T720. Pt updated. Belongings collected. Pt declines to have this RN call anyone to inform them of room change. Report given to Mami BARNARD RN. Pt leaves the floor by WC with CCT to go to T7.

## 2024-03-02 NOTE — CARE PLAN
The patient is Watcher - Medium risk of patient condition declining or worsening    Shift Goals  Clinical Goals: POD 2 care, hemodynamic stability  Patient Goals: Rest  Family Goals: FELIX    Progress made toward(s) clinical / shift goals:      Problem: Post op day 2 CABG/Heart Valve Replacement  Goal: Optimal care of the post op CABG/heart valve replacement post op day 2  Outcome: Progressing  Intervention: FSBS: when 2 consecutive BS < 130 after post op day 2, discontinue FSBS unless patient is insulin dependent diabetic  Note: Patient remains on FSBS. Insulin doses not required on shift.  Intervention: Daily weights in the morning  Note: Completed  Intervention: Up in chair for all meals  Note: Completed  Intervention: Ambulate 4 times daily, increasing the distance each time  Note: Ambulate x3 this shift, 300ft each time.  Intervention: Stand at sink and wash up with assistance.  Clean incisions twice daily with soap and water.  Note: Prevena in place  Intervention: IS q 1 hour while awake and record best IS volume  Note: Completed. Best . Patient proves to beself-motivated  Intervention: Consider pacer wire removal by MD  Note: Pacer wires remain in place  Intervention: Consider removal of mcclain and chest tube if not already done  Note: Mcclain remains in place    Problem: Pain - Standard  Goal: Alleviation of pain or a reduction in pain to the patient’s comfort goal  Outcome: Progressing     Problem: Knowledge Deficit - Standard  Goal: Patient and family/care givers will demonstrate understanding of plan of care, disease process/condition, diagnostic tests and medications  Outcome: Progressing     Problem: Hemodynamics  Goal: Patient's hemodynamics, fluid balance and neurologic status will be stable or improve  Outcome: Progressing     Problem: Respiratory  Goal: Patient will achieve/maintain optimum respiratory ventilation and gas exchange  Outcome: Progressing     Problem: Self Care  Goal: Patient will  have the ability to perform ADLs independently or with assistance (bathe, groom, dress, toilet and feed)  Outcome: Progressing

## 2024-03-03 ENCOUNTER — APPOINTMENT (OUTPATIENT)
Dept: RADIOLOGY | Facility: MEDICAL CENTER | Age: 74
DRG: 233 | End: 2024-03-03
Attending: NURSE PRACTITIONER
Payer: MEDICARE

## 2024-03-03 LAB
ANION GAP SERPL CALC-SCNC: 12 MMOL/L (ref 7–16)
BUN SERPL-MCNC: 18 MG/DL (ref 8–22)
CALCIUM SERPL-MCNC: 8 MG/DL (ref 8.5–10.5)
CHLORIDE SERPL-SCNC: 97 MMOL/L (ref 96–112)
CO2 SERPL-SCNC: 27 MMOL/L (ref 20–33)
CREAT SERPL-MCNC: 0.51 MG/DL (ref 0.5–1.4)
ERYTHROCYTE [DISTWIDTH] IN BLOOD BY AUTOMATED COUNT: 45.7 FL (ref 35.9–50)
GFR SERPLBLD CREATININE-BSD FMLA CKD-EPI: 98 ML/MIN/1.73 M 2
GLUCOSE SERPL-MCNC: 103 MG/DL (ref 65–99)
HCT VFR BLD AUTO: 29.9 % (ref 37–47)
HGB BLD-MCNC: 9.9 G/DL (ref 12–16)
MCH RBC QN AUTO: 28.6 PG (ref 27–33)
MCHC RBC AUTO-ENTMCNC: 33.1 G/DL (ref 32.2–35.5)
MCV RBC AUTO: 86.4 FL (ref 81.4–97.8)
PLATELET # BLD AUTO: 178 K/UL (ref 164–446)
PMV BLD AUTO: 10.5 FL (ref 9–12.9)
POTASSIUM SERPL-SCNC: 4.1 MMOL/L (ref 3.6–5.5)
RBC # BLD AUTO: 3.46 M/UL (ref 4.2–5.4)
SODIUM SERPL-SCNC: 136 MMOL/L (ref 135–145)
WBC # BLD AUTO: 11 K/UL (ref 4.8–10.8)

## 2024-03-03 PROCEDURE — 700111 HCHG RX REV CODE 636 W/ 250 OVERRIDE (IP): Mod: JZ | Performed by: NURSE PRACTITIONER

## 2024-03-03 PROCEDURE — 71046 X-RAY EXAM CHEST 2 VIEWS: CPT

## 2024-03-03 PROCEDURE — A9270 NON-COVERED ITEM OR SERVICE: HCPCS | Performed by: NURSE PRACTITIONER

## 2024-03-03 PROCEDURE — 80048 BASIC METABOLIC PNL TOTAL CA: CPT

## 2024-03-03 PROCEDURE — 85027 COMPLETE CBC AUTOMATED: CPT

## 2024-03-03 PROCEDURE — 700111 HCHG RX REV CODE 636 W/ 250 OVERRIDE (IP): Performed by: NURSE PRACTITIONER

## 2024-03-03 PROCEDURE — A9270 NON-COVERED ITEM OR SERVICE: HCPCS | Performed by: STUDENT IN AN ORGANIZED HEALTH CARE EDUCATION/TRAINING PROGRAM

## 2024-03-03 PROCEDURE — 700102 HCHG RX REV CODE 250 W/ 637 OVERRIDE(OP): Performed by: NURSE PRACTITIONER

## 2024-03-03 PROCEDURE — 770020 HCHG ROOM/CARE - TELE (206)

## 2024-03-03 PROCEDURE — 700102 HCHG RX REV CODE 250 W/ 637 OVERRIDE(OP): Performed by: STUDENT IN AN ORGANIZED HEALTH CARE EDUCATION/TRAINING PROGRAM

## 2024-03-03 PROCEDURE — 99024 POSTOP FOLLOW-UP VISIT: CPT | Performed by: NURSE PRACTITIONER

## 2024-03-03 RX ORDER — AMIODARONE HYDROCHLORIDE 200 MG/1
400 TABLET ORAL
Status: DISCONTINUED | OUTPATIENT
Start: 2024-03-04 | End: 2024-03-04 | Stop reason: HOSPADM

## 2024-03-03 RX ADMIN — AMIODARONE HYDROCHLORIDE 400 MG: 200 TABLET ORAL at 04:30

## 2024-03-03 RX ADMIN — CLOPIDOGREL BISULFATE 75 MG: 75 TABLET ORAL at 04:31

## 2024-03-03 RX ADMIN — DOCUSATE SODIUM 100 MG: 100 CAPSULE, LIQUID FILLED ORAL at 04:31

## 2024-03-03 RX ADMIN — OXYCODONE HYDROCHLORIDE 10 MG: 10 TABLET ORAL at 00:28

## 2024-03-03 RX ADMIN — Medication 1 APPLICATOR: at 19:57

## 2024-03-03 RX ADMIN — FUROSEMIDE 20 MG: 10 INJECTION INTRAMUSCULAR; INTRAVENOUS at 04:31

## 2024-03-03 RX ADMIN — ASPIRIN 81 MG: 81 TABLET, COATED ORAL at 04:30

## 2024-03-03 RX ADMIN — OXYCODONE 5 MG: 5 TABLET ORAL at 14:35

## 2024-03-03 RX ADMIN — DOCUSATE SODIUM 100 MG: 100 CAPSULE, LIQUID FILLED ORAL at 17:28

## 2024-03-03 RX ADMIN — ROSUVASTATIN 10 MG: 10 TABLET, FILM COATED ORAL at 19:54

## 2024-03-03 RX ADMIN — Medication 1 APPLICATOR: at 09:45

## 2024-03-03 RX ADMIN — ENOXAPARIN SODIUM 40 MG: 100 INJECTION SUBCUTANEOUS at 17:28

## 2024-03-03 RX ADMIN — DOCUSATE SODIUM 50 MG AND SENNOSIDES 8.6 MG 1 TABLET: 8.6; 5 TABLET, FILM COATED ORAL at 19:54

## 2024-03-03 RX ADMIN — OMEPRAZOLE 20 MG: 20 CAPSULE, DELAYED RELEASE ORAL at 04:47

## 2024-03-03 RX ADMIN — ONDANSETRON 8 MG: 2 INJECTION INTRAMUSCULAR; INTRAVENOUS at 08:24

## 2024-03-03 RX ADMIN — MAGNESIUM HYDROXIDE 30 ML: 1200 LIQUID ORAL at 16:16

## 2024-03-03 RX ADMIN — OXYCODONE HYDROCHLORIDE 10 MG: 10 TABLET ORAL at 04:31

## 2024-03-03 RX ADMIN — METOPROLOL TARTRATE 25 MG: 25 TABLET, FILM COATED ORAL at 17:28

## 2024-03-03 RX ADMIN — METOPROLOL TARTRATE 12.5 MG: 25 TABLET, FILM COATED ORAL at 04:31

## 2024-03-03 RX ADMIN — POTASSIUM CHLORIDE 10 MEQ: 1500 TABLET, EXTENDED RELEASE ORAL at 04:31

## 2024-03-03 ASSESSMENT — FIBROSIS 4 INDEX: FIB4 SCORE: 2.75

## 2024-03-03 ASSESSMENT — PAIN DESCRIPTION - PAIN TYPE
TYPE: ACUTE PAIN;SURGICAL PAIN

## 2024-03-03 NOTE — FACE TO FACE
Face to Face Note  -  Durable Medical Equipment    ELIO Malloy - NPI: [id number]  I certify that this patient is under my care and that they had a durable medical equipment(DME)face to face encounter by myself that meets the physician DME face-to-face encounter requirements with this patient on:    Date of encounter:   Patient:                    MRN:                       YOB: 2025  Carine Hammondsilvia  [id number]  1950     The encounter with the patient was in whole, or in part, for the following medical condition, which is the primary reason for durable medical equipment:  Post-Op Surgery    I certify that, based on my findings, the following durable medical equipment is medically necessary:    Walkers.    My Clinical findings support the need for the above equipment due to:  Abnormal Gait

## 2024-03-03 NOTE — PROGRESS NOTES
Assumed care on patient post bedside report. Alert and oriented x 4. Up to bathroom independently, got SOB when back to bed. Denies pain at this time.     Tele on and heart rhythm and rate checked on monitor.    Plan of care - monitor v/s, lab values, manage pain, ambulate in hallway x 2 laps and shower, she verbalized understanding.    Safety precautions in place are nonskid socks on, side rails up x 2, bed to lowest level, alarms on. Appropriate to use call light for needs and assistance. Will round hourly and as needed.

## 2024-03-03 NOTE — CARE PLAN
The patient is Stable - Low risk of patient condition declining or worsening    Shift Goals  Clinical Goals: vs, controm pain, increase mobility, shower  Patient Goals: rest, less pain  Family Goals: FELIX    Progress made toward(s) clinical / shift goals:      Problem: Pain - Standard  Goal: Alleviation of pain or a reduction in pain to the patient’s comfort goal  Description: Target End Date:  Prior to discharge or change in level of care    Document on Vitals flowsheet    1.  Document pain using the appropriate pain scale per order or unit policy  2.  Educate and implement non-pharmacologic comfort measures (i.e. relaxation, distraction, massage, cold/heat therapy, etc.)  3.  Pain management medications as ordered  4.  Reassess pain after pain med administration per policy  5.  If opiods administered assess patient's response to pain medication is appropriate per POSS sedation scale  6.  Follow pain management plan developed in collaboration with patient and interdisciplinary team (including palliative care or pain specialists if applicable)  Outcome: Progressing     Problem: Knowledge Deficit - Standard  Goal: Patient and family/care givers will demonstrate understanding of plan of care, disease process/condition, diagnostic tests and medications  Description: Target End Date:  1-3 days or as soon as patient condition allows    Document in Patient Education    1.  Patient and family/caregiver oriented to unit, equipment, visitation policy and means for communicating concern  2.  Complete/review Learning Assessment  3.  Assess knowledge level of disease process/condition, treatment plan, diagnostic tests and medications  4.  Explain disease process/condition, treatment plan, diagnostic tests and medications  Outcome: Progressing     Problem: Fall Risk  Goal: Patient will remain free from falls  Description: Target End Date:  Prior to discharge or change in level of care    Document interventions on the Pauline Adams  Fall Risk Assessment    1.  Assess for fall risk factors  2.  Implement fall precautions  Outcome: Progressing     Problem: Post Op Day 4 CABG/Heart Valve Replacement  Goal: Optimal care of the Post Op CABG/Heart Valve replacement Post Op Day 4  Intervention: Shower daily and clean incisions twice daily with soap and water  Note: PATIENT IS AGREEABLE TO SHOWER IN AM,  Intervention: Up in chair for all meals  Note: AGREES TO BE UP TO CHAIR EVERY MEALS.  Intervention: IS q 1 hour while awake and record best IS volume  Note: INCENTIVE SPIROMETER ENCOURAGED 10X WHILE AWAKE.      Problem: Bowel Elimination - Post Surgical  Goal: Patient will resume regular bowel sounds and function with no discomfort or distention  Description: Target End Date:  1 - 3 days post op    1.  Monitor bowel sounds every shift  2.  Assess for flatus  3.  Monitor for abdominal distention  4.  Monitor for abdominal discomfort  5.  Initiate bowel protocol on post op day 2 if no bowel movement  6.  Note date of last BM  7.  Educate about diet, fluid intake, medication and activity to promote bowel function  8.  Educate signs and symptoms of constipation and interventions to implement  9.  Pharmacologic bowel management per provider order  10. Regular toileting schedule  11. Upright position for toileting  12. High fiber diet  13. Encourage hydration  14. Collaborate with Clinical Dietician  15. Care and maintenance of ostomy if applicable  Outcome: Not Progressing  Flowsheets  Taken 3/2/2024 2000 by Naheed Mercado RJASBIR  Bowel Sounds RUQ: Hypoactive  Bowel Sounds RLQ: Hypoactive  Bowel Sounds LUQ: Hypoactive  Bowel Sounds LLQ: Hypoactive  Taken 2/29/2024 2000 by Elio Restrepo R.N.  Last BM: 02/27/24  Note: BOWEL REGIMEN ORDERED       Patient is not progressing towards the following goals:      Problem: Bowel Elimination - Post Surgical  Goal: Patient will resume regular bowel sounds and function with no discomfort or distention  Description:  Target End Date:  1 - 3 days post op    1.  Monitor bowel sounds every shift  2.  Assess for flatus  3.  Monitor for abdominal distention  4.  Monitor for abdominal discomfort  5.  Initiate bowel protocol on post op day 2 if no bowel movement  6.  Note date of last BM  7.  Educate about diet, fluid intake, medication and activity to promote bowel function  8.  Educate signs and symptoms of constipation and interventions to implement  9.  Pharmacologic bowel management per provider order  10. Regular toileting schedule  11. Upright position for toileting  12. High fiber diet  13. Encourage hydration  14. Collaborate with Clinical Dietician  15. Care and maintenance of ostomy if applicable  Outcome: Not Progressing  Flowsheets  Taken 3/2/2024 2000 by Naheed Mercado RFlaviaNFlavia  Bowel Sounds RUQ: Hypoactive  Bowel Sounds RLQ: Hypoactive  Bowel Sounds LUQ: Hypoactive  Bowel Sounds LLQ: Hypoactive  Taken 2/29/2024 2000 by Elio Restrepo R.N.  Last BM: 02/27/24  Note: BOWEL REGIMEN ORDERED

## 2024-03-03 NOTE — PROGRESS NOTES
Cardiovascular Surgery Progress Note    Name: Carine Christopher  MRN: [id number]  : 1950  Admit Date: 2024  6:21 AM  4 Days Post-Op     Procedure:  Procedure(s) and Anesthesia Type:     * CABG, WITH ENDOSCOPIC VEIN PROCUREMENT - X3 - General     * ECHOCARDIOGRAM, TRANSESOPHAGEAL, INTRAOPERATIVE - General    Vitals:  Vitals:    24 0000 24 0400 24 0802 24 0805   BP: 109/60 110/61 113/61    Pulse: (!) 102 (!) 101  96   Resp:    Temp: 36.7 °C (98.1 °F) 36.7 °C (98 °F) 36.5 °C (97.7 °F)    TempSrc: Temporal Temporal Temporal    SpO2: 96% 90%  88%   Weight:  62.4 kg (137 lb 9.1 oz)     Height:          Temp (24hrs), Av.6 °C (97.9 °F), Min:36.5 °C (97.7 °F), Max:36.7 °C (98.1 °F)      Respiratory:    Respiration: 16, Pulse Oximetry: 88 %       Fluids:    Intake/Output Summary (Last 24 hours) at 3/3/2024 1025  Last data filed at 3/3/2024 0802  Gross per 24 hour   Intake 560 ml   Output 2100 ml   Net -1540 ml     Admit weight: Weight: 62.1 kg (137 lb)  Current weight: Weight: 62.4 kg (137 lb 9.1 oz) (24 0400)    Labs:  Recent Labs     24  0105 24  0030 24  0330   WBC 14.9* 11.8* 11.0*   RBC 3.42* 3.32* 3.46*   HEMOGLOBIN 10.0* 9.6* 9.9*   HEMATOCRIT 29.6* 28.7* 29.9*   MCV 86.5 86.4 86.4   MCH 29.2 28.9 28.6   MCHC 33.8 33.4 33.1   RDW 47.6 45.6 45.7   PLATELETCT 134* 128* 178   MPV 10.8 11.3 10.5     Recent Labs     24  0105 24  0030 24  0330   SODIUM 133* 134* 136   POTASSIUM 4.4 4.1 4.1   CHLORIDE 99 98 97   CO2 24 24 27   GLUCOSE 104* 108* 103*   BUN 29* 19 18   CREATININE 0.55 0.44* 0.51   CALCIUM 8.3* 8.2* 8.0*             HgbA1c:  5.4    Diabetic Educator Consult:  no    Medications:  Scheduled Medications   Medication Dose Frequency    metoprolol tartrate  25 mg BID    [START ON 3/4/2024] amiodarone  400 mg Q DAY    rosuvastatin  10 mg QHS    clopidogrel  75 mg DAILY    Nozin nasal  swab  1 Applicator  BID    aspirin  81 mg DAILY    acetaminophen  1,000 mg Q6HRS    docusate sodium  100 mg BID    senna-docusate  1 Tablet Nightly    omeprazole  20 mg DAILY    enoxaparin (LOVENOX) injection  40 mg DAILY AT 1800        Exam:   Physical Exam  Constitutional:       General: She is not in acute distress.  HENT:      Mouth/Throat:      Mouth: Mucous membranes are moist.   Eyes:      Pupils: Pupils are equal, round, and reactive to light.   Cardiovascular:      Rate and Rhythm: Normal rate and regular rhythm.      Pulses: Normal pulses.      Heart sounds: Normal heart sounds.   Pulmonary:      Effort: No respiratory distress.      Breath sounds: Examination of the right-lower field reveals decreased breath sounds. Examination of the left-lower field reveals decreased breath sounds. Decreased breath sounds present.   Abdominal:      Palpations: Abdomen is soft.   Skin:     General: Skin is warm and dry.      Comments: Right arm ecchymosis from cath    Sternum - prevena    Bilateral SVG sites - cdi   Neurological:      General: No focal deficit present.      Mental Status: She is alert and oriented to person, place, and time.         Cardiac Medications:    ASA - Yes    Plavix - Yes    Post-operative Beta Blockers - Yes    Ace/ARB- No; contraindicated because of Normal EF    Statin - Yes    Aldactone- No; contraindicated because of Normal EF    SGLT2i-  No contraindicated because of No; contraindicated because of Normal EF    Ejection Fraction:  55%    Telemetry:   2/29 SR/ST  3/1 ST/Aflutter 140s  3/2 SR 80's  3/3 SR    Assessment/Plan:  POD 1 HDS- just off levo, SR, keep mediastinal tubes  Keep mcclain for strict I&O, A&O x 3, pain controlled, incisions- covered, amb/enc IS    POD 2 ST vs aflutter 140s.  Hypotension, asymptomatic.  Neuro intact.  Wounds CDI.  Cr 0.55 Hgb 10.0.  Mag repleted this AM.  Bolused overnight not fluid responsive.  Plan: Amio gtt.  DC mediastinal tubes.  Keep pacer wires 1 more day.  Keep CIC one  more day.      POD 3 HDS, SR- on amio gtt- d/c cont PO, d/c pacer wires,  gently diurese, neuro intact, pain- controlled, transfer to tele    POD 4 HDS, SR - on PO amio, diuresed well- below admit weight- d/c, Room air, neuro intact, amb/enc IS- plan home tomorrow, d/c prevena    Disposition:  PT/OT- home- FWW- ordered

## 2024-03-04 ENCOUNTER — PHARMACY VISIT (OUTPATIENT)
Dept: PHARMACY | Facility: MEDICAL CENTER | Age: 74
End: 2024-03-04
Payer: COMMERCIAL

## 2024-03-04 VITALS
WEIGHT: 139.77 LBS | TEMPERATURE: 97.7 F | SYSTOLIC BLOOD PRESSURE: 95 MMHG | OXYGEN SATURATION: 96 % | HEART RATE: 96 BPM | BODY MASS INDEX: 23.29 KG/M2 | HEIGHT: 65 IN | RESPIRATION RATE: 17 BRPM | DIASTOLIC BLOOD PRESSURE: 63 MMHG

## 2024-03-04 PROBLEM — I20.0 UNSTABLE ANGINA (HCC): Status: RESOLVED | Noted: 2024-02-21 | Resolved: 2024-03-04

## 2024-03-04 LAB
ANION GAP SERPL CALC-SCNC: 9 MMOL/L (ref 7–16)
BUN SERPL-MCNC: 18 MG/DL (ref 8–22)
CALCIUM SERPL-MCNC: 8.6 MG/DL (ref 8.5–10.5)
CHLORIDE SERPL-SCNC: 96 MMOL/L (ref 96–112)
CO2 SERPL-SCNC: 31 MMOL/L (ref 20–33)
CREAT SERPL-MCNC: 0.61 MG/DL (ref 0.5–1.4)
ERYTHROCYTE [DISTWIDTH] IN BLOOD BY AUTOMATED COUNT: 44.9 FL (ref 35.9–50)
GFR SERPLBLD CREATININE-BSD FMLA CKD-EPI: 93 ML/MIN/1.73 M 2
GLUCOSE SERPL-MCNC: 113 MG/DL (ref 65–99)
HCT VFR BLD AUTO: 29.1 % (ref 37–47)
HGB BLD-MCNC: 9.6 G/DL (ref 12–16)
LV EJECT FRACT  99904: 65
LV EJECT FRACT MOD 2C 99903: 60.19
LV EJECT FRACT MOD 4C 99902: 50.99
LV EJECT FRACT MOD BP 99901: 64.55
MCH RBC QN AUTO: 28.4 PG (ref 27–33)
MCHC RBC AUTO-ENTMCNC: 33 G/DL (ref 32.2–35.5)
MCV RBC AUTO: 86.1 FL (ref 81.4–97.8)
PLATELET # BLD AUTO: 221 K/UL (ref 164–446)
PMV BLD AUTO: 10.3 FL (ref 9–12.9)
POTASSIUM SERPL-SCNC: 4.3 MMOL/L (ref 3.6–5.5)
RBC # BLD AUTO: 3.38 M/UL (ref 4.2–5.4)
SODIUM SERPL-SCNC: 136 MMOL/L (ref 135–145)
WBC # BLD AUTO: 10.2 K/UL (ref 4.8–10.8)

## 2024-03-04 PROCEDURE — 700102 HCHG RX REV CODE 250 W/ 637 OVERRIDE(OP): Performed by: NURSE PRACTITIONER

## 2024-03-04 PROCEDURE — A9270 NON-COVERED ITEM OR SERVICE: HCPCS | Performed by: NURSE PRACTITIONER

## 2024-03-04 PROCEDURE — RXMED WILLOW AMBULATORY MEDICATION CHARGE: Performed by: NURSE PRACTITIONER

## 2024-03-04 PROCEDURE — 700102 HCHG RX REV CODE 250 W/ 637 OVERRIDE(OP): Performed by: STUDENT IN AN ORGANIZED HEALTH CARE EDUCATION/TRAINING PROGRAM

## 2024-03-04 PROCEDURE — A9270 NON-COVERED ITEM OR SERVICE: HCPCS | Performed by: STUDENT IN AN ORGANIZED HEALTH CARE EDUCATION/TRAINING PROGRAM

## 2024-03-04 PROCEDURE — 80048 BASIC METABOLIC PNL TOTAL CA: CPT

## 2024-03-04 PROCEDURE — 94669 MECHANICAL CHEST WALL OSCILL: CPT

## 2024-03-04 PROCEDURE — 85027 COMPLETE CBC AUTOMATED: CPT

## 2024-03-04 RX ORDER — ROSUVASTATIN CALCIUM 20 MG/1
20 TABLET, COATED ORAL
Qty: 30 TABLET | Refills: 2 | Status: SHIPPED | OUTPATIENT
Start: 2024-03-04 | End: 2024-03-27 | Stop reason: SDUPTHER

## 2024-03-04 RX ORDER — CLOPIDOGREL BISULFATE 75 MG/1
75 TABLET ORAL DAILY
Qty: 30 TABLET | Refills: 2 | Status: SHIPPED | OUTPATIENT
Start: 2024-03-05 | End: 2024-03-27 | Stop reason: SDUPTHER

## 2024-03-04 RX ORDER — ACETAMINOPHEN 500 MG
500-1000 TABLET ORAL EVERY 6 HOURS PRN
COMMUNITY
Start: 2024-03-04 | End: 2024-03-18

## 2024-03-04 RX ORDER — OXYCODONE HYDROCHLORIDE 5 MG/1
5 TABLET ORAL EVERY 6 HOURS PRN
Qty: 56 TABLET | Refills: 0 | Status: SHIPPED | OUTPATIENT
Start: 2024-03-04 | End: 2024-03-18

## 2024-03-04 RX ORDER — OMEPRAZOLE 20 MG/1
20 CAPSULE, DELAYED RELEASE ORAL DAILY
Qty: 30 CAPSULE | Refills: 2 | Status: SHIPPED | OUTPATIENT
Start: 2024-03-05

## 2024-03-04 RX ORDER — AMIODARONE HYDROCHLORIDE 200 MG/1
TABLET ORAL
Qty: 72 TABLET | Refills: 0 | Status: SHIPPED | OUTPATIENT
Start: 2024-03-05 | End: 2024-03-27 | Stop reason: SDUPTHER

## 2024-03-04 RX ADMIN — ASPIRIN 81 MG: 81 TABLET, COATED ORAL at 05:29

## 2024-03-04 RX ADMIN — Medication 1 APPLICATOR: at 07:47

## 2024-03-04 RX ADMIN — OXYCODONE 5 MG: 5 TABLET ORAL at 06:18

## 2024-03-04 RX ADMIN — BISACODYL 10 MG: 10 SUPPOSITORY RECTAL at 02:42

## 2024-03-04 RX ADMIN — AMIODARONE HYDROCHLORIDE 400 MG: 200 TABLET ORAL at 05:29

## 2024-03-04 RX ADMIN — CLOPIDOGREL BISULFATE 75 MG: 75 TABLET ORAL at 05:29

## 2024-03-04 RX ADMIN — OMEPRAZOLE 20 MG: 20 CAPSULE, DELAYED RELEASE ORAL at 05:29

## 2024-03-04 RX ADMIN — METOPROLOL TARTRATE 25 MG: 25 TABLET, FILM COATED ORAL at 05:29

## 2024-03-04 ASSESSMENT — FIBROSIS 4 INDEX: FIB4 SCORE: 2.21

## 2024-03-04 ASSESSMENT — PAIN DESCRIPTION - PAIN TYPE: TYPE: ACUTE PAIN;SURGICAL PAIN

## 2024-03-04 NOTE — PROGRESS NOTES
Assumed care on patient post bedside report. Alert and oriented x 4. On RA. Vss. Denies pain.    Tele on and heart rhythm and rate checked on monitor.    Plan of care - monitor v/s, lab values, I and O, ambulate in hallway in AM, shower and manage pain. She verbalized understanding.    Safety precautions in place are nonskid socks on, side rails up x 2, bed to lowest level, alarms on. Appropriate to use call light for needs and assistance. Will round hourly and as needed.

## 2024-03-04 NOTE — DISCHARGE PLANNING
Case Management Discharge Planning    Admission Date: 2/21/2024  GMLOS: 11.4  ALOS: 12    6-Clicks ADL Score: 18  6-Clicks Mobility Score: 15  PT and/or OT Eval ordered: Yes  Post-acute Referrals Ordered: No  Post-acute Choice Obtained: No  Has referral(s) been sent to post-acute provider:  No      Anticipated Discharge Dispo: Discharge Disposition: Discharged to home/self care (01)    DME Needed: Yes    Action(s) Taken: Choice obtained and Referral(s) sent    Per morning rounds tank is anticipated to discharge today and will need home oxygen and HH. Discussed HH with patient and she does not want to have HH on discharge. Patient is open to home oxygen and sent choice this morning to [facility] for home oxygen.     Escalations Completed: None    Medically Clear: Yes    Next Steps: Follow up with Physician, nursing team on barriers for discharge.   Follow up with oxygen company on acceptance and delivery of oxygen for discharge.   Barriers to Discharge: DME    Is the patient up for discharge tomorrow: No Today, patient has ride home

## 2024-03-04 NOTE — CARE PLAN
The patient is Stable - Low risk of patient condition declining or worsening    Shift Goals  Clinical Goals: VSS, INCREASE ADLs, MANAGE PAIN, IINCENTIVE SPIROMETER  Patient Goals: REST  Family Goals: FELIX    Progress made toward(s) clinical / shift goals:        Patient is not progressing towards the following goals:      Problem: Bowel Elimination - Post Surgical  Goal: Patient will resume regular bowel sounds and function with no discomfort or distention  Description: Target End Date:  1 - 3 days post op    1.  Monitor bowel sounds every shift  2.  Assess for flatus  3.  Monitor for abdominal distention  4.  Monitor for abdominal discomfort  5.  Initiate bowel protocol on post op day 2 if no bowel movement  6.  Note date of last BM  7.  Educate about diet, fluid intake, medication and activity to promote bowel function  8.  Educate signs and symptoms of constipation and interventions to implement  9.  Pharmacologic bowel management per provider order  10. Regular toileting schedule  11. Upright position for toileting  12. High fiber diet  13. Encourage hydration  14. Collaborate with Clinical Dietician  15. Care and maintenance of ostomy if applicable  Outcome: Not Progressing  Note: Last BM was the 27th, bowel regimen ordered. Given laxatives and warm prune juice.

## 2024-03-04 NOTE — DISCHARGE SUMMARY
DISCHARGE SUMMARY    ADMISSION DATE: 2/21/2024    DISCHARGE DATE: 3/4/24    ADMITTING DIAGNOSES: multivessel CAD including chronic total occlusion of circumflex artery in area of previous stent, unstable angina at presentation, history of MI x3 previously with stents, HTN, HLD, giant cell arteritis, chronic bronchitis     DISCHARGE DIAGNOSES: multivessel CAD including chronic total occlusion of circumflex artery in area of previous stent, unstable angina at presentation, history of MI x3 previously with stents, HTN, HLD, giant cell arteritis, chronic bronchitis     PROCEDURES PERFORMED: Dr Chicas 2/28/24  Coronary artery bypass grafting x3  Reversed saphenous vein graft to left anterior descending artery (LIMA left internal mammary artery not used due to sclerotic diminutive vessel consistent with subclavian stenosis/proximal LIMA stenosis)  Reversed saphenous vein graft to diagonal branch artery  Reversed saphenous vein graft to obtuse marginal artery  Endo-vein procurement    HISTORY OF PRESENT ILLNESS:  The patient is a 73 y.o. female with a past medical history of CAD s/p PCIx 3 08/2023 at [facility], chronic bronchitis, giant cell arteritis (courses of steroids), MI x 4 over the last 4 years who presented to the hospital with sharp/burning chest pain and left arm numbness on 2/21/24. She has been having ongoing bronchitis on oral azithromycin. She has associated weakness. She was seen by cardiology and underwent a LHC which shows in-stent restenosis and multivessel disease.  Her last dose of effient was on 2/20/24     HOSPITAL COURSE:   POD 1 HDS- just off levo, SR, keep mediastinal tubes  Keep mcclain for strict I&O, A&O x 3, pain controlled, incisions- covered, amb/enc IS     POD 2 ST vs aflutter 140s.  Hypotension, asymptomatic.  Neuro intact.  Wounds CDI.  Cr 0.55 Hgb 10.0.  Mag repleted this AM.  Bolused overnight not fluid responsive.  Plan: Amio gtt.  DC mediastinal tubes.  Keep pacer wires 1  more day.  Keep CIC one more day.       POD 3 HDS, SR- on amio gtt- d/c cont PO, d/c pacer wires,  gently diurese, neuro intact, pain- controlled, transfer to tele     POD 4 HDS, SR - on PO amio, diuresed well- below admit weight- d/c, Room air, neuro intact, amb/enc IS- plan home tomorrow, d/c prevena    POD 5 HDS. SR. On po amio.  1LNC at rest/exertion.  Home oxygen ordered.  Cr 0.61 Hgb 9.6.  Plan: Home oxygen ordered.  If DME approved ok to dc home today.  Otherwise keep until on RA or DME approved.  Clopidogrel ordered, previously on effient but noncompliant in past and PCI greater than 6 months prior to admission.       TELEMETRY:  2/29 SR/ST  3/1 ST/Aflutter 140s  3/2 SR 80's  3/3 SR  3/4 SR    RECENT LABS:     Lab Results   Component Value Date/Time    SODIUM 136 03/04/2024 02:30 AM    POTASSIUM 4.3 03/04/2024 02:30 AM    CHLORIDE 96 03/04/2024 02:30 AM    CO2 31 03/04/2024 02:30 AM    GLUCOSE 113 (H) 03/04/2024 02:30 AM    BUN 18 03/04/2024 02:30 AM    CREATININE 0.61 03/04/2024 02:30 AM      Lab Results   Component Value Date/Time    WBC 10.2 03/04/2024 02:30 AM    RBC 3.38 (L) 03/04/2024 02:30 AM    HEMOGLOBIN 9.6 (L) 03/04/2024 02:30 AM    HEMATOCRIT 29.1 (L) 03/04/2024 02:30 AM    MCV 86.1 03/04/2024 02:30 AM    MCH 28.4 03/04/2024 02:30 AM    MCHC 33.0 03/04/2024 02:30 AM    MPV 10.3 03/04/2024 02:30 AM    NEUTSPOLYS 66.60 02/27/2024 01:10 PM    LYMPHOCYTES 21.00 (L) 02/27/2024 01:10 PM    MONOCYTES 8.40 02/27/2024 01:10 PM    EOSINOPHILS 3.00 02/27/2024 01:10 PM    BASOPHILS 0.70 02/27/2024 01:10 PM      Lab Results   Component Value Date/Time    PROTHROMBTM 18.1 (H) 02/28/2024 01:35 PM    INR 1.48 (H) 02/28/2024 01:35 PM        Fluids:    Intake/Output Summary (Last 24 hours) at 3/4/2024 0758  Last data filed at 3/3/2024 2000  Gross per 24 hour   Intake 940 ml   Output 0 ml   Net 940 ml     Admit weight: Weight: 62.1 kg (137 lb)  Current weight: Weight: 63.4 kg (139 lb 12.4 oz) (03/04/24  0500)    ALLERGIES:     Atorvastatin, Chloramphenicol, Codeine, Other drug, Sulfa drugs, and Coffee flavor    EJECTION FRACTION:  55%    CARDIAC MEDICATIONS:    ASA - Yes    Plavix - Yes    Post-operative Beta Blockers - Yes    Ace/ARB- No; contraindicated because of Normal EF    Statin - Yes    Aldactone- No; contraindicated because of Normal EF    SGLT2i-  No contraindicated because of No; contraindicated because of Normal EF    DISCHARGE MEDICATIONS:      Medication List        START taking these medications        Instructions   acetaminophen 500 MG Tabs  Commonly known as: Tylenol   Take 1-2 Tablets by mouth every 6 hours as needed for Mild Pain or Moderate Pain for up to 14 days.  Dose: 500-1,000 mg     amiodarone 200 MG Tabs  Start taking on: March 5, 2024  Commonly known as: Cordarone   Doctor's comments: 400 mg po BID x 7 days then 400 mg po daily x 7 days then 200 mg po daily thereafter.  Take 2 Tablets by mouth every day.  Dose: 400 mg     clopidogrel 75 MG Tabs  Start taking on: March 5, 2024  Commonly known as: Plavix   Take 1 Tablet by mouth every day.  Dose: 75 mg     omeprazole 20 MG delayed-release capsule  Start taking on: March 5, 2024  Commonly known as: PriLOSEC   Take 1 Capsule by mouth every day.  Dose: 20 mg     oxyCODONE immediate-release 5 MG Tabs  Commonly known as: Roxicodone   Take 1 Tablet by mouth every 6 hours as needed for Severe Pain for up to 14 days.  Dose: 5 mg            CHANGE how you take these medications        Instructions   rosuvastatin 20 MG Tabs  What changed:   medication strength  how much to take  when to take this  Commonly known as: Crestor   Take 1 Tablet by mouth at bedtime.  Dose: 20 mg            CONTINUE taking these medications        Instructions   albuterol 108 (90 Base) MCG/ACT Aers inhalation aerosol   Inhale 2 Puffs 3 times a day as needed for Shortness of Breath (chronic bronchitis).  Dose: 2 Puff     Aspirin EC Low Strength 81 MG EC tablet  Generic  drug: aspirin   Take 81 mg by mouth every evening.  Dose: 81 mg     azelastine 137 MCG/SPRAY nasal spray  Commonly known as: Astelin   Administer 2 Sprays into affected nostril(S) 1 time a day as needed for Rhinitis.  Dose: 2 Spray     erythromycin base 250 MG Cpep   Take 750 mg by mouth every day. X 7 days  Dose: 750 mg     metoprolol tartrate 25 MG Tabs  Commonly known as: Lopressor   Take 25 mg by mouth 2 times a day.  Dose: 25 mg     NyQuil Cough DM + Congestion 5-6.25-10 MG/15ML Liqd  Generic drug: Phenylephrine-Doxylamine-DM   Take 1 Each by mouth every evening as needed (chronic bronchitis).  Dose: 1 Each     PreserVision AREDS Tabs   Take 1 Tablet by mouth every evening.  Dose: 1 Tablet     Vicks DayQuil Cough 15 MG/15ML Liqd  Generic drug: Dextromethorphan HBr   Take  by mouth 1 time a day as needed. 1 each  Indications: Cough            STOP taking these medications      Complete  DHA 29-1-200 & 200 MG Misc      MG Tabs  Generic drug: ibuprofen     prasugrel 10 MG Tabs  Commonly known as: Effient              NARCOTIC PAIN MEDICATIONS:   In prescribing controlled substances to this patient, I certify that I have obtained and reviewed their medical history. I have also made a good ros effort to obtain applicable records from other providers who have treated the patient .    I have conducted a physical exam and documented it. I have reviewed the patient's prescription history as maintained by the [place] Prescription Monitoring Program.     I have assessed the patient’s risk for abuse, dependency, and addiction using the validated Opioid Risk Tool.    Given the above, I believe the benefits of controlled substance therapy outweigh the risks. The reasons for prescribing controlled substances include non-narcotic, oral analgesic alternatives have been inadequate for pain control. Accordingly, I have discussed the risk and benefits, treatment plan, and alternative therapies with the patient.     Pt  understands this prescription is a controlled substance which is potentially habit-forming and its use is regulated by the [facility]. It must be submitted to the pharmacy within 5 days of the date written and can not be called in or faxed to the pharmacy. Refills are subject to terms of a medicine agreement. Any refill requires a new prescription that must be obtained from this office during regular office hours Monday through Thursday 7 am to 4 pm. We ask for 72 hours notice to get an appointment for a narcotic pain medication refill. This medicine can cause nausea, significant constipation, sedation, confusion.     DIET:   Cardiac diet    DISCHARGE INSTRUCTIONS DISCUSSED WITH THE PATIENT:      1. NO driving for 4 weeks after surgery. You may ride as a passenger.  2. NO lifting of any item over 10 lbs (e.g. gallon of milk) for 6 weeks after surgery.  3. DO walk as much as possible! Walk a minimum of once a day. Depending on your fatigue and comfort level, you may walk as much as you wish. There is no maximum.  4. Other physical activities (sex, housework, gardening, etc.) are OK after 4 weeks   5. Continue using incentive spirometer for 2 weeks, especially if going home on oxygen.    Incision Care:  1. SHOWER ONLY - no baths. Clean incision daily with plain Ivory ® soap or any other dye or perfume free soap. Then pat incision dry with clean towel. Avoid creams or lotions on the incision(s).  a. If there is any increase in redness or swelling, or separation of the incision line, or thick drainage* from any of the incisions, call right away  * Clear, thin drainage is not abnormal especially from the leg incision and/or                         chest tube sites.  2. Continue to wear your JAMES Stockings for 4 weeks. You may take off the stockings when in bed or when the legs are elevated.    Patient instructed to call [facility] at [phone]  if any increased shortness of breath, uncontrolled pain, weight gain  greater than 3 pounds in 1 day or 5 pounds in 1 week, SBP >140, HR <60 or redness swelling or drainage of incisions.      FOLLOW-UP:   Future Appointments   Date Time Provider Department Center   3/27/2024 12:45 PM Ashley Johnson P.A.-C. [facility] None   4/1/2024 12:45 PM CT RESOURCE PROVIDER [facility] None

## 2024-03-04 NOTE — CARE PLAN
The patient is Stable - Low risk of patient condition declining or worsening    Shift Goals  Clinical Goals: promote mobility; prevent infection  Patient Goals: comfort; rest  Family Goals: FELIX    Progress made toward(s) clinical / shift goals:    Problem: Pain - Standard  Goal: Alleviation of pain or a reduction in pain to the patient’s comfort goal  Description: Target End Date:  Prior to discharge or change in level of care    Document on Vitals flowsheet    1.  Document pain using the appropriate pain scale per order or unit policy  2.  Educate and implement non-pharmacologic comfort measures (i.e. relaxation, distraction, massage, cold/heat therapy, etc.)  3.  Pain management medications as ordered  4.  Reassess pain after pain med administration per policy  5.  If opiods administered assess patient's response to pain medication is appropriate per POSS sedation scale  6.  Follow pain management plan developed in collaboration with patient and interdisciplinary team (including palliative care or pain specialists if applicable)  Outcome: Progressing, taking 5-10 mg oxycodone prn     Problem: Knowledge Deficit - Standard  Goal: Patient and family/care givers will demonstrate understanding of plan of care, disease process/condition, diagnostic tests and medications  Description: Target End Date:  1-3 days or as soon as patient condition allows    Document in Patient Education    1.  Patient and family/caregiver oriented to unit, equipment, visitation policy and means for communicating concern  2.  Complete/review Learning Assessment  3.  Assess knowledge level of disease process/condition, treatment plan, diagnostic tests and medications  4.  Explain disease process/condition, treatment plan, diagnostic tests and medications  Outcome: Progressing, repeated education and updates on plan of care given to patient     Problem: Hemodynamics  Goal: Patient's hemodynamics, fluid balance and neurologic status will be  stable or improve  Description: Target End Date:  Prior to discharge or change in level of care    Document on Assessment and I/O flowsheet templates    1.  Monitor vital signs, pulse oximetry and cardiac monitor per provider order and/or policy  2.  Maintain blood pressure per provider order  3.  Hemodynamic monitoring per provider order  4.  Manage IV fluids and IV infusions  5.  Monitor intake and output  6.  Daily weights per unit policy or provider order  7.  Assess peripheral pulses and capillary refill  8.  Assess color and body temperature  9.  Position patient for maximum circulation/cardiac output  10. Monitor for signs/symptoms of excessive bleeding  11. Assess mental status, restlessness and changes in level of consciousness  12. Monitor temperature and report fever or hypothermia to provider immediately. Consideration of targeted temperature management.  Outcome: Progressing, monitoring I&O, daily weights, and vitals per policy     Problem: Respiratory  Goal: Patient will achieve/maintain optimum respiratory ventilation and gas exchange  Description: Target End Date:  Prior to discharge or change in level of care    Document on Assessment flowsheet    1.  Assess and monitor rate, rhythm, depth and effort of respiration  2.  Breath sounds assessed qshift and/or as needed  3.  Assess O2 saturation, administer/titrate oxygen as ordered  4.  Position patient for maximum ventilatory efficiency  5.  Turn, cough, and deep breath with splinting to improve effectiveness  6.  Collaborate with RT to administer medication/treatments per order  7.  Encourage use of incentive spirometer and encourage patient to cough after use and utilize splinting techniques if applicable  8.  Airway suctioning  9.  Monitor sputum production for changes in color, consistency and frequency  10. Perform frequent oral hygiene  11. Alternate physical activity with rest periods  Outcome: Progressing, on RA during daytime, intermittently  needing 2L O2 at night     Problem: Fall Risk  Goal: Patient will remain free from falls  Description: Target End Date:  Prior to discharge or change in level of care    Document interventions on the [facility] Fall Risk Assessment    1.  Assess for fall risk factors  2.  Implement fall precautions  Outcome: Progressing, using walker and fall precautions in place, walking 4x per day     Problem: Post Op Day 4 CABG/Heart Valve Replacement  Goal: Optimal care of the Post Op CABG/Heart Valve replacement Post Op Day 4  Outcome: Progressing, following plan of care and giving medications per MAR     Problem: Bowel Elimination - Post Surgical  Goal: Patient will resume regular bowel sounds and function with no discomfort or distention  Description: Target End Date:  1 - 3 days post op    1.  Monitor bowel sounds every shift  2.  Assess for flatus  3.  Monitor for abdominal distention  4.  Monitor for abdominal discomfort  5.  Initiate bowel protocol on post op day 2 if no bowel movement  6.  Note date of last BM  7.  Educate about diet, fluid intake, medication and activity to promote bowel function  8.  Educate signs and symptoms of constipation and interventions to implement  9.  Pharmacologic bowel management per provider order  10. Regular toileting schedule  11. Upright position for toileting  12. High fiber diet  13. Encourage hydration  14. Collaborate with Clinical Dietician  15. Care and maintenance of ostomy if applicable  Outcome: Progressing, multiple bowel protocol medications being given and drinking prune juice       Patient is not progressing towards the following goals:

## 2024-03-04 NOTE — DISCHARGE PLANNING
0910  Received Choice form at 0902  Agency/Facility Name: [facility]   Referral sent per Choice form @ 0910     1152  Agency/Facility Name: [facility]   Spoke To: Tonya   Outcome: DPA called to f/u on O2 and per Tonya order received and it is being process. ETA of delivery is 2 hours per Tonya.

## 2024-03-04 NOTE — FACE TO FACE
"Face to Face Note  -  Durable Medical Equipment    TESHA Calderon - NPI: [id number]  I certify that this patient is under my care and that they had a durable medical equipment(DME)face to face encounter by myself that meets the physician DME face-to-face encounter requirements with this patient on:    Date of encounter:   Patient:                    MRN:                       YOB: 2025  Carine Christopher  [id number]  1950     The encounter with the patient was in whole, or in part, for the following medical condition, which is the primary reason for durable medical equipment:  Cardiac Disease and Post-Op Surgery    I certify that, based on my findings, the following durable medical equipment is medically necessary:    Oxygen   HOME O2 Saturation Measurements:(Values must be present for Home Oxygen orders)  Room air sat at rest: 87  Room air sat with amb: 84  With liters of O2: 0.5, O2 sat at rest with O2: 91  With Liters of O2: 2, O2 sat with amb with O2 : 90  Is the patient mobile?: Yes  If patient feels more short of breath, they can go up to 6 liters per minute and contact healthcare provider.    Supporting Symptoms: The patient requires supplemental oxygen, as the following interventions have been tried with limited or no improvement: \"Ambulation with oximetry and \"Incentive spirometry.    My Clinical findings support the need for the above equipment due to:  Hypoxia  "

## 2024-03-04 NOTE — DISCHARGE PLANNING
Care Transition Team Assessment  Confirmed demographic and insurance information. Patient has no prior services prior to admit. Patient is independent with ADLs and IADLs , and has ride home with spouse on discharge back to [place].     Information Source  Orientation Level: Oriented X4  Information Given By: Patient  Informant's Name: tigre  Who is responsible for making decisions for patient? : Patient    Readmission Evaluation  Is this a readmission?: No    Elopement Risk  Legal Hold: No  Ambulatory or Self Mobile in Wheelchair: Yes  Disoriented: No  Psychiatric Symptoms: None  History of Wandering: No  Elopement this Admit: No  Vocalizing Wanting to Leave: No  Displays Behaviors, Body Language Wanting to Leave: No-Not at Risk for Elopement  Elopement Risk: Not at Risk for Elopement    Interdisciplinary Discharge Planning  Lives with - Patient's Self Care Capacity: Significant Other  Patient or legal guardian wants to designate a caregiver: No  Support Systems: Family Member(s)  Housing / Facility:  (tri level home)  Prior Services: None  Durable Medical Equipment: Not Applicable    Discharge Preparedness  What is your plan after discharge?: Home with help  What are your discharge supports?: Spouse  Prior Functional Level: Ambulatory  Difficulity with ADLs: None  Difficulity with IADLs: None    Functional Assesment  Prior Functional Level: Ambulatory    Finances  Financial Barriers to Discharge: No  Prescription Coverage: Yes      Advance Directive  Advance Directive?: None    Domestic Abuse  Have you ever been the victim of abuse or violence?: No  Physical Abuse or Sexual Abuse: No  Verbal Abuse or Emotional Abuse: No  Possible Abuse/Neglect Reported to:: Not Applicable    Psychological Assessment  History of Substance Abuse: None  History of Psychiatric Problems: No  Non-compliant with Treatment: No  Newly Diagnosed Illness: No    Discharge Risks or Barriers  Discharge risks or barriers?: Other  (comment)  Patient risk factors: Vulnerable adult    Anticipated Discharge Information  Discharge Disposition: Discharged to home/self care (01)  Discharge Address: [facility] [address]  [facility] [address]  Discharge Contact Phone Number: [phone]

## 2024-03-04 NOTE — DISCHARGE PLANNING
Meds-to-Beds: Discharge prescription orders listed below delivered to patient's bedside. RN Mami notified. Patient counseled. Patient elected to have co-payment billed to patient account (when billing system back up).       Current Outpatient Medications   Medication Sig Dispense Refill    rosuvastatin (CRESTOR) 20 MG Tab Take 1 Tablet by mouth at bedtime. 30 Tablet 2    [START ON 3/5/2024] amiodarone (CORDARONE) 200 MG Tab Take 2 Tablets by mouth 2 times a day for 7 days, THEN 2 Tablets every day for 7 days, THEN 1 Tablet every day for 30 days. 72 Tablet 0    [START ON 3/5/2024] clopidogrel (PLAVIX) 75 MG Tab Take 1 Tablet by mouth every day. 30 Tablet 2    [START ON 3/5/2024] omeprazole (PRILOSEC) 20 MG delayed-release capsule Take 1 Capsule by mouth every day. 30 Capsule 2    oxyCODONE immediate-release (ROXICODONE) 5 MG Tab Take 1 Tablet by mouth every 6 hours as needed for Severe Pain for up to 14 days. 56 Tablet 0      Chen Cottrell, PharmD

## 2024-03-04 NOTE — PROGRESS NOTES
Patient is A&Ox4. Discharge instructions. Personal belongings in possession with patient. PIV and tele monitor removed. Copy of discharge instructions in patient chart, signed and reviewed. Patient verbalizes the understanding of the discharge instructions. Questions and concerns addressed prior to leaving the unit.  Transported via wheelchair by RN. Patient discharged to home. Family present.

## 2024-03-04 NOTE — PROGRESS NOTES
"Discharge Discussion and home care recap prior to discharge:      Discharge review was completed with patient and family.    Patient was reminded that outpatient cardiac rehab beginning 6 weeks post op. They were told it is highly recommended and available to them if desired, but not required. They were told to look at the provided flyer for the contact number and additional information.    Patient was reminded to utilize the vitals log book provided to take his/her weight daily and record.    Patient was told to call me with weight gain > 3 lbs in 1 day or 5 lbs in 1 week  Patient was also told to record heart rate and blood pressure morning and night; and to follow the hold parameters provided in the discharge summary on beta blockers and ACE/ARB (if prescribed on discharge).    Patient was reminded to review the \"recovery after heart surgery\" packet with information on normal expectations such as clicking in the chest, loud/pounding heart/ hot and cold flashes, weight loss, constipation, insomnia, tingling on left side of chest (CABG with LIMA).  I also reviewed the decision tree of whom to call and when with concerns after going home. RN navigator Monday through Friday during business hours for any questions or urgent concerns, and the office for urgent concerns at night or on the weekends.    I briefly recapped the discharge instructions that their primary RN will review in depth prior to discharge   1) appointments   2) medication reconciliation (start, continue, change, stop)   3) care instructions    All additional questions were answered or deferred to the bedside RN.    Event time 1 hour    "

## 2024-03-05 ENCOUNTER — TELEPHONE (OUTPATIENT)
Dept: HEALTH INFORMATION MANAGEMENT | Facility: OTHER | Age: 74
End: 2024-03-05

## 2024-03-08 ENCOUNTER — TELEPHONE (OUTPATIENT)
Dept: CARDIOTHORACIC SURGERY | Facility: MEDICAL CENTER | Age: 74
End: 2024-03-08
Payer: MEDICARE

## 2024-03-08 NOTE — TELEPHONE ENCOUNTER
Hospital follow up call    She is showering everyday or every other day.    she states that all incisions are healing well and approximated with no s/s of infection (redness, puss, fever, purulent drainage, or tenderness).    she states that the post op pain is well controlled. Narcotics are being utilized. Tylenol is not being utilized.    She is using the IS; volume is slightly improved. Current volume is 1200 ml.    she is walking everyday, distance is somewhat increased from the hospital.    She is obtaining daily weights. Current weight is 134.2 lbs which is less than the first home weight of 138 lbs. she is not wearing the compression/JAMES hose as her  cannot get them on due to an injury. She states improved LE edema.    she is taking all prescribed meds as directed. She has no medication questions.    She is taking vitals (HR and BP).    BP's: low 100's to 110's / 60's to 70's  HR's: 80's 90's    She has had a bowel movement since discharge.    she has no additional questions at this time. Follow up education was not needed on anything. Follow up appointments were reviewed and I ensured they have my number if issues or concerns arise.      Follow up call time was 13 minutes.

## 2024-03-13 NOTE — PROGRESS NOTES
CHIEF COMPLAINT: Post-op visit     PROCEDURE:  Dr Chicas 2/28/24  Coronary artery bypass grafting x3  Reversed saphenous vein graft to left anterior descending artery (LIMA left internal mammary artery not used due to sclerotic diminutive vessel consistent with subclavian stenosis/proximal LIMA stenosis)  Reversed saphenous vein graft to diagonal branch artery  Reversed saphenous vein graft to obtuse marginal artery  Endo-vein procurement    HPI: ***    There were no vitals taken for this visit.    PHYSICAL EXAM:  Physical Exam   Cardiac: S1S2  Neuro:  AAO x 3  Resp:  CTA  Wounds:  CDI  Sternum:  Stable    PLAN: ***  Continue Effient for 3 months or per your Cardiologist's recommendations.  We reviewed post operative sternal precautions, weight limits and driving precautions moving forward.  We reviewed SBE prophylaxis.      Overall, we are very pleased with the patient’s progress and we have instructed the patient to follow-up with us in the future should they have any concerns related to their surgery. Otherwise, we will see the patient on a PRN basis. The patient will continue to follow-up with their Cardiologist and PCP.  The patient has been informed that any further prescription refills should be done through their primary care physician and/or cardiologist.  They acknowledged understanding.  Thank you for allowing us to participate in the care of this very pleasant patient and please let us know if there is any way we may be of further assistance.     very pleasant patient and please let us know if there is any way we may be of further assistance.

## 2024-03-20 ENCOUNTER — TELEPHONE (OUTPATIENT)
Dept: CARDIOTHORACIC SURGERY | Facility: MEDICAL CENTER | Age: 74
End: 2024-03-20
Payer: MEDICARE

## 2024-03-20 NOTE — TELEPHONE ENCOUNTER
She was told she can have caffeine at this time, a cup or two max.    /65 HR's 70's    She thought she had vitamin K food restrictions, she was told that is only for warfarin which she is not on.    Call time 3 minutes.

## 2024-03-26 NOTE — PROGRESS NOTES
Chief Complaint   Patient presents with    Coronary Artery Disease     Follow up           Subjective:   Carine Christopher is a 73 y.o. female who presents today for follow-up with her  Vincent.     Patient of Dr. Davis.  Last seen January 2023.  She was seen by Dr. Gann 2/21/2024 where she presented to hospital with chest pain and referred to the Cath Lab for unstable angina.  Her coronary angiogram showed severe multivessel coronary artery disease with recurrent in-stent restenosis.  She was referred to cardiothoracic surgery and bypass surgery was recommended.  On 2/28/2024 she had coronary artery bypass grafting x 3 by Dr. Chicas (R SVG-LAD, R SVG-diagonal, R SVG-marginal).  The LIMA was not used due to proximal LIMA/subclavian stenosis.  Her postop recovery was complicated by atrial arrhythmia, possibly atrial flutter.  She was treated with an amiodarone drip and discharged on oral amiodarone.    Carine brings in all her medications as well as her log of blood pressures and weights.  Her biggest complaint is the physical limitations she has postoperatively.  She is depressed that she cannot do dishes and vacuum.  She is using oxycodone in the morning in the evening.     She complains of nausea and occasional vomiting occurring in the evening.  She is sure that this is related to one of the medications that she is taking currently.     She denies chest pain, lower extremity edema, shortness of breath.    She asked about restarting her prenatal vitamin and supplement for hot flashes.    Past Medical History:   Diagnosis Date    Arthritis 07/07/2022    hands    Breath shortness 07/07/2022    with exertion    Bronchitis 07/07/2022    chronic broncitis since age 15    CAD (coronary artery disease) 07/07/2022    Cataract 07/07/2022    IOLOU    Chronic bronchitis (HCC)     COVID 07/06/2022    Giant cell arteritis (HCC) 2019    Giant cell arteritis (HCC)     Gynecological disorder  "07/07/2022    pt still having monthly menses, on hormones    Head ache     Heart attack (HCC) 10/2020    taking metoprolol, stents placed times 2    Heart valve disease 07/07/2022    High cholesterol 07/07/2022    non medicated    History of heart artery stent     x3 stents    Hyperlipidemia     Hypertension     MI (myocardial infarction) (HCC)     x 4    Pain 07/07/2022    constant headaches times 3 years    Pneumonia 07/07/2022    walking pneumonia 1980    Urinary incontinence 07/07/2022    at times         Family History   Problem Relation Age of Onset    Glasses Father     Heart Disease Father     Hypertension Mother     Kidney Disease Neg Hx          Social History     Tobacco Use    Smoking status: Never    Smokeless tobacco: Never   Vaping Use    Vaping Use: Never used   Substance Use Topics    Alcohol use: Yes     Comment: Very rare    Drug use: Never         Allergies   Allergen Reactions    Codeine Vomiting and Nausea    Other Drug Unspecified     States that most antibiotics cause yellow stools.    Atorvastatin Unspecified     Caused bruising general body, pt doesn't want to take    Atorvastatin Unspecified     .    Chloramphenicol Diarrhea     PCN is okay to take    Chloramphenicol Unspecified     .    Codeine Unspecified     .    Other Drug Diarrhea     Most antibiotics turn stool yellow    Sulfa Antibiotics [Sulfa Drugs] Unspecified     Can't remember reaction    Sulfa Drugs Diarrhea     .    Coffee Flavor Vomiting     Patient states coffee, and coffee cakes induce vomiting, \"even the word coffee makes me want to throw up\"         Current Outpatient Medications   Medication Sig    oxyCODONE immediate-release (ROXICODONE) 5 MG Tab Take 5 mg by mouth every 6 hours as needed for Severe Pain.    amiodarone (CORDARONE) 200 MG Tab Take 1 Tablet by mouth every day.    clopidogrel (PLAVIX) 75 MG Tab Take 1 Tablet by mouth every day.    rosuvastatin (CRESTOR) 20 MG Tab Take 1 Tablet by mouth at bedtime.    " "metoprolol SR (TOPROL XL) 25 MG TABLET SR 24 HR Take 1 Tablet by mouth every day.    omeprazole (PRILOSEC) 20 MG delayed-release capsule Take 1 Capsule by mouth every day.    azelastine (ASTELIN) 137 MCG/SPRAY nasal spray Administer 2 Sprays into affected nostril(S) 1 time a day as needed for Rhinitis.    Multiple Vitamins-Minerals (PRESERVISION AREDS) Tab Take 1 Tablet by mouth every evening.    Dextromethorphan HBr (VICKS DAYQUIL COUGH) 15 MG/15ML Liquid Take  by mouth 1 time a day as needed. 1 each  Indications: Cough    Phenylephrine-Doxylamine-DM (NYQUIL COUGH DM + CONGESTION) 5-6.25-10 MG/15ML Liquid Take 1 Each by mouth every evening as needed (chronic bronchitis).    albuterol 108 (90 Base) MCG/ACT Aero Soln inhalation aerosol Inhale 2 Puffs 3 times a day as needed for Shortness of Breath (chronic bronchitis).    LUTEIN PO Take 20 mg by mouth every day.    aspirin 81 MG EC tablet Take 81 mg by mouth every evening.         Review of Systems   Constitutional:  Positive for malaise/fatigue.   Respiratory:  Negative for cough and shortness of breath.    Cardiovascular:  Negative for chest pain, palpitations, leg swelling and PND.   Neurological:  Negative for dizziness.   Endo/Heme/Allergies:         Hot flashes   Psychiatric/Behavioral:  Positive for depression.           Objective:   /56 (BP Location: Left arm, Patient Position: Sitting)   Pulse 68   Resp 16   Ht 1.651 m (5' 5\")   Wt 58.1 kg (128 lb)   SpO2 98%  Body mass index is 21.3 kg/m².         Physical Exam  Vitals reviewed.   HENT:      Head: Normocephalic and atraumatic.   Cardiovascular:      Rate and Rhythm: Normal rate and regular rhythm.      Heart sounds: No murmur heard.  Pulmonary:      Effort: No respiratory distress.      Breath sounds: No rales.   Skin:     Comments: Healing sternotomy incision and chest tube incisions.  Well-healed scars on lower legs.   Neurological:      Mental Status: She is alert.         TTE " 2/21/2024  CONCLUSIONS  Normal left ventricular systolic function. The left ventricular   ejection fraction is visually estimated to be 55%.   Normal right ventricular size and systolic function.  Mild aortic insufficiency.   Right ventricular systolic pressure is estimated to be  23 mmHg   (normal).   No prior study is available for comparison.       Assessment:     1. Coronary artery disease involving native coronary artery of native heart with angina pectoris (HCC)  metoprolol SR (TOPROL XL) 25 MG TABLET SR 24 HR      2. S/P CABG x 3  amiodarone (CORDARONE) 200 MG Tab    clopidogrel (PLAVIX) 75 MG Tab    rosuvastatin (CRESTOR) 20 MG Tab    metoprolol SR (TOPROL XL) 25 MG TABLET SR 24 HR      3. Typical atrial flutter (HCC)  [facility] ZIO PATCH MONITOR      4. Statin intolerance             Medical Decision Making:  Today's Assessment / Plan:   Severe multivessel artery disease s/p CABG (R SVG-LAD, R SVG-diagonal, R SVG-marginal) Feb 2024  - aspirin and plavix for 6-12mo given her unstable angina presentation  - HI statin. Previously didn't tolerate atorvastatin but seems to be doing well with rosuvastatin  - metop succinate  - EF preserved  - recommend ICR       Postoperative atrial flutter  - lasting about 1/2 day in the hospital. No symptomatic recurrence. Reduce amiodarone to 200mg daily given her nausea. Take for 7 days then can stop amiodarone. Will screen for recurrent atrial arrhythmias once the amiodarone has washed out of system. Mail zio patch in 7 weeks.     Hot flashes  I advise against restarting her supplement as black cohosh has interactions with statins and amiodarone.     Return in about 3 months (around 6/27/2024) for follow up with me or Dr. Davis or his NP.  Sooner if problems.

## 2024-03-27 ENCOUNTER — OFFICE VISIT (OUTPATIENT)
Dept: CARDIOLOGY | Facility: MEDICAL CENTER | Age: 74
End: 2024-03-27
Attending: PHYSICIAN ASSISTANT
Payer: MEDICARE

## 2024-03-27 VITALS
HEIGHT: 65 IN | BODY MASS INDEX: 21.33 KG/M2 | DIASTOLIC BLOOD PRESSURE: 56 MMHG | OXYGEN SATURATION: 98 % | WEIGHT: 128 LBS | RESPIRATION RATE: 16 BRPM | SYSTOLIC BLOOD PRESSURE: 124 MMHG | HEART RATE: 68 BPM

## 2024-03-27 DIAGNOSIS — I25.119 CORONARY ARTERY DISEASE INVOLVING NATIVE CORONARY ARTERY OF NATIVE HEART WITH ANGINA PECTORIS (HCC): ICD-10-CM

## 2024-03-27 DIAGNOSIS — Z78.9 STATIN INTOLERANCE: ICD-10-CM

## 2024-03-27 DIAGNOSIS — Z95.1 S/P CABG X 3: ICD-10-CM

## 2024-03-27 DIAGNOSIS — I48.3 TYPICAL ATRIAL FLUTTER (HCC): ICD-10-CM

## 2024-03-27 PROCEDURE — 99214 OFFICE O/P EST MOD 30 MIN: CPT | Performed by: PHYSICIAN ASSISTANT

## 2024-03-27 PROCEDURE — 3078F DIAST BP <80 MM HG: CPT | Performed by: PHYSICIAN ASSISTANT

## 2024-03-27 PROCEDURE — 3074F SYST BP LT 130 MM HG: CPT | Performed by: PHYSICIAN ASSISTANT

## 2024-03-27 PROCEDURE — 2023F DILAT RTA XM W/O RTNOPTHY: CPT | Performed by: PHYSICIAN ASSISTANT

## 2024-03-27 PROCEDURE — 99213 OFFICE O/P EST LOW 20 MIN: CPT | Performed by: PHYSICIAN ASSISTANT

## 2024-03-27 RX ORDER — CLOPIDOGREL BISULFATE 75 MG/1
75 TABLET ORAL DAILY
Qty: 30 TABLET | Refills: 11 | Status: SHIPPED | OUTPATIENT
Start: 2024-03-27

## 2024-03-27 RX ORDER — AMIODARONE HYDROCHLORIDE 200 MG/1
200 TABLET ORAL DAILY
Qty: 30 TABLET | Refills: 0
Start: 2024-03-27

## 2024-03-27 RX ORDER — METOPROLOL SUCCINATE 25 MG/1
25 TABLET, EXTENDED RELEASE ORAL DAILY
Qty: 90 TABLET | Refills: 3 | Status: SHIPPED | OUTPATIENT
Start: 2024-03-27

## 2024-03-27 RX ORDER — ROSUVASTATIN CALCIUM 20 MG/1
20 TABLET, COATED ORAL
Qty: 90 TABLET | Refills: 3 | Status: SHIPPED | OUTPATIENT
Start: 2024-03-27

## 2024-03-27 RX ORDER — OXYCODONE HYDROCHLORIDE 5 MG/1
5 TABLET ORAL EVERY 6 HOURS PRN
COMMUNITY

## 2024-03-27 ASSESSMENT — ENCOUNTER SYMPTOMS
SHORTNESS OF BREATH: 0
DIZZINESS: 0
COUGH: 0
DEPRESSION: 1
PND: 0
PALPITATIONS: 0

## 2024-03-27 ASSESSMENT — FIBROSIS 4 INDEX: FIB4 SCORE: 2.21

## 2024-03-27 NOTE — PATIENT INSTRUCTIONS
Reduce amiodarone to 200mg in the morning. Take for 7 more days then STOP.     We will mail you a zio patch in 6 weeks. Wear this for 2 weeks then mail back.

## 2024-04-01 ENCOUNTER — APPOINTMENT (OUTPATIENT)
Dept: CARDIOTHORACIC SURGERY | Facility: MEDICAL CENTER | Age: 74
End: 2024-04-01
Payer: MEDICARE

## 2024-04-01 VITALS
HEART RATE: 74 BPM | WEIGHT: 130.1 LBS | TEMPERATURE: 97 F | BODY MASS INDEX: 21.67 KG/M2 | DIASTOLIC BLOOD PRESSURE: 66 MMHG | OXYGEN SATURATION: 96 % | HEIGHT: 65 IN | SYSTOLIC BLOOD PRESSURE: 124 MMHG

## 2024-04-01 DIAGNOSIS — G89.18 POST-OP PAIN: ICD-10-CM

## 2024-04-01 DIAGNOSIS — Z98.890 POST-OPERATIVE STATE: ICD-10-CM

## 2024-04-01 PROCEDURE — 99024 POSTOP FOLLOW-UP VISIT: CPT

## 2024-04-01 PROCEDURE — 3074F SYST BP LT 130 MM HG: CPT

## 2024-04-01 PROCEDURE — 3078F DIAST BP <80 MM HG: CPT

## 2024-04-01 PROCEDURE — 2023F DILAT RTA XM W/O RTNOPTHY: CPT

## 2024-04-01 RX ORDER — METOPROLOL SUCCINATE 25 MG/1
1 TABLET, EXTENDED RELEASE ORAL
COMMUNITY

## 2024-04-01 RX ORDER — OXYCODONE HYDROCHLORIDE 5 MG/1
5 TABLET ORAL EVERY 6 HOURS PRN
Qty: 28 TABLET | Refills: 0 | Status: SHIPPED | OUTPATIENT
Start: 2024-04-01 | End: 2024-04-08

## 2024-04-01 ASSESSMENT — FIBROSIS 4 INDEX: FIB4 SCORE: 2.21

## 2024-04-01 NOTE — PROGRESS NOTES
Seen today. Forgot to order additional pain medications for sternal pain during her visit.  We discussed no further refills after this prescription and to follow up with her PCP or pain management if continued need for narcotics.

## 2024-04-15 ENCOUNTER — APPOINTMENT (OUTPATIENT)
Dept: OPHTHALMOLOGY | Facility: MEDICAL CENTER | Age: 74
End: 2024-04-15
Payer: MEDICARE

## 2024-05-15 ENCOUNTER — NON-PROVIDER VISIT (OUTPATIENT)
Dept: CARDIOLOGY | Facility: MEDICAL CENTER | Age: 74
End: 2024-05-15
Attending: PHYSICIAN ASSISTANT
Payer: MEDICARE

## 2024-05-15 DIAGNOSIS — I48.3 TYPICAL ATRIAL FLUTTER (HCC): ICD-10-CM

## 2024-05-15 NOTE — PROGRESS NOTES
Home enrollment completed in the 14 day Zio Holter monitor per Ashley Johnson PA-C.  Monitor will be shipped to patient via [employer], pending EOS.

## 2024-06-05 ENCOUNTER — TELEPHONE (OUTPATIENT)
Dept: CARDIOLOGY | Facility: MEDICAL CENTER | Age: 74
End: 2024-06-05
Payer: MEDICARE

## 2024-06-05 NOTE — TELEPHONE ENCOUNTER
Sudarshan EOS to 's nurse, Harshad, on 6/5/2024  Preliminary findings:  2 runs of Svt,  with an avg rate of 118 bpm  Sinus rhythm,  with an avg rate of 83 bpm  6 patient events corresponding to:  SR 79-86

## 2024-06-07 NOTE — TELEPHONE ENCOUNTER
Phone Number Called: [phone]     Call outcome: Did not leave a detailed message. Requested patient to call back.    Message: Called to inform patient of JA recommendations. Unable to reach. Left VM for patient to call back when able.     -------------------      Ashley Johnson P.A.-C.  YouYesterday (4:42 PM)      In the 10 days that she wore the monitor we didn't see any recurrent afib or aflutter which is reassuring. If she is doing well she doesn't have to return to see me, she can just see Susan in August. Happy to see her if she needs to though. Tried to call her but only reached voicemail.

## 2024-06-10 NOTE — TELEPHONE ENCOUNTER
MARYSE    Caller: Carine Christopher     Topic/issue: Pt returned call please advise.    Callback Number: [phone] (home)      Thank you,     Steven SCOTT

## 2024-06-10 NOTE — TELEPHONE ENCOUNTER
Pt called back. Would like to see JA sooner. Thinks she may be overdoing physical activity. Pain in center of chest. Had blood in urine, stopped taking 81mg aspirin, resolved. Constipation post sx d/t narcotic pain meds.

## 2024-06-10 NOTE — TELEPHONE ENCOUNTER
Phone Number Called: [phone]     Call outcome: Left detailed message for patient. Informed to call back with any additional questions.    Message: Called to relay below info        In the 10 days that she wore the monitor we didn't see any recurrent afib or aflutter which is reassuring. If she is doing well she doesn't have to return to see me, she can just see Susan in August. Happy to see her if she needs to though. Tried to call her but only reached voicemail.

## 2024-06-12 NOTE — PROGRESS NOTES
"Chief Complaint   Patient presents with    Coronary Artery Disease     F/V dx: Coronary artery disease involving native coronary artery of native heart with angina pectoris (HCC)    Hypertension    Dyslipidemia          Subjective:   Carine Christopher is a 73 y.o. female who presents today for follow-up.     Patient of Dr. Davis.  Last seen January 2023.   Medical problems include coronary artery disease with history of 3 previous stents with in-stent restenosis, hypertension, hyperlipidemia, giant cell arteritis.     In February she was hospitalized for unstable angina, her coronary angiogram showed severe multivessel coronary artery disease with recurrent in-stent restenosis.  She was referred to cardiothoracic surgery and bypass surgery was recommended.  On 2/28/2024 she had coronary artery bypass grafting x 3 by Dr. Chicas (R SVG-LAD, R SVG-diagonal, R SVG-marginal).  The LIMA was not used due to proximal LIMA/subclavian stenosis.  Her postop recovery was complicated by atrial arrhythmia, possibly atrial flutter.  She was treated with an amiodarone drip and discharged on oral amiodarone.    After completing amiodarone course we allowed a 5 week washout period and she wore a 2 week event monitor which showed no sustained arrhythmias. She remains off anticoagulation.     Carine comes in with a list of discussion points today. She had an episode of hematuria and stopped her aspirin, she remains on clopidogrel.   She has \"bumps\" on her sternum and is concerned about her healing. She has no clicking or pain at sternum though she is sore and has some nerve pain and numbness in her chest wall.   She asks about restarting motrin which she used to use 800mg bid for treating her GCA.     She is not participating in cardiac rehab because she stays active at home with her chores, she doesn't have a regular exercise routine.   Past Medical History:   Diagnosis Date    Arthritis 07/07/2022    hands    " "Breath shortness 07/07/2022    with exertion    Bronchitis 07/07/2022    chronic broncitis since age 15    CAD (coronary artery disease) 07/07/2022    Cataract 07/07/2022    IOLOU    Chronic bronchitis ([facility])     COVID 07/06/2022    Giant cell arteritis ([facility]) 2019    Giant cell arteritis ([facility])     Gynecological disorder 07/07/2022    pt still having monthly menses, on hormones    Head ache     Heart attack ([facility]) 10/2020    taking metoprolol, stents placed times 2    Heart valve disease 07/07/2022    High cholesterol 07/07/2022    non medicated    History of heart artery stent     x3 stents    Hyperlipidemia     Hypertension     MI (myocardial infarction) ([facility])     x 4    Pain 07/07/2022    constant headaches times 3 years    Pneumonia 07/07/2022    walking pneumonia 1980    Urinary incontinence 07/07/2022    at times         Family History   Problem Relation Age of Onset    Glasses Father     Heart Disease Father     Hypertension Mother     Kidney Disease Neg Hx          Social History     Tobacco Use    Smoking status: Never    Smokeless tobacco: Never   Vaping Use    Vaping status: Never Used   Substance Use Topics    Alcohol use: Yes     Comment: Very rare    Drug use: Never         Allergies   Allergen Reactions    Codeine Vomiting and Nausea    Other Drug Unspecified     States that most antibiotics cause yellow stools.    Atorvastatin Unspecified     Caused bruising general body, pt doesn't want to take    Atorvastatin Unspecified     .    Chloramphenicol Diarrhea     PCN is okay to take    Chloramphenicol Unspecified     .    Codeine Unspecified     .    Other Drug Diarrhea     Most antibiotics turn stool yellow    Sulfa Antibiotics [Sulfa Drugs] Unspecified     Can't remember reaction    Sulfa Drugs Diarrhea     .    Coffee Flavor Vomiting     Patient states coffee, and coffee cakes induce vomiting, \"even the word coffee makes me want to throw up\"         Current Outpatient Medications   Medication Sig    " "gabapentin (NEURONTIN) 100 MG Cap     Rhubarb (ESTROVEN MENOPAUSE RELIEF PO) Take  by mouth.    oxyCODONE immediate-release (ROXICODONE) 5 MG Tab     omeprazole (PRILOSEC) 20 MG delayed-release capsule Take 1 Capsule by mouth every day. Obtain refills from PCP    clopidogrel (PLAVIX) 75 MG Tab Take 1 Tablet by mouth every day.    rosuvastatin (CRESTOR) 20 MG Tab Take 1 Tablet by mouth at bedtime.    metoprolol SR (TOPROL XL) 25 MG TABLET SR 24 HR Take 1 Tablet by mouth every day.    azelastine (ASTELIN) 137 MCG/SPRAY nasal spray Administer 2 Sprays into affected nostril(S) 1 time a day as needed for Rhinitis.    Dextromethorphan HBr (VICKS DAYQUIL COUGH) 15 MG/15ML Liquid Take  by mouth 1 time a day as needed. 1 each  Indications: Cough    Phenylephrine-Doxylamine-DM (NYQUIL COUGH DM + CONGESTION) 5-6.25-10 MG/15ML Liquid Take 1 Each by mouth every evening as needed (chronic bronchitis).    albuterol 108 (90 Base) MCG/ACT Aero Soln inhalation aerosol Inhale 2 Puffs 3 times a day as needed for Shortness of Breath (chronic bronchitis).    LUTEIN PO Take 20 mg by mouth every day.         Review of Systems   Respiratory:  Negative for cough and shortness of breath.    Cardiovascular:  Negative for chest pain, palpitations, orthopnea, leg swelling and PND.   Gastrointestinal:  Negative for nausea.   Genitourinary:  Positive for hematuria.   Neurological:  Positive for headaches. Negative for dizziness and loss of consciousness.          Objective:   /62 (BP Location: Left arm, Patient Position: Sitting, BP Cuff Size: Adult)   Pulse 78   Resp 14   Ht 1.651 m (5' 5\")   Wt 58.1 kg (128 lb)   SpO2 97%  Body mass index is 21.3 kg/m².         Physical Exam  Vitals reviewed.   HENT:      Head: Normocephalic and atraumatic.   Cardiovascular:      Rate and Rhythm: Normal rate and regular rhythm.      Heart sounds: No murmur heard.  Pulmonary:      Effort: Pulmonary effort is normal. No respiratory distress.      Breath " sounds: No rales.   Abdominal:      General: There is no distension.   Musculoskeletal:      Right lower leg: No edema.      Left lower leg: No edema.      Comments: Well-healed sternotomy scar with palpable nodules along the sternum, no clicking or instability of the sternum noted   Skin:     General: Skin is warm and dry.   Neurological:      Mental Status: She is alert.   Psychiatric:         Judgment: Judgment normal.         TTE 2/21/2024  CONCLUSIONS  Normal left ventricular systolic function. The left ventricular   ejection fraction is visually estimated to be 55%.   Normal right ventricular size and systolic function.  Mild aortic insufficiency.   Right ventricular systolic pressure is estimated to be  23 mmHg   (normal).   No prior study is available for comparison.       Assessment:     1. S/P CABG x 3  DX-CHEST-2 VIEWS    omeprazole (PRILOSEC) 20 MG delayed-release capsule      2. Postoperative surgical complication involving musculoskeletal system associated with musculoskeletal procedure, unspecified complication  DX-CHEST-2 VIEWS      3. Coronary artery disease involving native coronary artery of native heart with angina pectoris (HCC)        4. Temporal giant cell arteritis (HCC)               Medical Decision Making:  Today's Assessment / Plan:   Severe multivessel artery disease s/p CABG (R SVG-LAD, R SVG-diagonal, R SVG-marginal) Feb 2024  - previous MI with ISR of LCx stent  - aspirin and plavix for 6-12mo given her unstable angina presentation and previous ISR. If hematuria is persisting she needs to her primary about this  - HI statin. Previously didn't tolerate atorvastatin but seems to be doing well with rosuvastatin  - metop succinate  - EF preserved  - CXR for sternal complaints, no signs of instability on physical exam    Postoperative atrial flutter  - no recurrent atrial arrhythmias after sufficient washout period from amio    Hot flashes  I advise against restarting her supplement as  black mario has interactions with statins and amiodarone.     GCA  - long discussion about NSAID use including risk of MI with this,  recommend GI protection with PPI if going to use NSAIDS to treat her symptoms.         Return for keep Dr. Davis.  Sooner if problems.

## 2024-06-13 ENCOUNTER — OFFICE VISIT (OUTPATIENT)
Dept: CARDIOLOGY | Facility: MEDICAL CENTER | Age: 74
End: 2024-06-13
Attending: PHYSICIAN ASSISTANT
Payer: MEDICARE

## 2024-06-13 VITALS
DIASTOLIC BLOOD PRESSURE: 62 MMHG | OXYGEN SATURATION: 97 % | HEART RATE: 78 BPM | HEIGHT: 65 IN | WEIGHT: 128 LBS | SYSTOLIC BLOOD PRESSURE: 108 MMHG | RESPIRATION RATE: 14 BRPM | BODY MASS INDEX: 21.33 KG/M2

## 2024-06-13 DIAGNOSIS — M31.6 TEMPORAL GIANT CELL ARTERITIS (HCC): ICD-10-CM

## 2024-06-13 DIAGNOSIS — M96.89 POSTOPERATIVE SURGICAL COMPLICATION INVOLVING MUSCULOSKELETAL SYSTEM ASSOCIATED WITH MUSCULOSKELETAL PROCEDURE, UNSPECIFIED COMPLICATION: ICD-10-CM

## 2024-06-13 DIAGNOSIS — I25.119 CORONARY ARTERY DISEASE INVOLVING NATIVE CORONARY ARTERY OF NATIVE HEART WITH ANGINA PECTORIS (HCC): ICD-10-CM

## 2024-06-13 DIAGNOSIS — Z95.1 S/P CABG X 3: ICD-10-CM

## 2024-06-13 PROCEDURE — 3074F SYST BP LT 130 MM HG: CPT | Performed by: PHYSICIAN ASSISTANT

## 2024-06-13 PROCEDURE — 2023F DILAT RTA XM W/O RTNOPTHY: CPT | Performed by: PHYSICIAN ASSISTANT

## 2024-06-13 PROCEDURE — 3078F DIAST BP <80 MM HG: CPT | Performed by: PHYSICIAN ASSISTANT

## 2024-06-13 PROCEDURE — 99214 OFFICE O/P EST MOD 30 MIN: CPT | Performed by: PHYSICIAN ASSISTANT

## 2024-06-13 PROCEDURE — 99213 OFFICE O/P EST LOW 20 MIN: CPT | Performed by: PHYSICIAN ASSISTANT

## 2024-06-13 RX ORDER — OMEPRAZOLE 20 MG/1
20 CAPSULE, DELAYED RELEASE ORAL DAILY
Qty: 30 CAPSULE | Refills: 0 | Status: SHIPPED | OUTPATIENT
Start: 2024-06-13

## 2024-06-13 RX ORDER — GABAPENTIN 100 MG/1
CAPSULE ORAL
COMMUNITY
Start: 2024-04-25

## 2024-06-13 RX ORDER — OXYCODONE HYDROCHLORIDE 5 MG/1
TABLET ORAL
COMMUNITY
Start: 2024-05-28

## 2024-06-13 ASSESSMENT — ENCOUNTER SYMPTOMS
SHORTNESS OF BREATH: 0
PALPITATIONS: 0
PND: 0
ORTHOPNEA: 0
LOSS OF CONSCIOUSNESS: 0
DIZZINESS: 0
NAUSEA: 0
HEADACHES: 1
COUGH: 0

## 2024-06-13 ASSESSMENT — FIBROSIS 4 INDEX: FIB4 SCORE: 2.21

## 2024-06-19 ENCOUNTER — APPOINTMENT (OUTPATIENT)
Dept: RADIOLOGY | Facility: MEDICAL CENTER | Age: 74
End: 2024-06-19
Attending: PHYSICIAN ASSISTANT
Payer: MEDICARE

## 2024-06-19 DIAGNOSIS — Z95.1 S/P CABG X 3: ICD-10-CM

## 2024-06-19 DIAGNOSIS — M96.89 POSTOPERATIVE SURGICAL COMPLICATION INVOLVING MUSCULOSKELETAL SYSTEM ASSOCIATED WITH MUSCULOSKELETAL PROCEDURE, UNSPECIFIED COMPLICATION: ICD-10-CM

## 2024-06-19 PROCEDURE — 71046 X-RAY EXAM CHEST 2 VIEWS: CPT

## 2024-06-21 NOTE — RESULT ENCOUNTER NOTE
Carine, your chest xray looks good. No obvious signs of sternotomy wire issues. For further concerns about this please contact the surgery team.     Ashley

## 2024-08-05 ENCOUNTER — HOSPITAL ENCOUNTER (OUTPATIENT)
Dept: LAB | Facility: MEDICAL CENTER | Age: 74
End: 2024-08-05
Attending: OPHTHALMOLOGY
Payer: MEDICARE

## 2024-08-05 ENCOUNTER — OFFICE VISIT (OUTPATIENT)
Dept: OPHTHALMOLOGY | Facility: MEDICAL CENTER | Age: 74
End: 2024-08-05
Payer: MEDICARE

## 2024-08-05 DIAGNOSIS — Z95.1 S/P CABG X 3: ICD-10-CM

## 2024-08-05 DIAGNOSIS — M31.6 TEMPORAL GIANT CELL ARTERITIS (HCC): ICD-10-CM

## 2024-08-05 DIAGNOSIS — Z96.1 PSEUDOPHAKIA OF BOTH EYES: ICD-10-CM

## 2024-08-05 DIAGNOSIS — H49.9 OPHTHALMOPLEGIA: ICD-10-CM

## 2024-08-05 DIAGNOSIS — H35.371 EPIRETINAL MEMBRANE (ERM) OF RIGHT EYE: ICD-10-CM

## 2024-08-05 DIAGNOSIS — H52.31 ANISOMETROPIA: ICD-10-CM

## 2024-08-05 LAB
CRP SERPL HS-MCNC: <0.3 MG/DL (ref 0–0.75)
ERYTHROCYTE [SEDIMENTATION RATE] IN BLOOD BY WESTERGREN METHOD: 14 MM/HOUR (ref 0–25)

## 2024-08-05 PROCEDURE — 36415 COLL VENOUS BLD VENIPUNCTURE: CPT

## 2024-08-05 PROCEDURE — 99214 OFFICE O/P EST MOD 30 MIN: CPT | Mod: 25 | Performed by: OPHTHALMOLOGY

## 2024-08-05 PROCEDURE — 92250 FUNDUS PHOTOGRAPHY W/I&R: CPT | Performed by: OPHTHALMOLOGY

## 2024-08-05 PROCEDURE — 85652 RBC SED RATE AUTOMATED: CPT

## 2024-08-05 PROCEDURE — 86140 C-REACTIVE PROTEIN: CPT

## 2024-08-05 RX ORDER — ASPIRIN 81 MG/1
81 TABLET ORAL
COMMUNITY
End: 2024-08-23

## 2024-08-05 ASSESSMENT — REFRACTION_WEARINGRX
OS_AXIS: 159
OS_CYLINDER: +0.75
OD_CYLINDER: SPHERE
OS_SPHERE: -1.50
OS_HPRISM: 2.0
OS_ADD: +2.75
OD_VBASE: UP
OD_VPRISM: 2.0
OD_SPHERE: +0.25
OS_HBASE: IN
OD_ADD: +2.75

## 2024-08-05 ASSESSMENT — REFRACTION_MANIFEST
OS_AXIS: 140
OD_CYLINDER: +0.25
OD_AXIS: 066
OD_SPHERE: +0.50
OS_CYLINDER: +0.75
OS_SPHERE: -1.25
METHOD_AUTOREFRACTION: 1

## 2024-08-05 ASSESSMENT — EXTERNAL EXAM - LEFT EYE: OS_EXAM: NORMAL

## 2024-08-05 ASSESSMENT — CUP TO DISC RATIO
OS_RATIO: 0.1
OD_RATIO: 0.1

## 2024-08-05 ASSESSMENT — SLIT LAMP EXAM - LIDS
COMMENTS: NORMAL
COMMENTS: NORMAL

## 2024-08-05 ASSESSMENT — VISUAL ACUITY
OD_CC: 20/20-2
METHOD: SNELLEN - LINEAR
CORRECTION_TYPE: GLASSES
OS_CC: 20/25

## 2024-08-05 ASSESSMENT — TONOMETRY
OS_IOP_MMHG: 12
OD_IOP_MMHG: 12

## 2024-08-05 ASSESSMENT — EXTERNAL EXAM - RIGHT EYE: OD_EXAM: NORMAL

## 2024-08-05 ASSESSMENT — ENCOUNTER SYMPTOMS: DOUBLE VISION: 1

## 2024-08-05 NOTE — ASSESSMENT & PLAN NOTE
8/25/2021 - small RHypo and XT. 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 However difficulty fusion secondary to the aniseikonia form the monovision as well as the ERM OD. In Pat did better with a 2 base up OD and 2 base in OS. However still having some asthenopic findings secondary to the ERM. Discussed trial of RX with prisms and bifocal and correcting for the monovision.   12/1/2021 - ortho with new glasses, but not wearing because of nose bleed. No progression of the misalignment  7/27/2022 - continue current rx with prism  1/4/2023 -overall motility appears stable.  However she does not like to wear prism glasses because of the same irritating her temple on the right.  4/5/2023 -stable small right hypo and exotropia.  Has monovision so not that bothered by the motility disorder and double vision.  10/11/2023-overall stable.  Doing well with prism glasses  8/5/2024-not really tolerating prism glasses following cataract extraction.  Discussed could continue going without

## 2024-08-05 NOTE — PROGRESS NOTES
Peds/Neuro Ophthalmology:   Jon Wilde M.D.    Date & Time note created:    8/5/2024   3:46 PM     Referring MD / APRN:  Khai Deras M.D., No att. providers found    Patient ID:  Name:             Carine Christopher   YOB: 1950  Age:                 73 y.o.  female   MRN:               [id number]    Chief Complaint/Reason for Visit:     No chief complaint on file.      History of Present Illness:    Carine Christopher is a 73 y.o. female   Follow up ophthalmoplegia.Patient does not wear prism glasses.        Review of Systems:  Review of Systems   Eyes:  Positive for double vision.   All other systems reviewed and are negative.      Past Medical History:   Past Medical History:   Diagnosis Date    Arthritis 07/07/2022    hands    Breath shortness 07/07/2022    with exertion    Bronchitis 07/07/2022    chronic broncitis since age 15    CAD (coronary artery disease) 07/07/2022    Cataract 07/07/2022    IOLOU    Chronic bronchitis (HCC)     COVID 07/06/2022    Giant cell arteritis (HCC) 2019    Giant cell arteritis (HCC)     Gynecological disorder 07/07/2022    pt still having monthly menses, on hormones    Head ache     Heart attack (HCC) 10/2020    taking metoprolol, stents placed times 2    Heart valve disease 07/07/2022    High cholesterol 07/07/2022    non medicated    History of heart artery stent     x3 stents    Hyperlipidemia     Hypertension     MI (myocardial infarction) ([facility])     x 4    Pain 07/07/2022    constant headaches times 3 years    Pneumonia 07/07/2022    walking pneumonia 1980    Urinary incontinence 07/07/2022    at times       Past Surgical History:  Past Surgical History:   Procedure Laterality Date    MULTIPLE CORONARY ARTERY BYPASS ENDO VEIN HARVEST  2/28/2024    Procedure: CABG, WITH ENDOSCOPIC VEIN PROCUREMENT - X3;  Surgeon: Fe Chicas M.D.;  Location: SURGERY [facility];  Service: Cardiothoracic    ECHOCARDIOGRAM,  TRANSESOPHAGEAL, INTRAOPERATIVE  2/28/2024    Procedure: ECHOCARDIOGRAM, TRANSESOPHAGEAL, INTRAOPERATIVE;  Surgeon: Fe Chicas M.D.;  Location: SURGERY [facility];  Service: Cardiothoracic    KY TEMPORAL ARTERY LIGATN OR BX Right 8/10/2022    Procedure: RIGHT TEMPORAL ARTERY BIOPSY;  Surgeon: Tone Fields M.D.;  Location: SURGERY [facility];  Service: Vascular    OTHER CARDIAC SURGERY  2021    stents placed    CATARACT EXTRACTION WITH IOL Bilateral 2019    ABDOMINAL EXPLORATION      BREAST IMPLANT REVISION      BREAST IMPLANT REVISION Bilateral     implants       Current Outpatient Medications:  Current Outpatient Medications   Medication Sig Dispense Refill    Misc Natural Products (ESTROVEN + ENERGY MAX STRENGTH PO) Take  by mouth.      aspirin 81 MG EC tablet Take 81 mg by mouth every day.      Rhubarb (ESTROVEN MENOPAUSE RELIEF PO) Take  by mouth.      oxyCODONE immediate-release (ROXICODONE) 5 MG Tab       omeprazole (PRILOSEC) 20 MG delayed-release capsule Take 1 Capsule by mouth every day. Obtain refills from PCP 30 Capsule 0    clopidogrel (PLAVIX) 75 MG Tab Take 1 Tablet by mouth every day. 30 Tablet 11    rosuvastatin (CRESTOR) 20 MG Tab Take 1 Tablet by mouth at bedtime. 90 Tablet 3    metoprolol SR (TOPROL XL) 25 MG TABLET SR 24 HR Take 1 Tablet by mouth every day. 90 Tablet 3    azelastine (ASTELIN) 137 MCG/SPRAY nasal spray Administer 2 Sprays into affected nostril(S) 1 time a day as needed for Rhinitis.      Dextromethorphan HBr (VICKS DAYQUIL COUGH) 15 MG/15ML Liquid Take  by mouth 1 time a day as needed. 1 each  Indications: Cough      Phenylephrine-Doxylamine-DM (NYQUIL COUGH DM + CONGESTION) 5-6.25-10 MG/15ML Liquid Take 1 Each by mouth every evening as needed (chronic bronchitis).      albuterol 108 (90 Base) MCG/ACT Aero Soln inhalation aerosol Inhale 2 Puffs 3 times a day as needed for Shortness of Breath (chronic bronchitis).      gabapentin (NEURONTIN) 100 MG Cap  (Patient not  "taking: Reported on 8/5/2024)      LUTEIN PO Take 20 mg by mouth every day. (Patient not taking: Reported on 8/5/2024)       No current facility-administered medications for this visit.       Allergies:  Allergies   Allergen Reactions    Codeine Vomiting and Nausea    Other Drug Unspecified     States that most antibiotics cause yellow stools.    Atorvastatin Unspecified     Caused bruising general body, pt doesn't want to take    Atorvastatin Unspecified     .    Chloramphenicol Diarrhea     PCN is okay to take    Chloramphenicol Unspecified     .    Codeine Unspecified     .    Other Drug Diarrhea     Most antibiotics turn stool yellow    Sulfa Antibiotics [Sulfa Drugs] Unspecified     Can't remember reaction    Sulfa Drugs Diarrhea     .    Coffee Flavor Vomiting     Patient states coffee, and coffee cakes induce vomiting, \"even the word coffee makes me want to throw up\"       Family History:  Family History   Problem Relation Age of Onset    Glasses Father     Heart Disease Father     Hypertension Mother     Kidney Disease Neg Hx        Social History:  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      Social Determinants of Health     Financial Resource Strain: Unknown (1/12/2022)    Overall Financial Resource Strain (CARDIA)     Difficulty of Paying Living Expenses: Patient declined   Food Insecurity: No Food Insecurity (1/12/2022) "    Hunger Vital Sign     Worried About Running Out of Food in the Last Year: Never true     Ran Out of Food in the Last Year: Never true   Transportation Needs: Unknown (1/12/2022)    PRAPARE - Transportation     Lack of Transportation (Medical): Patient declined     Lack of Transportation (Non-Medical): Patient declined   Physical Activity: Not on file   Stress: Not on file   Social Connections: Not on file   Intimate Partner Violence: Low Risk  (8/29/2023)    [facility]    History of Abuse     History of Abuse: Denies   Housing Stability: Not on file          Physical Exam:  Physical Exam    Oriented x 3  Weight/BMI: There is no height or weight on file to calculate BMI.  There were no vitals taken for this visit.    Base Eye Exam       Visual Acuity (Snellen - Linear)         Right Left    Dist cc 20/20-2 20/25      Correction: Glasses              Tonometry (i care, 2:23 PM)         Right Left    Pressure 12 12              Pupils         Shape    Right     Left Irregular              Neuro/Psych       Oriented x3: Yes    Mood/Affect: Normal                  Additional Tests       Color         Right Left    Ishihara 9/9 9/9              Stereo       Fly: +    Animals: 3/3    Circles: 4/9                  Strabismus Exam       Reading #1   (Edited by: Jon Wilde M.D.)      Correction: sc    Observations: Ortho      Distance Near Near +3DS N Bifocals                      0 0 0   0 0 0                       0  0  ET 2 0  0            LHypoT 2           0 0 0   0 0 0                           Reading #2   (Edited by: Jon Wilde M.D.)      Correction: sc    Observations: Ortho      Distance Near Near +3DS N Bifocals                      0 0 0   0 0 0                       0  0  ET 2 0  0            LHypoT 2           0 0 0   0 0 0                               Slit Lamp and Fundus Exam       External Exam         Right Left    External  Normal Normal              Slit Lamp Exam         Right Left    Lids/Lashes Normal Normal    Conjunctiva/Sclera White and quiet White and quiet    Cornea Clear Clear    Anterior Chamber Deep and quiet Deep and quiet    Iris Round and reactive Round and reactive    Lens Posterior chamber intraocular lens Posterior chamber intraocular lens    Vitreous Normal Normal              Fundus Exam         Right Left    Disc Normal Normal    C/D Ratio 0.1 0.1    Macula Epiretinal membrane Membrane, Epiretinal membrane    Vessels Normal Normal    Periphery Normal Normal                  Refraction       Wearing Rx         Sphere Cylinder Axis Add Horz Prism Vert Prism    Right +0.25 Sphere  +2.75  2.0 up    Left -1.50 +0.75 159 +2.75 2.0 in               Manifest Refraction (Auto)         Sphere Cylinder Axis    Right +0.50 +0.25 066    Left -1.25 +0.75 140                    Pertinent Lab/Test/Imaging Review:      Assessment and Plan:     Anisometropia  2/3/2021 - 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 Causing some symptomatic aniseikonbia. Will therefore refer to Dr Navjot Feliz for consideration of IOL exchange  8/25/2021 - Will give trial of glasses rx to help correct some of the aniseikonia to see if tolerates. Discussed that even with IOL exchange has the ERM OD that would affect fusion  7/27/2022 - Continue current rx with prism. Re-gave rx  10/11/2023-continue Rx with prisms.  Getting full frame distance because somewhat bothered by progressive  8/5/2024-cannot tolerate glasses to correct the anisometropia with prisms.  Probably because of the anisocoria.  However primarily uses the right eye for distance.  Discussed that could get full frame readers +3.00.  Over-the-counter    Ophthalmoplegia  8/25/2021 - small RHypo and XT. 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 However difficulty fusion secondary to  the aniseikonia form the monovision as well as the ERM OD. In Pat did better with a 2 base up OD and 2 base in OS. However still having some asthenopic findings secondary to the ERM. Discussed trial of RX with prisms and bifocal and correcting for the monovision.   12/1/2021 - ortho with new glasses, but not wearing because of nose bleed. No progression of the misalignment  7/27/2022 - continue current rx with prism  1/4/2023 -overall motility appears stable.  However she does not like to wear prism glasses because of the same irritating her temple on the right.  4/5/2023 -stable small right hypo and exotropia.  Has monovision so not that bothered by the motility disorder and double vision.  10/11/2023-overall stable.  Doing well with prism glasses  8/5/2024-not really tolerating prism glasses following cataract extraction.  Discussed could continue going without    Temporal giant cell arteritis (HCC)  2/3/2021 - Presumed giant cell arteritis by history, symptoms of temporal pain, and response to prednisone. Never had temporal artery biopsy. ESR was 1 back in January, but had been on prednosne at least 10 mg for a few months prior. Dr Banks increased prednisone to 60 and currently on 40 mg / day.  From the neuro-ophthalmic standpoint no evidence of optic nerve invovement with OCT NFL thickness normal at 107 OD and 110 OS. No optic disc pallor and no swelling. In Addition VICKIE was 1:320. Therefore discussed with patient that I recommend being managed by Rheumatology. Would consider Actemra and thus will refer to Dr Michaud in [place]. VICKIE will need to be characterized to determine is has concomitant other autoimmune disorder.   8/25/2021 - Didn't see Dr Michaud. Sept 13th has a telemedicine apt with [place]. On prednisone 10 mg. Initial symptoms headache and lump on the side of the head. Dx made 10/2020. Long discussion regarding GCA, and monitoring symptoms, ESR and CRP on pred taper. Recommended she have  discussion via telemed with Rheum about Actemra. Ordered ESR, CRP and TFT's. No evidence of GCA induced optic neuropathy  8/26/2021 - ESR 4.0, TSH 1.16, CRP 0.34  12/1/2021 - Apparently had relapse of headaches in September and went back to 20 mg prednisone. Then at taper again down to 10 mg last week headache returned and increased to 12.5 mg. She discussed with Rheumatologist in [place] where advised TA biopsy to secure the diagnosis. Would need to find provider here in [place] to rx Actemra. OCT NFL thickness stable at 107 OD and 114 OS and nerves healthy, so no evidence of optic neuropathy. However will recheck ESR and CRP. Also refer to Dr Pelaez for temporal artery biopsy  12/3/2021 - Repeat ESR 4.0, CRP 0.6 slightly increased, but still within normal.  7/27/2022 - Recent MI with subsequent COVID. Self discontinued prednisone, but was on 3 mg. Will repeat ESR and CRP. Had biopsy scheduled august 10 th. Still no evidence of an optic neuropathy or motility disorder secondary to GCA. OCT NFL thickness stable at 106 OD and 106 OS  Addendum CRP 1.73 slightly above normal range  8/24/2022 - Report of Temporal Artery Biopsy by Dr Fields at [facility]. Shows evidence of treated temporal arteritis. Will send report to her immunologist Dr Brandi Hester at [place]  1/4/2023 -has now been off prednisone since the biopsy in July.  However states that her temporal headaches have started to come back as well as some jaw discomfort on the right.  She Was History of Temporal and Facial Headaches That Improved in the past with Botox but Her Current Return of Headaches Is More Similar to the Ones That Caused a Lump in the Region of Her Temporal Artery.  She Also Has Some Other Cranial Lumps and a CT Scan Done with Contrast by Her Primary Was Nonrevealing.  Is Therefore concerning That Her Headaches Have Worsened.  I am therefore repeating her sed rate CRP, and also sending her as a consultation to rheumatology here  at [facility] [place].  I discussed that if her CRP and sedimentation rate are starting to increase that this would be consistent with a return of giant cell arteritis.  At this point there is no neuro ophthalmologic manifestations, but I did stress to her that if she were to have any change in her vision to call immediately or present to the emergency room  4/5/2023-saw Dr. Hager who recommended beginning on methotrexate.  However she no longer wants to take any medications.  She has had no recurrence of her temporal arteritis symptoms.  Her last CRP was normal at 0.75 and sed rate 6.0.  I discussed that if following her off medication she should be aware of the recurrence of any subtle signs of her temporal arteritis.  If these occur she should start prednisone immediately and if she has any vision changes present to the emergency room.  Her optic nerve heads appear stable without any edema and 105  OS OCT NFL thickness.  This can repeat her laboratories again at the end of this month  10/11/2023-overall stable.  No development of an optic neuropathy.  Currently taking prasugrel and nonsteroidals.  Again discussed that if any change in vision to present emergently for high-dose steroids and then consideration of methotrexate  8/5/2024 -has not been on prednisone and did not begin methotrexate.  Essentially asymptomatic.  Will repeat CRP and ESR    Pseudophakia of both eyes  IOL intact    S/P CABG x 3  8/5/2024-recent cardiac bypass.  No evidence that this was associated with giant cell arteritis.    Epiretinal membrane (ERM) of right eye  2/3/2021 - Epiretinal membrane seen on 5-line OCT. Discussed that since vision still 20/20 would hold on referral unless becomes more symptomatic  12/1/2021 - OCT 5-line no significant change and vision still good  7/27/2022 - ERM stable on 5-line  1/4/2023 -OCT shows improvement of ERM OD.  Vision stable.  4/5/2023-stable epiretinal membrane.  Not visually significant at this  time  10/11/2023-stable epiretinal membrane  8/5/2024-stable epiretinal membrane right eye.  Some undulation parafoveal left eye        Jon Wilde M.D.

## 2024-08-05 NOTE — ASSESSMENT & PLAN NOTE
8/5/2024-recent cardiac bypass.  No evidence that this was associated with giant cell arteritis.   None

## 2024-08-05 NOTE — ASSESSMENT & PLAN NOTE
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 Causing some symptomatic aniseikonbia. Will therefore refer to Dr Navjot Feliz for consideration of IOL exchange  8/25/2021 - Will give trial of glasses rx to help correct some of the aniseikonia to see if tolerates. Discussed that even with IOL exchange has the ERM OD that would affect fusion  7/27/2022 - Continue current rx with prism. Re-gave rx  10/11/2023-continue Rx with prisms.  Getting full frame distance because somewhat bothered by progressive  8/5/2024-cannot tolerate glasses to correct the anisometropia with prisms.  Probably because of the anisocoria.  However primarily uses the right eye for distance.  Discussed that could get full frame readers +3.00.  Over-the-counter

## 2024-08-05 NOTE — ASSESSMENT & PLAN NOTE
2/3/2021 - Epiretinal membrane seen on 5-line OCT. Discussed that since vision still 20/20 would hold on referral unless becomes more symptomatic  12/1/2021 - OCT 5-line no significant change and vision still good  7/27/2022 - ERM stable on 5-line  1/4/2023 -OCT shows improvement of ERM OD.  Vision stable.  4/5/2023-stable epiretinal membrane.  Not visually significant at this time  10/11/2023-stable epiretinal membrane  8/5/2024-stable epiretinal membrane right eye.  Some undulation parafoveal left eye

## 2024-08-06 ENCOUNTER — DOCUMENTATION (OUTPATIENT)
Dept: OPHTHALMOLOGY | Facility: MEDICAL CENTER | Age: 74
End: 2024-08-06
Payer: MEDICARE

## 2024-08-06 DIAGNOSIS — M31.6 TEMPORAL GIANT CELL ARTERITIS (HCC): ICD-10-CM

## 2024-08-06 NOTE — PROGRESS NOTES
2/3/2021 - Presumed giant cell arteritis by history, symptoms of temporal pain, and response to prednisone. Never had temporal artery biopsy. ESR was 1 back in January, but had been on prednosne at least 10 mg for a few months prior. Dr Banks increased prednisone to 60 and currently on 40 mg / day.  From the neuro-ophthalmic standpoint no evidence of optic nerve invovement with OCT NFL thickness normal at 107 OD and 110 OS. No optic disc pallor and no swelling. In Addition VICKIE was 1:320. Therefore discussed with patient that I recommend being managed by Rheumatology. Would consider Actemra and thus will refer to Dr Michaud in [place]. VICKIE will need to be characterized to determine is has concomitant other autoimmune disorder.   8/25/2021 - Didn't see Dr Michaud. Sept 13th has a telemedicine apt with [place]. On prednisone 10 mg. Initial symptoms headache and lump on the side of the head. Dx made 10/2020. Long discussion regarding GCA, and monitoring symptoms, ESR and CRP on pred taper. Recommended she have discussion via telemed with Rheum about Actemra. Ordered ESR, CRP and TFT's. No evidence of GCA induced optic neuropathy  8/26/2021 - ESR 4.0, TSH 1.16, CRP 0.34  12/1/2021 - Apparently had relapse of headaches in September and went back to 20 mg prednisone. Then at taper again down to 10 mg last week headache returned and increased to 12.5 mg. She discussed with Rheumatologist in [place] where advised TA biopsy to secure the diagnosis. Would need to find provider here in [place] to rx Actemra. OCT NFL thickness stable at 107 OD and 114 OS and nerves healthy, so no evidence of optic neuropathy. However will recheck ESR and CRP. Also refer to Dr Pelaez for temporal artery biopsy  12/3/2021 - Repeat ESR 4.0, CRP 0.6 slightly increased, but still within normal.  7/27/2022 - Recent MI with subsequent COVID. Self discontinued prednisone, but was on 3 mg. Will repeat ESR and CRP. Had biopsy scheduled august 10  th. Still no evidence of an optic neuropathy or motility disorder secondary to GCA. OCT NFL thickness stable at 106 OD and 106 OS  Addendum CRP 1.73 slightly above normal range  8/24/2022 - Report of Temporal Artery Biopsy by Dr Fields at [facility]. Shows evidence of treated temporal arteritis. Will send report to her immunologist Dr Brandi Hester at [place]  1/4/2023 -has now been off prednisone since the biopsy in July.  However states that her temporal headaches have started to come back as well as some jaw discomfort on the right.  She Was History of Temporal and Facial Headaches That Improved in the past with Botox but Her Current Return of Headaches Is More Similar to the Ones That Caused a Lump in the Region of Her Temporal Artery.  She Also Has Some Other Cranial Lumps and a CT Scan Done with Contrast by Her Primary Was Nonrevealing.  Is Therefore concerning That Her Headaches Have Worsened.  I am therefore repeating her sed rate CRP, and also sending her as a consultation to rheumatology here at [facility].  I discussed that if her CRP and sedimentation rate are starting to increase that this would be consistent with a return of giant cell arteritis.  At this point there is no neuro ophthalmologic manifestations, but I did stress to her that if she were to have any change in her vision to call immediately or present to the emergency room  4/5/2023-saw Dr. Hager who recommended beginning on methotrexate.  However she no longer wants to take any medications.  She has had no recurrence of her temporal arteritis symptoms.  Her last CRP was normal at 0.75 and sed rate 6.0.  I discussed that if following her off medication she should be aware of the recurrence of any subtle signs of her temporal arteritis.  If these occur she should start prednisone immediately and if she has any vision changes present to the emergency room.  Her optic nerve heads appear stable without any edema and 105  OS OCT NFL  thickness.  This can repeat her laboratories again at the end of this month  10/11/2023-overall stable.  No development of an optic neuropathy.  Currently taking prasugrel and nonsteroidals.  Again discussed that if any change in vision to present emergently for high-dose steroids and then consideration of methotrexate  8/5/2024 -has not been on prednisone and did not begin methotrexate.  Essentially asymptomatic.  Will repeat CRP and ESR  8/6/2024-ESR 14, CRP <0.30.  Thus CRP stable ESR slightly elevated over her normal.  Will repeat again and 8 weeks

## 2024-08-07 NOTE — ASSESSMENT & PLAN NOTE
2/3/2021 - Presumed giant cell arteritis by history, symptoms of temporal pain, and response to prednisone. Never had temporal artery biopsy. ESR was 1 back in January, but had been on prednosne at least 10 mg for a few months prior. Dr Banks increased prednisone to 60 and currently on 40 mg / day.  From the neuro-ophthalmic standpoint no evidence of optic nerve invovement with OCT NFL thickness normal at 107 OD and 110 OS. No optic disc pallor and no swelling. In Addition VICKIE was 1:320. Therefore discussed with patient that I recommend being managed by Rheumatology. Would consider Actemra and thus will refer to Dr Michaud in [place]. VICKIE will need to be characterized to determine is has concomitant other autoimmune disorder.   8/25/2021 - Didn't see Dr Michaud. Sept 13th has a telemedicine apt with [facility]. On prednisone 10 mg. Initial symptoms headache and lump on the side of the head. Dx made 10/2020. Long discussion regarding GCA, and monitoring symptoms, ESR and CRP on pred taper. Recommended she have discussion via telemed with Rheum about Actemra. Ordered ESR, CRP and TFT's. No evidence of GCA induced optic neuropathy  8/26/2021 - ESR 4.0, TSH 1.16, CRP 0.34  12/1/2021 - Apparently had relapse of headaches in September and went back to 20 mg prednisone. Then at taper again down to 10 mg last week headache returned and increased to 12.5 mg. She discussed with Rheumatologist in [place] where advised TA biopsy to secure the diagnosis. Would need to find provider here in [place] to rx Actemra. OCT NFL thickness stable at 107 OD and 114 OS and nerves healthy, so no evidence of optic neuropathy. However will recheck ESR and CRP. Also refer to Dr Pelaez for temporal artery biopsy  12/3/2021 - Repeat ESR 4.0, CRP 0.6 slightly increased, but still within normal.  7/27/2022 - Recent MI with subsequent COVID. Self discontinued prednisone, but was on 3 mg. Will repeat ESR and CRP. Had biopsy scheduled august 10  th. Still no evidence of an optic neuropathy or motility disorder secondary to GCA. OCT NFL thickness stable at 106 OD and 106 OS  Addendum CRP 1.73 slightly above normal range  8/24/2022 - Report of Temporal Artery Biopsy by Dr Fields at [facility]. Shows evidence of treated temporal arteritis. Will send report to her immunologist Dr Brandi Hester at [facility]  1/4/2023 -has now been off prednisone since the biopsy in July.  However states that her temporal headaches have started to come back as well as some jaw discomfort on the right.  She Was History of Temporal and Facial Headaches That Improved in the past with Botox but Her Current Return of Headaches Is More Similar to the Ones That Caused a Lump in the Region of Her Temporal Artery.  She Also Has Some Other Cranial Lumps and a CT Scan Done with Contrast by Her Primary Was Nonrevealing.  Is Therefore concerning That Her Headaches Have Worsened.  I am therefore repeating her sed rate CRP, and also sending her as a consultation to rheumatology here at [facility].  I discussed that if her CRP and sedimentation rate are starting to increase that this would be consistent with a return of giant cell arteritis.  At this point there is no neuro ophthalmologic manifestations, but I did stress to her that if she were to have any change in her vision to call immediately or present to the emergency room  4/5/2023-saw Dr. Hager who recommended beginning on methotrexate.  However she no longer wants to take any medications.  She has had no recurrence of her temporal arteritis symptoms.  Her last CRP was normal at 0.75 and sed rate 6.0.  I discussed that if following her off medication she should be aware of the recurrence of any subtle signs of her temporal arteritis.  If these occur she should start prednisone immediately and if she has any vision changes present to the emergency room.  Her optic nerve heads appear stable without any edema and 105  OS OCT NFL  thickness.  This can repeat her laboratories again at the end of this month  10/11/2023-overall stable.  No development of an optic neuropathy.  Currently taking prasugrel and nonsteroidals.  Again discussed that if any change in vision to present emergently for high-dose steroids and then consideration of methotrexate  8/5/2024 -has not been on prednisone and did not begin methotrexate.  Essentially asymptomatic.  Will repeat CRP and ESR  8/6/2024-ESR 14, CRP <0.30.  Thus CRP stable ESR slightly elevated over her normal.  Will repeat again and 8 weeks

## 2024-08-23 ENCOUNTER — OFFICE VISIT (OUTPATIENT)
Dept: CARDIOLOGY | Facility: MEDICAL CENTER | Age: 74
End: 2024-08-23
Attending: DENTIST
Payer: MEDICARE

## 2024-08-23 VITALS
WEIGHT: 129 LBS | OXYGEN SATURATION: 94 % | DIASTOLIC BLOOD PRESSURE: 52 MMHG | RESPIRATION RATE: 14 BRPM | BODY MASS INDEX: 21.49 KG/M2 | HEIGHT: 65 IN | SYSTOLIC BLOOD PRESSURE: 110 MMHG | HEART RATE: 86 BPM

## 2024-08-23 DIAGNOSIS — I10 PRIMARY HYPERTENSION: ICD-10-CM

## 2024-08-23 DIAGNOSIS — I35.1 MILD AORTIC INSUFFICIENCY: ICD-10-CM

## 2024-08-23 DIAGNOSIS — Z95.1 S/P CABG X 3: ICD-10-CM

## 2024-08-23 DIAGNOSIS — M31.6 TEMPORAL GIANT CELL ARTERITIS (HCC): ICD-10-CM

## 2024-08-23 DIAGNOSIS — R76.8 SEROLOGIC AUTOIMMUNITY: ICD-10-CM

## 2024-08-23 PROCEDURE — 99213 OFFICE O/P EST LOW 20 MIN: CPT | Performed by: INTERNAL MEDICINE

## 2024-08-23 RX ORDER — IBUPROFEN 800 MG/1
800 TABLET, FILM COATED ORAL EVERY 8 HOURS PRN
COMMUNITY

## 2024-08-23 ASSESSMENT — FIBROSIS 4 INDEX: FIB4 SCORE: 2.24

## 2024-08-23 NOTE — PROGRESS NOTES
"CARDIOLOGY OUTPATIENT FOLLOWUP    PCP: Khai Deras M.D.    1. S/P CABG x 3    2. Mild aortic insufficiency    3. Primary hypertension    4. Temporal giant cell arteritis (HCC)    5. Serologic autoimmunity (positive VICKIE and anti-TPO)        Carine Christopher is experiencing ongoing fatigue following bypass surgery.  I suspect it may be related to metoprolol and asked that she discontinue this.  Additionally, she no longer requires aspirin but will remain on clopidogrel.  We did discuss the potential merits of participation in cardiac rehab which will be a challenge given that she lives in [place].  Additionally we discussed potential referral for sleep evaluation.    Follow up: 6 months    History: Carine Christopher is a 74 y.o. female with history of giant cell arteritis, recurrent in-stent stenosis involving the circumflex and now undergoing three-vessel bypass February 2024.    She is back to full activities but does notice pain from the sternal wires which has been managed with analgesics.      She also has marked fatigue, sleepiness      Physical Exam:  /52 (BP Location: Left arm, Patient Position: Sitting, BP Cuff Size: Adult)   Pulse 86   Resp 14   Ht 1.651 m (5' 5\")   Wt 58.5 kg (129 lb)   LMP 06/09/2021 (Exact Date)   SpO2 94%   BMI 21.47 kg/m²   GEN: NAD  RESP: CTAB  CVS:  RRR, no M/R/G  ABD: Soft, NT/ND  EXT: WWP, No edema         The ASCVD Risk score (Lizemores DK, et al., 2019) failed to calculate.    Studies  Lab Results   Component Value Date/Time    CHOLSTRLTOT 134 02/26/2024 03:35 AM    LDL 50 02/26/2024 03:35 AM    HDL 72 02/26/2024 03:35 AM    TRIGLYCERIDE 58 02/26/2024 03:35 AM       Lab Results   Component Value Date/Time    SODIUM 136 03/04/2024 02:30 AM    POTASSIUM 4.3 03/04/2024 02:30 AM    CHLORIDE 96 03/04/2024 02:30 AM    CO2 31 03/04/2024 02:30 AM    GLUCOSE 113 (H) 03/04/2024 02:30 AM    BUN 18 03/04/2024 02:30 AM    CREATININE 0.61 " 03/04/2024 02:30 AM    GLOMRATE 108 08/26/2023 02:28 AM      Lab Results   Component Value Date/Time    PROTHROMBTM 18.1 (H) 02/28/2024 01:35 PM    INR 1.48 (H) 02/28/2024 01:35 PM      Lab Results   Component Value Date/Time    WBC 10.2 03/04/2024 02:30 AM    RBC 3.38 (L) 03/04/2024 02:30 AM    HEMOGLOBIN 9.6 (L) 03/04/2024 02:30 AM    HEMATOCRIT 29.1 (L) 03/04/2024 02:30 AM    MCV 86.1 03/04/2024 02:30 AM    MCH 28.4 03/04/2024 02:30 AM    MCHC 33.0 03/04/2024 02:30 AM    MPV 10.3 03/04/2024 02:30 AM    NEUTSPOLYS 66.60 02/27/2024 01:10 PM    LYMPHOCYTES 21.00 (L) 02/27/2024 01:10 PM    MONOCYTES 8.40 02/27/2024 01:10 PM    EOSINOPHILS 3.00 02/27/2024 01:10 PM    BASOPHILS 0.70 02/27/2024 01:10 PM        Past Medical History:   Diagnosis Date    Arthritis 07/07/2022    hands    Breath shortness 07/07/2022    with exertion    Bronchitis 07/07/2022    chronic broncitis since age 15    CAD (coronary artery disease) 07/07/2022    Cataract 07/07/2022    IOLOU    Chronic bronchitis (HCC)     COVID 07/06/2022    Giant cell arteritis ([facility]) 2019    Giant cell arteritis ([facility])     Gynecological disorder 07/07/2022    pt still having monthly menses, on hormones    Head ache     Heart attack ([facility]) 10/2020    taking metoprolol, stents placed times 2    Heart valve disease 07/07/2022    High cholesterol 07/07/2022    non medicated    History of heart artery stent     x3 stents    Hyperlipidemia     Hypertension     MI (myocardial infarction) ([facility])     x 4    Pain 07/07/2022    constant headaches times 3 years    Pneumonia 07/07/2022    walking pneumonia 1980    Urinary incontinence 07/07/2022    at times     Allergies   Allergen Reactions    Codeine Vomiting and Nausea    Other Drug Unspecified     States that most antibiotics cause yellow stools.    Atorvastatin Unspecified     Caused bruising general body, pt doesn't want to take    Atorvastatin Unspecified     .    Chloramphenicol Diarrhea     PCN is okay to take     "Chloramphenicol Unspecified     .    Codeine Unspecified     .    Other Drug Diarrhea     Most antibiotics turn stool yellow    Sulfa Antibiotics [[facility]ulfa [facility]] Unspecified     Can't remember reaction    Sulfa Drugs Diarrhea     .    Coffee Flavor Vomiting     Patient states coffee, and coffee cakes induce vomiting, \"even the word coffee makes me want to throw up\"     Outpatient Encounter Medications as of 8/23/2024   Medication Sig Dispense Refill    ibuprofen (MOTRIN) 800 MG Tab Take 800 mg by mouth every 8 hours as needed.      Misc Natural Products (ESTROVEN + ENERGY MAX STRENGTH PO) Take  by mouth.      Rhubarb (ESTROVEN MENOPAUSE RELIEF PO) Take  by mouth.      oxyCODONE immediate-release (ROXICODONE) 5 MG Tab       clopidogrel (PLAVIX) 75 MG Tab Take 1 Tablet by mouth every day. 30 Tablet 11    rosuvastatin (CRESTOR) 20 MG Tab Take 1 Tablet by mouth at bedtime. 90 Tablet 3    azelastine (ASTELIN) 137 MCG/SPRAY nasal spray Administer 2 Sprays into affected nostril(S) 1 time a day as needed for Rhinitis.      Dextromethorphan HBr (VICKS DAYQUIL COUGH) 15 MG/15ML Liquid Take  by mouth 1 time a day as needed. 1 each  Indications: Cough      Phenylephrine-Doxylamine-DM (NYQUIL COUGH DM + CONGESTION) 5-6.25-10 MG/15ML Liquid Take 1 Each by mouth every evening as needed (chronic bronchitis).      albuterol 108 (90 Base) MCG/ACT Aero Soln inhalation aerosol Inhale 2 Puffs 3 times a day as needed for Shortness of Breath (chronic bronchitis).      LUTEIN PO Take 20 mg by mouth every day.      [DISCONTINUED] aspirin 81 MG EC tablet Take 81 mg by mouth. 3x a week      gabapentin (NEURONTIN) 100 MG Cap  (Patient not taking: Reported on 8/5/2024)      omeprazole (PRILOSEC) 20 MG delayed-release capsule Take 1 Capsule by mouth every day. Obtain refills from PCP (Patient not taking: Reported on 8/23/2024) 30 Capsule 0    [DISCONTINUED] metoprolol SR (TOPROL XL) 25 MG TABLET SR 24 HR Take 1 Tablet by mouth every day. 90 Tablet " 3     No facility-administered encounter medications on file as of 8/23/2024.     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[employer]      Social Determinants of Health     Financial Resource Strain: Unknown (1/12/2022)    Overall Financial Resource Strain (CARDIA)     Difficulty of Paying Living Expenses: Patient declined   Food Insecurity: No Food Insecurity (1/12/2022)    Hunger Vital Sign     Worried About Running Out of Food in the Last Year: Never true     Ran Out of Food in the Last Year: Never true   Transportation Needs: Unknown (1/12/2022)    PRAPARE - Transportation     Lack of Transportation (Medical): Patient declined     Lack of Transportation (Non-Medical): Patient declined   Physical Activity: Not on file   Stress: Not on file   Social Connections: Not on file   Intimate Partner Violence: Low Risk  (8/29/2023)    [facility]    History of Abuse     History of Abuse: Denies   Housing Stability: Not on file         ROS:   10 point review systems is otherwise negative except as per the HPI    Chief Complaint   Patient presents with    Follow-Up     F/v Dx: Coronary artery disease involving native coronary artery of native heart with angina pectoris (HCC)    Hypertension       Primary hypertension    Dyslipidemia       45 minutes spent during today's  encounter

## 2025-02-06 ENCOUNTER — OFFICE VISIT (OUTPATIENT)
Dept: CARDIOLOGY | Facility: MEDICAL CENTER | Age: 75
End: 2025-02-06
Attending: INTERNAL MEDICINE
Payer: MEDICARE

## 2025-02-06 VITALS
SYSTOLIC BLOOD PRESSURE: 110 MMHG | HEIGHT: 65 IN | HEART RATE: 88 BPM | WEIGHT: 133 LBS | DIASTOLIC BLOOD PRESSURE: 64 MMHG | BODY MASS INDEX: 22.16 KG/M2 | OXYGEN SATURATION: 97 % | RESPIRATION RATE: 14 BRPM

## 2025-02-06 DIAGNOSIS — M31.6 GIANT CELL ARTERITIS (HCC): ICD-10-CM

## 2025-02-06 DIAGNOSIS — I47.10 SVT (SUPRAVENTRICULAR TACHYCARDIA) (HCC): ICD-10-CM

## 2025-02-06 DIAGNOSIS — I49.3 PVC'S (PREMATURE VENTRICULAR CONTRACTIONS): ICD-10-CM

## 2025-02-06 DIAGNOSIS — R09.81 CHRONIC NASAL CONGESTION: ICD-10-CM

## 2025-02-06 DIAGNOSIS — I25.119 CORONARY ARTERY DISEASE INVOLVING NATIVE CORONARY ARTERY OF NATIVE HEART WITH ANGINA PECTORIS (HCC): ICD-10-CM

## 2025-02-06 DIAGNOSIS — M31.6 TEMPORAL GIANT CELL ARTERITIS (HCC): ICD-10-CM

## 2025-02-06 DIAGNOSIS — Z95.1 S/P CABG X 3: ICD-10-CM

## 2025-02-06 DIAGNOSIS — I35.1 MILD AORTIC INSUFFICIENCY: ICD-10-CM

## 2025-02-06 PROCEDURE — 3078F DIAST BP <80 MM HG: CPT | Performed by: INTERNAL MEDICINE

## 2025-02-06 PROCEDURE — G2211 COMPLEX E/M VISIT ADD ON: HCPCS | Performed by: INTERNAL MEDICINE

## 2025-02-06 PROCEDURE — 99214 OFFICE O/P EST MOD 30 MIN: CPT | Performed by: INTERNAL MEDICINE

## 2025-02-06 PROCEDURE — 99213 OFFICE O/P EST LOW 20 MIN: CPT | Performed by: INTERNAL MEDICINE

## 2025-02-06 PROCEDURE — 3074F SYST BP LT 130 MM HG: CPT | Performed by: INTERNAL MEDICINE

## 2025-02-06 RX ORDER — AZELASTINE 1 MG/ML
2 SPRAY, METERED NASAL
Qty: 30 ML | Refills: 3 | Status: SHIPPED | OUTPATIENT
Start: 2025-02-06

## 2025-02-06 RX ORDER — ROSUVASTATIN CALCIUM 20 MG/1
20 TABLET, COATED ORAL
Qty: 100 TABLET | Refills: 3 | Status: SHIPPED | OUTPATIENT
Start: 2025-02-06

## 2025-02-06 RX ORDER — CLOPIDOGREL BISULFATE 75 MG/1
75 TABLET ORAL DAILY
Qty: 100 TABLET | Refills: 3 | Status: SHIPPED | OUTPATIENT
Start: 2025-02-06

## 2025-02-06 ASSESSMENT — FIBROSIS 4 INDEX: FIB4 SCORE: 2.24

## 2025-02-06 NOTE — PROGRESS NOTES
"CARDIOLOGY OUTPATIENT FOLLOWUP    PCP: Khai Deras M.D.    1. Coronary artery disease involving native coronary artery of native heart with angina pectoris (HCC)    2. S/P CABG x 3    3. Chronic nasal congestion    4. Temporal giant cell arteritis (HCC)    5. Giant cell arteritis (HCC)    6. Mild aortic insufficiency        Carine Christopher has chronic cough which I do not suspect is related to cardiac pathology.  She is stable from a cardiovascular standpoint.  She will continue the present medications.  I advised having the cholesterol and other labs checked.    Follow up: 1 year    History: Carine Christopher is a 74 y.o. female with history of giant cell arteritis, recurrent in-stent restenosis in the circumflex now status post three-vessel bypass February 2024 with normal LVEF presenting for follow-up.      She feels well aside from intermittent cough which has been present since the fall.      Physical Exam:  /64 (BP Location: Left arm, Patient Position: Sitting, BP Cuff Size: Adult)   Pulse 88   Resp 14   Ht 1.651 m (5' 5\")   Wt 60.3 kg (133 lb)   LMP 06/09/2021 (Exact Date)   SpO2 97%   BMI 22.13 kg/m²   GEN: NAD  RESP: CTAB  CVS: RRR, No M/R/G  ABD: Soft, NT/ND  EXT: WWP, no edema    () Today's E/M visit is associated with medical care services that serve as the continuing focal point for all needed health care services and/or with medical care services that  are part of ongoing care related to a patient's single, serious condition, or a complex condition: This includes  furnishing services to patients on an ongoing basis that result in care that is personalized  to the patient. The services result in a comprehensive, longitudinal, and continuous  relationship with the patient and involve delivery of team-based care that is accessible, coordinated with other practitioners and providers, and integrated with the broader health  care landscape.     The " ASCVD Risk score (Eduardo ENRIQUEZ, et al., 2019) failed to calculate.    Studies  Lab Results   Component Value Date/Time    CHOLSTRLTOT 134 02/26/2024 03:35 AM    LDL 50 02/26/2024 03:35 AM    HDL 72 02/26/2024 03:35 AM    TRIGLYCERIDE 58 02/26/2024 03:35 AM       Lab Results   Component Value Date/Time    SODIUM 136 03/04/2024 02:30 AM    POTASSIUM 4.3 03/04/2024 02:30 AM    CHLORIDE 96 03/04/2024 02:30 AM    CO2 31 03/04/2024 02:30 AM    GLUCOSE 113 (H) 03/04/2024 02:30 AM    BUN 18 03/04/2024 02:30 AM    CREATININE 0.61 03/04/2024 02:30 AM    GLOMRATE 108 08/26/2023 02:28 AM      Lab Results   Component Value Date/Time    PROTHROMBTM 18.1 (H) 02/28/2024 01:35 PM    INR 1.48 (H) 02/28/2024 01:35 PM      Lab Results   Component Value Date/Time    WBC 10.2 03/04/2024 02:30 AM    RBC 3.38 (L) 03/04/2024 02:30 AM    HEMOGLOBIN 9.6 (L) 03/04/2024 02:30 AM    HEMATOCRIT 29.1 (L) 03/04/2024 02:30 AM    MCV 86.1 03/04/2024 02:30 AM    MCH 28.4 03/04/2024 02:30 AM    MCHC 33.0 03/04/2024 02:30 AM    MPV 10.3 03/04/2024 02:30 AM    NEUTSPOLYS 66.60 02/27/2024 01:10 PM    LYMPHOCYTES 21.00 (L) 02/27/2024 01:10 PM    MONOCYTES 8.40 02/27/2024 01:10 PM    EOSINOPHILS 3.00 02/27/2024 01:10 PM    BASOPHILS 0.70 02/27/2024 01:10 PM        Past Medical History:   Diagnosis Date    Arthritis 07/07/2022    hands    Breath shortness 07/07/2022    with exertion    Bronchitis 07/07/2022    chronic broncitis since age 15    CAD (coronary artery disease) 07/07/2022    Cataract 07/07/2022    IOLOU    Chronic bronchitis (HCC)     COVID 07/06/2022    Giant cell arteritis (HCC) 2019    Giant cell arteritis (HCC)     Gynecological disorder 07/07/2022    pt still having monthly menses, on hormones    Head ache     Heart attack (HCC) 10/2020    taking metoprolol, stents placed times 2    Heart valve disease 07/07/2022    High cholesterol 07/07/2022    non medicated    History of heart artery stent     x3 stents    Hyperlipidemia     Hypertension      "MI (myocardial infarction) ([facility])     x 4    Pain 07/07/2022    constant headaches times 3 years    Pneumonia 07/07/2022    walking pneumonia 1980    Urinary incontinence 07/07/2022    at times     Allergies   Allergen Reactions    Codeine Vomiting and Nausea    Other Drug Unspecified     States that most antibiotics cause yellow stools.    Atorvastatin Unspecified     Caused bruising general body, pt doesn't want to take    Atorvastatin Unspecified     .    Chloramphenicol Diarrhea     PCN is okay to take    Chloramphenicol Unspecified     .    Codeine Unspecified     .    Other Drug Diarrhea     Most antibiotics turn stool yellow    Sulfa Antibiotics [Sulfa Drugs] Unspecified     Can't remember reaction    Sulfa Drugs Diarrhea     .    Aspirin Unspecified    Coffee Flavor Vomiting     Patient states coffee, and coffee cakes induce vomiting, \"even the word coffee makes me want to throw up\"     Outpatient Encounter Medications as of 2/6/2025   Medication Sig Dispense Refill    Multiple Vitamins-Minerals (PRESERVISION AREDS PO) Take  by mouth.      azelastine (ASTELIN) 137 MCG/SPRAY nasal spray Administer 2 Sprays into affected nostril(S) 1 time a day as needed for Rhinitis. 30 mL 3    clopidogrel (PLAVIX) 75 MG Tab Take 1 Tablet by mouth every day. 100 Tablet 3    rosuvastatin (CRESTOR) 20 MG Tab Take 1 Tablet by mouth at bedtime. 100 Tablet 3    ibuprofen (MOTRIN) 800 MG Tab Take 800 mg by mouth every 8 hours as needed.      Rhubarb (ESTROVEN MENOPAUSE RELIEF PO) Take  by mouth.      oxyCODONE immediate-release (ROXICODONE) 5 MG Tab       Dextromethorphan HBr (VICKS DAYQUIL COUGH) 15 MG/15ML Liquid Take  by mouth 1 time a day as needed. 1 each  Indications: Cough      Phenylephrine-Doxylamine-DM (NYQUIL COUGH DM + CONGESTION) 5-6.25-10 MG/15ML Liquid Take 1 Each by mouth every evening as needed (chronic bronchitis).      albuterol 108 (90 Base) MCG/ACT Aero Soln inhalation aerosol Inhale 2 Puffs 3 times a day as " needed for Shortness of Breath (chronic bronchitis).      LUTEIN PO Take 20 mg by mouth every day.      [DISCONTINUED] Misc Natural Products (ESTROVEN + ENERGY MAX STRENGTH PO) Take  by mouth. (Patient not taking: Reported on 2/6/2025)      [DISCONTINUED] gabapentin (NEURONTIN) 100 MG Cap  (Patient not taking: Reported on 2/6/2025)      [DISCONTINUED] omeprazole (PRILOSEC) 20 MG delayed-release capsule Take 1 Capsule by mouth every day. Obtain refills from PCP (Patient not taking: Reported on 2/6/2025) 30 Capsule 0    [DISCONTINUED] clopidogrel (PLAVIX) 75 MG Tab Take 1 Tablet by mouth every day. 30 Tablet 11    [DISCONTINUED] rosuvastatin (CRESTOR) 20 MG Tab Take 1 Tablet by mouth at bedtime. 90 Tablet 3    [DISCONTINUED] azelastine (ASTELIN) 137 MCG/SPRAY nasal spray Administer 2 Sprays into affected nostril(S) 1 time a day as needed for Rhinitis. (Patient not taking: Reported on 2/6/2025)       No facility-administered encounter medications on file as of 2/6/2025.     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[employer]      Social Drivers of Health     Financial Resource Strain: Unknown (1/12/2022)    Overall Financial Resource Strain (CARDIA)     Difficulty of Paying Living Expenses: Patient declined   Food Insecurity: No Food Insecurity (1/12/2022)    Hunger Vital Sign     Worried About Running Out of Food in the Last Year: Never true      Ran Out of Food in the Last Year: Never true   Transportation Needs: Unknown (1/12/2022)    PRAPARE - Transportation     Lack of Transportation (Medical): Patient declined     Lack of Transportation (Non-Medical): Patient declined   Physical Activity: Not on file   Stress: Not on file   Social Connections: Not on file   Intimate Partner Violence: Low Risk  (8/29/2023)    [facility]    History of Abuse     History of Abuse: Denies   Housing Stability: Not on file         ROS:   10 point review systems is otherwise negative except as per the HPI    Chief Complaint   Patient presents with    Follow-Up     S/P CABG x 3    Coronary Artery Disease     Coronary artery disease involving native coronary artery of native heart with angina pectoris (HCC)    Hypertension

## 2025-03-28 ENCOUNTER — HOSPITAL ENCOUNTER (OUTPATIENT)
Dept: LAB | Facility: MEDICAL CENTER | Age: 75
End: 2025-03-28
Attending: INTERNAL MEDICINE
Payer: MEDICARE

## 2025-03-28 DIAGNOSIS — I49.3 PVC'S (PREMATURE VENTRICULAR CONTRACTIONS): ICD-10-CM

## 2025-03-28 DIAGNOSIS — I47.10 SVT (SUPRAVENTRICULAR TACHYCARDIA) (HCC): ICD-10-CM

## 2025-03-28 DIAGNOSIS — M31.6 GIANT CELL ARTERITIS (HCC): ICD-10-CM

## 2025-03-28 DIAGNOSIS — I25.119 CORONARY ARTERY DISEASE INVOLVING NATIVE CORONARY ARTERY OF NATIVE HEART WITH ANGINA PECTORIS (HCC): ICD-10-CM

## 2025-03-28 DIAGNOSIS — M31.6 TEMPORAL GIANT CELL ARTERITIS (HCC): ICD-10-CM

## 2025-03-28 LAB
ERYTHROCYTE [DISTWIDTH] IN BLOOD BY AUTOMATED COUNT: 47.3 FL (ref 35.9–50)
ERYTHROCYTE [SEDIMENTATION RATE] IN BLOOD BY WESTERGREN METHOD: 6 MM/HOUR (ref 0–25)
HCT VFR BLD AUTO: 42.3 % (ref 37–47)
HGB BLD-MCNC: 13.4 G/DL (ref 12–16)
MCH RBC QN AUTO: 27.6 PG (ref 27–33)
MCHC RBC AUTO-ENTMCNC: 31.7 G/DL (ref 32.2–35.5)
MCV RBC AUTO: 87 FL (ref 81.4–97.8)
PLATELET # BLD AUTO: 211 K/UL (ref 164–446)
PMV BLD AUTO: 11 FL (ref 9–12.9)
RBC # BLD AUTO: 4.86 M/UL (ref 4.2–5.4)
WBC # BLD AUTO: 8.8 K/UL (ref 4.8–10.8)

## 2025-03-28 PROCEDURE — 80053 COMPREHEN METABOLIC PANEL: CPT

## 2025-03-28 PROCEDURE — 84443 ASSAY THYROID STIM HORMONE: CPT

## 2025-03-28 PROCEDURE — 36415 COLL VENOUS BLD VENIPUNCTURE: CPT

## 2025-03-28 PROCEDURE — 80061 LIPID PANEL: CPT

## 2025-03-28 PROCEDURE — 85027 COMPLETE CBC AUTOMATED: CPT

## 2025-03-28 PROCEDURE — 85652 RBC SED RATE AUTOMATED: CPT

## 2025-03-29 LAB
ALBUMIN SERPL BCP-MCNC: 4.2 G/DL (ref 3.2–4.9)
ALBUMIN/GLOB SERPL: 1.4 G/DL
ALP SERPL-CCNC: 102 U/L (ref 30–99)
ALT SERPL-CCNC: 11 U/L (ref 2–50)
ANION GAP SERPL CALC-SCNC: 12 MMOL/L (ref 7–16)
AST SERPL-CCNC: 21 U/L (ref 12–45)
BILIRUB SERPL-MCNC: 0.3 MG/DL (ref 0.1–1.5)
BUN SERPL-MCNC: 19 MG/DL (ref 8–22)
CALCIUM ALBUM COR SERPL-MCNC: 9.5 MG/DL (ref 8.5–10.5)
CALCIUM SERPL-MCNC: 9.7 MG/DL (ref 8.5–10.5)
CHLORIDE SERPL-SCNC: 107 MMOL/L (ref 96–112)
CHOLEST SERPL-MCNC: 141 MG/DL (ref 100–199)
CO2 SERPL-SCNC: 24 MMOL/L (ref 20–33)
CREAT SERPL-MCNC: 0.82 MG/DL (ref 0.5–1.4)
GFR SERPLBLD CREATININE-BSD FMLA CKD-EPI: 74 ML/MIN/1.73 M 2
GLOBULIN SER CALC-MCNC: 3 G/DL (ref 1.9–3.5)
GLUCOSE SERPL-MCNC: 82 MG/DL (ref 65–99)
HDLC SERPL-MCNC: 63 MG/DL
LDLC SERPL CALC-MCNC: 63 MG/DL
POTASSIUM SERPL-SCNC: 4.3 MMOL/L (ref 3.6–5.5)
PROT SERPL-MCNC: 7.2 G/DL (ref 6–8.2)
SODIUM SERPL-SCNC: 143 MMOL/L (ref 135–145)
TRIGL SERPL-MCNC: 74 MG/DL (ref 0–149)
TSH SERPL-ACNC: 2.74 UIU/ML (ref 0.35–5.5)

## 2025-03-30 ENCOUNTER — RESULTS FOLLOW-UP (OUTPATIENT)
Dept: CARDIOLOGY | Facility: MEDICAL CENTER | Age: 75
End: 2025-03-30

## 2025-04-30 ENCOUNTER — OFFICE VISIT (OUTPATIENT)
Dept: OPHTHALMOLOGY | Facility: MEDICAL CENTER | Age: 75
End: 2025-04-30
Payer: MEDICARE

## 2025-04-30 ENCOUNTER — HOSPITAL ENCOUNTER (OUTPATIENT)
Dept: LAB | Facility: MEDICAL CENTER | Age: 75
End: 2025-04-30
Attending: OPHTHALMOLOGY
Payer: MEDICARE

## 2025-04-30 DIAGNOSIS — M31.6 TEMPORAL GIANT CELL ARTERITIS (HCC): ICD-10-CM

## 2025-04-30 DIAGNOSIS — H35.371 EPIRETINAL MEMBRANE (ERM) OF RIGHT EYE: ICD-10-CM

## 2025-04-30 DIAGNOSIS — H52.31 ANISOMETROPIA: ICD-10-CM

## 2025-04-30 DIAGNOSIS — H49.9 OPHTHALMOPLEGIA: ICD-10-CM

## 2025-04-30 LAB
CRP SERPL HS-MCNC: <0.3 MG/DL (ref 0–0.75)
ERYTHROCYTE [SEDIMENTATION RATE] IN BLOOD BY WESTERGREN METHOD: 10 MM/HOUR (ref 0–25)

## 2025-04-30 PROCEDURE — 85652 RBC SED RATE AUTOMATED: CPT

## 2025-04-30 PROCEDURE — 36415 COLL VENOUS BLD VENIPUNCTURE: CPT

## 2025-04-30 PROCEDURE — 86140 C-REACTIVE PROTEIN: CPT

## 2025-04-30 ASSESSMENT — REFRACTION_MANIFEST
OD_CYLINDER: +1.25
OS_SPHERE: -1.25
OS_CYLINDER: +1.00
OD_AXIS: 002
METHOD_AUTOREFRACTION: 1
OD_SPHERE: +0.50
OS_AXIS: 158

## 2025-04-30 ASSESSMENT — TONOMETRY
OD_IOP_MMHG: 13
OS_IOP_MMHG: 13

## 2025-04-30 ASSESSMENT — CUP TO DISC RATIO
OS_RATIO: 0.1
OD_RATIO: 0.1

## 2025-04-30 ASSESSMENT — VISUAL ACUITY
METHOD: SNELLEN - LINEAR
OD_SC: 20/20
OS_SC: J2
OS_SC: 20/20
OD_SC: J7

## 2025-04-30 ASSESSMENT — EXTERNAL EXAM - LEFT EYE: OS_EXAM: NORMAL

## 2025-04-30 ASSESSMENT — SLIT LAMP EXAM - LIDS
COMMENTS: NORMAL
COMMENTS: NORMAL

## 2025-04-30 ASSESSMENT — ENCOUNTER SYMPTOMS: HEADACHES: 1

## 2025-04-30 ASSESSMENT — EXTERNAL EXAM - RIGHT EYE: OD_EXAM: NORMAL

## 2025-04-30 NOTE — ASSESSMENT & PLAN NOTE
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 Causing some symptomatic aniseikonbia. Will therefore refer to Dr Navjot Feliz for consideration of IOL exchange  8/25/2021 - Will give trial of glasses rx to help correct some of the aniseikonia to see if tolerates. Discussed that even with IOL exchange has the ERM OD that would affect fusion  7/27/2022 - Continue current rx with prism. Re-gave rx  10/11/2023-continue Rx with prisms.  Getting full frame distance because somewhat bothered by progressive  8/5/2024-cannot tolerate glasses to correct the anisometropia with prisms.  Probably because of the anisocoria.  However primarily uses the right eye for distance.  Discussed that could get full frame readers +3.00.  Over-the-counter  4/30/2025-primarily going without glasses Rx

## 2025-04-30 NOTE — ASSESSMENT & PLAN NOTE
2/3/2021 - Epiretinal membrane seen on 5-line OCT. Discussed that since vision still 20/20 would hold on referral unless becomes more symptomatic  12/1/2021 - OCT 5-line no significant change and vision still good  7/27/2022 - ERM stable on 5-line  1/4/2023 -OCT shows improvement of ERM OD.  Vision stable.  4/5/2023-stable epiretinal membrane.  Not visually significant at this time  10/11/2023-stable epiretinal membrane  8/5/2024-stable epiretinal membrane right eye.  Some undulation parafoveal left eye  4/30/2025-no progression

## 2025-04-30 NOTE — ASSESSMENT & PLAN NOTE
2/3/2021 - Presumed giant cell arteritis by history, symptoms of temporal pain, and response to prednisone. Never had temporal artery biopsy. ESR was 1 back in January, but had been on prednosne at least 10 mg for a few months prior. Dr Banks increased prednisone to 60 and currently on 40 mg / day.  From the neuro-ophthalmic standpoint no evidence of optic nerve invovement with OCT NFL thickness normal at 107 OD and 110 OS. No optic disc pallor and no swelling. In Addition VICKIE was 1:320. Therefore discussed with patient that I recommend being managed by Rheumatology. Would consider Actemra and thus will refer to Dr Michaud in [place]. VICKIE will need to be characterized to determine is has concomitant other autoimmune disorder.   8/25/2021 - Didn't see Dr Michaud. Sept 13th has a telemedicine apt with [place]. On prednisone 10 mg. Initial symptoms headache and lump on the side of the head. Dx made 10/2020. Long discussion regarding GCA, and monitoring symptoms, ESR and CRP on pred taper. Recommended she have discussion via telemed with Rheum about Actemra. Ordered ESR, CRP and TFT's. No evidence of GCA induced optic neuropathy  8/26/2021 - ESR 4.0, TSH 1.16, CRP 0.34  12/1/2021 - Apparently had relapse of headaches in September and went back to 20 mg prednisone. Then at taper again down to 10 mg last week headache returned and increased to 12.5 mg. She discussed with Rheumatologist in [place] where advised TA biopsy to secure the diagnosis. Would need to find provider here in [place] to rx Actemra. OCT NFL thickness stable at 107 OD and 114 OS and nerves healthy, so no evidence of optic neuropathy. However will recheck ESR and CRP. Also refer to Dr Pelaez for temporal artery biopsy  12/3/2021 - Repeat ESR 4.0, CRP 0.6 slightly increased, but still within normal.  7/27/2022 - Recent MI with subsequent COVID. Self discontinued prednisone, but was on 3 mg. Will repeat ESR and CRP. Had biopsy scheduled august 10  th. Still no evidence of an optic neuropathy or motility disorder secondary to GCA. OCT NFL thickness stable at 106 OD and 106 OS  Addendum CRP 1.73 slightly above normal range  8/24/2022 - Report of Temporal Artery Biopsy by Dr Fields at [facility]. Shows evidence of treated temporal arteritis. Will send report to her immunologist Dr Brandi Hester at [place]  1/4/2023 -has now been off prednisone since the biopsy in July.  However states that her temporal headaches have started to come back as well as some jaw discomfort on the right.  She Was History of Temporal and Facial Headaches That Improved in the past with Botox but Her Current Return of Headaches Is More Similar to the Ones That Caused a Lump in the Region of Her Temporal Artery.  She Also Has Some Other Cranial Lumps and a CT Scan Done with Contrast by Her Primary Was Nonrevealing.  Is Therefore concerning That Her Headaches Have Worsened.  I am therefore repeating her sed rate CRP, and also sending her as a consultation to rheumatology here at [facility].  I discussed that if her CRP and sedimentation rate are starting to increase that this would be consistent with a return of giant cell arteritis.  At this point there is no neuro ophthalmologic manifestations, but I did stress to her that if she were to have any change in her vision to call immediately or present to the emergency room  4/5/2023-saw Dr. Hager who recommended beginning on methotrexate.  However she no longer wants to take any medications.  She has had no recurrence of her temporal arteritis symptoms.  Her last CRP was normal at 0.75 and sed rate 6.0.  I discussed that if following her off medication she should be aware of the recurrence of any subtle signs of her temporal arteritis.  If these occur she should start prednisone immediately and if she has any vision changes present to the emergency room.  Her optic nerve heads appear stable without any edema and 105  OS OCT NFL  thickness.  This can repeat her laboratories again at the end of this month  10/11/2023-overall stable.  No development of an optic neuropathy.  Currently taking prasugrel and nonsteroidals.  Again discussed that if any change in vision to present emergently for high-dose steroids and then consideration of methotrexate  8/5/2024 -has not been on prednisone and did not begin methotrexate.  Essentially asymptomatic.  Will repeat CRP and ESR  8/6/2024-ESR 14, CRP <0.30.  Thus CRP stable ESR slightly elevated over her normal.  Will repeat again and 8 weeks  4/30/2025-emergent visit today.  States has been noting some nodules over the region of the greater occipital protuberance on the right as well as the forehead in the region of the prior temporal artery biopsy.  She states that these became prominent during the time of her initial diagnosis of temporal arteritis.  She decided to reinstate taking 600 to 800 mg of ibuprofen per day.  Currently the nodules are no longer present.  I am therefore uncertain as to the etiology of these nodules.  They seem to be responding to nonsteroidal anti-inflammatories.  She had a recent sed rate done 3/28/2025 by her cardiologist that was 6.0.  I repeated her CRP today that came back < 0.30.  A follow-up ESR is pending.  Therefore do not find any specific ocular inflammatory component.  Is uncertain as to whether this is truly recurrent giant cell.  I therefore recommend that she take her Motrin 600 mg more consistently 1/day for the next 5 to 7 days.  If these nodules develop despite the consistent nonsteroidals then next step would be to consider low-dose prednisone and then referred back to Dr. Hager for probable chronic methotrexate.  She related that she read the side effects of methotrexate in the past and did not wish to take.  However I discussed the long-term side effects of methotrexate are far less than long-term prednisone or long-term Motrin.  Especially since she is on  Plavix

## 2025-04-30 NOTE — PROGRESS NOTES
Peds/Neuro Ophthalmology:   Jon Wilde M.D.    Date & Time note created:    4/30/2025   3:36 PM     Referring MD / APRN:  Khai Deras M.D., No att. providers found    Patient ID:  Name:             Carine Christopher   YOB: 1950  Age:                 74 y.o.  female   MRN:               [id number]    Chief Complaint/Reason for Visit:     Other (ophthalmoplegia)      History of Present Illness:    Carine Christopher is a 74 y.o. female   Follow up GCA with bumps on right side of head x 2 weeks.Worse headaches.    Other  Associated symptoms include headaches.       Review of Systems:  Review of Systems   Neurological:  Positive for headaches.   All other systems reviewed and are negative.      Past Medical History:   Past Medical History:   Diagnosis Date    Arthritis 07/07/2022    hands    Breath shortness 07/07/2022    with exertion    Bronchitis 07/07/2022    chronic broncitis since age 15    CAD (coronary artery disease) 07/07/2022    Cataract 07/07/2022    IOLOU    Chronic bronchitis (HCC)     COVID 07/06/2022    Giant cell arteritis (HCC) 2019    Giant cell arteritis (HCC)     Gynecological disorder 07/07/2022    pt still having monthly menses, on hormones    Head ache     Heart attack (HCC) 10/2020    taking metoprolol, stents placed times 2    Heart valve disease 07/07/2022    High cholesterol 07/07/2022    non medicated    History of heart artery stent     x3 stents    Hyperlipidemia     Hypertension     MI (myocardial infarction) (HCC)     x 4    Pain 07/07/2022    constant headaches times 3 years    Pneumonia 07/07/2022    walking pneumonia 1980    Urinary incontinence 07/07/2022    at times       Past Surgical History:  Past Surgical History:   Procedure Laterality Date    MULTIPLE CORONARY ARTERY BYPASS ENDO VEIN HARVEST  2/28/2024    Procedure: CABG, WITH ENDOSCOPIC VEIN PROCUREMENT - X3;  Surgeon: Fe Chicas M.D.;  Location: SURGERY  [facility];  Service: Cardiothoracic    ECHOCARDIOGRAM, TRANSESOPHAGEAL, INTRAOPERATIVE  2/28/2024    Procedure: ECHOCARDIOGRAM, TRANSESOPHAGEAL, INTRAOPERATIVE;  Surgeon: Fe Chicas M.D.;  Location: SURGERY [facility];  Service: Cardiothoracic    WI TEMPORAL ARTERY LIGATN OR BX Right 8/10/2022    Procedure: RIGHT TEMPORAL ARTERY BIOPSY;  Surgeon: Tone Fields M.D.;  Location: SURGERY [facility];  Service: Vascular    OTHER CARDIAC SURGERY  2021    stents placed    CATARACT EXTRACTION WITH IOL Bilateral 2019    ABDOMINAL EXPLORATION      BREAST IMPLANT REVISION      BREAST IMPLANT REVISION Bilateral     implants       Current Outpatient Medications:  Current Outpatient Medications   Medication Sig Dispense Refill    Multiple Vitamins-Minerals (PRESERVISION AREDS PO) Take  by mouth.      azelastine (ASTELIN) 137 MCG/SPRAY nasal spray Administer 2 Sprays into affected nostril(S) 1 time a day as needed for Rhinitis. 30 mL 3    clopidogrel (PLAVIX) 75 MG Tab Take 1 Tablet by mouth every day. 100 Tablet 3    rosuvastatin (CRESTOR) 20 MG Tab Take 1 Tablet by mouth at bedtime. 100 Tablet 3    ibuprofen (MOTRIN) 800 MG Tab Take 800 mg by mouth every 8 hours as needed.      Rhubarb (ESTROVEN MENOPAUSE RELIEF PO) Take  by mouth.      oxyCODONE immediate-release (ROXICODONE) 5 MG Tab       Dextromethorphan HBr (VICKS DAYQUIL COUGH) 15 MG/15ML Liquid Take  by mouth 1 time a day as needed. 1 each  Indications: Cough      Phenylephrine-Doxylamine-DM (NYQUIL COUGH DM + CONGESTION) 5-6.25-10 MG/15ML Liquid Take 1 Each by mouth every evening as needed (chronic bronchitis).      albuterol 108 (90 Base) MCG/ACT Aero Soln inhalation aerosol Inhale 2 Puffs 3 times a day as needed for Shortness of Breath (chronic bronchitis).      LUTEIN PO Take 20 mg by mouth every day.       No current facility-administered medications for this visit.       Allergies:  Allergies   Allergen Reactions    Codeine Vomiting and Nausea     "Other Drug Unspecified     States that most antibiotics cause yellow stools.    Atorvastatin Unspecified     Caused bruising general body, pt doesn't want to take    Atorvastatin Unspecified     .    Chloramphenicol Diarrhea     PCN is okay to take    Chloramphenicol Unspecified     .    Codeine Unspecified     .    Other Drug Diarrhea     Most antibiotics turn stool yellow    Sulfa Antibiotics [Sulfa Drugs] Unspecified     Can't remember reaction    Sulfa Drugs Diarrhea     .    Aspirin Unspecified    Coffee Flavor Vomiting     Patient states coffee, and coffee cakes induce vomiting, \"even the word coffee makes me want to throw up\"       Family History:  Family History   Problem Relation Age of Onset    Glasses Father     Heart Disease Father     Hypertension Mother     Kidney Disease Neg Hx        Social History:  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      Social Drivers of Health     Financial Resource Strain: Unknown (1/12/2022)    Overall Financial Resource Strain (CARDIA)     Difficulty of Paying Living Expenses: Patient declined   Food Insecurity: No Food Insecurity (1/12/2022)    Hunger Vital Sign     Worried About Running Out of Food in the Last Year: Never true     Ran Out of Food in the Last Year: Never true   Transportation Needs: Unknown (1/12/2022)    PRAPARE - Transportation     Lack of Transportation (Medical): " Patient declined     Lack of Transportation (Non-Medical): Patient declined   Physical Activity: Not on file   Stress: Not on file   Social Connections: Not on file   Intimate Partner Violence: Low Risk  (8/29/2023)    [facility]    History of Abuse     History of Abuse: Denies   Housing Stability: Not on file          Physical Exam:  Physical Exam    Oriented x 3  Weight/BMI: There is no height or weight on file to calculate BMI.  There were no vitals taken for this visit.    Base Eye Exam       Visual Acuity (Snellen - Linear)         Right Left    Dist sc 20/20 20/20    Near sc J7 J2              Tonometry ( care, 1:16 PM)         Right Left    Pressure 13 13              Pupils         Pupils    Right PERRL    Left PERRL              Neuro/Psych       Oriented x3: Yes    Mood/Affect: Normal                  Additional Tests       Stereo       Fly: +    Animals: 3/3    Circles: 6/9                  Slit Lamp and Fundus Exam       External Exam         Right Left    External Normal Normal              Slit Lamp Exam         Right Left    Lids/Lashes Normal Normal    Conjunctiva/Sclera White and quiet White and quiet    Cornea Clear Clear    Anterior Chamber Deep and quiet Deep and quiet    Iris Round and reactive Round and reactive    Lens Posterior chamber intraocular lens Posterior chamber intraocular lens    Vitreous Normal Normal              Fundus Exam         Right Left    Disc Normal Normal    C/D Ratio 0.1 0.1    Macula Epiretinal membrane Membrane, Epiretinal membrane    Vessels Normal Normal    Periphery Normal Normal                  Refraction       Manifest Refraction (Auto)         Sphere Cylinder Axis    Right +0.50 +1.25 002    Left -1.25 +1.00 158                    Pertinent Lab/Test/Imaging Review:      Assessment and Plan:     Anisometropia  2/3/2021 - 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 Causing some symptomatic aniseikonbia. Will therefore refer to Dr Navjot Feliz for consideration of IOL exchange  8/25/2021 - Will give trial of glasses rx to help correct some of the aniseikonia to see if tolerates. Discussed that even with IOL exchange has the ERM OD that would affect fusion  7/27/2022 - Continue current rx with prism. Re-gave rx  10/11/2023-continue Rx with prisms.  Getting full frame distance because somewhat bothered by progressive  8/5/2024-cannot tolerate glasses to correct the anisometropia with prisms.  Probably because of the anisocoria.  However primarily uses the right eye for distance.  Discussed that could get full frame readers +3.00.  Over-the-counter  4/30/2025-primarily going without glasses Rx    Epiretinal membrane (ERM) of right eye  2/3/2021 - Epiretinal membrane seen on 5-line OCT. Discussed that since vision still 20/20 would hold on referral unless becomes more symptomatic  12/1/2021 - OCT 5-line no significant change and vision still good  7/27/2022 - ERM stable on 5-line  1/4/2023 -OCT shows improvement of ERM OD.  Vision stable.  4/5/2023-stable epiretinal membrane.  Not visually significant at this time  10/11/2023-stable epiretinal membrane  8/5/2024-stable epiretinal membrane right eye.  Some undulation parafoveal left eye  4/30/2025-no progression    Ophthalmoplegia  8/25/2021 - small RHypo and XT. 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 However difficulty fusion secondary to the aniseikonia form the monovision as well as the ERM OD. In Pat did better with a 2 base up OD and 2 base in OS. However still having some asthenopic findings secondary to the ERM. Discussed trial of RX with prisms and bifocal and correcting for the monovision.   12/1/2021 - ortho with new glasses, but not wearing because of nose bleed. No progression of the misalignment  7/27/2022 - continue  current rx with prism  1/4/2023 -overall motility appears stable.  However she does not like to wear prism glasses because of the same irritating her temple on the right.  4/5/2023 -stable small right hypo and exotropia.  Has monovision so not that bothered by the motility disorder and double vision.  10/11/2023-overall stable.  Doing well with prism glasses  8/5/2024-not really tolerating prism glasses following cataract extraction.  Discussed could continue going without  For 3/20/2025-essentially asymptomatic without Rx    Temporal giant cell arteritis (HCC)  2/3/2021 - Presumed giant cell arteritis by history, symptoms of temporal pain, and response to prednisone. Never had temporal artery biopsy. ESR was 1 back in January, but had been on prednosne at least 10 mg for a few months prior. Dr Banks increased prednisone to 60 and currently on 40 mg / day.  From the neuro-ophthalmic standpoint no evidence of optic nerve invovement with OCT NFL thickness normal at 107 OD and 110 OS. No optic disc pallor and no swelling. In Addition VICKIE was 1:320. Therefore discussed with patient that I recommend being managed by Rheumatology. Would consider Actemra and thus will refer to Dr Michaud in [place]. VICKIE will need to be characterized to determine is has concomitant other autoimmune disorder.   8/25/2021 - Didn't see Dr Michaud. Sept 13th has a telemedicine apt with [facility]. On prednisone 10 mg. Initial symptoms headache and lump on the side of the head. Dx made 10/2020. Long discussion regarding GCA, and monitoring symptoms, ESR and CRP on pred taper. Recommended she have discussion via telemed with Rheum about Actemra. Ordered ESR, CRP and TFT's. No evidence of GCA induced optic neuropathy  8/26/2021 - ESR 4.0, TSH 1.16, CRP 0.34  12/1/2021 - Apparently had relapse of headaches in September and went back to 20 mg prednisone. Then at taper again down to 10 mg last week headache returned and increased to 12.5 mg. She  discussed with Rheumatologist in [place] where advised TA biopsy to secure the diagnosis. Would need to find provider here in [place] to rx Actemra. OCT NFL thickness stable at 107 OD and 114 OS and nerves healthy, so no evidence of optic neuropathy. However will recheck ESR and CRP. Also refer to Dr Pelaez for temporal artery biopsy  12/3/2021 - Repeat ESR 4.0, CRP 0.6 slightly increased, but still within normal.  7/27/2022 - Recent MI with subsequent COVID. Self discontinued prednisone, but was on 3 mg. Will repeat ESR and CRP. Had biopsy scheduled august 10 th. Still no evidence of an optic neuropathy or motility disorder secondary to GCA. OCT NFL thickness stable at 106 OD and 106 OS  Addendum CRP 1.73 slightly above normal range  8/24/2022 - Report of Temporal Artery Biopsy by Dr Fields at [facility]. Shows evidence of treated temporal arteritis. Will send report to her immunologist Dr Brandi Hester at [place]  1/4/2023 -has now been off prednisone since the biopsy in July.  However states that her temporal headaches have started to come back as well as some jaw discomfort on the right.  She Was History of Temporal and Facial Headaches That Improved in the past with Botox but Her Current Return of Headaches Is More Similar to the Ones That Caused a Lump in the Region of Her Temporal Artery.  She Also Has Some Other Cranial Lumps and a CT Scan Done with Contrast by Her Primary Was Nonrevealing.  Is Therefore concerning That Her Headaches Have Worsened.  I am therefore repeating her sed rate CRP, and also sending her as a consultation to rheumatology here at [facility].  I discussed that if her CRP and sedimentation rate are starting to increase that this would be consistent with a return of giant cell arteritis.  At this point there is no neuro ophthalmologic manifestations, but I did stress to her that if she were to have any change in her vision to call immediately or present to the emergency  room  4/5/2023-saw Dr. Hager who recommended beginning on methotrexate.  However she no longer wants to take any medications.  She has had no recurrence of her temporal arteritis symptoms.  Her last CRP was normal at 0.75 and sed rate 6.0.  I discussed that if following her off medication she should be aware of the recurrence of any subtle signs of her temporal arteritis.  If these occur she should start prednisone immediately and if she has any vision changes present to the emergency room.  Her optic nerve heads appear stable without any edema and 105  OS OCT NFL thickness.  This can repeat her laboratories again at the end of this month  10/11/2023-overall stable.  No development of an optic neuropathy.  Currently taking prasugrel and nonsteroidals.  Again discussed that if any change in vision to present emergently for high-dose steroids and then consideration of methotrexate  8/5/2024 -has not been on prednisone and did not begin methotrexate.  Essentially asymptomatic.  Will repeat CRP and ESR  8/6/2024-ESR 14, CRP <0.30.  Thus CRP stable ESR slightly elevated over her normal.  Will repeat again and 8 weeks  4/30/2025-emergent visit today.  States has been noting some nodules over the region of the greater occipital protuberance on the right as well as the forehead in the region of the prior temporal artery biopsy.  She states that these became prominent during the time of her initial diagnosis of temporal arteritis.  She decided to reinstate taking 600 to 800 mg of ibuprofen per day.  Currently the nodules are no longer present.  I am therefore uncertain as to the etiology of these nodules.  They seem to be responding to nonsteroidal anti-inflammatories.  She had a recent sed rate done 3/28/2025 by her cardiologist that was 6.0.  I repeated her CRP today that came back < 0.30.  A follow-up ESR is pending.  Therefore do not find any specific ocular inflammatory component.  Is uncertain as to whether  this is truly recurrent giant cell.  I therefore recommend that she take her Motrin 600 mg more consistently 1/day for the next 5 to 7 days.  If these nodules develop despite the consistent nonsteroidals then next step would be to consider low-dose prednisone and then referred back to Dr. Hager for probable chronic methotrexate.  She related that she read the side effects of methotrexate in the past and did not wish to take.  However I discussed the long-term side effects of methotrexate are far less than long-term prednisone or long-term Motrin.  Especially since she is on Plavix    Emergency visit.  Ordered ESR and CRP.    Jon Wilde M.D.

## 2025-04-30 NOTE — ASSESSMENT & PLAN NOTE
8/25/2021 - small RHypo and XT. 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 However difficulty fusion secondary to the aniseikonia form the monovision as well as the ERM OD. In Pat did better with a 2 base up OD and 2 base in OS. However still having some asthenopic findings secondary to the ERM. Discussed trial of RX with prisms and bifocal and correcting for the monovision.   12/1/2021 - ortho with new glasses, but not wearing because of nose bleed. No progression of the misalignment  7/27/2022 - continue current rx with prism  1/4/2023 -overall motility appears stable.  However she does not like to wear prism glasses because of the same irritating her temple on the right.  4/5/2023 -stable small right hypo and exotropia.  Has monovision so not that bothered by the motility disorder and double vision.  10/11/2023-overall stable.  Doing well with prism glasses  8/5/2024-not really tolerating prism glasses following cataract extraction.  Discussed could continue going without  For 3/20/2025-essentially asymptomatic without Rx

## 2025-05-06 ENCOUNTER — DOCUMENTATION (OUTPATIENT)
Dept: OPHTHALMOLOGY | Facility: MEDICAL CENTER | Age: 75
End: 2025-05-06
Payer: MEDICARE

## 2025-05-06 ENCOUNTER — RESULTS FOLLOW-UP (OUTPATIENT)
Dept: OPHTHALMOLOGY | Facility: MEDICAL CENTER | Age: 75
End: 2025-05-06
Payer: MEDICARE

## 2025-05-06 DIAGNOSIS — M31.6 TEMPORAL GIANT CELL ARTERITIS (HCC): ICD-10-CM

## 2025-05-06 NOTE — TELEPHONE ENCOUNTER
----- Message from Physician Jon Wilde M.D. sent at 5/6/2025  8:00 AM PDT -----  Can you please let her know that the ESR test was also normal. Thanks  ----- Message -----  From: [facility], Lab  Sent: 4/30/2025   2:30 PM PDT  To: Jon Wilde M.D.

## 2025-05-06 NOTE — ASSESSMENT & PLAN NOTE
2/3/2021 - Presumed giant cell arteritis by history, symptoms of temporal pain, and response to prednisone. Never had temporal artery biopsy. ESR was 1 back in January, but had been on prednosne at least 10 mg for a few months prior. Dr Banks increased prednisone to 60 and currently on 40 mg / day.  From the neuro-ophthalmic standpoint no evidence of optic nerve invovement with OCT NFL thickness normal at 107 OD and 110 OS. No optic disc pallor and no swelling. In Addition VICKIE was 1:320. Therefore discussed with patient that I recommend being managed by Rheumatology. Would consider Actemra and thus will refer to Dr Michaud in [place]. VICKIE will need to be characterized to determine is has concomitant other autoimmune disorder.   8/25/2021 - Didn't see Dr Michaud. Sept 13th has a telemedicine apt with [place]. On prednisone 10 mg. Initial symptoms headache and lump on the side of the head. Dx made 10/2020. Long discussion regarding GCA, and monitoring symptoms, ESR and CRP on pred taper. Recommended she have discussion via telemed with Rheum about Actemra. Ordered ESR, CRP and TFT's. No evidence of GCA induced optic neuropathy  8/26/2021 - ESR 4.0, TSH 1.16, CRP 0.34  12/1/2021 - Apparently had relapse of headaches in September and went back to 20 mg prednisone. Then at taper again down to 10 mg last week headache returned and increased to 12.5 mg. She discussed with Rheumatologist in [place] where advised TA biopsy to secure the diagnosis. Would need to find provider here in [place] to rx Actemra. OCT NFL thickness stable at 107 OD and 114 OS and nerves healthy, so no evidence of optic neuropathy. However will recheck ESR and CRP. Also refer to Dr Pelaez for temporal artery biopsy  12/3/2021 - Repeat ESR 4.0, CRP 0.6 slightly increased, but still within normal.  7/27/2022 - Recent MI with subsequent COVID. Self discontinued prednisone, but was on 3 mg. Will repeat ESR and CRP. Had biopsy scheduled august 10  th. Still no evidence of an optic neuropathy or motility disorder secondary to GCA. OCT NFL thickness stable at 106 OD and 106 OS  Addendum CRP 1.73 slightly above normal range  8/24/2022 - Report of Temporal Artery Biopsy by Dr Fields at [facility]. Shows evidence of treated temporal arteritis. Will send report to her immunologist Dr Brandi Hester at [place]  1/4/2023 -has now been off prednisone since the biopsy in July.  However states that her temporal headaches have started to come back as well as some jaw discomfort on the right.  She Was History of Temporal and Facial Headaches That Improved in the past with Botox but Her Current Return of Headaches Is More Similar to the Ones That Caused a Lump in the Region of Her Temporal Artery.  She Also Has Some Other Cranial Lumps and a CT Scan Done with Contrast by Her Primary Was Nonrevealing.  Is Therefore concerning That Her Headaches Have Worsened.  I am therefore repeating her sed rate CRP, and also sending her as a consultation to rheumatology here at [facility].  I discussed that if her CRP and sedimentation rate are starting to increase that this would be consistent with a return of giant cell arteritis.  At this point there is no neuro ophthalmologic manifestations, but I did stress to her that if she were to have any change in her vision to call immediately or present to the emergency room  4/5/2023-saw Dr. Hager who recommended beginning on methotrexate.  However she no longer wants to take any medications.  She has had no recurrence of her temporal arteritis symptoms.  Her last CRP was normal at 0.75 and sed rate 6.0.  I discussed that if following her off medication she should be aware of the recurrence of any subtle signs of her temporal arteritis.  If these occur she should start prednisone immediately and if she has any vision changes present to the emergency room.  Her optic nerve heads appear stable without any edema and 105  OS OCT NFL  thickness.  This can repeat her laboratories again at the end of this month  10/11/2023-overall stable.  No development of an optic neuropathy.  Currently taking prasugrel and nonsteroidals.  Again discussed that if any change in vision to present emergently for high-dose steroids and then consideration of methotrexate  8/5/2024 -has not been on prednisone and did not begin methotrexate.  Essentially asymptomatic.  Will repeat CRP and ESR  8/6/2024-ESR 14, CRP <0.30.  Thus CRP stable ESR slightly elevated over her normal.  Will repeat again and 8 weeks  4/30/2025-emergent visit today.  States has been noting some nodules over the region of the greater occipital protuberance on the right as well as the forehead in the region of the prior temporal artery biopsy.  She states that these became prominent during the time of her initial diagnosis of temporal arteritis.  She decided to reinstate taking 600 to 800 mg of ibuprofen per day.  Currently the nodules are no longer present.  I am therefore uncertain as to the etiology of these nodules.  They seem to be responding to nonsteroidal anti-inflammatories.  She had a recent sed rate done 3/28/2025 by her cardiologist that was 6.0.  I repeated her CRP today that came back < 0.30.  A follow-up ESR is pending.  Therefore do not find any specific ocular inflammatory component.  Is uncertain as to whether this is truly recurrent giant cell.  I therefore recommend that she take her Motrin 600 mg more consistently 1/day for the next 5 to 7 days.  If these nodules develop despite the consistent nonsteroidals then next step would be to consider low-dose prednisone and then referred back to Dr. Hager for probable chronic methotrexate.  She related that she read the side effects of methotrexate in the past and did not wish to take.  However I discussed the long-term side effects of methotrexate are far less than long-term prednisone or long-term Motrin.  Especially since she is on  Plavix  5/6/2025 - ESR 10

## 2025-05-06 NOTE — TELEPHONE ENCOUNTER
"Spoke to patient to relay results per Dr. Wilde \"ESR was normal.\" Patient understood and had no questions or concerns. Left my name and direct line in case any questions come up.   "

## 2025-05-06 NOTE — PROGRESS NOTES
2/3/2021 - Presumed giant cell arteritis by history, symptoms of temporal pain, and response to prednisone. Never had temporal artery biopsy. ESR was 1 back in January, but had been on prednosne at least 10 mg for a few months prior. Dr Banks increased prednisone to 60 and currently on 40 mg / day.  From the neuro-ophthalmic standpoint no evidence of optic nerve invovement with OCT NFL thickness normal at 107 OD and 110 OS. No optic disc pallor and no swelling. In Addition VICKIE was 1:320. Therefore discussed with patient that I recommend being managed by Rheumatology. Would consider Actemra and thus will refer to Dr Michaud in [place]. VICKIE will need to be characterized to determine is has concomitant other autoimmune disorder.   8/25/2021 - Didn't see Dr Michaud. Sept 13th has a telemedicine apt with [place]. On prednisone 10 mg. Initial symptoms headache and lump on the side of the head. Dx made 10/2020. Long discussion regarding GCA, and monitoring symptoms, ESR and CRP on pred taper. Recommended she have discussion via telemed with Rheum about Actemra. Ordered ESR, CRP and TFT's. No evidence of GCA induced optic neuropathy  8/26/2021 - ESR 4.0, TSH 1.16, CRP 0.34  12/1/2021 - Apparently had relapse of headaches in September and went back to 20 mg prednisone. Then at taper again down to 10 mg last week headache returned and increased to 12.5 mg. She discussed with Rheumatologist in [place] where advised TA biopsy to secure the diagnosis. Would need to find provider here in [place] to rx Actemra. OCT NFL thickness stable at 107 OD and 114 OS and nerves healthy, so no evidence of optic neuropathy. However will recheck ESR and CRP. Also refer to Dr Pelaez for temporal artery biopsy  12/3/2021 - Repeat ESR 4.0, CRP 0.6 slightly increased, but still within normal.  7/27/2022 - Recent MI with subsequent COVID. Self discontinued prednisone, but was on 3 mg. Will repeat ESR and CRP. Had biopsy scheduled august 10  th. Still no evidence of an optic neuropathy or motility disorder secondary to GCA. OCT NFL thickness stable at 106 OD and 106 OS  Addendum CRP 1.73 slightly above normal range  8/24/2022 - Report of Temporal Artery Biopsy by Dr Fields at [facility]. Shows evidence of treated temporal arteritis. Will send report to her immunologist Dr Brandi Hester at [place]  1/4/2023 -has now been off prednisone since the biopsy in July.  However states that her temporal headaches have started to come back as well as some jaw discomfort on the right.  She Was History of Temporal and Facial Headaches That Improved in the past with Botox but Her Current Return of Headaches Is More Similar to the Ones That Caused a Lump in the Region of Her Temporal Artery.  She Also Has Some Other Cranial Lumps and a CT Scan Done with Contrast by Her Primary Was Nonrevealing.  Is Therefore concerning That Her Headaches Have Worsened.  I am therefore repeating her sed rate CRP, and also sending her as a consultation to rheumatology here at [facility].  I discussed that if her CRP and sedimentation rate are starting to increase that this would be consistent with a return of giant cell arteritis.  At this point there is no neuro ophthalmologic manifestations, but I did stress to her that if she were to have any change in her vision to call immediately or present to the emergency room  4/5/2023-saw Dr. Hager who recommended beginning on methotrexate.  However she no longer wants to take any medications.  She has had no recurrence of her temporal arteritis symptoms.  Her last CRP was normal at 0.75 and sed rate 6.0.  I discussed that if following her off medication she should be aware of the recurrence of any subtle signs of her temporal arteritis.  If these occur she should start prednisone immediately and if she has any vision changes present to the emergency room.  Her optic nerve heads appear stable without any edema and 105  OS OCT NFL  thickness.  This can repeat her laboratories again at the end of this month  10/11/2023-overall stable.  No development of an optic neuropathy.  Currently taking prasugrel and nonsteroidals.  Again discussed that if any change in vision to present emergently for high-dose steroids and then consideration of methotrexate  8/5/2024 -has not been on prednisone and did not begin methotrexate.  Essentially asymptomatic.  Will repeat CRP and ESR  8/6/2024-ESR 14, CRP <0.30.  Thus CRP stable ESR slightly elevated over her normal.  Will repeat again and 8 weeks  4/30/2025-emergent visit today.  States has been noting some nodules over the region of the greater occipital protuberance on the right as well as the forehead in the region of the prior temporal artery biopsy.  She states that these became prominent during the time of her initial diagnosis of temporal arteritis.  She decided to reinstate taking 600 to 800 mg of ibuprofen per day.  Currently the nodules are no longer present.  I am therefore uncertain as to the etiology of these nodules.  They seem to be responding to nonsteroidal anti-inflammatories.  She had a recent sed rate done 3/28/2025 by her cardiologist that was 6.0.  I repeated her CRP today that came back < 0.30.  A follow-up ESR is pending.  Therefore do not find any specific ocular inflammatory component.  Is uncertain as to whether this is truly recurrent giant cell.  I therefore recommend that she take her Motrin 600 mg more consistently 1/day for the next 5 to 7 days.  If these nodules develop despite the consistent nonsteroidals then next step would be to consider low-dose prednisone and then referred back to Dr. Hager for probable chronic methotrexate.  She related that she read the side effects of methotrexate in the past and did not wish to take.  However I discussed the long-term side effects of methotrexate are far less than long-term prednisone or long-term Motrin.  Especially since she is on  Plavix  5/6/2025 - ESR 10

## 2025-08-06 ENCOUNTER — OFFICE VISIT (OUTPATIENT)
Dept: OPHTHALMOLOGY | Facility: MEDICAL CENTER | Age: 75
End: 2025-08-06
Payer: MEDICARE

## 2025-08-06 DIAGNOSIS — H52.31 ANISOMETROPIA: ICD-10-CM

## 2025-08-06 DIAGNOSIS — M31.6 TEMPORAL GIANT CELL ARTERITIS (HCC): ICD-10-CM

## 2025-08-06 DIAGNOSIS — Z96.1 PSEUDOPHAKIA OF BOTH EYES: ICD-10-CM

## 2025-08-06 DIAGNOSIS — H49.9 OPHTHALMOPLEGIA: Primary | ICD-10-CM

## 2025-08-06 DIAGNOSIS — Z95.1 S/P CABG X 3: ICD-10-CM

## 2025-08-06 DIAGNOSIS — H40.003 GLAUCOMA SUSPECT OF BOTH EYES: ICD-10-CM

## 2025-08-06 DIAGNOSIS — H35.371 EPIRETINAL MEMBRANE (ERM) OF RIGHT EYE: ICD-10-CM

## 2025-08-06 PROCEDURE — 92250 FUNDUS PHOTOGRAPHY W/I&R: CPT | Performed by: OPHTHALMOLOGY

## 2025-08-06 PROCEDURE — 92060 SENSORIMOTOR EXAMINATION: CPT | Performed by: OPHTHALMOLOGY

## 2025-08-06 PROCEDURE — 99214 OFFICE O/P EST MOD 30 MIN: CPT | Mod: 25 | Performed by: OPHTHALMOLOGY

## 2025-08-06 ASSESSMENT — CONF VISUAL FIELD
OS_SUPERIOR_NASAL_RESTRICTION: 0
OD_INFERIOR_TEMPORAL_RESTRICTION: 0
OS_SUPERIOR_TEMPORAL_RESTRICTION: 0
OS_NORMAL: 1
OD_NORMAL: 1
OD_SUPERIOR_TEMPORAL_RESTRICTION: 0
OS_INFERIOR_TEMPORAL_RESTRICTION: 0
OD_INFERIOR_NASAL_RESTRICTION: 0
OD_SUPERIOR_NASAL_RESTRICTION: 0
OS_INFERIOR_NASAL_RESTRICTION: 0

## 2025-08-06 ASSESSMENT — TONOMETRY
OD_IOP_MMHG: 11
IOP_METHOD: I-CARE
OS_IOP_MMHG: 11

## 2025-08-06 ASSESSMENT — SLIT LAMP EXAM - LIDS
COMMENTS: NORMAL
COMMENTS: NORMAL

## 2025-08-06 ASSESSMENT — CUP TO DISC RATIO
OD_RATIO: 0.1
OS_RATIO: 0.1

## 2025-08-06 ASSESSMENT — VISUAL ACUITY
OS_SC: 20/20
OD_SC+: -1
METHOD: SNELLEN - LINEAR
OD_SC: 20/20

## 2025-08-06 ASSESSMENT — REFRACTION_MANIFEST
OD_SPHERE: -0.50
OD_AXIS: 056
OS_CYLINDER: +1.00
OS_SPHERE: -1.25
METHOD_AUTOREFRACTION: 1
OD_CYLINDER: +0.25
OS_AXIS: 157

## 2025-08-06 ASSESSMENT — EXTERNAL EXAM - RIGHT EYE: OD_EXAM: NORMAL

## 2025-08-06 ASSESSMENT — EXTERNAL EXAM - LEFT EYE: OS_EXAM: NORMAL

## 2025-08-06 ASSESSMENT — ENCOUNTER SYMPTOMS: DOUBLE VISION: 1

## (undated) DEVICE — ELECTRODE NEEDLE NON-SAFETY 2.8 IN (150EA/CT)

## (undated) DEVICE — TUBING INSUFFLATION - (10/BX)

## (undated) DEVICE — CONNECTOR HUBLESS DRAINAGE - ONE WAY (20/BX)

## (undated) DEVICE — DRAPE MAYO STAND - (30/CA)

## (undated) DEVICE — TUBING CLEARLINK DUO-VENT - C-FLO (48EA/CA)

## (undated) DEVICE — SYSTEM PREVENA DRESSING CUSTOMIZABLE (5EA/CA)

## (undated) DEVICE — BANDAGE ELASTIC 6 HONEYCOMB - 6X5YD LF (20/CA)"

## (undated) DEVICE — CANISTER SUCTION 3000ML MECHANICAL FILTER AUTO SHUTOFF MEDI-VAC NONSTERILE LF DISP  (40EA/CA)

## (undated) DEVICE — SUTURE 7-0 PROLENE 24BV175-6 - EP8735H (36/BX)"

## (undated) DEVICE — STOPCOCK MALE 4-WAY - (50/CA)

## (undated) DEVICE — BAG RESUSCITATION DISPOSABLE - WITH MASK (10 EA/CA)

## (undated) DEVICE — DERMABOND ADVANCED - (12EA/BX)

## (undated) DEVICE — PACK VEIN - (19/CA)

## (undated) DEVICE — DRAPE SURGICAL U 77X120 - (10/CA)

## (undated) DEVICE — GLOVE BIOGEL SZ 6 PF LATEX - (50EA/BX 4BX/CA)

## (undated) DEVICE — LACTATED RINGERS INJ 1000 ML - (14EA/CA 60CA/PF)

## (undated) DEVICE — COVER LIGHT HANDLE ALC PLUS DISP (18EA/BX)

## (undated) DEVICE — KIT SURGIFLO W/OUT THROMBIN - (6EA/CA)

## (undated) DEVICE — KIT TOURNIQUET DLP (40EA/PK)

## (undated) DEVICE — GLOVE BIOGEL PI ORTHO SZ 6 SURGICAL PF LF (40PR/BX)

## (undated) DEVICE — KIT ENDOHARVEST SYSTEM 8 - MUST ORDER 5 AT A TIME

## (undated) DEVICE — MASK ANESTHESIA ADULT  - (100/CA)

## (undated) DEVICE — SOD. CHL. INJ. 0.9% 1000 ML - (14EA/CA 60CA/PF)

## (undated) DEVICE — SUTURE 3-0 SILK SH C/R 18 IN - (12/BX)

## (undated) DEVICE — DRAIN SILICONE CLOSED WOUND SUCTION CHANNEL 24FR ROUND HUBLESS (10/CA)

## (undated) DEVICE — TOWELS CLOTH SURGICAL - (4/PK 20PK/CA)

## (undated) DEVICE — SUTURE 4-0 VICRYL PLUS PC-3 18 (12PK/BX)"

## (undated) DEVICE — SUTURE 5 SURGICAL STEEL V-40 - (12/BX) CCS CURRENT

## (undated) DEVICE — SYS DLV COST CLS RM TEMP - INJECTATE (CO-SET II) (10EA/CA)

## (undated) DEVICE — SET EXTENSION WITH 2 PORTS (48EA/CA) ***PART #2C8610 IS A SUBSTITUTE*****

## (undated) DEVICE — SYSTEM CHEST DRAIN ADULT/PEDS W/AUTO TRANSFUSION CAPABILITY SAHARA (6EA/CA)

## (undated) DEVICE — TUBE CHEST 32FR SOFT STRAIGHT (10EA/BX)

## (undated) DEVICE — SODIUM CHL IRRIGATION 0.9% 1000ML (12EA/CA)

## (undated) DEVICE — GOWN WARMING STANDARD FLEX - (30/CA)

## (undated) DEVICE — GOWN SURGEONS LARGE - (32/CA)

## (undated) DEVICE — BLADE BEAVER 6900 MINI SHARP ALL AROUND (20/CA)

## (undated) DEVICE — BLADE SURGICAL #15 - (50/BX 3BX/CA)

## (undated) DEVICE — BLOWER/MISTER (5EA/PK)

## (undated) DEVICE — GLOVE BIOGEL SZ 7 SURGICAL PF LTX - (50PR/BX 4BX/CA)

## (undated) DEVICE — SET LEADWIRE 5 LEAD BEDSIDE DISPOSABLE ECG (1SET OF 5/EA)

## (undated) DEVICE — GLOVE SZ 8 BIOGEL PI MICRO - PF LF (50PR/BX)

## (undated) DEVICE — SENSOR OXIMETER ADULT SPO2 RD SET (20EA/BX)

## (undated) DEVICE — BLADE STERNUM SAW SURGICAL 32.0 X 6.4 MM STERILE (1/EA)

## (undated) DEVICE — SUCTION INSTRUMENT YANKAUER BULBOUS TIP W/O VENT (50EA/CA)

## (undated) DEVICE — SLEEVE, VASO, THIGH, MED

## (undated) DEVICE — SUTURE GENERAL

## (undated) DEVICE — MICRODRIP PRIMARY VENTED 60 (48EA/CA) THIS WAS PART #2C8428 WHICH WAS DISCONTINUED

## (undated) DEVICE — CONTAINER SPECIMEN BAG OR - STERILE 4 OZ W/LID (100EA/CA)

## (undated) DEVICE — D-5-W INJ. 500 ML - (24EA/CA)

## (undated) DEVICE — INSERT STEALTH #5 - (10/BX)

## (undated) DEVICE — SUTURE 4-0 PROLENE RB-1 D/A 36 (36PK/BX)"

## (undated) DEVICE — SUTURE OHS

## (undated) DEVICE — DRESSING SURGICAL NUKNIT 6X9 (10EA/BX)

## (undated) DEVICE — TRAY SURESTEP FOLEY TEMP SENSING 16FR (10EA/CA) ORDER  #18764 FOR TEMP FOLEY ONLY

## (undated) DEVICE — GLOVE SZ 7.5 BIOGEL PI MICRO - PF LF (50PR/BX)

## (undated) DEVICE — ELECTRODE DUAL RETURN W/ CORD - (50/PK)

## (undated) DEVICE — TOWEL STOP TIMEOUT SAFETY FLAG (40EA/CA)

## (undated) DEVICE — CATHETER IV 14 GA X 2 ---SURG.& SDS ONLY---(200EA/CA)

## (undated) DEVICE — CATHETER THERMALDILUTION SWAN - (5EA/CA)

## (undated) DEVICE — SUTURE 4-0 PROLENE V-7 D/A (36PK/BX)

## (undated) DEVICE — SUTURE 6-0 PROLENE RB-2 D/A 30 (36PK/BX)"

## (undated) DEVICE — DECANTER FLD BLS - (50/CA)

## (undated) DEVICE — SUTURE 2-0 VICRYL PLUS CT-1 36 (36PK/BX)"

## (undated) DEVICE — LEAD PACING TEMP MYO - (12/BX)

## (undated) DEVICE — ELECTRODE 850 FOAM ADHESIVE - HYDROGEL RADIOTRNSPRNT (50/PK)

## (undated) DEVICE — PAD LAP STERILE 18 X 18 - (5/PK 40PK/CA)

## (undated) DEVICE — SPONGE XRAY 8X4 STERL. 12PL - (10EA/TY 80TY/CA)

## (undated) DEVICE — SET BIFURCATED BLOOD - (48EA/CS)

## (undated) DEVICE — GLOVE BIOGEL INDICATOR SZ 6.5 SURGICAL PF LTX - (50PR/BX 4BX/CA)

## (undated) DEVICE — HEAD HOLDER JUNIOR/ADULT

## (undated) DEVICE — DRAPE STRLE REG TOWEL 18X24 - (10/BX 4BX/CA)"

## (undated) DEVICE — TRANSDUCER BIFURCATED MONITORING KIT (10EA/CA)

## (undated) DEVICE — SPRING BULLDOG 1/2 FORCE BLUE - (10/BX)

## (undated) DEVICE — KIT ANESTHESIA W/CIRCUIT & 3/LT BAG W/FILTER (20EA/CA)

## (undated) DEVICE — SET TUBING PNEUMOCLEAR HIGH FLOW SMOKE EVACUATION (10EA/BX)

## (undated) DEVICE — SUTURE NONABSORBABLE DOUBLEWIRE 8 CCS-2 14IN (12EA/PK)

## (undated) DEVICE — SET FLUID WARMING STANDARD FLOW - (10/CA)

## (undated) DEVICE — DRESSING NON ADHERENT 3 X 4 - STERILE (100/BX 12BX/CA)

## (undated) DEVICE — CLIP SM INTNL HRZN TI ESCP LGT - (24EA/PK 25PK/BX)

## (undated) DEVICE — SOD. CHL. INJ. 0.9% 250 ML - (36/CA 50CA/PF)

## (undated) DEVICE — TUBE E-T HI-LO CUFF 6.5MM (10EA/BX)

## (undated) DEVICE — PACK CV DRAPING/BASIN 2PART - (1/CA)

## (undated) DEVICE — SUTURE 4-0 30CM STRATAFIX SPIRAL PS-2 (12EA/BX)

## (undated) DEVICE — PROTECTOR ULNA NERVE - (36PR/CA)

## (undated) DEVICE — GLOVE BIOGEL PI INDICATOR SZ 8.0 SURGICAL PF LF -(50/BX 4BX/CA)

## (undated) DEVICE — GOWN SURGEONS X-LARGE - DISP. (30/CA)

## (undated) DEVICE — KIT INTROPERCUTANEOUS SHEATH - 8.5 FR W/MAX BARRIER AND BIOPATCH  (5/CA)

## (undated) DEVICE — SUTURE 4-0 VICRYL PLUS TF - 8 X 18 INCH (12/BX)

## (undated) DEVICE — PACK MINOR BASIN - (2EA/CA)

## (undated) DEVICE — KIT CATHETERIZATION TWO-LUMEN CENTRAL VENOUS PI CVC (5EA/CA)

## (undated) DEVICE — BLANKET UNDERBODY FULL ACCES - (5/CA)

## (undated) DEVICE — SODIUM CHL. INJ. 0.9% 500ML (24EA/CA 50CA/PF)

## (undated) DEVICE — CHLORAPREP 26 ML APPLICATOR - ORANGE TINT(25/CA)

## (undated) DEVICE — RETRACTOR OFF PUMP OCTO ONLY - 10/BX

## (undated) DEVICE — KIT RADIAL ARTERY 20GA W/MAX BARRIER AND BIOPATCH  (5EA/CA) #10740 IS FOR THE SET RADIAL ARTERIAL